# Patient Record
Sex: FEMALE | Race: WHITE | NOT HISPANIC OR LATINO | Employment: OTHER | URBAN - NONMETROPOLITAN AREA
[De-identification: names, ages, dates, MRNs, and addresses within clinical notes are randomized per-mention and may not be internally consistent; named-entity substitution may affect disease eponyms.]

---

## 2018-05-31 ENCOUNTER — APPOINTMENT (OUTPATIENT)
Dept: GENERAL RADIOLOGY | Facility: HOSPITAL | Age: 38
End: 2018-05-31

## 2018-05-31 ENCOUNTER — APPOINTMENT (OUTPATIENT)
Dept: CT IMAGING | Facility: HOSPITAL | Age: 38
End: 2018-05-31

## 2018-05-31 ENCOUNTER — HOSPITAL ENCOUNTER (OUTPATIENT)
Facility: HOSPITAL | Age: 38
Setting detail: OBSERVATION
Discharge: HOME | End: 2018-06-02
Attending: EMERGENCY MEDICINE | Admitting: FAMILY MEDICINE

## 2018-05-31 DIAGNOSIS — R00.1 BRADYCARDIA: ICD-10-CM

## 2018-05-31 DIAGNOSIS — R07.9 CHEST PAIN, UNSPECIFIED TYPE: ICD-10-CM

## 2018-05-31 DIAGNOSIS — J20.9 ACUTE BRONCHITIS, UNSPECIFIED ORGANISM: Primary | ICD-10-CM

## 2018-05-31 PROBLEM — K21.9 GERD WITHOUT ESOPHAGITIS: Chronic | Status: ACTIVE | Noted: 2018-05-31

## 2018-05-31 PROBLEM — I10 ESSENTIAL HYPERTENSION: Chronic | Status: ACTIVE | Noted: 2018-05-31

## 2018-05-31 PROBLEM — J42 CHRONIC BRONCHITIS WITH ACUTE EXACERBATION (HCC): Status: ACTIVE | Noted: 2018-05-31

## 2018-05-31 PROBLEM — M79.7 FIBROMYALGIA SYNDROME: Chronic | Status: ACTIVE | Noted: 2018-05-31

## 2018-05-31 LAB
ALBUMIN SERPL-MCNC: 4 G/DL (ref 3.5–5)
ALBUMIN/GLOB SERPL: 1.4 G/DL (ref 1–2)
ALP SERPL-CCNC: 121 U/L (ref 38–126)
ALT SERPL W P-5'-P-CCNC: 84 U/L (ref 13–69)
ANION GAP SERPL CALCULATED.3IONS-SCNC: 15.1 MMOL/L (ref 10–20)
ARTERIAL PATENCY WRIST A: POSITIVE
AST SERPL-CCNC: 171 U/L (ref 15–46)
B-HCG UR QL: NEGATIVE
BASE EXCESS BLDA CALC-SCNC: -1.9 MMOL/L
BASOPHILS # BLD AUTO: 0.02 10*3/MM3 (ref 0–0.2)
BASOPHILS NFR BLD AUTO: 0.3 % (ref 0–2.5)
BDY SITE: ABNORMAL
BILIRUB SERPL-MCNC: 1 MG/DL (ref 0.2–1.3)
BUN BLD-MCNC: 9 MG/DL (ref 7–20)
BUN/CREAT SERPL: 12.9 (ref 7.1–23.5)
CALCIUM SPEC-SCNC: 8.6 MG/DL (ref 8.4–10.2)
CHLORIDE SERPL-SCNC: 105 MMOL/L (ref 98–107)
CO2 SERPL-SCNC: 26 MMOL/L (ref 26–30)
COHGB MFR BLD: 1.2 %
CREAT BLD-MCNC: 0.7 MG/DL (ref 0.6–1.3)
DEPRECATED RDW RBC AUTO: 47.6 FL (ref 37–54)
EOSINOPHIL # BLD AUTO: 0.02 10*3/MM3 (ref 0–0.7)
EOSINOPHIL NFR BLD AUTO: 0.3 % (ref 0–7)
ERYTHROCYTE [DISTWIDTH] IN BLOOD BY AUTOMATED COUNT: 16.9 % (ref 11.5–14.5)
GFR SERPL CREATININE-BSD FRML MDRD: 95 ML/MIN/1.73
GLOBULIN UR ELPH-MCNC: 2.8 GM/DL
GLUCOSE BLD-MCNC: 133 MG/DL (ref 74–98)
GLUCOSE BLDC GLUCOMTR-MCNC: 154 MG/DL (ref 70–130)
HCO3 BLDA-SCNC: 21.8 MMOL/L (ref 22–28)
HCT VFR BLD AUTO: 30.9 % (ref 37–47)
HGB BLD-MCNC: 9.4 G/DL (ref 12–16)
HGB BLDA-MCNC: 9 G/DL (ref 12–18)
HOROWITZ INDEX BLD+IHG-RTO: 21 %
IMM GRANULOCYTES # BLD: 0.08 10*3/MM3 (ref 0–0.06)
IMM GRANULOCYTES NFR BLD: 1.2 % (ref 0–0.6)
LYMPHOCYTES # BLD AUTO: 1.13 10*3/MM3 (ref 0.6–3.4)
LYMPHOCYTES NFR BLD AUTO: 16.4 % (ref 10–50)
MCH RBC QN AUTO: 23.6 PG (ref 27–31)
MCHC RBC AUTO-ENTMCNC: 30.4 G/DL (ref 30–37)
MCV RBC AUTO: 77.4 FL (ref 81–99)
METHGB BLD QL: 0.9 %
MODALITY: ABNORMAL
MONOCYTES # BLD AUTO: 0.41 10*3/MM3 (ref 0–0.9)
MONOCYTES NFR BLD AUTO: 6 % (ref 0–12)
NEUTROPHILS # BLD AUTO: 5.21 10*3/MM3 (ref 2–6.9)
NEUTROPHILS NFR BLD AUTO: 75.8 % (ref 37–80)
NRBC BLD MANUAL-RTO: 0.4 /100 WBC (ref 0–0)
NT-PROBNP SERPL-MCNC: 368 PG/ML (ref 0–125)
OXYHGB MFR BLDV: 94.2 % (ref 94–99)
PCO2 BLDA: 30.1 MM HG (ref 35–45)
PCO2 TEMP ADJ BLD: ABNORMAL MM HG (ref 35–45)
PH BLDA: 7.47 PH UNITS (ref 7.3–7.5)
PH, TEMP CORRECTED: ABNORMAL PH UNITS
PLATELET # BLD AUTO: 199 10*3/MM3 (ref 130–400)
PMV BLD AUTO: 9.9 FL (ref 6–12)
PO2 BLDA: 79.5 MM HG (ref 75–100)
PO2 TEMP ADJ BLD: ABNORMAL MM HG (ref 83–108)
POTASSIUM BLD-SCNC: 4.1 MMOL/L (ref 3.5–5.1)
PROT SERPL-MCNC: 6.8 G/DL (ref 6.3–8.2)
RBC # BLD AUTO: 3.99 10*6/MM3 (ref 4.2–5.4)
SAO2 % BLDCOA: 96.2 %
SODIUM BLD-SCNC: 142 MMOL/L (ref 137–145)
T4 FREE SERPL-MCNC: 1.1 NG/DL (ref 0.78–2.19)
TROPONIN I SERPL-MCNC: <0.012 NG/ML (ref 0–0.03)
TSH SERPL DL<=0.05 MIU/L-ACNC: 3.06 MIU/ML (ref 0.47–4.68)
WBC NRBC COR # BLD: 6.87 10*3/MM3 (ref 4.8–10.8)

## 2018-05-31 PROCEDURE — 25010000002 METHYLPREDNISOLONE PER 125 MG: Performed by: FAMILY MEDICINE

## 2018-05-31 PROCEDURE — 36600 WITHDRAWAL OF ARTERIAL BLOOD: CPT

## 2018-05-31 PROCEDURE — G0378 HOSPITAL OBSERVATION PER HR: HCPCS

## 2018-05-31 PROCEDURE — 96375 TX/PRO/DX INJ NEW DRUG ADDON: CPT

## 2018-05-31 PROCEDURE — 80053 COMPREHEN METABOLIC PANEL: CPT | Performed by: NURSE PRACTITIONER

## 2018-05-31 PROCEDURE — 99220 PR INITIAL OBSERVATION CARE/DAY 70 MINUTES: CPT | Performed by: FAMILY MEDICINE

## 2018-05-31 PROCEDURE — 71275 CT ANGIOGRAPHY CHEST: CPT

## 2018-05-31 PROCEDURE — 25010000002 IOPAMIDOL 61 % SOLUTION: Performed by: EMERGENCY MEDICINE

## 2018-05-31 PROCEDURE — 94640 AIRWAY INHALATION TREATMENT: CPT

## 2018-05-31 PROCEDURE — 83050 HGB METHEMOGLOBIN QUAN: CPT

## 2018-05-31 PROCEDURE — 84443 ASSAY THYROID STIM HORMONE: CPT | Performed by: FAMILY MEDICINE

## 2018-05-31 PROCEDURE — 82375 ASSAY CARBOXYHB QUANT: CPT

## 2018-05-31 PROCEDURE — 93005 ELECTROCARDIOGRAM TRACING: CPT | Performed by: NURSE PRACTITIONER

## 2018-05-31 PROCEDURE — 84439 ASSAY OF FREE THYROXINE: CPT | Performed by: FAMILY MEDICINE

## 2018-05-31 PROCEDURE — 96374 THER/PROPH/DIAG INJ IV PUSH: CPT

## 2018-05-31 PROCEDURE — 99284 EMERGENCY DEPT VISIT MOD MDM: CPT

## 2018-05-31 PROCEDURE — 25010000002 KETOROLAC TROMETHAMINE PER 15 MG: Performed by: NURSE PRACTITIONER

## 2018-05-31 PROCEDURE — 82962 GLUCOSE BLOOD TEST: CPT

## 2018-05-31 PROCEDURE — 81025 URINE PREGNANCY TEST: CPT | Performed by: NURSE PRACTITIONER

## 2018-05-31 PROCEDURE — 84484 ASSAY OF TROPONIN QUANT: CPT | Performed by: NURSE PRACTITIONER

## 2018-05-31 PROCEDURE — 85025 COMPLETE CBC W/AUTO DIFF WBC: CPT | Performed by: NURSE PRACTITIONER

## 2018-05-31 PROCEDURE — 94799 UNLISTED PULMONARY SVC/PX: CPT

## 2018-05-31 PROCEDURE — 83880 ASSAY OF NATRIURETIC PEPTIDE: CPT | Performed by: NURSE PRACTITIONER

## 2018-05-31 PROCEDURE — 82805 BLOOD GASES W/O2 SATURATION: CPT

## 2018-05-31 PROCEDURE — 25010000002 FENTANYL CITRATE (PF) 100 MCG/2ML SOLUTION: Performed by: NURSE PRACTITIONER

## 2018-05-31 RX ORDER — FENTANYL CITRATE 50 UG/ML
50 INJECTION, SOLUTION INTRAMUSCULAR; INTRAVENOUS ONCE
Status: COMPLETED | OUTPATIENT
Start: 2018-05-31 | End: 2018-05-31

## 2018-05-31 RX ORDER — FLUOXETINE HYDROCHLORIDE 40 MG/1
40 CAPSULE ORAL DAILY
COMMUNITY

## 2018-05-31 RX ORDER — CALCIUM CARBONATE 200(500)MG
1 TABLET,CHEWABLE ORAL
Status: DISCONTINUED | OUTPATIENT
Start: 2018-06-01 | End: 2018-06-02 | Stop reason: HOSPADM

## 2018-05-31 RX ORDER — BUDESONIDE AND FORMOTEROL FUMARATE DIHYDRATE 160; 4.5 UG/1; UG/1
2 AEROSOL RESPIRATORY (INHALATION)
Status: DISCONTINUED | OUTPATIENT
Start: 2018-05-31 | End: 2018-06-02 | Stop reason: HOSPADM

## 2018-05-31 RX ORDER — LISINOPRIL 2.5 MG/1
2.5 TABLET ORAL DAILY
Status: ON HOLD | COMMUNITY
End: 2018-06-02

## 2018-05-31 RX ORDER — METHYLPREDNISOLONE SODIUM SUCCINATE 125 MG/2ML
80 INJECTION, POWDER, LYOPHILIZED, FOR SOLUTION INTRAMUSCULAR; INTRAVENOUS ONCE
Status: COMPLETED | OUTPATIENT
Start: 2018-05-31 | End: 2018-05-31

## 2018-05-31 RX ORDER — SODIUM CHLORIDE 0.9 % (FLUSH) 0.9 %
1-10 SYRINGE (ML) INJECTION AS NEEDED
Status: DISCONTINUED | OUTPATIENT
Start: 2018-05-31 | End: 2018-06-02 | Stop reason: HOSPADM

## 2018-05-31 RX ORDER — IPRATROPIUM BROMIDE AND ALBUTEROL SULFATE 2.5; .5 MG/3ML; MG/3ML
3 SOLUTION RESPIRATORY (INHALATION) ONCE
Status: COMPLETED | OUTPATIENT
Start: 2018-05-31 | End: 2018-05-31

## 2018-05-31 RX ORDER — ACETAMINOPHEN 325 MG/1
325 TABLET ORAL EVERY 4 HOURS PRN
Status: DISCONTINUED | OUTPATIENT
Start: 2018-05-31 | End: 2018-06-02 | Stop reason: HOSPADM

## 2018-05-31 RX ORDER — LANSOPRAZOLE 30 MG/1
30 CAPSULE, DELAYED RELEASE ORAL DAILY
COMMUNITY
End: 2020-06-16 | Stop reason: HOSPADM

## 2018-05-31 RX ORDER — LISINOPRIL 5 MG/1
5 TABLET ORAL DAILY
Status: DISCONTINUED | OUTPATIENT
Start: 2018-06-01 | End: 2018-06-02 | Stop reason: HOSPADM

## 2018-05-31 RX ORDER — KETOROLAC TROMETHAMINE 30 MG/ML
30 INJECTION, SOLUTION INTRAMUSCULAR; INTRAVENOUS ONCE
Status: COMPLETED | OUTPATIENT
Start: 2018-05-31 | End: 2018-05-31

## 2018-05-31 RX ORDER — GABAPENTIN 800 MG/1
800 TABLET ORAL 3 TIMES DAILY
COMMUNITY

## 2018-05-31 RX ORDER — SODIUM CHLORIDE 9 MG/ML
75 INJECTION, SOLUTION INTRAVENOUS CONTINUOUS
Status: DISCONTINUED | OUTPATIENT
Start: 2018-05-31 | End: 2018-06-01

## 2018-05-31 RX ORDER — AZITHROMYCIN 250 MG/1
500 TABLET, FILM COATED ORAL
Status: DISCONTINUED | OUTPATIENT
Start: 2018-06-01 | End: 2018-06-02 | Stop reason: HOSPADM

## 2018-05-31 RX ORDER — GABAPENTIN 400 MG/1
400 CAPSULE ORAL EVERY 8 HOURS SCHEDULED
Status: DISCONTINUED | OUTPATIENT
Start: 2018-05-31 | End: 2018-06-02 | Stop reason: HOSPADM

## 2018-05-31 RX ORDER — METHYLPREDNISOLONE SODIUM SUCCINATE 40 MG/ML
40 INJECTION, POWDER, LYOPHILIZED, FOR SOLUTION INTRAMUSCULAR; INTRAVENOUS EVERY 12 HOURS
Status: DISCONTINUED | OUTPATIENT
Start: 2020-06-01 | End: 2018-06-01

## 2018-05-31 RX ORDER — ONDANSETRON 2 MG/ML
4 INJECTION INTRAMUSCULAR; INTRAVENOUS EVERY 6 HOURS PRN
Status: DISCONTINUED | OUTPATIENT
Start: 2018-05-31 | End: 2018-06-02 | Stop reason: HOSPADM

## 2018-05-31 RX ORDER — FAMOTIDINE 20 MG/1
20 TABLET, FILM COATED ORAL 2 TIMES DAILY
Status: DISCONTINUED | OUTPATIENT
Start: 2018-05-31 | End: 2018-06-02 | Stop reason: HOSPADM

## 2018-05-31 RX ORDER — ROPINIROLE 1 MG/1
1 TABLET, FILM COATED ORAL 3 TIMES DAILY
COMMUNITY
End: 2018-06-02 | Stop reason: HOSPADM

## 2018-05-31 RX ADMIN — FENTANYL CITRATE 50 MCG: 50 INJECTION, SOLUTION INTRAMUSCULAR; INTRAVENOUS at 19:19

## 2018-05-31 RX ADMIN — METHYLPREDNISOLONE SODIUM SUCCINATE 80 MG: 125 INJECTION, POWDER, FOR SOLUTION INTRAMUSCULAR; INTRAVENOUS at 19:19

## 2018-05-31 RX ADMIN — FAMOTIDINE 20 MG: 20 TABLET ORAL at 21:20

## 2018-05-31 RX ADMIN — BUDESONIDE AND FORMOTEROL FUMARATE DIHYDRATE 2 PUFF: 160; 4.5 AEROSOL RESPIRATORY (INHALATION) at 22:53

## 2018-05-31 RX ADMIN — ACETAMINOPHEN 325 MG: 325 TABLET, FILM COATED ORAL at 23:21

## 2018-05-31 RX ADMIN — GABAPENTIN 400 MG: 400 CAPSULE ORAL at 21:20

## 2018-05-31 RX ADMIN — IOPAMIDOL 100 ML: 612 INJECTION, SOLUTION INTRAVENOUS at 17:51

## 2018-05-31 RX ADMIN — KETOROLAC TROMETHAMINE 30 MG: 30 INJECTION, SOLUTION INTRAMUSCULAR; INTRAVENOUS at 17:15

## 2018-05-31 RX ADMIN — IPRATROPIUM BROMIDE AND ALBUTEROL SULFATE 3 ML: .5; 3 SOLUTION RESPIRATORY (INHALATION) at 15:42

## 2018-05-31 RX ADMIN — SODIUM CHLORIDE 75 ML/HR: 9 INJECTION, SOLUTION INTRAVENOUS at 21:20

## 2018-06-01 ENCOUNTER — APPOINTMENT (OUTPATIENT)
Dept: ULTRASOUND IMAGING | Facility: HOSPITAL | Age: 38
End: 2018-06-01

## 2018-06-01 LAB
ALBUMIN SERPL-MCNC: 4.1 G/DL (ref 3.5–5)
ALBUMIN/GLOB SERPL: 1.3 G/DL (ref 1–2)
ALP SERPL-CCNC: 120 U/L (ref 38–126)
ALT SERPL W P-5'-P-CCNC: 123 U/L (ref 13–69)
ANION GAP SERPL CALCULATED.3IONS-SCNC: 21.7 MMOL/L (ref 10–20)
ANISOCYTOSIS BLD QL: NORMAL
AST SERPL-CCNC: 173 U/L (ref 15–46)
BACTERIA UR QL AUTO: ABNORMAL /HPF
BASOPHILS # BLD AUTO: 0.01 10*3/MM3 (ref 0–0.2)
BASOPHILS NFR BLD AUTO: 0.1 % (ref 0–2.5)
BILIRUB SERPL-MCNC: 0.9 MG/DL (ref 0.2–1.3)
BILIRUB UR QL STRIP: NEGATIVE
BUN BLD-MCNC: 8 MG/DL (ref 7–20)
BUN/CREAT SERPL: 13.3 (ref 7.1–23.5)
CALCIUM SPEC-SCNC: 8.6 MG/DL (ref 8.4–10.2)
CHLORIDE SERPL-SCNC: 105 MMOL/L (ref 98–107)
CLARITY UR: ABNORMAL
CO2 SERPL-SCNC: 21 MMOL/L (ref 26–30)
COLOR UR: YELLOW
CREAT BLD-MCNC: 0.6 MG/DL (ref 0.6–1.3)
DEPRECATED RDW RBC AUTO: 47.5 FL (ref 37–54)
EOSINOPHIL # BLD AUTO: 0 10*3/MM3 (ref 0–0.7)
EOSINOPHIL NFR BLD AUTO: 0 % (ref 0–7)
ERYTHROCYTE [DISTWIDTH] IN BLOOD BY AUTOMATED COUNT: 17 % (ref 11.5–14.5)
ERYTHROCYTE [SEDIMENTATION RATE] IN BLOOD: 19 MM/HR (ref 0–20)
GFR SERPL CREATININE-BSD FRML MDRD: 113 ML/MIN/1.73
GLOBULIN UR ELPH-MCNC: 3.2 GM/DL
GLUCOSE BLD-MCNC: 248 MG/DL (ref 74–98)
GLUCOSE BLDC GLUCOMTR-MCNC: 247 MG/DL (ref 70–130)
GLUCOSE UR STRIP-MCNC: NEGATIVE MG/DL
HBA1C MFR BLD: 6 % (ref 3–6)
HCT VFR BLD AUTO: 32.2 % (ref 37–47)
HGB BLD-MCNC: 9.5 G/DL (ref 12–16)
HGB UR QL STRIP.AUTO: ABNORMAL
HIGH SENSITIVE C-REACTIVE PROTEIN MG/L: >15 MG/L (ref 1–3)
HYALINE CASTS UR QL AUTO: ABNORMAL /LPF
IMM GRANULOCYTES # BLD: 0.17 10*3/MM3 (ref 0–0.06)
IMM GRANULOCYTES NFR BLD: 2.1 % (ref 0–0.6)
KETONES UR QL STRIP: NEGATIVE
LEUKOCYTE ESTERASE UR QL STRIP.AUTO: ABNORMAL
LIPASE SERPL-CCNC: 99 U/L (ref 23–300)
LYMPHOCYTES # BLD AUTO: 0.73 10*3/MM3 (ref 0.6–3.4)
LYMPHOCYTES NFR BLD AUTO: 8.9 % (ref 10–50)
MAGNESIUM SERPL-MCNC: 2.2 MG/DL (ref 1.6–2.3)
MCH RBC QN AUTO: 22.9 PG (ref 27–31)
MCHC RBC AUTO-ENTMCNC: 29.5 G/DL (ref 30–37)
MCV RBC AUTO: 77.6 FL (ref 81–99)
MONOCYTES # BLD AUTO: 0.15 10*3/MM3 (ref 0–0.9)
MONOCYTES NFR BLD AUTO: 1.8 % (ref 0–12)
NEUTROPHILS # BLD AUTO: 7.16 10*3/MM3 (ref 2–6.9)
NEUTROPHILS NFR BLD AUTO: 87.1 % (ref 37–80)
NITRITE UR QL STRIP: NEGATIVE
NRBC BLD MANUAL-RTO: 0 /100 WBC (ref 0–0)
PH UR STRIP.AUTO: 6 [PH] (ref 5–8)
PLAT MORPH BLD: NORMAL
PLATELET # BLD AUTO: 224 10*3/MM3 (ref 130–400)
PMV BLD AUTO: 11.8 FL (ref 6–12)
POLYCHROMASIA BLD QL SMEAR: NORMAL
POTASSIUM BLD-SCNC: 4.7 MMOL/L (ref 3.5–5.1)
PROT SERPL-MCNC: 7.3 G/DL (ref 6.3–8.2)
PROT UR QL STRIP: NEGATIVE
RBC # BLD AUTO: 4.15 10*6/MM3 (ref 4.2–5.4)
RBC # UR: ABNORMAL /HPF
REF LAB TEST METHOD: ABNORMAL
SODIUM BLD-SCNC: 143 MMOL/L (ref 137–145)
SP GR UR STRIP: 1.01 (ref 1–1.03)
SQUAMOUS #/AREA URNS HPF: ABNORMAL /HPF
UROBILINOGEN UR QL STRIP: ABNORMAL
WBC MORPH BLD: NORMAL
WBC NRBC COR # BLD: 8.22 10*3/MM3 (ref 4.8–10.8)
WBC UR QL AUTO: ABNORMAL /HPF

## 2018-06-01 PROCEDURE — 83735 ASSAY OF MAGNESIUM: CPT | Performed by: FAMILY MEDICINE

## 2018-06-01 PROCEDURE — 25010000002 ENOXAPARIN PER 10 MG: Performed by: NURSE PRACTITIONER

## 2018-06-01 PROCEDURE — 80053 COMPREHEN METABOLIC PANEL: CPT | Performed by: FAMILY MEDICINE

## 2018-06-01 PROCEDURE — 80074 ACUTE HEPATITIS PANEL: CPT | Performed by: NURSE PRACTITIONER

## 2018-06-01 PROCEDURE — 96372 THER/PROPH/DIAG INJ SC/IM: CPT

## 2018-06-01 PROCEDURE — 63710000001 PREDNISONE PER 1 MG: Performed by: NURSE PRACTITIONER

## 2018-06-01 PROCEDURE — 94799 UNLISTED PULMONARY SVC/PX: CPT

## 2018-06-01 PROCEDURE — 85651 RBC SED RATE NONAUTOMATED: CPT | Performed by: INTERNAL MEDICINE

## 2018-06-01 PROCEDURE — 85007 BL SMEAR W/DIFF WBC COUNT: CPT | Performed by: FAMILY MEDICINE

## 2018-06-01 PROCEDURE — 86141 C-REACTIVE PROTEIN HS: CPT | Performed by: INTERNAL MEDICINE

## 2018-06-01 PROCEDURE — 85025 COMPLETE CBC W/AUTO DIFF WBC: CPT | Performed by: FAMILY MEDICINE

## 2018-06-01 PROCEDURE — 82962 GLUCOSE BLOOD TEST: CPT

## 2018-06-01 PROCEDURE — 83036 HEMOGLOBIN GLYCOSYLATED A1C: CPT | Performed by: INTERNAL MEDICINE

## 2018-06-01 PROCEDURE — 25010000002 ONDANSETRON PER 1 MG: Performed by: FAMILY MEDICINE

## 2018-06-01 PROCEDURE — 83690 ASSAY OF LIPASE: CPT | Performed by: INTERNAL MEDICINE

## 2018-06-01 PROCEDURE — 86618 LYME DISEASE ANTIBODY: CPT | Performed by: INTERNAL MEDICINE

## 2018-06-01 PROCEDURE — 76705 ECHO EXAM OF ABDOMEN: CPT

## 2018-06-01 PROCEDURE — 81001 URINALYSIS AUTO W/SCOPE: CPT | Performed by: INTERNAL MEDICINE

## 2018-06-01 PROCEDURE — G0378 HOSPITAL OBSERVATION PER HR: HCPCS

## 2018-06-01 PROCEDURE — 99225 PR SBSQ OBSERVATION CARE/DAY 25 MINUTES: CPT | Performed by: NURSE PRACTITIONER

## 2018-06-01 PROCEDURE — 96375 TX/PRO/DX INJ NEW DRUG ADDON: CPT

## 2018-06-01 RX ORDER — TIZANIDINE 4 MG/1
4 TABLET ORAL EVERY 12 HOURS SCHEDULED
Status: DISCONTINUED | OUTPATIENT
Start: 2018-06-01 | End: 2018-06-02 | Stop reason: HOSPADM

## 2018-06-01 RX ORDER — PREDNISONE 20 MG/1
40 TABLET ORAL
Status: DISCONTINUED | OUTPATIENT
Start: 2018-06-01 | End: 2018-06-02 | Stop reason: HOSPADM

## 2018-06-01 RX ORDER — SODIUM CHLORIDE 450 MG/100ML
50 INJECTION, SOLUTION INTRAVENOUS CONTINUOUS
Status: DISCONTINUED | OUTPATIENT
Start: 2018-06-01 | End: 2018-06-02 | Stop reason: HOSPADM

## 2018-06-01 RX ORDER — BENZONATATE 100 MG/1
100 CAPSULE ORAL 3 TIMES DAILY PRN
Status: DISCONTINUED | OUTPATIENT
Start: 2018-06-01 | End: 2018-06-02 | Stop reason: HOSPADM

## 2018-06-01 RX ADMIN — BUDESONIDE AND FORMOTEROL FUMARATE DIHYDRATE 2 PUFF: 160; 4.5 AEROSOL RESPIRATORY (INHALATION) at 07:21

## 2018-06-01 RX ADMIN — PREDNISONE 40 MG: 20 TABLET ORAL at 14:09

## 2018-06-01 RX ADMIN — CALCIUM CARBONATE 1 TABLET: 500 TABLET, CHEWABLE ORAL at 12:45

## 2018-06-01 RX ADMIN — AZITHROMYCIN MONOHYDRATE 500 MG: 250 TABLET ORAL at 09:08

## 2018-06-01 RX ADMIN — ENOXAPARIN SODIUM 40 MG: 40 INJECTION SUBCUTANEOUS at 14:09

## 2018-06-01 RX ADMIN — BUDESONIDE AND FORMOTEROL FUMARATE DIHYDRATE 2 PUFF: 160; 4.5 AEROSOL RESPIRATORY (INHALATION) at 19:25

## 2018-06-01 RX ADMIN — ONDANSETRON 4 MG: 2 INJECTION INTRAMUSCULAR; INTRAVENOUS at 15:11

## 2018-06-01 RX ADMIN — LISINOPRIL 5 MG: 5 TABLET ORAL at 09:07

## 2018-06-01 RX ADMIN — ACETAMINOPHEN 325 MG: 325 TABLET, FILM COATED ORAL at 22:16

## 2018-06-01 RX ADMIN — GABAPENTIN 400 MG: 400 CAPSULE ORAL at 22:12

## 2018-06-01 RX ADMIN — SODIUM CHLORIDE 50 ML/HR: 4.5 INJECTION, SOLUTION INTRAVENOUS at 14:12

## 2018-06-01 RX ADMIN — CALCIUM CARBONATE 1 TABLET: 500 TABLET, CHEWABLE ORAL at 06:43

## 2018-06-01 RX ADMIN — TIZANIDINE 4 MG: 4 TABLET ORAL at 02:31

## 2018-06-01 RX ADMIN — CALCIUM CARBONATE 1 TABLET: 500 TABLET, CHEWABLE ORAL at 17:29

## 2018-06-01 RX ADMIN — BENZONATATE 100 MG: 100 CAPSULE, LIQUID FILLED ORAL at 12:45

## 2018-06-01 RX ADMIN — FAMOTIDINE 20 MG: 20 TABLET ORAL at 09:08

## 2018-06-01 RX ADMIN — GABAPENTIN 400 MG: 400 CAPSULE ORAL at 14:09

## 2018-06-01 RX ADMIN — FAMOTIDINE 20 MG: 20 TABLET ORAL at 22:12

## 2018-06-01 RX ADMIN — GABAPENTIN 400 MG: 400 CAPSULE ORAL at 06:43

## 2018-06-01 RX ADMIN — TIZANIDINE 4 MG: 4 TABLET ORAL at 22:12

## 2018-06-01 NOTE — DISCHARGE INSTR - OTHER ORDERS
If you have any questions about your recovery, please call the [facility] Nurse Call Center at [phone]. A registered nurse is available 24 hours a day 7 days a week to assist you.

## 2018-06-01 NOTE — CONSULTS
Referring Provider: Grover Zhou  Reason for Consultation: Bradycardia     Patient Care Team:  Ollie Flores MD as PCP - General (Internal Medicine)        History of present illness:    Middle-aged lady with multiple medical problems is been feeling bad for the last 3 days time.  Feels like she is can pass out on and off.  These comes in waves and 50 has to hold onto something to feel any better.  She also has lost her appetite has been having aches and pains all over the body and feels generalized fatigue.  Has been admitted for possible intermittent bradycardia/exacerbation of her reactive airway disease.    Patient presently feels weak in general.  On questioning feels that she had a swelling of the left lower extremity that came up about 3 days ago and then gradually went away.  Has been feeling weak and tired and has no appetite for the last few days now.    Review of Systems   Pertinent items are noted in HPI  Review of Systems      History  #1 Baseline EKG-sinus rhythm AR interval of 162 ms rate of 42 beats a minute no acute ST segment changes.    #2 LV function assessment-EF 65% bedside echocardiogram for 6/18.    #3 CAD workup none.    #4 hypertension.    #5 obesity with recent weight gain.    #6 sleep apnea syndrome-high probability significant snoring through the night.    #7 fibromyalgia.    #8 celiac disease.    #9  degenerative disc disease of the low back.    #10 PCOS    #11. Pre Diabetic status.    #12 abnormal menstrual cycles heavy bleeding.    Personal history:    Nonsmoker does not drink alcohol.  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    Family history:    A sister and a brother have had myocardial infarction in the mid 50s.    Review of symptoms   has had generalized fatigue and malaise has not had any Actual Syncope Swelling of the Left Leg 3 Days Ago.  There Is No Stroke Weakness Joint Swelling No History of Tick Bite.    Past Surgical History:   Procedure Laterality  "Date   • CHOLECYSTECTOMY     , History reviewed. No pertinent family history., Social History   Substance Use Topics   • Smoking status: Former Smoker     Quit date: 2014   • Smokeless tobacco: Never Used   • Alcohol use No   , Prescriptions Prior to Admission   Medication Sig Dispense Refill Last Dose   • FLUoxetine (PROzac) 20 MG capsule Take 40 mg by mouth Daily.   5/31/2018 at Unknown time   • gabapentin (NEURONTIN) 800 MG tablet Take 800 mg by mouth 3 (Three) Times a Day.   5/30/2018 at Unknown time   • lansoprazole (PREVACID) 30 MG capsule Take 30 mg by mouth Daily.   5/30/2018 at Unknown time   • lisinopril (PRINIVIL,ZESTRIL) 2.5 MG tablet Take 2.5 mg by mouth Daily.   Past Week at Unknown time   • metFORMIN (GLUCOPHAGE) 500 MG tablet Take 500 mg by mouth 2 (Two) Times a Day With Meals.      • rOPINIRole (REQUIP) 1 MG tablet Take 1 mg by mouth 3 (Three) Times a Day. Take 1 hour before bedtime.      • TiZANidine HCl (ZANAFLEX PO) Take 4 mg by mouth Every Night. 1-2 tabs   5/30/2018 at Unknown time   , Scheduled Meds:    azithromycin 500 mg Oral Q24H   budesonide-formoterol 2 puff Inhalation BID - RT   calcium carbonate 1 tablet Oral TID AC   enoxaparin 40 mg Subcutaneous Q24H   famotidine 20 mg Oral BID   gabapentin 400 mg Oral Q8H   lisinopril 5 mg Oral Daily   predniSONE 40 mg Oral Daily With Breakfast   tiZANidine 4 mg Oral Q12H   , Continuous Infusions:    sodium chloride 50 mL/hr   , PRN Meds:  •  acetaminophen  •  benzonatate  •  ondansetron  •  sodium chloride, Allergies:  Penicillins and Phenobarbital     Objective     Vital Sign Min/Max for last 24 hours  Temp  Min: 98.1 °F (36.7 °C)  Max: 99.2 °F (37.3 °C)   BP  Min: 136/86  Max: 175/75   Pulse  Min: 41  Max: 63   Resp  Min: 16  Max: 18   SpO2  Min: 94 %  Max: 99 %   No Data Recorded   Weight  Min: 127 kg (280 lb 9.6 oz)  Max: 129 kg (283 lb 14.4 oz)     Flowsheet Rows      First Filed Value   Admission Height  165.1 cm (65\") Documented at 05/31/2018 " 1509   Admission Weight  128 kg (283 lb) Documented at 05/31/2018 1509               Physical Exam:     General Appearance:    Alert, cooperative, in no acute distress   Head:    Normocephalic, without obvious abnormality, atraumatic   Eyes:            Lids and lashes normal, conjunctivae and sclerae normal, no   icterus, no pallor, corneas clear, PERRLA   Ears:    Ears appear intact with no abnormalities noted   Throat:   No oral lesions, no thrush, oral mucosa moist   Neck:   No adenopathy, supple, trachea midline, no thyromegaly, no     carotid bruit, no JVD   Back:     No kyphosis present, no scoliosis present, no skin lesions,       erythema or scars, no tenderness to percussion or                   palpation,   range of motion normal   Lungs:     Clear to auscultation,respirations regular, even and                   unlabored    Heart:    Regular rhythm and normal rate, normal S1 and S2, no            murmur, no gallop, no rub, no click   Breast Exam:    Deferred   Abdomen:     Normal bowel sounds, no masses, no organomegaly, soft        non-tender, non-distended, no guarding, no rebound                 tenderness   Genitalia:    Deferred   Extremities:   Moves all extremities well, no edema, no cyanosis, no              redness   Pulses:   Pulses palpable and equal bilaterally   Skin:   No bleeding, bruising or rash   Lymph nodes:   No palpable adenopathy   Neurologic:   Cranial nerves 2 - 12 grossly intact, sensation intact, DTR        present and equal bilaterally       Results Review:   I reviewed the patient's new clinical results.    Telemetry-average heart rate 50 range 36-90.  No prolonged pauses noted.  Marketed sinus bradycardia noted.    LAB DATA :           WBC   Date Value Ref Range Status   06/01/2018 8.22 4.80 - 10.80 10*3/mm3 Final     RBC   Date Value Ref Range Status   06/01/2018 4.15 (L) 4.20 - 5.40 10*6/mm3 Final     Hemoglobin   Date Value Ref Range Status   06/01/2018 9.5 (L) 12.0 - 16.0  g/dL Final     Hematocrit   Date Value Ref Range Status   06/01/2018 32.2 (L) 37.0 - 47.0 % Final     MCV   Date Value Ref Range Status   06/01/2018 77.6 (L) 81.0 - 99.0 fL Final     MCH   Date Value Ref Range Status   06/01/2018 22.9 (L) 27.0 - 31.0 pg Final     MCHC   Date Value Ref Range Status   06/01/2018 29.5 (L) 30.0 - 37.0 g/dL Final     RDW   Date Value Ref Range Status   06/01/2018 17.0 (H) 11.5 - 14.5 % Final     RDW-SD   Date Value Ref Range Status   06/01/2018 47.5 37.0 - 54.0 fl Final     MPV   Date Value Ref Range Status   06/01/2018 11.8 6.0 - 12.0 fL Final     Platelets   Date Value Ref Range Status   06/01/2018 224 130 - 400 10*3/mm3 Final     Neutrophil %   Date Value Ref Range Status   06/01/2018 87.1 (H) 37.0 - 80.0 % Final     Lymphocyte %   Date Value Ref Range Status   06/01/2018 8.9 (L) 10.0 - 50.0 % Final     Monocyte %   Date Value Ref Range Status   06/01/2018 1.8 0.0 - 12.0 % Final     Eosinophil %   Date Value Ref Range Status   06/01/2018 0.0 0.0 - 7.0 % Final     Basophil %   Date Value Ref Range Status   06/01/2018 0.1 0.0 - 2.5 % Final     Immature Grans %   Date Value Ref Range Status   06/01/2018 2.1 (H) 0.0 - 0.6 % Final     Neutrophils, Absolute   Date Value Ref Range Status   06/01/2018 7.16 (H) 2.00 - 6.90 10*3/mm3 Final     Lymphocytes, Absolute   Date Value Ref Range Status   06/01/2018 0.73 0.60 - 3.40 10*3/mm3 Final     Monocytes, Absolute   Date Value Ref Range Status   06/01/2018 0.15 0.00 - 0.90 10*3/mm3 Final     Eosinophils, Absolute   Date Value Ref Range Status   06/01/2018 0.00 0.00 - 0.70 10*3/mm3 Final     Basophils, Absolute   Date Value Ref Range Status   06/01/2018 0.01 0.00 - 0.20 10*3/mm3 Final     Immature Grans, Absolute   Date Value Ref Range Status   06/01/2018 0.17 (H) 0.00 - 0.06 10*3/mm3 Final     nRBC   Date Value Ref Range Status   06/01/2018 0.0 0.0 - 0.0 /100 WBC Final       Lab Results   Component Value Date    GLUCOSE 248 (H) 06/01/2018    BUN 8  06/01/2018    CREATININE 0.60 06/01/2018    EGFRIFNONA 113 06/01/2018    BCR 13.3 06/01/2018    CO2 21.0 (L) 06/01/2018    CALCIUM 8.6 06/01/2018    ALBUMIN 4.10 06/01/2018    LABIL2 1.3 06/01/2018     (H) 06/01/2018     (H) 06/01/2018       Lab Results   Component Value Date    CKTOTAL 59 11/10/2016    CKMB 0.4 11/10/2016    CKMBINDEX 1 11/10/2016    TROPONINI <0.012 05/31/2018       No results found for: DDIMER    Site   Date Value Ref Range Status   05/31/2018 Arterial: right brachial  Final     Carlos's Test   Date Value Ref Range Status   05/31/2018 Positive  Final     pH, Arterial   Date Value Ref Range Status   05/31/2018 7.469 7.300 - 7.500 pH units Final     pCO2, Arterial   Date Value Ref Range Status   05/31/2018 30.1 (L) 35.0 - 45.0 mm Hg Final     pO2, Arterial   Date Value Ref Range Status   05/31/2018 79.5 75.0 - 100.0 mm Hg Final     HCO3, Arterial   Date Value Ref Range Status   05/31/2018 21.8 (L) 22.0 - 28.0 mmol/L Final     Base Excess, Arterial   Date Value Ref Range Status   05/31/2018 -1.9 mmol/L Final     O2 Saturation, Arterial   Date Value Ref Range Status   05/31/2018 96.2 % Final     Hemoglobin, Blood Gas   Date Value Ref Range Status   05/31/2018 9.0 (L) 12 - 18 g/dL Final     Oxyhemoglobin   Date Value Ref Range Status   05/31/2018 94.2 94 - 99 % Final     Methemoglobin   Date Value Ref Range Status   05/31/2018 0.90 % Final     Carboxyhemoglobin   Date Value Ref Range Status   05/31/2018 1.2 % Final     Modality   Date Value Ref Range Status   05/31/2018 Room Air  Final     FIO2   Date Value Ref Range Status   05/31/2018 21 % Final     Lab Results   Component Value Date    HGBA1C 5.8 11/11/2016       Results from last 7 days  Lab Units 05/31/18  1534   TSH mIU/mL 3.060   FREE T4 ng/dL 1.10     No results found for: LIPASE    IMAGING DATA:     Ct Chest Pulmonary Embolism With Contrast    Result Date: 5/31/2018  Narrative: FINAL REPORT TECHNIQUE: Thin section axial images  were obtained through the chest and during the arterial phase of IV contrast administration. Coronal 3-D MIP reconstructed images were also provided. CLINICAL HISTORY: shortness of breath. PE PROTOCOL FINDINGS: The pulmonary arteries are well-opacified and there is no filling defect to indicate pulmonary embolism. The thoracic aorta is normal.  There are small right greater than left pleural effusions.  There is subtle groundglass opacity and interlobular septal thickening in the lower lung zones that may be due to pulmonary edema.  The lungs are otherwise clear. There is no adenopathy or significant osseous abnormality.     Impression: Unremarkable. Authenticated by Mino Reina M.D. on 05/31/2018 06:15:10 PM        DIAGNOSIS   #1 bradycardia: Patient has had abrupt onset bradycardia.  Will need Lyme's titer checked.  We'll continue to monitor on telemetry.  Her thyroid function appears to be normal.  There are no medications on board at this point in time that should cause the bradycardia.  Her recurrent dizzy spells waves of nausea and feeling like she is on a pass out appear to be related to her bradycardia.  Will have to watch her closely until the heart rate stabilizes.  Patient is in agreement with this plan.    #2 autoimmune disorders: Patient has multiple symptoms associated with the same.  Was checked for pericardial effusion by bedside echo which was negative.  The LV function appears to be normal.  We will check her CRP and ESR Level at This Time to Quantify the Intermittent Markers.    #3 Hypertension: On Manual Blood Pressure Checked Which Is 1 40 x 80.  Would Discontinue the Saline to and Switch It to Half-Normal Saline at This Point in Time.    Thank you again for me take part in the care of Mrs. Flaherty.  Continue to follow her during this hospital stay.        Principal Problem:    Chronic bronchitis with acute exacerbation  Active Problems:    Bradycardia with 41-50 beats per minute    Essential  hypertension    Fibromyalgia syndrome    GERD without esophagitis    Acute bronchitis          I discussed the patients findings and my recommendations with patient    Huber Pierson MD  06/01/18  1:52 PM      Please note that portions of this note may have been completed with a voice recognition program. Efforts were made to edit the dictations, but occasionally words are mistranscribed.

## 2018-06-01 NOTE — H&P
[facility] Medicine Services  HISTORY AND PHYSICAL    Primary Care Physician: Cecilio Newell DO    Subjective     Chief Complaint:    Chief Complaint   Patient presents with   • Chest Pain       History of Present Illness:     I have reviewed labs/imaging/records from this hospitalization, including ER staff and admitting/attending physicians H/P's and progress notes to establish a comprehensive understanding of this patient's clinical hospital course, as well as to establish a transition of care appropriately.    Patient is a 37-year-old female who presents to the emergency room today with complaints of progressive worsening shortness of breath with cough.  Patient states she has had worsening of malaise/fatigue additionally, all symptoms seemingly to have again around 5-6 days ago.  She has not been seen by her primary care provider for this however.  Patient denies any known sick contacts.  She has a significant history of chronic bronchitis however.  She also has known hypertension and fibromyalgia syndrome.  She denies any recent changes to her home medications.  Patient gives a history of frequently awakening at night with a cough.  She denies any active or focal wheezing.  She has no known history of reactive airway disease or asthma as a child.  She denies any particular or known allergens history.  Patient does have known GERD, currently listed as taking daily proton pump inhibitor.  She denies any dysphagia or worsened reflux symptoms.    In the emergency room patient was found to have respiratory symptoms, and complaints of chest discomfort or shortness of breath which prompted laboratory evaluation demonstrating normal white blood cell count, as well as chronic anemia of iron deficiency.  Thyroid levels were normal including TSH and free T4.  Initial troponin was negative.  Arterial blood gas demonstrates a normal pH of 7.46, PCO2 of 30.1.  Bicarbonate of 21.  Renal  function was normal.  Mild elevation to her ALT and AST of 84 and 171.  Patient was given an IV dose of Solu-Medrol, with mild improvement to her respiratory symptoms and generalized malaise and fatigue.  Patient has had relative bradycardia throughout the majority of her emergency room visit, with a heart rate in the 40s.  She has no known history of sick sinus syndrome or symptomatic bradycardia in the past.  EKG demonstrates sinus bradycardia, with prolonged QT interval, likely reflective of her bradycardia.    Review of Systems   1. Constitutional: Negative for fever. Negative for chills, diaphoresis.  Progressively worsening malaise/fatigue and without unexpected weight change.  Denies night sweats.  2. HENT: No dysphagia; no changes to vision/hearing/smell/taste; no epistaxis  3. Eyes: Negative for redness and visual disturbance.   4. Respiratory: Positive for shortness of breath, not persistent. Negative for chest pain .  Paroxysmal coughing without defined wheezing, associated with chest tightness at times.  No hemoptysis.    5. Cardiovascular: Negative for chest pain and palpitations.   6. Gastrointestinal: Negative for abdominal distention, abdominal pain and blood in stool.   7. Endocrine: Negative for cold intolerance and heat intolerance.   8. Genitourinary: Decreased frequency and volume of urination.   9. Musculoskeletal: Chronic arthralgias, back pain and myalgias.   10. Skin: Negative for color change, rash and wound.   11. Neurological: Negative for syncope and headaches.  Generalized weakness.  12. Hematological: Negative for adenopathy. Does not bruise/bleed easily.   13. Psychiatric/Behavioral: Negative for confusion. The patient is not nervous/anxious.     Past Medical History:   Past Medical History:   Diagnosis Date   • Chronic bronchitis    • Fibromyalgia    • Hypertension    • PCOS (polycystic ovarian syndrome)    • Pneumonia    • Seizures        Past Surgical History:  Past Surgical  "History:   Procedure Laterality Date   • CHOLECYSTECTOMY         Family History: family history is not on file.    Social History:  reports that she quit smoking about 4 years ago. She has never used smokeless tobacco. She reports that she does not drink alcohol or use drugs.    Medications:  Prescriptions Prior to Admission   Medication Sig Dispense Refill Last Dose   • FLUoxetine (PROzac) 20 MG capsule Take 40 mg by mouth Daily.   5/31/2018 at Unknown time   • gabapentin (NEURONTIN) 800 MG tablet Take 800 mg by mouth 3 (Three) Times a Day.   5/30/2018 at Unknown time   • lansoprazole (PREVACID) 30 MG capsule Take 30 mg by mouth Daily.   5/30/2018 at Unknown time   • lisinopril (PRINIVIL,ZESTRIL) 2.5 MG tablet Take 2.5 mg by mouth Daily.   Past Week at Unknown time   • TiZANidine HCl (ZANAFLEX PO) Take 4 mg by mouth Every Night. 1-2 tabs   5/30/2018 at Unknown time       Allergies:  Allergies   Allergen Reactions   • Penicillins Rash   • Phenobarbital Rash         Objective     Physical Exam:  Vital Signs: /89   Pulse 54   Temp 99.2 °F (37.3 °C) (Oral)   Resp 18   Ht 165.1 cm (65\")   Wt 128 kg (283 lb)   LMP 05/24/2018   SpO2 98%   BMI 47.09 kg/m²      General Appearance: alert, oriented x 3, no acute distress.  Skin: warm and dry.   HEENT: Atraumatic.  pupils round and reactive to light and accommodation, oral mucosa pink and moist.  Nares patent without epistaxis.  External auditory canals are patent tympanic membranes intact.  Neck: supple, no JVD, trachea midline.  No thyromegaly  Lungs: Audible air exchange noted all lung fields, mild/trace end expiratory wheezes bilaterally.  Rhonchus referred upper airway congestion is cleared mostly with light cough.  Heart: Bradycardia with a heart rate of 56 bpm, normal S1 and S2, no S3, no rub.  Abdomen: soft, non-tender, no palpable bladder, present bowel sounds to auscultation ×4.  No guarding or rigidity.  Truncal obesity.  Extremities: no clubbing, " cyanosis or edema.  Good range of motion actively and passively.  Symmetric muscle strength and development  Neuro: normal speech and mental status.  Cranial nerves II through XII intact.  No anosmia. DTR 2+; proprioception intact.  No focal motor/sensory deficits.          Results Reviewed:    Lab Results (last 72 hours)     Procedure Component Value Units Date/Time    TSH [[id number]]  (Normal) Collected:  05/31/18 1534    Specimen:  Blood Updated:  05/31/18 2022     TSH 3.060 mIU/mL     T4, Free [[id number]]  (Normal) Collected:  05/31/18 1534    Specimen:  Blood Updated:  05/31/18 2022     Free T4 1.10 ng/dL     Blood Gas, Arterial With Co-Ox [[id number]]  (Abnormal) Collected:  05/31/18 1842    Specimen:  Arterial Blood Updated:  05/31/18 1839     Site Arterial: right brachial     Carlos's Test Positive     pH, Arterial 7.469 pH units      pCO2, Arterial 30.1 (L) mm Hg      pO2, Arterial 79.5 mm Hg      HCO3, Arterial 21.8 (L) mmol/L      Base Excess, Arterial -1.9 mmol/L      O2 Saturation, Arterial 96.2 %      Hemoglobin, Blood Gas 9.0 (L) g/dL      Oxyhemoglobin 94.2 %      Methemoglobin 0.90 %      Carboxyhemoglobin 1.2 %      Modality Room Air     FIO2 21 %      pH, Temp Corrected -- pH Units      pCO2, Temperature Corrected -- mm Hg      pO2, Temperature Corrected -- mm Hg     Pregnancy, Urine - Urine, Clean Catch [[id number]]  (Normal) Collected:  05/31/18 1710    Specimen:  Urine from Urine, Clean Catch Updated:  05/31/18 1733     HCG, Urine QL Negative    BNP [[id number]]  (Abnormal) Collected:  05/31/18 1534    Specimen:  Blood Updated:  05/31/18 1616     proBNP 368.0 (C) pg/mL     Comprehensive Metabolic Panel [[id number]]  (Abnormal) Collected:  05/31/18 1534    Specimen:  Blood Updated:  05/31/18 1614     Glucose 133 (H) mg/dL      BUN 9 mg/dL      Creatinine 0.70 mg/dL      Sodium 142 mmol/L      Potassium 4.1 mmol/L      Chloride 105 mmol/L      CO2 26.0 mmol/L      Calcium 8.6 mg/dL      Total Protein 6.8  g/dL      Albumin 4.00 g/dL      ALT (SGPT) 84 (H) U/L      AST (SGOT) 171 (H) U/L      Alkaline Phosphatase 121 U/L      Total Bilirubin 1.0 mg/dL      eGFR Non African Amer 95 mL/min/1.73      Globulin 2.8 gm/dL      A/G Ratio 1.4 g/dL      BUN/Creatinine Ratio 12.9     Anion Gap 15.1 mmol/L     Narrative:       GFR Normal >60  Chronic Kidney Disease <60  Kidney Failure <15    Troponin [[id number]]  (Normal) Collected:  05/31/18 1534    Specimen:  Blood Updated:  05/31/18 1614     Troponin I <0.012 ng/mL     Narrative:       Normal Patient Upper Reference Limit (URL) (99th Percentile)=0.03 ng/mL   Non-AMI Illness Reference Limit=0.03-0.11 ng/mL   AMI Confirmation=0.12 ng/mL and above    CBC & Differential [[id number]] Collected:  05/31/18 1534    Specimen:  Blood Updated:  05/31/18 1543    Narrative:       The following orders were created for panel order CBC & Differential.  Procedure                               Abnormality         Status                     ---------                               -----------         ------                     CBC Auto Differential[[id number]]        Abnormal            Final result                 Please view results for these tests on the individual orders.    CBC Auto Differential [[id number]]  (Abnormal) Collected:  05/31/18 1534    Specimen:  Blood Updated:  05/31/18 1543     WBC 6.87 10*3/mm3      RBC 3.99 (L) 10*6/mm3      Hemoglobin 9.4 (L) g/dL      Hematocrit 30.9 (L) %      MCV 77.4 (L) fL      MCH 23.6 (L) pg      MCHC 30.4 g/dL      RDW 16.9 (H) %      RDW-SD 47.6 fl      MPV 9.9 fL      Platelets 199 10*3/mm3      Neutrophil % 75.8 %      Lymphocyte % 16.4 %      Monocyte % 6.0 %      Eosinophil % 0.3 %      Basophil % 0.3 %      Immature Grans % 1.2 (H) %      Neutrophils, Absolute 5.21 10*3/mm3      Lymphocytes, Absolute 1.13 10*3/mm3      Monocytes, Absolute 0.41 10*3/mm3      Eosinophils, Absolute 0.02 10*3/mm3      Basophils, Absolute 0.02 10*3/mm3      Immature  Grans, Absolute 0.08 (H) 10*3/mm3      nRBC 0.4 (H) /100 WBC           Imaging Results (last 72 hours)     Procedure Component Value Units Date/Time    CT Chest Pulmonary Embolism With Contrast [[id number]] Collected:  05/31/18 1815     Updated:  05/31/18 1816    Narrative:       FINAL REPORT    TECHNIQUE:  Thin section axial images were obtained through the chest and  during the arterial phase of IV contrast administration. Coronal  3-D MIP reconstructed images were also provided.    CLINICAL HISTORY:  shortness of breath. PE PROTOCOL    FINDINGS:  The pulmonary arteries are well-opacified and there is no  filling defect to indicate pulmonary embolism. The thoracic  aorta is normal.  There are small right greater than left  pleural effusions.  There is subtle groundglass opacity and  interlobular septal thickening in the lower lung zones that may  be due to pulmonary edema.  The lungs are otherwise clear.  There is no adenopathy or significant osseous abnormality.      Impression:       Unremarkable.    Authenticated by Mino Reina M.D. on 05/31/2018 06:15:10 PM          ECG/EMG Results (most recent)     None          I have personally reviewed and interpreted available lab data, radiology studies and ECG obtained at time of admission.     Assessment / Plan     Assessment/Problem List:   Principal Problem:    Chronic bronchitis with acute exacerbation  Active Problems:    Bradycardia with 41-50 beats per minute    Essential hypertension    Fibromyalgia syndrome    GERD without esophagitis          Plan:  Patient will be admitted to telemetry.  I do suspect ropinirole is contributing to patient's bradycardia.  Plan to hold today's dosing, resume lower dosing and decreased frequency beginning tomorrow at 0.5 mg dosing twice daily.  Avoid abrupt discontinuation due to potential withdrawal effects.  I have recommended twice daily dosing of Pepcid.  One Tums 30 minutes prior to each meal 3 times daily.  Hold use of  metformin at this time.  I've also recommended a decrease dosing of gabapentin to 400 mg 3 times daily.  Patient has an acute exacerbation of clinical chronic bronchitis, I recommended initiation of Symbicort 2 puffs twice daily 160/4.5.  Patient will be continued on DuoNeb breathing treatments as needed, as well as oxygen supplementation should it also be required.  SCUDs to bilateral lower extremities.  Hopeful for a an immediate improvement to her bradycardia with adjustment to her ropinirole dosing, as well as improvement to her respiratory status with IV Solu-Medrol and initiation of long-acting bronchodilator and inhaled steroid.  Discussed the need to continue the inhaled steroid and long-acting bronchodilator for a prolonged period of time, with consideration to lowering of dosage in approximately 3 months time.  Patient relative bradycardia is without symptoms at this time, and certainly can be attributable to her ropinirole use.  I see no findings or family history of any sick sinus syndrome.  I have discussed the plan of care in detail with patient, she verbalized understanding and agrees.  I've answered all of her questions today.      I spent a total of 70 minutes in direct patient care time today.      Grover Zhou,  05/31/18 8:44 PM    Please note that portions of this note may have been completed with a voice recognition program. Efforts were made to edit the dictations, but occasionally words are mistranscribed.

## 2018-06-01 NOTE — PLAN OF CARE
Problem: Cardiac: ACS (Acute Coronary Syndrome) (Adult)  Goal: Signs and Symptoms of Listed Potential Problems Will be Absent, Minimized or Managed (Cardiac: ACS)   05/31/18 8736   Goal/Outcome Evaluation   Problems Assessed (Acute Coronary Syndrome) chest pain (angina);situational response   Problems Present (Acute Coronary Syn) dysrhythmia/arrhythmia

## 2018-06-01 NOTE — PROGRESS NOTES
Adult Nutrition  Assessment/PES    Patient Name:  Liliana Calderon  YOB: 1980  MRN: [id number]  Admit Date:  5/31/2018    Assessment Date:  6/1/2018    Comments:    Recommend:  1. Continue current diet order.  2. Encourage PO intake. PO intake average ~12% x 2 meals.  3. RD ordered Boost daily with lunch.  4. Consider MVI with minerals daily.     RD to follow pt and available PRN.        Reason for Assessment     Row Name 06/01/18 1515          Reason for Assessment    Reason For Assessment diagnosis/disease state     Diagnosis cardiac disease;gastrointestinal disease;other (see comments)   Chronic bronchitis, Bradycardia, HTN, Fibromyalgia, GERD     Identified At Risk by Screening Criteria BMI;other (see comments)   LACE                 Labs/Tests/Procedures/Meds     Row Name 06/01/18 1518          Labs/Procedures/Meds    Lab Results Reviewed reviewed, pertinent     Lab Results Comments High: Gluc, ALT               Estimated/Assessed Needs     Row Name 06/01/18 1520          Calculation Measurements    Weight Used For Calculations 57.3 kg (126 lb 4.8 oz)   IBW        Estimated/Assessed Needs    Additional Documentation Fluid Requirements (Group);[place]-St. Jeor Equation (Group);Protein Requirements (Group);Calorie Requirements (Group)        Calorie Requirements    Estimated Calorie Requirement (kcal/day) --   AF 1.3     Estimated Calorie Requirement Comment 1642 - 1842        KCAL/KG    14 Kcal/Kg (kcal) 802.06     15 Kcal/Kg (kcal) 859.35     18 Kcal/Kg (kcal) 1031.22     20 Kcal/Kg (kcal) 1145.8     25 Kcal/Kg (kcal) 1432.25     30 Kcal/Kg (kcal) 1718.7     35 Kcal/Kg (kcal) 2005.15     40 Kcal/Kg (kcal) 2291.6     45 Kcal/Kg (kcal) 2578.05     50 Kcal/Kg (kcal) 2864.5        [place]-St. Jeor Equation    RMR ([place]-St. Jeor Equation) 1263.78        Protein Requirements    Est Protein Requirement Amount (gms/kg) 1.2 gm protein   58 - 68 gm     Estimated Protein Requirements (gms/day) 68.75         Fluid Requirements    Estimated Fluid Requirement Method [place]-Segar Formula     [place]-Segar Method (over 20 kg) 2645.8             Nutrition Prescription Ordered     Row Name 06/01/18 1526          Nutrition Prescription PO    Current PO Diet Regular     Common Modifiers Cardiac             Evaluation of Received Nutrient/Fluid Intake     Row Name 06/01/18 1520          Calculation Measurements    Weight Used For Calculations 57.3 kg (126 lb 4.8 oz)   IBW        PO Evaluation    Number of Days PO Intake Evaluated 1 day     Number of Meals 2     % PO Intake 12             Evaluation of Prescribed Nutrient/Fluid Intake     Row Name 06/01/18 1520          Calculation Measurements    Weight Used For Calculations 57.3 kg (126 lb 4.8 oz)   IBW           Problem/Interventions:        Problem 1     Row Name 06/01/18 1527          Nutrition Diagnoses Problem 1    Problem 1 Overweight/Obesity     Etiology (related to) Factors Affecting Nutrition     Food Habit/Preferences Large Meals     Signs/Symptoms (evidenced by) BMI     BMI Greater than 40             Problem 2     Row Name 06/01/18 1527          Nutrition Diagnoses Problem 2    Problem 2 Inadequate Intake/Infusion     Inadequate Intake Type Oral     Macronutrient Fluid;Fiber;Protein;Kcal;Carbohydrate;Fat     Micronutrient Vitamin;Mineral     Etiology (related to) Factors Affecting Nutrition     Food Habit/Preferences Small Meals   While inpatient     Signs/Symptoms (evidenced by) PO Intake     Percent (%) intake recorded 12 %     Over number of meals 2                   Intervention Goal     Row Name 06/01/18 1528          Intervention Goal    General Meet nutritional needs for age/condition     PO Meet estimated needs;Increase intake;PO intake (%)     PO Intake % 50 %     Weight No significant weight loss             Nutrition Intervention     Row Name 06/01/18 1528          Nutrition Intervention    RD/Tech Action Follow Tx progress;Encourage  intake;Recommend/ordered     Recommended/Ordered Supplement       [unfilled]        Education/Evaluation     Row Name 06/01/18 1529          Education    Education Will Instruct as appropriate        Monitor/Evaluation    Monitor Per protocol;I&O;PO intake;Supplement intake;Pertinent labs;Weight         Electronically signed by:  Shari Buchanan RD  06/01/18 3:30 PM

## 2018-06-01 NOTE — PROGRESS NOTES
Discharge Planning Assessment   Andino     Patient Name: Liliana Calderon  MRN: [id number]  Today's Date: 6/1/2018    Admit Date: 5/31/2018          Discharge Needs Assessment     Row Name 06/01/18 1135       Living Environment    Lives With child(royce), dependent    Name(s) of Who Lives With Patient 14 and 10 y/o sons    Current Living Arrangements home/apartment/condo    Primary Care Provided by self    Provides Primary Care For child(royce)    Family Caregiver if Needed none    Quality of Family Relationships unable to assess    Able to Return to Prior Arrangements yes       Resource/Environmental Concerns    Resource/Environmental Concerns none       Transition Planning    Patient/Family Anticipates Transition to home    Patient/Family Anticipated Services at Transition none    Transportation Anticipated family or friend will provide       Discharge Needs Assessment    Readmission Within the Last 30 Days no previous admission in last 30 days    Concerns to be Addressed denies needs/concerns at this time    Equipment Currently Used at Home none    Anticipated Changes Related to Illness none    Equipment Needed After Discharge none            Discharge Plan     Row Name 06/01/18 1132       Plan    Plan Home    Patient/Family in Agreement with Plan yes    Plan Comments Spoke with pt in room re [facility]; she is alone.  Pt reports that she lives in a duplex with her two dep sons.   She is independent and plans to return home.  Her sister or friends provide transportation.  Pt has a nebulizer and glucometer, but does not use.  Address, phone & PCP verified.  CM will continue to follow and assist with discharge as needed.        Destination     No service coordination in this encounter.      Durable Medical Equipment     No service coordination in this encounter.      Dialysis/Infusion     No service coordination in this encounter.      Home Medical Care     No service coordination in this encounter.      Social Care      No service coordination in this encounter.        Expected Discharge Date and Time     Expected Discharge Date Expected Discharge Time    Jun 2, 2018               Demographic Summary     Row Name 06/01/18 1134       General Information    Admission Type observation    Arrived From emergency department    Required Notices Provided Observation Status Notice    Referral Source admission list    Reason for Consult discharge planning    Preferred Language English     Used During This Interaction no            Functional Status     Row Name 06/01/18 1134       Functional Status    Usual Activity Tolerance good       Functional Status, IADL    Medications independent    Meal Preparation independent    Housekeeping independent    Laundry independent    Shopping independent       Mental Status    General Appearance WDL WDL       Mental Status Summary    Recent Changes in Mental Status/Cognitive Functioning no changes       Employment/    Employment Status disabled    Current or Previous Occupation desk job            Psychosocial    No documentation.           Abuse/Neglect    No documentation.           Legal    No documentation.           Substance Abuse    No documentation.           Patient Forms    No documentation.         Susan Hernadez

## 2018-06-01 NOTE — PROGRESS NOTES
PROGRESS NOTE        Date of Admission: 5/31/2018  Length of Stay: 0  Primary Care Physician: Ollie Flores MD    Subjective   Chief Complaint: Chest pain and cough  HPI: This is a 37-year-old female who is admitted last night after she presented to the emergency room with complaints of chest pain and worsening shortness of breath.  Patient also has a known history of chronic bronchitis.  Upon evaluation in the emergency room she was complaining of some chest discomfort and shortness of breath.  Labs were done for which her TSH and free T4 were normal.  Arterial blood gas showed a pH of 7.46, PCO2 of 30.1, bicarbonate of 21.  Her renal function was normal.  She does have some mild elevation of her liver enzymes for which I have ordered a hepatitis panel and an ultrasound of her liver.  She does complain of some postnasal drainage with a tickle in the back of her throat for which I have ordered Tessalon Perles.  She has noted some bradycardia on telemetry with heart rate as low as 30s.  There was question of a 2.2 second pause on telemetry however I have reviewed the strip and it does not appear that this was a true pause.  Cardiology has been consulted for patient's bradycardia.    Review Of Systems:   Review of Systems   General ROS: Patient denies any fevers, chills or loss of consciousness.    Psychological ROS: Denies any hallucinations and delusions.  Ophthalmic ROS: Denies any diplopia or transient loss of vision.  ENT ROS: Denies sore throat, nasal congestion or ear pain.   Allergy and Immunology ROS: Denies rash or itching.  Hematological and Lymphatic ROS: Denies neck swelling or easy bleeding.  Endocrine ROS: Denies any recent unintentional weight gain or loss.  Breast ROS: Denies any pain or swelling.  Respiratory ROS: Complains of cough and shortness of breath.   Cardiovascular ROS: Denies chest pain or palpitations. No history of exertional chest pain.   Gastrointestinal ROS: Denies nausea and  vomiting. Denies any abdominal pain. No diarrhea.  Genito-Urinary ROS: Denies dysuria or hematuria.  Musculoskeletal ROS: Denies back pain. No muscle pain. No calf pain.   Neurological ROS: Denies any focal weakness. No loss of consciousness. Denies any numbness. Denies neck pain.   Dermatological ROS: Denies any redness or pruritis.    Objective      Temp:  [98.1 °F (36.7 °C)-99.2 °F (37.3 °C)] 98.3 °F (36.8 °C)  Heart Rate:  [41-63] 45  Resp:  [16-18] 18  BP: (136-175)/() 144/86  Physical Exam    General Appearance:  Alert and cooperative, not in any acute distress.   Head:  Atraumatic and normocephalic, without obvious abnormality.   Eyes:          PERRLA, conjunctivae and sclerae normal, no Icterus. No pallor. Extra-occular movements are within normal limits.   Ears:  Ears appear intact with no abnormalities noted.   Throat: No oral lesions, no thrush, oral mucosa moist.   Neck: Supple, trachea midline, no thyromegaly, no carotid bruit.   Back:   No kyphoscoliosis present. No tenderness to palpation,   range of motion normal.   Lungs:   Chest shape is normal. Breath sounds heard bilaterally equally.  No crackles or wheezing. No Pleural rub or bronchial breathing.   Heart:  Normal S1 and S2, no murmur, no gallop, no rub. No JVD   Abdomen:   Normal bowel sounds, no masses, no organomegaly. Soft     non-tender, non-distended, no guarding, no rebound                tenderness   Extremities: Moves all extremities well, no edema, no cyanosis, no clubbing.   Pulses: Pulses palpable and equal bilaterally   Skin: No bleeding, bruising or rash   Lymph nodes: No palpable adenopathy   Neurologic:    Psychiatric/Behavior:     Cranial nerves 2 - 12 grossly intact, sensation intact, Motor power is normal and equal bilaterally.  Mood normal, behavior normal       Results Review:    I have reviewed the labs, radiology results and diagnostic studies.      Results from last 7 days  Lab Units 06/01/18  0611   WBC 10*3/mm3  8.22   HEMOGLOBIN g/dL 9.5*   PLATELETS 10*3/mm3 224       Results from last 7 days  Lab Units 06/01/18  0611   SODIUM mmol/L 143   POTASSIUM mmol/L 4.7   CO2 mmol/L 21.0*   CREATININE mg/dL 0.60   GLUCOSE mg/dL 248*       Culture Data:    Radiology Data:   Cardiology Data:    I have reviewed the medications.    Assessment/Plan     Assessment and Plan:  1.  Acute bronchitis-patient is on empiric antibiotic therapy in the form of azithromycin, breathing treatments.  Patient has been started on Symbicort.    2.  Symptomatic bradycardia-according to the patient she has been having episodes where she feels dizzy like she is going to pass out.  She is on telemetry for which she has had some episodes of bradycardia with heart rate in the 30s to 40s.  Cardiology has been consulted.    3.  Morbid obesity-counseling    4.  Fibromyalgia    5.  Chronic essential hypertension-blood pressure stable continue to monitor.    6.  Elevated liver enzymes-  Hepatitis panel and US liver        DVT prophylaxis:  Lovenox  Discharge Planning:   SHAYNE Sol 06/01/18 1:10 PM

## 2018-06-02 VITALS
RESPIRATION RATE: 18 BRPM | DIASTOLIC BLOOD PRESSURE: 95 MMHG | HEART RATE: 50 BPM | HEIGHT: 65 IN | WEIGHT: 281.6 LBS | TEMPERATURE: 98.2 F | BODY MASS INDEX: 46.92 KG/M2 | OXYGEN SATURATION: 100 % | SYSTOLIC BLOOD PRESSURE: 143 MMHG

## 2018-06-02 LAB
B BURGDOR IGM SER-ACNC: <0.8 INDEX (ref 0–0.79)
HAV IGM SERPL QL IA: NEGATIVE
HBV CORE IGM SERPL QL IA: NEGATIVE
HBV SURFACE AG SERPL QL IA: NEGATIVE
HCV AB S/CO SERPL IA: <0.1 S/CO RATIO (ref 0–0.9)

## 2018-06-02 PROCEDURE — 94799 UNLISTED PULMONARY SVC/PX: CPT

## 2018-06-02 PROCEDURE — 63710000001 PREDNISONE PER 1 MG: Performed by: NURSE PRACTITIONER

## 2018-06-02 PROCEDURE — 99217 PR OBSERVATION CARE DISCHARGE MANAGEMENT: CPT | Performed by: INTERNAL MEDICINE

## 2018-06-02 PROCEDURE — G0378 HOSPITAL OBSERVATION PER HR: HCPCS

## 2018-06-02 RX ORDER — BUDESONIDE AND FORMOTEROL FUMARATE DIHYDRATE 160; 4.5 UG/1; UG/1
2 AEROSOL RESPIRATORY (INHALATION)
Qty: 1 INHALER | Refills: 0 | Status: SHIPPED | OUTPATIENT
Start: 2018-06-02

## 2018-06-02 RX ORDER — ONDANSETRON 4 MG/1
4 TABLET, FILM COATED ORAL EVERY 8 HOURS PRN
Qty: 6 TABLET | Refills: 0 | Status: ON HOLD | OUTPATIENT
Start: 2018-06-02 | End: 2018-07-27

## 2018-06-02 RX ORDER — LISINOPRIL 5 MG/1
5 TABLET ORAL DAILY
Qty: 30 TABLET | Refills: 0 | Status: ON HOLD | OUTPATIENT
Start: 2018-06-02 | End: 2018-07-26

## 2018-06-02 RX ORDER — PREDNISONE 20 MG/1
20 TABLET ORAL
Qty: 10 TABLET | Refills: 0 | Status: ON HOLD | OUTPATIENT
Start: 2018-06-02 | End: 2018-07-27

## 2018-06-02 RX ORDER — AZITHROMYCIN 250 MG/1
TABLET, FILM COATED ORAL
Qty: 1 TABLET | Refills: 0 | Status: ON HOLD | OUTPATIENT
Start: 2018-06-02 | End: 2018-07-26

## 2018-06-02 RX ADMIN — PREDNISONE 40 MG: 20 TABLET ORAL at 09:35

## 2018-06-02 RX ADMIN — BUDESONIDE AND FORMOTEROL FUMARATE DIHYDRATE 2 PUFF: 160; 4.5 AEROSOL RESPIRATORY (INHALATION) at 07:08

## 2018-06-02 RX ADMIN — AZITHROMYCIN MONOHYDRATE 500 MG: 250 TABLET ORAL at 09:35

## 2018-06-02 RX ADMIN — GABAPENTIN 400 MG: 400 CAPSULE ORAL at 06:50

## 2018-06-02 RX ADMIN — BENZONATATE 100 MG: 100 CAPSULE, LIQUID FILLED ORAL at 00:04

## 2018-06-02 RX ADMIN — FAMOTIDINE 20 MG: 20 TABLET ORAL at 09:35

## 2018-06-02 RX ADMIN — TIZANIDINE 4 MG: 4 TABLET ORAL at 09:35

## 2018-06-02 RX ADMIN — CALCIUM CARBONATE 1 TABLET: 500 TABLET, CHEWABLE ORAL at 06:50

## 2018-06-02 RX ADMIN — LISINOPRIL 5 MG: 5 TABLET ORAL at 09:35

## 2018-06-02 NOTE — PROGRESS NOTES
"   LOS: 0 days   Patient Care Team:  Ollie Flores MD as PCP - General (Internal Medicine)      Interval History: Patient feels some better.  He is not as fatigued and tired.  Has a little more energy and she is eating this morning.        Review of Systems:   Pertinent items are noted in HPI.      Objective     Vital Sign Min/Max for last 24 hours  Temp  Min: 97.6 °F (36.4 °C)  Max: 98.6 °F (37 °C)   BP  Min: 139/72  Max: 146/87   Pulse  Min: 45  Max: 66   Resp  Min: 17  Max: 20   SpO2  Min: 94 %  Max: 100 %   No Data Recorded   Weight  Min: 128 kg (281 lb 9.6 oz)  Max: 128 kg (281 lb 9.6 oz)     Flowsheet Rows      First Filed Value   Admission Height  165.1 cm (65\") Documented at 05/31/2018 1509   Admission Weight  128 kg (283 lb) Documented at 05/31/2018 1509          Physical Exam:     General Appearance:    Alert, cooperative, in no acute distress   Head:    Normocephalic, without obvious abnormality, atraumatic   Eyes:            Lids and lashes normal, conjunctivae and sclerae normal, no   icterus, no pallor, corneas clear, PERRLA   Ears:    Ears appear intact with no abnormalities noted   Throat:   No oral lesions, no thrush, oral mucosa moist   Neck:   No adenopathy, supple, trachea midline, no thyromegaly, no     carotid bruit, no JVD   Back:     No kyphosis present, no scoliosis present, no skin lesions,       erythema or scars, no tenderness to percussion or                   palpation,   range of motion normal   Lungs:     Clear to auscultation,respirations regular, even and                   unlabored    Heart:    Regular rhythm and normal rate, normal S1 and S2, no            murmur, no gallop, no rub, no click   Breast Exam:    Deferred   Abdomen:     Normal bowel sounds, no masses, no organomegaly, soft        non-tender, non-distended, no guarding, no rebound                 tenderness   Genitalia:    Deferred   Extremities:   Moves all extremities well, no edema, no cyanosis, no            "   redness   Pulses:   Pulses palpable and equal bilaterally   Skin:   No bleeding, bruising or rash   Lymph nodes:   No palpable adenopathy   Neurologic:   Cranial nerves 2 - 12 grossly intact, sensation intact, DTR        present and equal bilaterally        Results Review:     I reviewed the patient's new clinical results.    Results Review:   I reviewed the patient's new clinical results.    Average heart rate of 52.  No pauses noted.    LAB DATA :           Laboratory results:      Results from last 7 days  Lab Units 06/01/18  0611 05/31/18  1534   SODIUM mmol/L 143 142   POTASSIUM mmol/L 4.7 4.1   CHLORIDE mmol/L 105 105   CO2 mmol/L 21.0* 26.0   BUN mg/dL 8 9   CREATININE mg/dL 0.60 0.70   CALCIUM mg/dL 8.6 8.6   BILIRUBIN mg/dL 0.9 1.0   ALK PHOS U/L 120 121   ALT (SGPT) U/L 123* 84*   AST (SGOT) U/L 173* 171*   GLUCOSE mg/dL 248* 133*       Results from last 7 days  Lab Units 06/01/18  0611 05/31/18  1534   WBC 10*3/mm3 8.22 6.87   HEMOGLOBIN g/dL 9.5* 9.4*   HEMATOCRIT % 32.2* 30.9*   PLATELETS 10*3/mm3 224 199                   Results from last 7 days  Lab Units 05/31/18  1534   TROPONIN I ng/mL <0.012               Results from last 7 days  Lab Units 05/31/18  1842   PH, ARTERIAL pH units 7.469   PCO2, ARTERIAL mm Hg 30.1*   PO2 ART mm Hg 79.5   HCO3 ART mmol/L 21.8*   BASE EXCESS ART mmol/L -1.9   O2 SATURATION ART % 96.2   HEMOGLOBIN, ARTERIAL g/dL 9.0*   OXYHEMOGLOBIN % 94.2   METHEMOGLOBIN % 0.90   CARBOXYHEMOGLOBIN % 1.2   MODALITY  Room Air   FIO2 % 21     Lab Results   Component Value Date    HGBA1C 6.0 06/01/2018       Results from last 7 days  Lab Units 05/31/18  1534   TSH mIU/mL 3.060   FREE T4 ng/dL 1.10       Results from last 7 days  Lab Units 06/01/18  1406   LIPASE U/L 99       IMAGING DATA:     Us Liver    Result Date: 6/1/2018  Narrative: US LIVER  HISTORY: Elevated liver enzymes; J20.9-Acute bronchitis, unspecified; R07.9-Chest pain, unspecified; R00.1-Bradycardia, unspecified.      FINDINGS:  Multiple transverse and longitudinal scans were performed of the right upper quadrant of the abdomen with attention to the liver.  Liver is enlarged. There is diffuse increased echogenicity which may be seen with fatty change. No focal hepatic lesion is seen. No intrahepatic duct dilatation is identified. No evidence of common bile duct dilatation is identified.  The upper common duct measures 5 mm.  Color and spectral Doppler evaluation shows patent portal and hepatic veins. There is normal directional flow.  No appreciable ascites is seen.  Postcholecystectomy changes are present.  No fluid collection is seen in the gallbladder fossa.   Pancreas is obscured. The right kidney shows no evidence of obstruction.           Impression: Abnormally increased echogenicity throughout the liver probably fatty change or less likely cirrhosis with mild hepatomegaly.            Ct Chest Pulmonary Embolism With Contrast    Result Date: 5/31/2018  Narrative: FINAL REPORT TECHNIQUE: Thin section axial images were obtained through the chest and during the arterial phase of IV contrast administration. Coronal 3-D MIP reconstructed images were also provided. CLINICAL HISTORY: shortness of breath. PE PROTOCOL FINDINGS: The pulmonary arteries are well-opacified and there is no filling defect to indicate pulmonary embolism. The thoracic aorta is normal.  There are small right greater than left pleural effusions.  There is subtle groundglass opacity and interlobular septal thickening in the lower lung zones that may be due to pulmonary edema.  The lungs are otherwise clear. There is no adenopathy or significant osseous abnormality.     Impression: Unremarkable. Authenticated by Mino Reina M.D. on 05/31/2018 06:15:10 PM            Assessment/Plan    #1 bradycardia: Overnight the patient has done well to average heart rate is around 50-60.  No long pauses noted.  I do not have an explanation for a heart rate of 3740s that  occurred day before yesterday.  Await the Lyme titers still.  Patient has been asked to call if any recurrent symptoms do occur or if she has any nausea and excessive fatigue and tiredness.  No further workup being planned at this time.    #2 hypertension: The blood pressures appear to be borderline high still.  This may partly be related to her steroid intake at this time.  Will need to be followed up on outpatient basis. we will arrange for an echocardiogram and follow-up in about a month.    Principal Problem:    Chronic bronchitis with acute exacerbation  Active Problems:    Bradycardia with 41-50 beats per minute    Essential hypertension    Fibromyalgia syndrome    GERD without esophagitis    Acute bronchitis            Huber Pierson MD  06/02/18  8:04 AM

## 2018-06-02 NOTE — PLAN OF CARE
Problem: Patient Care Overview  Goal: Plan of Care Review  Outcome: Ongoing (interventions implemented as appropriate)   06/02/18 0615   Coping/Psychosocial   Plan of Care Reviewed With patient   Plan of Care Review   Progress improving   OTHER   Outcome Summary ultrasound

## 2018-06-02 NOTE — DISCHARGE SUMMARY
[facility]      Name:  Liliana Calderon   Age:  37 y.o.  Sex:  female  :  1980  MRN:  [id number]   Visit Number:  [id number]    Admission Date:  2018  Date of Discharge:  2018  Primary Care Physician:  Ollie Flores MD    Discharge Diagnoses:   Principal Problem:    Chronic bronchitis with acute exacerbation  Active Problems:    Bradycardia with 41-50 beats per minute    Essential hypertension    Fibromyalgia syndrome    GERD without esophagitis    Acute bronchitis      Presenting Problem:    Acute bronchitis, unspecified organism [J20.9]     Consults:     Consults     Date and Time Order Name Status Description    2018 0723 Inpatient Cardiology Consult          Consulting Physician(s)     Provider Relationship Specialty    Huber Pierson MD Consulting Physician Interventional Cardiology          Procedures Performed:           History of presenting illness/Hospital Course:  Patient is a 37-year-old female with medical history of PCOS, hypertension, GERD, celiac disease, recurrent episodes of bronchitis and fibromyalgia who presented to the ER on  with complaints of worsening shortness of breath, cough and fatigue.  Apparently all of her symptoms have been going on for about a week or so.  Patient was found to have mildly elevated AST and ALT and low hemoglobin at 9.5 on admission with otherwise normal lab findings.  CT chest pulmonary embolism was performed which was negative.  However, she was noted to be bradycardic with EKG showing sinus bradycardia into the 40s.     Patient does report history of episodes of near syncope in the recent past.  Cardiology consultation was obtained with Dr. Pierson who recommended checking lyme titers along with inflammatory markers and to monitor overnight for any further episodes of bradycardia. Patient did not have any notable pauses on telemetry and has had HR in 55-60 range since. Dr. Pierson  did evaluate patient for pericardial effusion with bedside echo and there was no finding of this. Her LV function on the bedside echo also appeared to be normal. Nonetheless, he did recommend a full echocardiogram which could not be done due to logistical issues during this hospitalization. Dr. Pierson feels that the patient is stable for discharge home and that he will arrange for outpatient echocardiogram and further evaluation.Thyroid functions were checked and are within normal limits.     Patient's blood pressures have been notably high. She was placed back on Lisinopril 5mg daily with appropriate response in BP.     Patient was found to have acute bronchitis on admission and was placed on Azithromycin and oral steroids with improvement in her symptoms. Dr. Pierson recommends continuing patient on the steroids as there could possibly be a inflammatory process contributing to patient's symptoms. She will be discharged home on one more day of azithromycin and 20mg oral prednisone daily.     Ms. Calderon was found to have slightly elevated LFTs on admission. US of abdomen showed enlarged liver with suspected fatty infiltration. Hepatitis panel negative. She was advised to try and lose weight and control her diet.     Today, patient states that she's still quite fatigued and tired. She does not have a good appetite and feeling slightly nauseous and has brought up bile at times. This is apparently a chronic issue for her. Denies any abdominal pain, fever or chills. She has chronic diarrhea due to Celiac and there has not been any changes in the consistently or amount of stool.     Patient has a history of anemia due to PCOS causing heavy menstrual cycles. She is supposed to be on iron supplementation but has not taken it in. I advised her to start taking iron pills as anemia can be contributing to some of her fatigue and tiredness. She verbalized understanding of this.     Patient was advised to follow up with her  primary care physician in 3-5 days and Dr. Pierson in 1 week.   She was advised to refrain from driving until cleared by Dr. Pierson.    Vital Signs:    Temp:  [97.6 °F (36.4 °C)-98.6 °F (37 °C)] 98.2 °F (36.8 °C)  Heart Rate:  [45-66] 50  Resp:  [17-20] 18  BP: (139-146)/(72-95) 143/95    Physical Exam:    General Appearance:  Alert and cooperative, not in any acute distress.   Head:  Atraumatic and normocephalic, without obvious abnormality.   Eyes:          PERRLA, conjunctivae and sclerae normal, no Icterus. No pallor. Extra-occular movements are within normal limits.   Ears:  Ears appear intact with no abnormalities noted.   Throat: No oral lesions, no thrush, oral mucosa moist.   Neck: Supple, trachea midline, no thyromegaly, no carotid bruit.   Back:   No kyphoscoliosis present. No tenderness to palpation,   range of motion normal.   Lungs:   Chest shape is normal. Breath sounds heard bilaterally equally.  No crackles or wheezing. No Pleural rub or bronchial breathing.   Heart:  Normal S1 and S2, no murmur, no gallop, no rub. No JVD.   Abdomen:   Normal bowel sounds, no masses, no organomegaly. Soft, non-tender, non-distended, no guarding, no rebound tenderness.   Extremities: Moves all extremities well, no edema, no cyanosis, no clubbing.   Pulses: Pulses palpable and equal bilaterally.   Skin: No bleeding, bruising or rash.   Lymph nodes: No palpable adenopathy.   Neurologic: Alert and oriented x 3. Moves all four limbs equally. No tremors. No facial asymetry.     Pertinent Lab Results:       Results from last 7 days  Lab Units 06/01/18  0611 05/31/18  1534   SODIUM mmol/L 143 142   POTASSIUM mmol/L 4.7 4.1   CHLORIDE mmol/L 105 105   CO2 mmol/L 21.0* 26.0   BUN mg/dL 8 9   CREATININE mg/dL 0.60 0.70   CALCIUM mg/dL 8.6 8.6   BILIRUBIN mg/dL 0.9 1.0   ALK PHOS U/L 120 121   ALT (SGPT) U/L 123* 84*   AST (SGOT) U/L 173* 171*   GLUCOSE mg/dL 248* 133*       Results from last 7 days  Lab Units 06/01/18  0611  05/31/18  1534   WBC 10*3/mm3 8.22 6.87   HEMOGLOBIN g/dL 9.5* 9.4*   HEMATOCRIT % 32.2* 30.9*   PLATELETS 10*3/mm3 224 199           Results from last 7 days  Lab Units 05/31/18  1534   TROPONIN I ng/mL <0.012       Results from last 7 days  Lab Units 05/31/18  1534   PROBNP pg/mL 368.0*           Results from last 7 days  Lab Units 06/01/18  1406   LIPASE U/L 99       Results from last 7 days  Lab Units 05/31/18  1842   PH, ARTERIAL pH units 7.469   PO2 ART mm Hg 79.5   PCO2, ARTERIAL mm Hg 30.1*   HCO3 ART mmol/L 21.8*       Results from last 7 days  Lab Units 06/01/18  1836   COLOR UA  Yellow   GLUCOSE UA  Negative   KETONES UA  Negative   LEUKOCYTES UA  Small (1+)*   PH, URINE  6.0   BILIRUBIN UA  Negative   UROBILINOGEN UA  0.2 E.U./dL     Pain Management Panel     There is no flowsheet data to display.              Pertinent Radiology Results:    Imaging Results (all)     Procedure Component Value Units Date/Time    US Liver [[id number]] Collected:  06/01/18 1733     Updated:  06/01/18 1733    Narrative:       US LIVER     HISTORY: Elevated liver enzymes; J20.9-Acute bronchitis, unspecified;  R07.9-Chest pain, unspecified; R00.1-Bradycardia, unspecified.         FINDINGS:  Multiple transverse and longitudinal scans were performed of  the right upper quadrant of the abdomen with attention to the liver.     Liver is enlarged. There is diffuse increased echogenicity which may be  seen with fatty change. No focal hepatic lesion is seen. No intrahepatic  duct dilatation is identified. No evidence of common bile duct  dilatation is identified.  The upper common duct measures 5 mm.     Color and spectral Doppler evaluation shows patent portal and hepatic  veins. There is normal directional flow.     No appreciable ascites is seen.     Postcholecystectomy changes are present.  No fluid collection is seen in  the gallbladder fossa.       Pancreas is obscured. The right kidney shows no evidence of obstruction.               Impression:       Abnormally increased echogenicity throughout the liver  probably fatty change or less likely cirrhosis with mild hepatomegaly.                CT Chest Pulmonary Embolism With Contrast [[id number]] Collected:  05/31/18 1815     Updated:  05/31/18 1816    Narrative:       FINAL REPORT    TECHNIQUE:  Thin section axial images were obtained through the chest and  during the arterial phase of IV contrast administration. Coronal  3-D MIP reconstructed images were also provided.    CLINICAL HISTORY:  shortness of breath. PE PROTOCOL    FINDINGS:  The pulmonary arteries are well-opacified and there is no  filling defect to indicate pulmonary embolism. The thoracic  aorta is normal.  There are small right greater than left  pleural effusions.  There is subtle groundglass opacity and  interlobular septal thickening in the lower lung zones that may  be due to pulmonary edema.  The lungs are otherwise clear.  There is no adenopathy or significant osseous abnormality.      Impression:       Unremarkable.    Authenticated by Mino Reina M.D. on 05/31/2018 06:15:10 PM          Condition on Discharge:      Stable.    Code status during the hospital stay:    Full Code    Discharge Disposition:    Home or Self Care    Discharge Medications:     Liliana Calderon   Home Medication Instructions HATTIE:[id number]    Printed on:06/02/18 0942   Medication Information                      azithromycin (ZITHROMAX) 250 MG tablet  Take 2 tablets the first day, then 1 tablet daily for 4 days.             budesonide-formoterol (SYMBICORT) 160-4.5 MCG/ACT inhaler  Inhale 2 puffs 2 (Two) Times a Day.             FLUoxetine (PROzac) 20 MG capsule  Take 40 mg by mouth Daily.             gabapentin (NEURONTIN) 800 MG tablet  Take 800 mg by mouth 3 (Three) Times a Day.             lansoprazole (PREVACID) 30 MG capsule  Take 30 mg by mouth Daily.             lisinopril (PRINIVIL,ZESTRIL) 5 MG tablet  Take 1 tablet by mouth Daily.              metFORMIN (GLUCOPHAGE) 500 MG tablet  Take 500 mg by mouth 2 (Two) Times a Day With Meals.             ondansetron (ZOFRAN) 4 MG tablet  Take 1 tablet by mouth Every 8 (Eight) Hours As Needed for Nausea or Vomiting.             predniSONE (DELTASONE) 20 MG tablet  Take 1 tablet by mouth Daily With Breakfast.             TiZANidine HCl (ZANAFLEX PO)  Take 4 mg by mouth Every Night. 1-2 tabs                 Discharge Diet:     Diet Instructions     Diet: Cardiac       Discharge Diet:  Cardiac          Activity at Discharge:     Activity Instructions     Discharge Activity Restrictions       1) No driving until follow up with Dr. Pierson          Follow-up Appointments:    Additional Instructions for the Follow-ups that You Need to Schedule     Discharge Follow-up with PCP    As directed      Follow Up Details:  3-5 days         Discharge Follow-up with Specified Provider: Dr. Pierson; 1 Week    As directed      To:  Dr. Pierson    Follow Up:  1 Week           Follow-up Information     Ollie Flores MD .    Specialty:  Internal Medicine  Why:  3-5 days  Contact information:  [address] [place] [address] [phone]                   No future appointments.    Additional Instructions for the Follow-ups that You Need to Schedule     Discharge Follow-up with PCP    As directed      Follow Up Details:  3-5 days         Discharge Follow-up with Specified Provider: Dr. Pierson; 1 Week    As directed      To:  Dr. Pierson    Follow Up:  1 Week               Test Results Pending at Discharge:     Order Current Status    Lyme, IgM, Early Test / Reflex In process             Fior Sanchez MD  06/02/18  9:42 AM    Time spent: 32 minutes     Dictated utilizing Dragon dictation.

## 2018-07-26 ENCOUNTER — APPOINTMENT (OUTPATIENT)
Dept: CT IMAGING | Facility: HOSPITAL | Age: 38
End: 2018-07-26

## 2018-07-26 ENCOUNTER — APPOINTMENT (OUTPATIENT)
Dept: GENERAL RADIOLOGY | Facility: HOSPITAL | Age: 38
End: 2018-07-26

## 2018-07-26 ENCOUNTER — HOSPITAL ENCOUNTER (INPATIENT)
Facility: HOSPITAL | Age: 38
LOS: 4 days | Discharge: HOME | End: 2018-07-30
Attending: EMERGENCY MEDICINE | Admitting: INTERNAL MEDICINE

## 2018-07-26 DIAGNOSIS — R41.82 ALTERED MENTAL STATUS, UNSPECIFIED ALTERED MENTAL STATUS TYPE: Primary | ICD-10-CM

## 2018-07-26 DIAGNOSIS — J96.01 ACUTE RESPIRATORY FAILURE WITH HYPOXIA (HCC): ICD-10-CM

## 2018-07-26 DIAGNOSIS — E87.20 LACTIC ACIDOSIS: ICD-10-CM

## 2018-07-26 DIAGNOSIS — J69.0 ASPIRATION PNEUMONIA OF BOTH LOWER LOBES DUE TO VOMIT (HCC): ICD-10-CM

## 2018-07-26 LAB
ALBUMIN SERPL-MCNC: 4.5 G/DL (ref 3.5–5)
ALBUMIN/GLOB SERPL: 1.5 G/DL (ref 1–2)
ALP SERPL-CCNC: 168 U/L (ref 38–126)
ALT SERPL W P-5'-P-CCNC: 93 U/L (ref 13–69)
AMORPH URATE CRY URNS QL MICRO: ABNORMAL /HPF
AMPHET+METHAMPHET UR QL: NEGATIVE
AMPHETAMINES UR QL: NEGATIVE
ANION GAP SERPL CALCULATED.3IONS-SCNC: 24.5 MMOL/L (ref 10–20)
ANISOCYTOSIS BLD QL: NORMAL
APAP SERPL-MCNC: <10 MCG/ML
ARTERIAL PATENCY WRIST A: ABNORMAL
ARTERIAL PATENCY WRIST A: POSITIVE
AST SERPL-CCNC: 176 U/L (ref 15–46)
ATMOSPHERIC PRESS: 734 MMHG
ATMOSPHERIC PRESS: 734 MMHG
B-HCG UR QL: NEGATIVE
BACTERIA UR QL AUTO: ABNORMAL /HPF
BARBITURATES UR QL SCN: NEGATIVE
BASE EXCESS BLDA CALC-SCNC: -1.2 MMOL/L (ref 0–2)
BASE EXCESS BLDA CALC-SCNC: -10.2 MMOL/L (ref 0–2)
BASOPHILS # BLD AUTO: 0.03 10*3/MM3 (ref 0–0.2)
BASOPHILS NFR BLD AUTO: 0.2 % (ref 0–2.5)
BDY SITE: ABNORMAL
BDY SITE: ABNORMAL
BENZODIAZ UR QL SCN: NEGATIVE
BILIRUB SERPL-MCNC: 0.9 MG/DL (ref 0.2–1.3)
BILIRUB UR QL STRIP: NEGATIVE
BUN BLD-MCNC: 12 MG/DL (ref 7–20)
BUN/CREAT SERPL: 13.3 (ref 7.1–23.5)
BUPRENORPHINE SERPL-MCNC: NEGATIVE NG/ML
CALCIUM SPEC-SCNC: 9.3 MG/DL (ref 8.4–10.2)
CANNABINOIDS SERPL QL: NEGATIVE
CHLORIDE SERPL-SCNC: 101 MMOL/L (ref 98–107)
CLARITY UR: CLEAR
CO2 SERPL-SCNC: 17 MMOL/L (ref 26–30)
COCAINE UR QL: NEGATIVE
COHGB MFR BLD: 0.9 % (ref 0–2)
COHGB MFR BLD: 1 % (ref 0–2)
COLOR UR: YELLOW
CREAT BLD-MCNC: 0.9 MG/DL (ref 0.6–1.3)
D-LACTATE SERPL-SCNC: 3.3 MMOL/L (ref 0.5–2)
D-LACTATE SERPL-SCNC: 8.7 MMOL/L (ref 0.5–2)
DEPRECATED RDW RBC AUTO: 51 FL (ref 37–54)
EOSINOPHIL # BLD AUTO: 0.04 10*3/MM3 (ref 0–0.7)
EOSINOPHIL NFR BLD AUTO: 0.3 % (ref 0–7)
ERYTHROCYTE [DISTWIDTH] IN BLOOD BY AUTOMATED COUNT: 17.4 % (ref 11.5–14.5)
GFR SERPL CREATININE-BSD FRML MDRD: 70 ML/MIN/1.73
GLOBULIN UR ELPH-MCNC: 3.1 GM/DL
GLUCOSE BLD-MCNC: 303 MG/DL (ref 74–98)
GLUCOSE BLDC GLUCOMTR-MCNC: 339 MG/DL (ref 70–130)
GLUCOSE UR STRIP-MCNC: ABNORMAL MG/DL
HCO3 BLDA-SCNC: 17.7 MMOL/L (ref 22–28)
HCO3 BLDA-SCNC: 24.3 MMOL/L (ref 22–28)
HCT VFR BLD AUTO: 33.2 % (ref 37–47)
HCT VFR BLD CALC: 29.4 %
HCT VFR BLD CALC: 35.9 %
HGB BLD-MCNC: 9.8 G/DL (ref 12–16)
HGB BLDA-MCNC: 11.7 G/DL (ref 12–18)
HGB BLDA-MCNC: 9.6 G/DL (ref 12–18)
HGB UR QL STRIP.AUTO: ABNORMAL
HOLD SPECIMEN: NORMAL
HOROWITZ INDEX BLD+IHG-RTO: 60 %
HOROWITZ INDEX BLD+IHG-RTO: 60 %
HYALINE CASTS UR QL AUTO: ABNORMAL /LPF
IMM GRANULOCYTES # BLD: 0.22 10*3/MM3 (ref 0–0.06)
IMM GRANULOCYTES NFR BLD: 1.7 % (ref 0–0.6)
KETONES UR QL STRIP: NEGATIVE
LEUKOCYTE ESTERASE UR QL STRIP.AUTO: NEGATIVE
LYMPHOCYTES # BLD AUTO: 0.73 10*3/MM3 (ref 0.6–3.4)
LYMPHOCYTES NFR BLD AUTO: 5.6 % (ref 10–50)
Lab: ABNORMAL
Lab: ABNORMAL
MCH RBC QN AUTO: 23.8 PG (ref 27–31)
MCHC RBC AUTO-ENTMCNC: 29.5 G/DL (ref 30–37)
MCV RBC AUTO: 80.8 FL (ref 81–99)
METHADONE UR QL SCN: NEGATIVE
METHGB BLD QL: 1.1 % (ref 0–1.5)
METHGB BLD QL: 1.1 % (ref 0–1.5)
MICROCYTES BLD QL: NORMAL
MODALITY: ABNORMAL
MODALITY: ABNORMAL
MONOCYTES # BLD AUTO: 0.51 10*3/MM3 (ref 0–0.9)
MONOCYTES NFR BLD AUTO: 3.9 % (ref 0–12)
MUCOUS THREADS URNS QL MICRO: ABNORMAL /HPF
NEUTROPHILS # BLD AUTO: 11.4 10*3/MM3 (ref 2–6.9)
NEUTROPHILS NFR BLD AUTO: 88.3 % (ref 37–80)
NITRITE UR QL STRIP: NEGATIVE
NRBC BLD MANUAL-RTO: 0 /100 WBC (ref 0–0)
OPIATES UR QL: POSITIVE
OXYCODONE UR QL SCN: NEGATIVE
OXYHGB MFR BLDV: 93.6 % (ref 94–99)
OXYHGB MFR BLDV: 98 % (ref 94–99)
PCO2 BLDA: 43.4 MM HG (ref 35–45)
PCO2 BLDA: 46.3 MM HG (ref 35–45)
PCO2 TEMP ADJ BLD: ABNORMAL MM HG (ref 35–45)
PCO2 TEMP ADJ BLD: ABNORMAL MM HG (ref 35–45)
PCP UR QL SCN: NEGATIVE
PEEP RESPIRATORY: 10 CM[H2O]
PEEP RESPIRATORY: 5 CM[H2O]
PH BLDA: 7.19 PH UNITS (ref 7.3–7.5)
PH BLDA: 7.36 PH UNITS (ref 7.3–7.5)
PH UR STRIP.AUTO: 6 [PH] (ref 5–8)
PH, TEMP CORRECTED: ABNORMAL PH UNITS
PH, TEMP CORRECTED: ABNORMAL PH UNITS
PLATELET # BLD AUTO: 185 10*3/MM3 (ref 130–400)
PMV BLD AUTO: 9.6 FL (ref 6–12)
PO2 BLDA: 220 MM HG (ref 75–100)
PO2 BLDA: 93.8 MM HG (ref 75–100)
PO2 TEMP ADJ BLD: ABNORMAL MM HG (ref 83–108)
PO2 TEMP ADJ BLD: ABNORMAL MM HG (ref 83–108)
POTASSIUM BLD-SCNC: 4.5 MMOL/L (ref 3.5–5.1)
PROPOXYPH UR QL: NEGATIVE
PROT SERPL-MCNC: 7.6 G/DL (ref 6.3–8.2)
PROT UR QL STRIP: ABNORMAL
RBC # BLD AUTO: 4.11 10*6/MM3 (ref 4.2–5.4)
RBC # UR: ABNORMAL /HPF
REF LAB TEST METHOD: ABNORMAL
SALICYLATES SERPL-MCNC: <1 MG/DL (ref 2.8–20)
SAO2 % BLDCOA: 95.5 % (ref 94–100)
SAO2 % BLDCOA: >99.6 % (ref 94–100)
SMALL PLATELETS BLD QL SMEAR: ADEQUATE
SODIUM BLD-SCNC: 138 MMOL/L (ref 137–145)
SP GR UR STRIP: >=1.03 (ref 1–1.03)
SQUAMOUS #/AREA URNS HPF: ABNORMAL /HPF
TRICYCLICS UR QL SCN: NEGATIVE
UROBILINOGEN UR QL STRIP: ABNORMAL
VENTILATOR MODE: AC
VENTILATOR MODE: AC
VT ON VENT VENT: 550 ML
WBC MORPH BLD: NORMAL
WBC NRBC COR # BLD: 12.93 10*3/MM3 (ref 4.8–10.8)
WBC UR QL AUTO: ABNORMAL /HPF

## 2018-07-26 PROCEDURE — 83605 ASSAY OF LACTIC ACID: CPT | Performed by: EMERGENCY MEDICINE

## 2018-07-26 PROCEDURE — 87205 SMEAR GRAM STAIN: CPT | Performed by: EMERGENCY MEDICINE

## 2018-07-26 PROCEDURE — 71260 CT THORAX DX C+: CPT

## 2018-07-26 PROCEDURE — 82962 GLUCOSE BLOOD TEST: CPT

## 2018-07-26 PROCEDURE — 94799 UNLISTED PULMONARY SVC/PX: CPT

## 2018-07-26 PROCEDURE — 83050 HGB METHEMOGLOBIN QUAN: CPT

## 2018-07-26 PROCEDURE — 93005 ELECTROCARDIOGRAM TRACING: CPT | Performed by: EMERGENCY MEDICINE

## 2018-07-26 PROCEDURE — 25010000002 VANCOMYCIN PER 500 MG: Performed by: EMERGENCY MEDICINE

## 2018-07-26 PROCEDURE — 94002 VENT MGMT INPAT INIT DAY: CPT

## 2018-07-26 PROCEDURE — 25010000002 PROPOFOL 1000 MG/ML EMULSION: Performed by: EMERGENCY MEDICINE

## 2018-07-26 PROCEDURE — 82805 BLOOD GASES W/O2 SATURATION: CPT

## 2018-07-26 PROCEDURE — 85007 BL SMEAR W/DIFF WBC COUNT: CPT | Performed by: EMERGENCY MEDICINE

## 2018-07-26 PROCEDURE — 74176 CT ABD & PELVIS W/O CONTRAST: CPT

## 2018-07-26 PROCEDURE — 80307 DRUG TEST PRSMV CHEM ANLYZR: CPT | Performed by: EMERGENCY MEDICINE

## 2018-07-26 PROCEDURE — 99223 1ST HOSP IP/OBS HIGH 75: CPT | Performed by: INTERNAL MEDICINE

## 2018-07-26 PROCEDURE — 70450 CT HEAD/BRAIN W/O DYE: CPT

## 2018-07-26 PROCEDURE — 81025 URINE PREGNANCY TEST: CPT | Performed by: EMERGENCY MEDICINE

## 2018-07-26 PROCEDURE — 25010000002 IOPAMIDOL 61 % SOLUTION: Performed by: EMERGENCY MEDICINE

## 2018-07-26 PROCEDURE — 36600 WITHDRAWAL OF ARTERIAL BLOOD: CPT

## 2018-07-26 PROCEDURE — 82375 ASSAY CARBOXYHB QUANT: CPT

## 2018-07-26 PROCEDURE — 81001 URINALYSIS AUTO W/SCOPE: CPT | Performed by: EMERGENCY MEDICINE

## 2018-07-26 PROCEDURE — 71045 X-RAY EXAM CHEST 1 VIEW: CPT

## 2018-07-26 PROCEDURE — 31500 INSERT EMERGENCY AIRWAY: CPT

## 2018-07-26 PROCEDURE — 51702 INSERT TEMP BLADDER CATH: CPT

## 2018-07-26 PROCEDURE — 25010000002 LEVOFLOXACIN PER 250 MG: Performed by: EMERGENCY MEDICINE

## 2018-07-26 PROCEDURE — 94770: CPT

## 2018-07-26 PROCEDURE — 99291 CRITICAL CARE FIRST HOUR: CPT

## 2018-07-26 PROCEDURE — 5A1935Z RESPIRATORY VENTILATION, LESS THAN 24 CONSECUTIVE HOURS: ICD-10-PCS | Performed by: INTERNAL MEDICINE

## 2018-07-26 PROCEDURE — 80053 COMPREHEN METABOLIC PANEL: CPT | Performed by: EMERGENCY MEDICINE

## 2018-07-26 PROCEDURE — 25010000002 CEFEPIME PER 500 MG: Performed by: EMERGENCY MEDICINE

## 2018-07-26 PROCEDURE — 85025 COMPLETE CBC W/AUTO DIFF WBC: CPT | Performed by: EMERGENCY MEDICINE

## 2018-07-26 PROCEDURE — 87040 BLOOD CULTURE FOR BACTERIA: CPT | Performed by: EMERGENCY MEDICINE

## 2018-07-26 PROCEDURE — 80306 DRUG TEST PRSMV INSTRMNT: CPT | Performed by: EMERGENCY MEDICINE

## 2018-07-26 PROCEDURE — 87070 CULTURE OTHR SPECIMN AEROBIC: CPT | Performed by: EMERGENCY MEDICINE

## 2018-07-26 RX ORDER — LEVOFLOXACIN 5 MG/ML
750 INJECTION, SOLUTION INTRAVENOUS ONCE
Status: COMPLETED | OUTPATIENT
Start: 2018-07-26 | End: 2018-07-26

## 2018-07-26 RX ORDER — ROCURONIUM BROMIDE 10 MG/ML
120 INJECTION, SOLUTION INTRAVENOUS ONCE
Status: COMPLETED | OUTPATIENT
Start: 2018-07-26 | End: 2018-07-26

## 2018-07-26 RX ORDER — ONDANSETRON 4 MG/1
4 TABLET, FILM COATED ORAL EVERY 6 HOURS PRN
Status: DISCONTINUED | OUTPATIENT
Start: 2018-07-26 | End: 2018-07-30 | Stop reason: HOSPADM

## 2018-07-26 RX ORDER — ONDANSETRON 4 MG/1
4 TABLET, ORALLY DISINTEGRATING ORAL EVERY 6 HOURS PRN
Status: DISCONTINUED | OUTPATIENT
Start: 2018-07-26 | End: 2018-07-30 | Stop reason: HOSPADM

## 2018-07-26 RX ORDER — SODIUM CHLORIDE 0.9 % (FLUSH) 0.9 %
1-10 SYRINGE (ML) INJECTION AS NEEDED
Status: DISCONTINUED | OUTPATIENT
Start: 2018-07-26 | End: 2018-07-30 | Stop reason: HOSPADM

## 2018-07-26 RX ORDER — DEXTROSE MONOHYDRATE 25 G/50ML
25 INJECTION, SOLUTION INTRAVENOUS
Status: DISCONTINUED | OUTPATIENT
Start: 2018-07-26 | End: 2018-07-27

## 2018-07-26 RX ORDER — NICOTINE POLACRILEX 4 MG
1 LOZENGE BUCCAL
Status: DISCONTINUED | OUTPATIENT
Start: 2018-07-26 | End: 2018-07-27

## 2018-07-26 RX ORDER — KETAMINE HYDROCHLORIDE 50 MG/ML
120 INJECTION, SOLUTION, CONCENTRATE INTRAMUSCULAR; INTRAVENOUS ONCE
Status: COMPLETED | OUTPATIENT
Start: 2018-07-26 | End: 2018-07-26

## 2018-07-26 RX ORDER — LEVOFLOXACIN 5 MG/ML
500 INJECTION, SOLUTION INTRAVENOUS DAILY
Status: DISCONTINUED | OUTPATIENT
Start: 2018-07-27 | End: 2018-07-28

## 2018-07-26 RX ORDER — FERROUS SULFATE 325(65) MG
325 TABLET ORAL
COMMUNITY
End: 2018-07-30 | Stop reason: HOSPADM

## 2018-07-26 RX ORDER — BISOPROLOL FUMARATE 5 MG/1
2.5 TABLET, FILM COATED ORAL DAILY
COMMUNITY
End: 2018-07-30 | Stop reason: HOSPADM

## 2018-07-26 RX ORDER — SODIUM CHLORIDE 9 MG/ML
100 INJECTION, SOLUTION INTRAVENOUS CONTINUOUS
Status: DISCONTINUED | OUTPATIENT
Start: 2018-07-26 | End: 2018-07-28

## 2018-07-26 RX ORDER — SODIUM CHLORIDE 0.9 % (FLUSH) 0.9 %
10 SYRINGE (ML) INJECTION AS NEEDED
Status: DISCONTINUED | OUTPATIENT
Start: 2018-07-26 | End: 2018-07-30 | Stop reason: HOSPADM

## 2018-07-26 RX ORDER — FAMOTIDINE 20 MG/1
40 TABLET, FILM COATED ORAL DAILY
Status: DISCONTINUED | OUTPATIENT
Start: 2018-07-27 | End: 2018-07-30 | Stop reason: HOSPADM

## 2018-07-26 RX ORDER — LISINOPRIL AND HYDROCHLOROTHIAZIDE 20; 12.5 MG/1; MG/1
1 TABLET ORAL DAILY
COMMUNITY
End: 2020-06-16 | Stop reason: HOSPADM

## 2018-07-26 RX ORDER — ACETAMINOPHEN 325 MG/1
650 TABLET ORAL EVERY 4 HOURS PRN
Status: DISCONTINUED | OUTPATIENT
Start: 2018-07-26 | End: 2018-07-30 | Stop reason: HOSPADM

## 2018-07-26 RX ORDER — ONDANSETRON 2 MG/ML
4 INJECTION INTRAMUSCULAR; INTRAVENOUS EVERY 6 HOURS PRN
Status: DISCONTINUED | OUTPATIENT
Start: 2018-07-26 | End: 2018-07-30 | Stop reason: HOSPADM

## 2018-07-26 RX ADMIN — SODIUM CHLORIDE, POTASSIUM CHLORIDE, SODIUM LACTATE AND CALCIUM CHLORIDE 1000 ML: 600; 310; 30; 20 INJECTION, SOLUTION INTRAVENOUS at 19:38

## 2018-07-26 RX ADMIN — CEFEPIME HYDROCHLORIDE 2 G: 2 INJECTION, SOLUTION INTRAVENOUS at 19:38

## 2018-07-26 RX ADMIN — KETAMINE HYDROCHLORIDE 120 MG: 50 INJECTION, SOLUTION INTRAMUSCULAR; INTRAVENOUS at 19:57

## 2018-07-26 RX ADMIN — PROPOFOL 70 MCG/KG/MIN: 10 INJECTION, EMULSION INTRAVENOUS at 21:55

## 2018-07-26 RX ADMIN — VANCOMYCIN HYDROCHLORIDE 2500 MG: 500 INJECTION, POWDER, LYOPHILIZED, FOR SOLUTION INTRAVENOUS at 20:12

## 2018-07-26 RX ADMIN — SODIUM CHLORIDE 1000 ML: 9 INJECTION, SOLUTION INTRAVENOUS at 22:47

## 2018-07-26 RX ADMIN — IOPAMIDOL 100 ML: 612 INJECTION, SOLUTION INTRAVENOUS at 19:04

## 2018-07-26 RX ADMIN — ROCURONIUM BROMIDE 120 MG: 10 INJECTION INTRAVENOUS at 19:57

## 2018-07-26 RX ADMIN — PROPOFOL 70 MCG/KG/MIN: 10 INJECTION, EMULSION INTRAVENOUS at 19:37

## 2018-07-26 RX ADMIN — SODIUM CHLORIDE 100 ML/HR: 9 INJECTION, SOLUTION INTRAVENOUS at 23:44

## 2018-07-26 RX ADMIN — PROPOFOL 60 MCG/KG/MIN: 10 INJECTION, EMULSION INTRAVENOUS at 18:10

## 2018-07-26 RX ADMIN — LEVOFLOXACIN 750 MG: 5 INJECTION, SOLUTION INTRAVENOUS at 20:12

## 2018-07-27 ENCOUNTER — APPOINTMENT (OUTPATIENT)
Dept: GENERAL RADIOLOGY | Facility: HOSPITAL | Age: 38
End: 2018-07-27

## 2018-07-27 LAB
ANION GAP SERPL CALCULATED.3IONS-SCNC: 13.2 MMOL/L (ref 10–20)
ARTERIAL PATENCY WRIST A: POSITIVE
ATMOSPHERIC PRESS: 734 MMHG
BASE EXCESS BLDA CALC-SCNC: 0.7 MMOL/L (ref 0–2)
BASOPHILS # BLD AUTO: 0.02 10*3/MM3 (ref 0–0.2)
BASOPHILS NFR BLD AUTO: 0.2 % (ref 0–2.5)
BDY SITE: ABNORMAL
BUN BLD-MCNC: 9 MG/DL (ref 7–20)
BUN/CREAT SERPL: 15 (ref 7.1–23.5)
CALCIUM SPEC-SCNC: 8.3 MG/DL (ref 8.4–10.2)
CHLORIDE SERPL-SCNC: 106 MMOL/L (ref 98–107)
CO2 SERPL-SCNC: 24 MMOL/L (ref 26–30)
COHGB MFR BLD: 1.2 % (ref 0–2)
CREAT BLD-MCNC: 0.6 MG/DL (ref 0.6–1.3)
D-LACTATE SERPL-SCNC: 3.4 MMOL/L (ref 0.5–2)
DEPRECATED RDW RBC AUTO: 52 FL (ref 37–54)
EOSINOPHIL # BLD AUTO: 0.02 10*3/MM3 (ref 0–0.7)
EOSINOPHIL NFR BLD AUTO: 0.2 % (ref 0–7)
ERYTHROCYTE [DISTWIDTH] IN BLOOD BY AUTOMATED COUNT: 17.9 % (ref 11.5–14.5)
GFR SERPL CREATININE-BSD FRML MDRD: 112 ML/MIN/1.73
GLUCOSE BLD-MCNC: 105 MG/DL (ref 74–98)
GLUCOSE BLDC GLUCOMTR-MCNC: 118 MG/DL (ref 70–130)
GLUCOSE BLDC GLUCOMTR-MCNC: 131 MG/DL (ref 70–130)
HCO3 BLDA-SCNC: 25.7 MMOL/L (ref 22–28)
HCT VFR BLD AUTO: 33.3 % (ref 37–47)
HCT VFR BLD CALC: 28.9 %
HGB BLD-MCNC: 10 G/DL (ref 12–16)
HGB BLDA-MCNC: 9.4 G/DL (ref 12–18)
HOROWITZ INDEX BLD+IHG-RTO: 50 %
IMM GRANULOCYTES # BLD: 0.12 10*3/MM3 (ref 0–0.06)
IMM GRANULOCYTES NFR BLD: 1.1 % (ref 0–0.6)
LYMPHOCYTES # BLD AUTO: 1.05 10*3/MM3 (ref 0.6–3.4)
LYMPHOCYTES NFR BLD AUTO: 9.7 % (ref 10–50)
Lab: ABNORMAL
MCH RBC QN AUTO: 24.1 PG (ref 27–31)
MCHC RBC AUTO-ENTMCNC: 30 G/DL (ref 30–37)
MCV RBC AUTO: 80.2 FL (ref 81–99)
METHGB BLD QL: 1.2 % (ref 0–1.5)
MODALITY: ABNORMAL
MONOCYTES # BLD AUTO: 0.52 10*3/MM3 (ref 0–0.9)
MONOCYTES NFR BLD AUTO: 4.8 % (ref 0–12)
NEUTROPHILS # BLD AUTO: 9.12 10*3/MM3 (ref 2–6.9)
NEUTROPHILS NFR BLD AUTO: 84 % (ref 37–80)
NRBC BLD MANUAL-RTO: 0 /100 WBC (ref 0–0)
NT-PROBNP SERPL-MCNC: 326 PG/ML (ref 0–125)
OXYHGB MFR BLDV: 97.4 % (ref 94–99)
PCO2 BLDA: 41.7 MM HG (ref 35–45)
PCO2 TEMP ADJ BLD: ABNORMAL MM HG (ref 35–45)
PEEP RESPIRATORY: 5 CM[H2O]
PH BLDA: 7.4 PH UNITS (ref 7.3–7.5)
PH, TEMP CORRECTED: ABNORMAL PH UNITS
PLATELET # BLD AUTO: 191 10*3/MM3 (ref 130–400)
PMV BLD AUTO: 11.1 FL (ref 6–12)
PO2 BLDA: 149 MM HG (ref 75–100)
PO2 TEMP ADJ BLD: ABNORMAL MM HG (ref 83–108)
POTASSIUM BLD-SCNC: 4.2 MMOL/L (ref 3.5–5.1)
RBC # BLD AUTO: 4.15 10*6/MM3 (ref 4.2–5.4)
SAO2 % BLDCOA: >99.6 % (ref 94–100)
SET MECH RESP RATE: 16
SODIUM BLD-SCNC: 139 MMOL/L (ref 137–145)
TROPONIN I SERPL-MCNC: 0.04 NG/ML (ref 0–0.03)
VENTILATOR MODE: AC
VT ON VENT VENT: 550 ML
WBC NRBC COR # BLD: 10.85 10*3/MM3 (ref 4.8–10.8)

## 2018-07-27 PROCEDURE — 80048 BASIC METABOLIC PNL TOTAL CA: CPT | Performed by: INTERNAL MEDICINE

## 2018-07-27 PROCEDURE — 94640 AIRWAY INHALATION TREATMENT: CPT

## 2018-07-27 PROCEDURE — 84484 ASSAY OF TROPONIN QUANT: CPT | Performed by: INTERNAL MEDICINE

## 2018-07-27 PROCEDURE — 82962 GLUCOSE BLOOD TEST: CPT

## 2018-07-27 PROCEDURE — 83605 ASSAY OF LACTIC ACID: CPT | Performed by: INTERNAL MEDICINE

## 2018-07-27 PROCEDURE — 25010000002 LEVOFLOXACIN PER 250 MG: Performed by: INTERNAL MEDICINE

## 2018-07-27 PROCEDURE — 94003 VENT MGMT INPAT SUBQ DAY: CPT

## 2018-07-27 PROCEDURE — 82375 ASSAY CARBOXYHB QUANT: CPT

## 2018-07-27 PROCEDURE — 94799 UNLISTED PULMONARY SVC/PX: CPT

## 2018-07-27 PROCEDURE — 99233 SBSQ HOSP IP/OBS HIGH 50: CPT | Performed by: INTERNAL MEDICINE

## 2018-07-27 PROCEDURE — 36600 WITHDRAWAL OF ARTERIAL BLOOD: CPT

## 2018-07-27 PROCEDURE — 25010000002 VANCOMYCIN PER 500 MG: Performed by: INTERNAL MEDICINE

## 2018-07-27 PROCEDURE — 71045 X-RAY EXAM CHEST 1 VIEW: CPT

## 2018-07-27 PROCEDURE — 25010000002 PROPOFOL 1000 MG/ML EMULSION: Performed by: EMERGENCY MEDICINE

## 2018-07-27 PROCEDURE — 99223 1ST HOSP IP/OBS HIGH 75: CPT | Performed by: INTERNAL MEDICINE

## 2018-07-27 PROCEDURE — 85025 COMPLETE CBC W/AUTO DIFF WBC: CPT | Performed by: INTERNAL MEDICINE

## 2018-07-27 PROCEDURE — 94770: CPT

## 2018-07-27 PROCEDURE — 82805 BLOOD GASES W/O2 SATURATION: CPT

## 2018-07-27 PROCEDURE — 83050 HGB METHEMOGLOBIN QUAN: CPT

## 2018-07-27 PROCEDURE — 83880 ASSAY OF NATRIURETIC PEPTIDE: CPT | Performed by: INTERNAL MEDICINE

## 2018-07-27 PROCEDURE — 25010000002 PROPOFOL 1000 MG/ML EMULSION: Performed by: INTERNAL MEDICINE

## 2018-07-27 PROCEDURE — 25010000002 ENOXAPARIN PER 10 MG: Performed by: INTERNAL MEDICINE

## 2018-07-27 RX ORDER — IPRATROPIUM BROMIDE AND ALBUTEROL SULFATE 2.5; .5 MG/3ML; MG/3ML
3 SOLUTION RESPIRATORY (INHALATION)
Status: DISCONTINUED | OUTPATIENT
Start: 2018-07-27 | End: 2018-07-30 | Stop reason: HOSPADM

## 2018-07-27 RX ORDER — BISOPROLOL FUMARATE 5 MG/1
5 TABLET, FILM COATED ORAL
Status: DISCONTINUED | OUTPATIENT
Start: 2018-07-28 | End: 2018-07-29

## 2018-07-27 RX ORDER — IBUPROFEN 800 MG/1
800 TABLET ORAL ONCE
Status: COMPLETED | OUTPATIENT
Start: 2018-07-27 | End: 2018-07-27

## 2018-07-27 RX ADMIN — PROPOFOL 70 MCG/KG/MIN: 10 INJECTION, EMULSION INTRAVENOUS at 00:00

## 2018-07-27 RX ADMIN — ENOXAPARIN SODIUM 40 MG: 40 INJECTION SUBCUTANEOUS at 00:53

## 2018-07-27 RX ADMIN — VANCOMYCIN HYDROCHLORIDE 2000 MG: 500 INJECTION, POWDER, LYOPHILIZED, FOR SOLUTION INTRAVENOUS at 08:48

## 2018-07-27 RX ADMIN — LEVOFLOXACIN 500 MG: 5 INJECTION, SOLUTION INTRAVENOUS at 23:00

## 2018-07-27 RX ADMIN — AZTREONAM 1 G: 1 INJECTION, POWDER, LYOPHILIZED, FOR SOLUTION INTRAMUSCULAR; INTRAVENOUS at 01:04

## 2018-07-27 RX ADMIN — SODIUM CHLORIDE 100 ML/HR: 9 INJECTION, SOLUTION INTRAVENOUS at 14:30

## 2018-07-27 RX ADMIN — ACETAMINOPHEN 650 MG: 325 TABLET, FILM COATED ORAL at 23:10

## 2018-07-27 RX ADMIN — IBUPROFEN 800 MG: 800 TABLET ORAL at 19:36

## 2018-07-27 RX ADMIN — PROPOFOL 70 MCG/KG/MIN: 10 INJECTION, EMULSION INTRAVENOUS at 06:23

## 2018-07-27 RX ADMIN — PROPOFOL 70 MCG/KG/MIN: 10 INJECTION, EMULSION INTRAVENOUS at 01:57

## 2018-07-27 RX ADMIN — PROPOFOL 70 MCG/KG/MIN: 10 INJECTION, EMULSION INTRAVENOUS at 04:11

## 2018-07-27 RX ADMIN — PROPOFOL 70 MCG/KG/MIN: 10 INJECTION, EMULSION INTRAVENOUS at 08:47

## 2018-07-27 RX ADMIN — ENOXAPARIN SODIUM 40 MG: 40 INJECTION SUBCUTANEOUS at 23:10

## 2018-07-27 RX ADMIN — FAMOTIDINE 40 MG: 20 TABLET ORAL at 08:48

## 2018-07-27 RX ADMIN — ACETAMINOPHEN 650 MG: 325 TABLET, FILM COATED ORAL at 16:36

## 2018-07-27 RX ADMIN — AZTREONAM 2 G: 1 INJECTION, POWDER, LYOPHILIZED, FOR SOLUTION INTRAMUSCULAR; INTRAVENOUS at 08:48

## 2018-07-27 RX ADMIN — IPRATROPIUM BROMIDE AND ALBUTEROL SULFATE 3 ML: .5; 3 SOLUTION RESPIRATORY (INHALATION) at 20:38

## 2018-07-27 RX ADMIN — ACETAMINOPHEN 650 MG: 325 TABLET, FILM COATED ORAL at 12:48

## 2018-07-28 ENCOUNTER — APPOINTMENT (OUTPATIENT)
Dept: GENERAL RADIOLOGY | Facility: HOSPITAL | Age: 38
End: 2018-07-28

## 2018-07-28 LAB
ALBUMIN SERPL-MCNC: 3.3 G/DL (ref 3.5–5)
ANION GAP SERPL CALCULATED.3IONS-SCNC: 10.1 MMOL/L (ref 10–20)
BASOPHILS # BLD AUTO: 0.01 10*3/MM3 (ref 0–0.2)
BASOPHILS NFR BLD AUTO: 0.2 % (ref 0–2.5)
BUN BLD-MCNC: 6 MG/DL (ref 7–20)
BUN/CREAT SERPL: 12 (ref 7.1–23.5)
CALCIUM SPEC-SCNC: 8.4 MG/DL (ref 8.4–10.2)
CHLORIDE SERPL-SCNC: 107 MMOL/L (ref 98–107)
CO2 SERPL-SCNC: 26 MMOL/L (ref 26–30)
CREAT BLD-MCNC: 0.5 MG/DL (ref 0.6–1.3)
CRP SERPL-MCNC: 8.7 MG/DL (ref 0–1)
DEPRECATED RDW RBC AUTO: 54.6 FL (ref 37–54)
EOSINOPHIL # BLD AUTO: 0.09 10*3/MM3 (ref 0–0.7)
EOSINOPHIL NFR BLD AUTO: 1.8 % (ref 0–7)
ERYTHROCYTE [DISTWIDTH] IN BLOOD BY AUTOMATED COUNT: 18.3 % (ref 11.5–14.5)
GFR SERPL CREATININE-BSD FRML MDRD: 139 ML/MIN/1.73
GLUCOSE BLD-MCNC: 186 MG/DL (ref 74–98)
HCT VFR BLD AUTO: 29.4 % (ref 37–47)
HGB BLD-MCNC: 8.6 G/DL (ref 12–16)
IMM GRANULOCYTES # BLD: 0.04 10*3/MM3 (ref 0–0.06)
IMM GRANULOCYTES NFR BLD: 0.8 % (ref 0–0.6)
IRON 24H UR-MRATE: 27 MCG/DL (ref 37–181)
IRON SATN MFR SERPL: 8 % (ref 11–46)
LYMPHOCYTES # BLD AUTO: 0.85 10*3/MM3 (ref 0.6–3.4)
LYMPHOCYTES NFR BLD AUTO: 17 % (ref 10–50)
MAGNESIUM SERPL-MCNC: 1.7 MG/DL (ref 1.6–2.3)
MCH RBC QN AUTO: 24 PG (ref 27–31)
MCHC RBC AUTO-ENTMCNC: 29.3 G/DL (ref 30–37)
MCV RBC AUTO: 82.1 FL (ref 81–99)
MONOCYTES # BLD AUTO: 0.33 10*3/MM3 (ref 0–0.9)
MONOCYTES NFR BLD AUTO: 6.6 % (ref 0–12)
NEUTROPHILS # BLD AUTO: 3.69 10*3/MM3 (ref 2–6.9)
NEUTROPHILS NFR BLD AUTO: 73.6 % (ref 37–80)
NRBC BLD MANUAL-RTO: 0 /100 WBC (ref 0–0)
PHOSPHATE SERPL-MCNC: 2.9 MG/DL (ref 2.5–4.5)
PLATELET # BLD AUTO: 121 10*3/MM3 (ref 130–400)
PMV BLD AUTO: 9.7 FL (ref 6–12)
POTASSIUM BLD-SCNC: 4.1 MMOL/L (ref 3.5–5.1)
RBC # BLD AUTO: 3.58 10*6/MM3 (ref 4.2–5.4)
SODIUM BLD-SCNC: 139 MMOL/L (ref 137–145)
TIBC SERPL-MCNC: 356 MCG/DL (ref 261–497)
VANCOMYCIN TROUGH SERPL-MCNC: <5 MCG/ML (ref 5–15)
WBC NRBC COR # BLD: 5.01 10*3/MM3 (ref 4.8–10.8)

## 2018-07-28 PROCEDURE — 94640 AIRWAY INHALATION TREATMENT: CPT

## 2018-07-28 PROCEDURE — 99233 SBSQ HOSP IP/OBS HIGH 50: CPT | Performed by: INTERNAL MEDICINE

## 2018-07-28 PROCEDURE — 85025 COMPLETE CBC W/AUTO DIFF WBC: CPT | Performed by: INTERNAL MEDICINE

## 2018-07-28 PROCEDURE — 80069 RENAL FUNCTION PANEL: CPT | Performed by: INTERNAL MEDICINE

## 2018-07-28 PROCEDURE — 86140 C-REACTIVE PROTEIN: CPT | Performed by: INTERNAL MEDICINE

## 2018-07-28 PROCEDURE — 25010000002 CEFEPIME PER 500 MG: Performed by: INTERNAL MEDICINE

## 2018-07-28 PROCEDURE — 80202 ASSAY OF VANCOMYCIN: CPT | Performed by: INTERNAL MEDICINE

## 2018-07-28 PROCEDURE — 99232 SBSQ HOSP IP/OBS MODERATE 35: CPT | Performed by: INTERNAL MEDICINE

## 2018-07-28 PROCEDURE — 83540 ASSAY OF IRON: CPT | Performed by: INTERNAL MEDICINE

## 2018-07-28 PROCEDURE — 25010000002 MORPHINE SULFATE (PF) 2 MG/ML SOLUTION: Performed by: INTERNAL MEDICINE

## 2018-07-28 PROCEDURE — 94799 UNLISTED PULMONARY SVC/PX: CPT

## 2018-07-28 PROCEDURE — 71045 X-RAY EXAM CHEST 1 VIEW: CPT

## 2018-07-28 PROCEDURE — 83550 IRON BINDING TEST: CPT | Performed by: INTERNAL MEDICINE

## 2018-07-28 PROCEDURE — 25010000002 ENOXAPARIN PER 10 MG: Performed by: INTERNAL MEDICINE

## 2018-07-28 PROCEDURE — 83735 ASSAY OF MAGNESIUM: CPT | Performed by: INTERNAL MEDICINE

## 2018-07-28 RX ORDER — PANTOPRAZOLE SODIUM 40 MG/1
40 TABLET, DELAYED RELEASE ORAL EVERY MORNING
Status: DISCONTINUED | OUTPATIENT
Start: 2018-07-29 | End: 2018-07-30 | Stop reason: HOSPADM

## 2018-07-28 RX ORDER — LEVETIRACETAM 500 MG/1
500 TABLET ORAL EVERY 12 HOURS SCHEDULED
Status: DISCONTINUED | OUTPATIENT
Start: 2018-07-28 | End: 2018-07-30 | Stop reason: HOSPADM

## 2018-07-28 RX ORDER — FLUOXETINE HYDROCHLORIDE 20 MG/1
40 CAPSULE ORAL DAILY
Status: DISCONTINUED | OUTPATIENT
Start: 2018-07-28 | End: 2018-07-30 | Stop reason: HOSPADM

## 2018-07-28 RX ORDER — TIZANIDINE 4 MG/1
4 TABLET ORAL EVERY 8 HOURS PRN
Status: DISCONTINUED | OUTPATIENT
Start: 2018-07-28 | End: 2018-07-30 | Stop reason: HOSPADM

## 2018-07-28 RX ORDER — BUDESONIDE AND FORMOTEROL FUMARATE DIHYDRATE 160; 4.5 UG/1; UG/1
2 AEROSOL RESPIRATORY (INHALATION)
Status: DISCONTINUED | OUTPATIENT
Start: 2018-07-28 | End: 2018-07-30 | Stop reason: HOSPADM

## 2018-07-28 RX ORDER — LEVOFLOXACIN 500 MG/1
500 TABLET, FILM COATED ORAL EVERY 24 HOURS
Status: COMPLETED | OUTPATIENT
Start: 2018-07-28 | End: 2018-07-30

## 2018-07-28 RX ORDER — GABAPENTIN 400 MG/1
400 CAPSULE ORAL 3 TIMES DAILY
Status: DISCONTINUED | OUTPATIENT
Start: 2018-07-28 | End: 2018-07-30 | Stop reason: HOSPADM

## 2018-07-28 RX ORDER — MORPHINE SULFATE 2 MG/ML
2 INJECTION, SOLUTION INTRAMUSCULAR; INTRAVENOUS EVERY 6 HOURS PRN
Status: DISCONTINUED | OUTPATIENT
Start: 2018-07-28 | End: 2018-07-30 | Stop reason: HOSPADM

## 2018-07-28 RX ORDER — FERROUS SULFATE TAB EC 324 MG (65 MG FE EQUIVALENT) 324 (65 FE) MG
324 TABLET DELAYED RESPONSE ORAL 2 TIMES DAILY WITH MEALS
Status: DISCONTINUED | OUTPATIENT
Start: 2018-07-28 | End: 2018-07-30 | Stop reason: HOSPADM

## 2018-07-28 RX ADMIN — IPRATROPIUM BROMIDE AND ALBUTEROL SULFATE 3 ML: .5; 3 SOLUTION RESPIRATORY (INHALATION) at 20:42

## 2018-07-28 RX ADMIN — SODIUM CHLORIDE 100 ML/HR: 9 INJECTION, SOLUTION INTRAVENOUS at 04:30

## 2018-07-28 RX ADMIN — Medication 10 ML: at 15:49

## 2018-07-28 RX ADMIN — ACETAMINOPHEN 650 MG: 325 TABLET, FILM COATED ORAL at 20:09

## 2018-07-28 RX ADMIN — LEVETIRACETAM 500 MG: 500 TABLET, FILM COATED ORAL at 20:09

## 2018-07-28 RX ADMIN — BISOPROLOL FUMARATE 5 MG: 5 TABLET ORAL at 08:33

## 2018-07-28 RX ADMIN — GABAPENTIN 400 MG: 400 CAPSULE ORAL at 20:09

## 2018-07-28 RX ADMIN — ACETAMINOPHEN 650 MG: 325 TABLET, FILM COATED ORAL at 08:39

## 2018-07-28 RX ADMIN — IPRATROPIUM BROMIDE AND ALBUTEROL SULFATE 3 ML: .5; 3 SOLUTION RESPIRATORY (INHALATION) at 13:08

## 2018-07-28 RX ADMIN — HYDROCHLOROTHIAZIDE: 25 TABLET ORAL at 15:48

## 2018-07-28 RX ADMIN — FERROUS SULFATE TAB EC 324 MG (65 MG FE EQUIVALENT) 324 MG: 324 (65 FE) TABLET DELAYED RESPONSE at 17:18

## 2018-07-28 RX ADMIN — MORPHINE SULFATE 2 MG: 2 INJECTION, SOLUTION INTRAMUSCULAR; INTRAVENOUS at 15:48

## 2018-07-28 RX ADMIN — CEFEPIME HYDROCHLORIDE 2 G: 2 INJECTION, SOLUTION INTRAVENOUS at 01:00

## 2018-07-28 RX ADMIN — GABAPENTIN 400 MG: 400 CAPSULE ORAL at 15:48

## 2018-07-28 RX ADMIN — Medication 10 ML: at 01:00

## 2018-07-28 RX ADMIN — GABAPENTIN 400 MG: 400 CAPSULE ORAL at 11:56

## 2018-07-28 RX ADMIN — CEFEPIME HYDROCHLORIDE 2 G: 2 INJECTION, SOLUTION INTRAVENOUS at 08:33

## 2018-07-28 RX ADMIN — FAMOTIDINE 40 MG: 20 TABLET ORAL at 08:33

## 2018-07-28 RX ADMIN — CEFEPIME HYDROCHLORIDE 2 G: 2 INJECTION, SOLUTION INTRAVENOUS at 17:18

## 2018-07-28 RX ADMIN — LEVOFLOXACIN 500 MG: 500 TABLET, FILM COATED ORAL at 11:56

## 2018-07-28 RX ADMIN — FLUOXETINE 40 MG: 20 CAPSULE ORAL at 15:48

## 2018-07-28 RX ADMIN — BUDESONIDE AND FORMOTEROL FUMARATE DIHYDRATE 2 PUFF: 160; 4.5 AEROSOL RESPIRATORY (INHALATION) at 21:02

## 2018-07-28 RX ADMIN — ENOXAPARIN SODIUM 40 MG: 100 INJECTION SUBCUTANEOUS at 20:09

## 2018-07-29 ENCOUNTER — APPOINTMENT (OUTPATIENT)
Dept: ULTRASOUND IMAGING | Facility: HOSPITAL | Age: 38
End: 2018-07-29

## 2018-07-29 LAB
ALBUMIN SERPL-MCNC: 3.4 G/DL (ref 3.5–5)
ANION GAP SERPL CALCULATED.3IONS-SCNC: 10 MMOL/L (ref 10–20)
BASOPHILS # BLD AUTO: 0.02 10*3/MM3 (ref 0–0.2)
BASOPHILS NFR BLD AUTO: 0.3 % (ref 0–2.5)
BUN BLD-MCNC: 4 MG/DL (ref 7–20)
BUN/CREAT SERPL: 6.7 (ref 7.1–23.5)
CALCIUM SPEC-SCNC: 8.9 MG/DL (ref 8.4–10.2)
CHLORIDE SERPL-SCNC: 104 MMOL/L (ref 98–107)
CO2 SERPL-SCNC: 30 MMOL/L (ref 26–30)
CREAT BLD-MCNC: 0.6 MG/DL (ref 0.6–1.3)
DEPRECATED RDW RBC AUTO: 54.5 FL (ref 37–54)
EOSINOPHIL # BLD AUTO: 0.12 10*3/MM3 (ref 0–0.7)
EOSINOPHIL NFR BLD AUTO: 2.1 % (ref 0–7)
ERYTHROCYTE [DISTWIDTH] IN BLOOD BY AUTOMATED COUNT: 18.6 % (ref 11.5–14.5)
GFR SERPL CREATININE-BSD FRML MDRD: 112 ML/MIN/1.73
GLUCOSE BLD-MCNC: 121 MG/DL (ref 74–98)
HCT VFR BLD AUTO: 28.5 % (ref 37–47)
HEMOCCULT STL QL: NEGATIVE
HGB BLD-MCNC: 8.3 G/DL (ref 12–16)
IMM GRANULOCYTES # BLD: 0.06 10*3/MM3 (ref 0–0.06)
IMM GRANULOCYTES NFR BLD: 1 % (ref 0–0.6)
LYMPHOCYTES # BLD AUTO: 1.33 10*3/MM3 (ref 0.6–3.4)
LYMPHOCYTES NFR BLD AUTO: 23 % (ref 10–50)
MAGNESIUM SERPL-MCNC: 1.8 MG/DL (ref 1.6–2.3)
MCH RBC QN AUTO: 23.4 PG (ref 27–31)
MCHC RBC AUTO-ENTMCNC: 29.1 G/DL (ref 30–37)
MCV RBC AUTO: 80.3 FL (ref 81–99)
MONOCYTES # BLD AUTO: 0.36 10*3/MM3 (ref 0–0.9)
MONOCYTES NFR BLD AUTO: 6.2 % (ref 0–12)
NEUTROPHILS # BLD AUTO: 3.9 10*3/MM3 (ref 2–6.9)
NEUTROPHILS NFR BLD AUTO: 67.4 % (ref 37–80)
NRBC BLD MANUAL-RTO: 0 /100 WBC (ref 0–0)
PHOSPHATE SERPL-MCNC: 4.1 MG/DL (ref 2.5–4.5)
PLATELET # BLD AUTO: 142 10*3/MM3 (ref 130–400)
PMV BLD AUTO: 9.7 FL (ref 6–12)
POTASSIUM BLD-SCNC: 4 MMOL/L (ref 3.5–5.1)
RBC # BLD AUTO: 3.55 10*6/MM3 (ref 4.2–5.4)
SODIUM BLD-SCNC: 140 MMOL/L (ref 137–145)
WBC NRBC COR # BLD: 5.79 10*3/MM3 (ref 4.8–10.8)

## 2018-07-29 PROCEDURE — 94799 UNLISTED PULMONARY SVC/PX: CPT

## 2018-07-29 PROCEDURE — 85007 BL SMEAR W/DIFF WBC COUNT: CPT | Performed by: INTERNAL MEDICINE

## 2018-07-29 PROCEDURE — 85025 COMPLETE CBC W/AUTO DIFF WBC: CPT | Performed by: INTERNAL MEDICINE

## 2018-07-29 PROCEDURE — 82272 OCCULT BLD FECES 1-3 TESTS: CPT | Performed by: INTERNAL MEDICINE

## 2018-07-29 PROCEDURE — 99232 SBSQ HOSP IP/OBS MODERATE 35: CPT | Performed by: INTERNAL MEDICINE

## 2018-07-29 PROCEDURE — 83735 ASSAY OF MAGNESIUM: CPT | Performed by: INTERNAL MEDICINE

## 2018-07-29 PROCEDURE — 80069 RENAL FUNCTION PANEL: CPT | Performed by: INTERNAL MEDICINE

## 2018-07-29 PROCEDURE — 25010000002 CEFEPIME PER 500 MG: Performed by: INTERNAL MEDICINE

## 2018-07-29 PROCEDURE — 93971 EXTREMITY STUDY: CPT

## 2018-07-29 PROCEDURE — 25010000002 ENOXAPARIN PER 10 MG: Performed by: INTERNAL MEDICINE

## 2018-07-29 RX ORDER — DIPHENHYDRAMINE HYDROCHLORIDE 50 MG/ML
25 INJECTION INTRAMUSCULAR; INTRAVENOUS ONCE
Status: DISCONTINUED | OUTPATIENT
Start: 2018-07-29 | End: 2018-07-29

## 2018-07-29 RX ORDER — METOCLOPRAMIDE HYDROCHLORIDE 5 MG/ML
10 INJECTION INTRAMUSCULAR; INTRAVENOUS ONCE
Status: DISCONTINUED | OUTPATIENT
Start: 2018-07-29 | End: 2018-07-29

## 2018-07-29 RX ORDER — AMLODIPINE BESYLATE 5 MG/1
5 TABLET ORAL ONCE
Status: COMPLETED | OUTPATIENT
Start: 2018-07-29 | End: 2018-07-29

## 2018-07-29 RX ORDER — KETOROLAC TROMETHAMINE 30 MG/ML
30 INJECTION, SOLUTION INTRAMUSCULAR; INTRAVENOUS ONCE
Status: DISCONTINUED | OUTPATIENT
Start: 2018-07-29 | End: 2018-07-29

## 2018-07-29 RX ORDER — SUMATRIPTAN 50 MG/1
50 TABLET, FILM COATED ORAL
Status: DISCONTINUED | OUTPATIENT
Start: 2018-07-29 | End: 2018-07-30 | Stop reason: HOSPADM

## 2018-07-29 RX ORDER — AMLODIPINE BESYLATE 5 MG/1
5 TABLET ORAL
Status: DISCONTINUED | OUTPATIENT
Start: 2018-07-29 | End: 2018-07-29

## 2018-07-29 RX ORDER — HYDRALAZINE HYDROCHLORIDE 25 MG/1
25 TABLET, FILM COATED ORAL EVERY 6 HOURS PRN
Status: DISCONTINUED | OUTPATIENT
Start: 2018-07-29 | End: 2018-07-30 | Stop reason: HOSPADM

## 2018-07-29 RX ORDER — CEFDINIR 300 MG/1
300 CAPSULE ORAL EVERY 12 HOURS SCHEDULED
Status: DISCONTINUED | OUTPATIENT
Start: 2018-07-29 | End: 2018-07-30 | Stop reason: HOSPADM

## 2018-07-29 RX ORDER — AMLODIPINE BESYLATE 5 MG/1
10 TABLET ORAL
Status: DISCONTINUED | OUTPATIENT
Start: 2018-07-30 | End: 2018-07-30

## 2018-07-29 RX ORDER — BISOPROLOL FUMARATE 5 MG/1
10 TABLET, FILM COATED ORAL
Status: DISCONTINUED | OUTPATIENT
Start: 2018-07-29 | End: 2018-07-30

## 2018-07-29 RX ADMIN — LEVETIRACETAM 500 MG: 500 TABLET, FILM COATED ORAL at 20:21

## 2018-07-29 RX ADMIN — LEVOFLOXACIN 500 MG: 500 TABLET, FILM COATED ORAL at 11:07

## 2018-07-29 RX ADMIN — TIZANIDINE 4 MG: 4 TABLET ORAL at 23:32

## 2018-07-29 RX ADMIN — PANTOPRAZOLE SODIUM 40 MG: 40 TABLET, DELAYED RELEASE ORAL at 06:22

## 2018-07-29 RX ADMIN — SUMATRIPTAN SUCCINATE 50 MG: 50 TABLET ORAL at 23:31

## 2018-07-29 RX ADMIN — ACETAMINOPHEN 650 MG: 325 TABLET, FILM COATED ORAL at 18:56

## 2018-07-29 RX ADMIN — GABAPENTIN 400 MG: 400 CAPSULE ORAL at 08:29

## 2018-07-29 RX ADMIN — IPRATROPIUM BROMIDE AND ALBUTEROL SULFATE 3 ML: .5; 3 SOLUTION RESPIRATORY (INHALATION) at 19:44

## 2018-07-29 RX ADMIN — BISOPROLOL FUMARATE 10 MG: 5 TABLET ORAL at 08:25

## 2018-07-29 RX ADMIN — GABAPENTIN 400 MG: 400 CAPSULE ORAL at 15:46

## 2018-07-29 RX ADMIN — ACETAMINOPHEN 650 MG: 325 TABLET, FILM COATED ORAL at 11:13

## 2018-07-29 RX ADMIN — AMLODIPINE BESYLATE 5 MG: 5 TABLET ORAL at 11:07

## 2018-07-29 RX ADMIN — FAMOTIDINE 40 MG: 20 TABLET ORAL at 08:27

## 2018-07-29 RX ADMIN — SUMATRIPTAN SUCCINATE 50 MG: 50 TABLET ORAL at 16:11

## 2018-07-29 RX ADMIN — GABAPENTIN 400 MG: 400 CAPSULE ORAL at 20:21

## 2018-07-29 RX ADMIN — FLUOXETINE 40 MG: 20 CAPSULE ORAL at 08:25

## 2018-07-29 RX ADMIN — CEFEPIME HYDROCHLORIDE 2 G: 2 INJECTION, SOLUTION INTRAVENOUS at 00:17

## 2018-07-29 RX ADMIN — HYDROCHLOROTHIAZIDE: 25 TABLET ORAL at 08:38

## 2018-07-29 RX ADMIN — ENOXAPARIN SODIUM 40 MG: 100 INJECTION SUBCUTANEOUS at 08:25

## 2018-07-29 RX ADMIN — LEVETIRACETAM 500 MG: 500 TABLET, FILM COATED ORAL at 08:27

## 2018-07-29 RX ADMIN — CEFDINIR 300 MG: 300 CAPSULE ORAL at 11:07

## 2018-07-29 RX ADMIN — HYDRALAZINE HYDROCHLORIDE 25 MG: 25 TABLET ORAL at 16:26

## 2018-07-29 RX ADMIN — ACETAMINOPHEN 650 MG: 325 TABLET, FILM COATED ORAL at 06:22

## 2018-07-29 RX ADMIN — IPRATROPIUM BROMIDE AND ALBUTEROL SULFATE 3 ML: .5; 3 SOLUTION RESPIRATORY (INHALATION) at 15:29

## 2018-07-29 RX ADMIN — AMLODIPINE BESYLATE 5 MG: 5 TABLET ORAL at 19:32

## 2018-07-29 RX ADMIN — CEFDINIR 300 MG: 300 CAPSULE ORAL at 20:21

## 2018-07-29 RX ADMIN — FERROUS SULFATE TAB EC 324 MG (65 MG FE EQUIVALENT) 324 MG: 324 (65 FE) TABLET DELAYED RESPONSE at 18:38

## 2018-07-29 RX ADMIN — FERROUS SULFATE TAB EC 324 MG (65 MG FE EQUIVALENT) 324 MG: 324 (65 FE) TABLET DELAYED RESPONSE at 08:25

## 2018-07-29 RX ADMIN — BUDESONIDE AND FORMOTEROL FUMARATE DIHYDRATE 2 PUFF: 160; 4.5 AEROSOL RESPIRATORY (INHALATION) at 19:44

## 2018-07-29 RX ADMIN — CEFEPIME HYDROCHLORIDE 2 G: 2 INJECTION, SOLUTION INTRAVENOUS at 08:25

## 2018-07-29 RX ADMIN — ACETAMINOPHEN 650 MG: 325 TABLET, FILM COATED ORAL at 00:13

## 2018-07-30 ENCOUNTER — APPOINTMENT (OUTPATIENT)
Dept: MRI IMAGING | Facility: HOSPITAL | Age: 38
End: 2018-07-30
Attending: PSYCHIATRY & NEUROLOGY

## 2018-07-30 ENCOUNTER — APPOINTMENT (OUTPATIENT)
Dept: SLEEP MEDICINE | Facility: HOSPITAL | Age: 38
End: 2018-07-30
Attending: PSYCHIATRY & NEUROLOGY

## 2018-07-30 VITALS
SYSTOLIC BLOOD PRESSURE: 111 MMHG | TEMPERATURE: 98.4 F | HEART RATE: 79 BPM | WEIGHT: 276.9 LBS | BODY MASS INDEX: 44.5 KG/M2 | HEIGHT: 66 IN | DIASTOLIC BLOOD PRESSURE: 72 MMHG | RESPIRATION RATE: 17 BRPM | OXYGEN SATURATION: 95 %

## 2018-07-30 PROBLEM — J20.9 ACUTE BRONCHITIS: Status: RESOLVED | Noted: 2018-05-31 | Resolved: 2018-07-30

## 2018-07-30 PROBLEM — J42 CHRONIC BRONCHITIS WITH ACUTE EXACERBATION (HCC): Status: RESOLVED | Noted: 2018-05-31 | Resolved: 2018-07-30

## 2018-07-30 PROBLEM — R41.82 ALTERED MENTAL STATUS: Status: RESOLVED | Noted: 2018-07-26 | Resolved: 2018-07-30

## 2018-07-30 PROBLEM — J20.9 CHRONIC BRONCHITIS WITH ACUTE EXACERBATION (HCC): Status: RESOLVED | Noted: 2018-05-31 | Resolved: 2018-07-30

## 2018-07-30 LAB
BACTERIA SPEC RESP CULT: NORMAL
GRAM STN SPEC: NORMAL

## 2018-07-30 PROCEDURE — 94799 UNLISTED PULMONARY SVC/PX: CPT

## 2018-07-30 PROCEDURE — 99222 1ST HOSP IP/OBS MODERATE 55: CPT | Performed by: PSYCHIATRY & NEUROLOGY

## 2018-07-30 PROCEDURE — 94760 N-INVAS EAR/PLS OXIMETRY 1: CPT

## 2018-07-30 PROCEDURE — 95819 EEG AWAKE AND ASLEEP: CPT | Performed by: PSYCHIATRY & NEUROLOGY

## 2018-07-30 PROCEDURE — 93005 ELECTROCARDIOGRAM TRACING: CPT | Performed by: INTERNAL MEDICINE

## 2018-07-30 PROCEDURE — 94618 PULMONARY STRESS TESTING: CPT

## 2018-07-30 PROCEDURE — 70551 MRI BRAIN STEM W/O DYE: CPT

## 2018-07-30 PROCEDURE — 99238 HOSP IP/OBS DSCHRG MGMT 30/<: CPT | Performed by: INTERNAL MEDICINE

## 2018-07-30 PROCEDURE — 95819 EEG AWAKE AND ASLEEP: CPT

## 2018-07-30 PROCEDURE — 25010000002 MORPHINE SULFATE (PF) 2 MG/ML SOLUTION: Performed by: INTERNAL MEDICINE

## 2018-07-30 RX ORDER — FERROUS SULFATE TAB EC 324 MG (65 MG FE EQUIVALENT) 324 (65 FE) MG
324 TABLET DELAYED RESPONSE ORAL 2 TIMES DAILY WITH MEALS
Qty: 60 TABLET | Refills: 0 | Status: SHIPPED | OUTPATIENT
Start: 2018-07-30 | End: 2020-06-16 | Stop reason: HOSPADM

## 2018-07-30 RX ORDER — LEVOFLOXACIN 500 MG/1
500 TABLET, FILM COATED ORAL EVERY 24 HOURS
Qty: 3 TABLET | Refills: 0 | Status: SHIPPED | OUTPATIENT
Start: 2018-07-30 | End: 2018-08-02

## 2018-07-30 RX ORDER — LEVETIRACETAM 500 MG/1
500 TABLET ORAL EVERY 12 HOURS SCHEDULED
Qty: 60 TABLET | Refills: 0 | Status: SHIPPED | OUTPATIENT
Start: 2018-07-30 | End: 2018-09-11 | Stop reason: SDUPTHER

## 2018-07-30 RX ORDER — SUMATRIPTAN 50 MG/1
TABLET, FILM COATED ORAL
Qty: 9 TABLET | Refills: 0 | Status: SHIPPED | OUTPATIENT
Start: 2018-07-30 | End: 2018-09-11 | Stop reason: SDUPTHER

## 2018-07-30 RX ORDER — BISOPROLOL FUMARATE 5 MG/1
5 TABLET, FILM COATED ORAL
Status: DISCONTINUED | OUTPATIENT
Start: 2018-07-30 | End: 2018-07-30 | Stop reason: HOSPADM

## 2018-07-30 RX ORDER — CEFDINIR 300 MG/1
300 CAPSULE ORAL EVERY 12 HOURS SCHEDULED
Qty: 11 CAPSULE | Refills: 0 | Status: SHIPPED | OUTPATIENT
Start: 2018-07-30 | End: 2018-08-05

## 2018-07-30 RX ORDER — BISOPROLOL FUMARATE 5 MG/1
5 TABLET, FILM COATED ORAL
Qty: 30 TABLET | Refills: 0 | Status: SHIPPED | OUTPATIENT
Start: 2018-07-31 | End: 2020-06-16 | Stop reason: HOSPADM

## 2018-07-30 RX ADMIN — FLUOXETINE 40 MG: 20 CAPSULE ORAL at 08:47

## 2018-07-30 RX ADMIN — GABAPENTIN 400 MG: 400 CAPSULE ORAL at 08:48

## 2018-07-30 RX ADMIN — ACETAMINOPHEN 650 MG: 325 TABLET, FILM COATED ORAL at 08:51

## 2018-07-30 RX ADMIN — LEVETIRACETAM 500 MG: 500 TABLET, FILM COATED ORAL at 08:47

## 2018-07-30 RX ADMIN — GABAPENTIN 400 MG: 400 CAPSULE ORAL at 15:16

## 2018-07-30 RX ADMIN — FAMOTIDINE 40 MG: 20 TABLET ORAL at 08:47

## 2018-07-30 RX ADMIN — HYDROCHLOROTHIAZIDE: 25 TABLET ORAL at 08:46

## 2018-07-30 RX ADMIN — PANTOPRAZOLE SODIUM 40 MG: 40 TABLET, DELAYED RELEASE ORAL at 06:09

## 2018-07-30 RX ADMIN — ACETAMINOPHEN 650 MG: 325 TABLET, FILM COATED ORAL at 15:18

## 2018-07-30 RX ADMIN — BUDESONIDE AND FORMOTEROL FUMARATE DIHYDRATE 2 PUFF: 160; 4.5 AEROSOL RESPIRATORY (INHALATION) at 06:51

## 2018-07-30 RX ADMIN — BISOPROLOL FUMARATE 5 MG: 5 TABLET ORAL at 08:46

## 2018-07-30 RX ADMIN — IPRATROPIUM BROMIDE AND ALBUTEROL SULFATE 3 ML: .5; 3 SOLUTION RESPIRATORY (INHALATION) at 12:33

## 2018-07-30 RX ADMIN — IPRATROPIUM BROMIDE AND ALBUTEROL SULFATE 3 ML: .5; 3 SOLUTION RESPIRATORY (INHALATION) at 06:51

## 2018-07-30 RX ADMIN — LEVOFLOXACIN 500 MG: 500 TABLET, FILM COATED ORAL at 11:16

## 2018-07-30 RX ADMIN — CEFDINIR 300 MG: 300 CAPSULE ORAL at 08:46

## 2018-07-30 RX ADMIN — MORPHINE SULFATE 2 MG: 2 INJECTION, SOLUTION INTRAMUSCULAR; INTRAVENOUS at 08:22

## 2018-07-30 RX ADMIN — FERROUS SULFATE TAB EC 324 MG (65 MG FE EQUIVALENT) 324 MG: 324 (65 FE) TABLET DELAYED RESPONSE at 08:47

## 2018-07-31 LAB
BACTERIA SPEC AEROBE CULT: NORMAL
BACTERIA SPEC AEROBE CULT: NORMAL

## 2018-08-10 ENCOUNTER — OFFICE VISIT (OUTPATIENT)
Dept: NEUROLOGY | Facility: CLINIC | Age: 38
End: 2018-08-10

## 2018-08-10 VITALS
HEART RATE: 94 BPM | HEIGHT: 66 IN | SYSTOLIC BLOOD PRESSURE: 112 MMHG | DIASTOLIC BLOOD PRESSURE: 72 MMHG | OXYGEN SATURATION: 100 %

## 2018-08-10 DIAGNOSIS — G47.33 OBSTRUCTIVE SLEEP APNEA: Primary | ICD-10-CM

## 2018-08-10 DIAGNOSIS — G47.19 EXCESSIVE DAYTIME SLEEPINESS: ICD-10-CM

## 2018-08-10 DIAGNOSIS — G47.34 NOCTURNAL OXYGEN DESATURATION: ICD-10-CM

## 2018-08-10 DIAGNOSIS — G43.711 INTRACTABLE CHRONIC MIGRAINE WITHOUT AURA AND WITH STATUS MIGRAINOSUS: ICD-10-CM

## 2018-08-10 PROCEDURE — 96372 THER/PROPH/DIAG INJ SC/IM: CPT | Performed by: PSYCHIATRY & NEUROLOGY

## 2018-08-10 PROCEDURE — 99214 OFFICE O/P EST MOD 30 MIN: CPT | Performed by: PSYCHIATRY & NEUROLOGY

## 2018-08-10 RX ORDER — TIZANIDINE 4 MG/1
1 TABLET ORAL EVERY 8 HOURS
Refills: 0 | COMMUNITY
Start: 2018-08-06 | End: 2020-06-16 | Stop reason: HOSPADM

## 2018-08-10 RX ORDER — IPRATROPIUM BROMIDE AND ALBUTEROL SULFATE 2.5; .5 MG/3ML; MG/3ML
SOLUTION RESPIRATORY (INHALATION)
Refills: 3 | COMMUNITY
Start: 2018-08-07

## 2018-08-10 RX ORDER — KETOROLAC TROMETHAMINE 30 MG/ML
60 INJECTION, SOLUTION INTRAMUSCULAR; INTRAVENOUS ONCE
Status: COMPLETED | OUTPATIENT
Start: 2018-08-10 | End: 2018-08-10

## 2018-08-10 RX ADMIN — KETOROLAC TROMETHAMINE 60 MG: 30 INJECTION, SOLUTION INTRAMUSCULAR; INTRAVENOUS at 10:01

## 2018-08-10 NOTE — PROGRESS NOTES
[facility] NEUROLOGY [place] PROGRESS NOTE  History of Present Illness     Date: 8/10/2018    Patient Identification  Liliana Calderon is a 38 y.o. female.    Patient information was obtained from patient.  History/Exam limitations: none.    Original consultation requested by: Ollie Flores MD      Chief Complaint   Seizures (NP, in office today for consult. Pt was taken to [facility] ED 7/26/18 for possible sz. Pt was found unconscious in her car, EMS was called and transported pt to hospital. Pt states at that time she had not had sz in approx 5 years  Pt has undergone MRI and EEG)      History of Present Illness   Patient is a pleasant 38-year-old referred to [facility] neurology [place] for evaluation of seizure and migraine headache.  Patient apparently admitted to hospital on July 26, 2018 after she was found unresponsive in her car with agonal breathing.  Her urine drug screen is positive for opiates.  Patient was found to be acidotic with a pH of 7.19.  Patient also had history of sleep apnea.  Patient witnessed having apneic episodes, loud snoring and excessive daytime sleepiness.      Patient also had history of seizure.  Patient has been seizure-free over the last 5 years.  With this episode of syncope patient suffered underwent EEG and MRI.  EEG and MRI are both reportedly normal.      Patient also reports having daily headache associated with nausea but no vomiting.  Patient reported to have photophobia and phonophobia.  Patient reports to have headache when she wake up in the morning.    PMH:   Past Medical History:   Diagnosis Date   • Anxiety and depression    • Chronic bronchitis (CMS/HCC)    • Fibromyalgia    • Heart valve problem    • Hypertension    • Migraine    • PCOS (polycystic ovarian syndrome)    • Pneumonia    • Seizures (CMS/HCC)        Past Surgical History:   Past Surgical History:   Procedure Laterality Date   • CHOLECYSTECTOMY         Family Hisotry:   Family History   Problem  Relation Age of Onset   • Breast cancer Mother    • Bone cancer Mother    • Skin cancer Mother    • Hypertension Mother    • Dementia Father    • Diabetes Father    • Heart disease Father    • Hypertension Father    • Stroke Father    • Pulmonary embolism Father    • Alcohol abuse Sister    • Hypertension Sister    • Mental illness Sister    • Alcohol abuse Brother    • Hypertension Brother    • Mental illness Brother    • Alzheimer's disease Maternal Grandmother    • Parkinsonism Maternal Grandmother    • Heart disease Paternal Grandfather        Social History:   Social History     Social History   • Marital status: Single     Spouse name: N/A   • Number of children: N/A   • Years of education: N/A     Occupational History   • Not on file.     Social History Main Topics   • Smoking status: Former Smoker     Quit date: 2014   • Smokeless tobacco: Never Used   • Alcohol use No   • Drug use: No   • Sexual activity: Not on file     Other Topics Concern   • Not on file     Social History Narrative   • No narrative on file       Medications:   Current Outpatient Prescriptions   Medication Sig Dispense Refill   • bisoprolol (ZEBeta) 5 MG tablet Take 1 tablet by mouth Daily. 30 tablet 0   • budesonide-formoterol (SYMBICORT) 160-4.5 MCG/ACT inhaler Inhale 2 puffs 2 (Two) Times a Day. 1 inhaler 0   • ferrous sulfate 324 (65 Fe) MG tablet delayed-release EC tablet Take 1 tablet by mouth 2 (Two) Times a Day With Meals. 60 tablet 0   • FLUoxetine (PROzac) 40 MG capsule Take 40 mg by mouth Daily.     • gabapentin (NEURONTIN) 800 MG tablet Take 800 mg by mouth 3 (Three) Times a Day.     • ipratropium-albuterol (DUO-NEB) 0.5-2.5 mg/3 ml nebulizer INHALE 1 VIAL (3 MLS)  BY NEBULIZATION TWO TIMES DAILY  3   • lansoprazole (PREVACID) 30 MG capsule Take 30 mg by mouth Daily.     • levETIRAcetam (KEPPRA) 500 MG tablet Take 1 tablet by mouth Every 12 (Twelve) Hours. 60 tablet 0   • lisinopril-hydrochlorothiazide (PRINZIDE,ZESTORETIC)  "20-12.5 MG per tablet Take 1 tablet by mouth Daily.     • SUMAtriptan (IMITREX) 50 MG tablet Take one tablet at onset of headache. May repeat dose one time in 2 hours if headache not relieved. 9 tablet 0   • tiZANidine (ZANAFLEX) 4 MG tablet 1 tablet Every 8 (Eight) Hours.  0     No current facility-administered medications for this visit.        Allergy:   Allergies   Allergen Reactions   • Penicillins Rash   • Phenobarbital Rash       Review of Systems:  Review of Systems   Constitutional: Positive for fatigue. Negative for chills and fever.   HENT: Negative for congestion, ear pain, hearing loss, rhinorrhea and sore throat.    Eyes: Negative for pain, discharge and redness.   Respiratory: Positive for apnea. Negative for cough, shortness of breath, wheezing and stridor.    Cardiovascular: Negative for chest pain, palpitations and leg swelling.   Gastrointestinal: Negative for abdominal pain, constipation, nausea and vomiting.   Endocrine: Negative for cold intolerance, heat intolerance and polyphagia.   Genitourinary: Negative for dysuria, flank pain, frequency and urgency.   Musculoskeletal: Negative for joint swelling, myalgias, neck pain and neck stiffness.   Skin: Negative for pallor, rash and wound.   Allergic/Immunologic: Negative for environmental allergies.   Neurological: Negative for dizziness, tremors, seizures, syncope, facial asymmetry, speech difficulty, weakness, light-headedness, numbness and headaches.   Hematological: Negative for adenopathy.   Psychiatric/Behavioral: Positive for sleep disturbance. Negative for confusion and hallucinations. The patient is not nervous/anxious.        Physical Exam     Vitals:    08/10/18 0845   BP: 112/72   Pulse: 94   SpO2: 100%  Comment: 2L O2   Height: 167.6 cm (66\")     GENERAL: Patient is pleasant, cooperative, appears to be stated age.  Body habitus is endomorphic.  SKIN AND EXTREMITIES:  No skin rashes or lesions are noted.  No cyanosis, clubbing or edema " of the extremities.    HEAD:  Head is normocephalic and atraumatic.    NECK: Neck are non-tender without thyromegaly or adenopathy.  Carotic upstrokes are 1+/4.  No cranial or cervical bruits.  The neck is supple with a full range of motion.   Neck Circumference is 17 inches  ENT: palate elevate symmetrically, no evidence of high arch palate, tongue midline erythema in posterior pharynx, Mallampati Classification Class III   CARDIOVASCULAR:  Regular rate and rhythm with normal S1 and S2 without rub or gallop.  RESPIRATORY:  Clear to auscultation without wheezes or crackle   ABDOMEN:  Soft and non-tender, positive bowel sound without hepatosplenomegaly  BACK:  Back is straight without midline defect.    PSYCH:  Higher cortical function/mental status:  The patient is alert.  She is oriented x3 to time, place and person.  Recent and the remote memory appear normal.  The patient has a good fund of knowledge.  There is no visual or auditory hallucination or suicidal or homicidal ideation.  SPEECH:There is no gross evidence of aphasia, dysarthria or agnosia.      CRANIAL NERVES:  Pupils are 4mm, equal round reactive to light, reacting briskly to 2mm without afferent pupillary defect.  Visual fields are intact to confrontation testing.  Fundoscopic examination reveals sharp disk margins with normal vasculature.  No papilledema, hemorrhages or exudates.  Extraocular movements are full and smooth with normal pursuits and saccades.  No nystagmus noted.  The face is symmetric. palate elevate symmetrically, Tongue midline, positive gag reflex. The remainder of the cranial nerves are intact and symmetrical.    MOTOR: Strength is 5/5 throughout with normal tone and bulk with the following exceptions, 4/5 intrinsic muscles of the hands and feet.  No involuntary movements noted.    Deep Tendon Reflexes: are 2/4 and symmetrical in the upper extremities, 2/4 and symmetrical at the knees and 1/4 and symmetrical at the Achilles tendon.   Plantar responses were down-going bilaterally.    SENSATION:  Intact to pinprick, light touch, vibration and proprioception.  Coordination:  The patient normally performs finger-nose-finger, heel-to-knee-to-shin and rapid alternating movements in symmetrical fashion.    COORDINATION AND GAIT:  The patient walks with a narrow-based gait.  Patient is able to heel-toe and tandem walk forward and backwards without difficulty.  Romberg and monopedal  Romberg are negative.    MUSCULOSKELETAL: Range of motion normal, no clubbing, cyanosis, or edema.  No joint swelling.            Studies: I have personally reviewed the following and discussed with the patient.  Results for orders placed or performed during the hospital encounter of 07/26/18   Respiratory Culture - Sputum, Naris, Right   Result Value Ref Range    Respiratory Culture Moderate growth (3+) Normal Respiratory Irina     Gram Stain Result Greater than 20 WBCs per low power field     Gram Stain Result Less than 10 Epithelial cells per low power field     Gram Stain Result       Rare (1+) Gram positive cocci, intracellular and extracellular, in pairs and chains   Blood Culture With MEMO - Blood,   Result Value Ref Range    Blood Culture No growth at 5 days    Blood Culture With MEMO - Blood,   Result Value Ref Range    Blood Culture No growth at 5 days    CBC Auto Differential   Result Value Ref Range    WBC 12.93 (H) 4.80 - 10.80 10*3/mm3    RBC 4.11 (L) 4.20 - 5.40 10*6/mm3    Hemoglobin 9.8 (L) 12.0 - 16.0 g/dL    Hematocrit 33.2 (L) 37.0 - 47.0 %    MCV 80.8 (L) 81.0 - 99.0 fL    MCH 23.8 (L) 27.0 - 31.0 pg    MCHC 29.5 (L) 30.0 - 37.0 g/dL    RDW 17.4 (H) 11.5 - 14.5 %    RDW-SD 51.0 37.0 - 54.0 fl    MPV 9.6 6.0 - 12.0 fL    Platelets 185 130 - 400 10*3/mm3    Neutrophil % 88.3 (H) 37.0 - 80.0 %    Lymphocyte % 5.6 (L) 10.0 - 50.0 %    Monocyte % 3.9 0.0 - 12.0 %    Eosinophil % 0.3 0.0 - 7.0 %    Basophil % 0.2 0.0 - 2.5 %    Immature Grans % 1.7 (H) 0.0 -  0.6 %    Neutrophils, Absolute 11.40 (H) 2.00 - 6.90 10*3/mm3    Lymphocytes, Absolute 0.73 0.60 - 3.40 10*3/mm3    Monocytes, Absolute 0.51 0.00 - 0.90 10*3/mm3    Eosinophils, Absolute 0.04 0.00 - 0.70 10*3/mm3    Basophils, Absolute 0.03 0.00 - 0.20 10*3/mm3    Immature Grans, Absolute 0.22 (H) 0.00 - 0.06 10*3/mm3    nRBC 0.0 0.0 - 0.0 /100 WBC   Comprehensive Metabolic Panel   Result Value Ref Range    Glucose 303 (H) 74 - 98 mg/dL    BUN 12 7 - 20 mg/dL    Creatinine 0.90 0.60 - 1.30 mg/dL    Sodium 138 137 - 145 mmol/L    Potassium 4.5 3.5 - 5.1 mmol/L    Chloride 101 98 - 107 mmol/L    CO2 17.0 (L) 26.0 - 30.0 mmol/L    Calcium 9.3 8.4 - 10.2 mg/dL    Total Protein 7.6 6.3 - 8.2 g/dL    Albumin 4.50 3.50 - 5.00 g/dL    ALT (SGPT) 93 (H) 13 - 69 U/L    AST (SGOT) 176 (H) 15 - 46 U/L    Alkaline Phosphatase 168 (H) 38 - 126 U/L    Total Bilirubin 0.9 0.2 - 1.3 mg/dL    eGFR Non African Amer 70 >60 mL/min/1.73    Globulin 3.1 gm/dL    A/G Ratio 1.5 1.0 - 2.0 g/dL    BUN/Creatinine Ratio 13.3 7.1 - 23.5    Anion Gap 24.5 (H) 10.0 - 20.0 mmol/L   Urine Drug Screen - Urine, Clean Catch   Result Value Ref Range    THC, Screen, Urine Negative Negative    Phencyclidine (PCP), Urine Negative Negative    Cocaine Screen, Urine Negative Negative    Methamphetamine, Urine Negative Negative    Opiate Screen Positive (A) Negative    Amphetamine Screen, Urine Negative Negative    Benzodiazepine Screen, Urine Negative Negative    Tricyclic Antidepressants Screen Negative Negative    Methadone Screen, Urine Negative Negative    Barbiturates Screen, Urine Negative Negative    Oxycodone Screen, Urine Negative Negative    Propoxyphene Screen Negative Negative    Buprenorphine, Screen, Urine Negative Negative   Urinalysis With Culture If Indicated - Urine, Clean Catch   Result Value Ref Range    Color, UA Yellow Yellow, Straw    Appearance, UA Clear Clear    pH, UA 6.0 5.0 - 8.0    Specific Gravity, UA >=1.030 1.005 - 1.030     Glucose,  mg/dL (2+) (A) Negative    Ketones, UA Negative Negative    Bilirubin, UA Negative Negative    Blood, UA Trace (A) Negative    Protein, UA >=300 mg/dL (3+) (A) Negative    Leuk Esterase, UA Negative Negative    Nitrite, UA Negative Negative    Urobilinogen, UA 0.2 E.U./dL 0.2 - 1.0 E.U./dL   Salicylate Level   Result Value Ref Range    Salicylate <1.0 (L) 2.8 - 20.0 mg/dL   Acetaminophen Level   Result Value Ref Range    Acetaminophen <10.0   mcg/mL   Pregnancy, Urine - Urine, Clean Catch   Result Value Ref Range    HCG, Urine QL Negative Negative   Blood Gas, Arterial With Co-Ox   Result Value Ref Range    Site Right Brachial     Carlos's Test N/A     pH, Arterial 7.191 (C) 7.300 - 7.500 pH units    pCO2, Arterial 46.3 (H) 35.0 - 45.0 mm Hg    pO2, Arterial 93.8 75.0 - 100.0 mm Hg    HCO3, Arterial 17.7 (L) 22.0 - 28.0 mmol/L    Base Excess, Arterial -10.2 (L) 0.0 - 2.0 mmol/L    O2 Saturation, Arterial 95.5 94.0 - 100.0 %    Hemoglobin, Blood Gas 11.7 (L) 12 - 18 g/dL    Hematocrit, Blood Gas 35.9 %    Oxyhemoglobin 93.6 (L) 94 - 99 %    Methemoglobin 1.10 0.00 - 1.50 %    Carboxyhemoglobin 0.9 0 - 2 %    Barometric Pressure for Blood Gas 734 mmHg    Modality Ventilator     FIO2 60 %    Ventilator Mode AC     Set Tidal Volume 550     PEEP 5.0     Collected by josey     pH, Temp Corrected  pH Units    pCO2, Temperature Corrected  35 - 45 mm Hg    pO2, Temperature Corrected  83 - 108 mm Hg   Lactic Acid, Plasma   Result Value Ref Range    Lactate 8.7 (C) 0.5 - 2.0 mmol/L   Urinalysis, Microscopic Only - Urine, Clean Catch   Result Value Ref Range    RBC, UA 3-5 (A) None Seen /HPF    WBC, UA 3-5 (A) None Seen /HPF    Bacteria, UA Trace (A) None Seen /HPF    Squamous Epithelial Cells, UA 3-6 (A) None Seen, 0-2 /HPF    Hyaline Casts, UA 0-2 None Seen /LPF    Amorphous Crystals, UA Large/3+ None Seen /HPF    Mucus, UA Trace None Seen, Trace /HPF    Methodology Manual Light Microscopy    Lactic Acid, Reflex  Timer (This will reflex a repeat order 3-3:15 hours after ordered.)   Result Value Ref Range    Extra Tube Hold for add-ons.    Scan Slide   Result Value Ref Range    Anisocytosis Slight/1+ None Seen    Microcytes Slight/1+ None Seen    WBC Morphology Normal Normal    Platelet Estimate Adequate Normal   Lactic Acid, Reflex   Result Value Ref Range    Lactate 3.3 (C) 0.5 - 2.0 mmol/L   Blood Gas, Arterial With Co-Ox   Result Value Ref Range    Site Right Radial     Carlos's Test Positive     pH, Arterial 7.357 7.300 - 7.500 pH units    pCO2, Arterial 43.4 35.0 - 45.0 mm Hg    pO2, Arterial 220.0 (H) 75.0 - 100.0 mm Hg    HCO3, Arterial 24.3 22.0 - 28.0 mmol/L    Base Excess, Arterial -1.2 (L) 0.0 - 2.0 mmol/L    O2 Saturation, Arterial >99.6 94.0 - 100.0 %    Hemoglobin, Blood Gas 9.6 (L) 12 - 18 g/dL    Hematocrit, Blood Gas 29.4 %    Oxyhemoglobin 98.0 94 - 99 %    Methemoglobin 1.10 0.00 - 1.50 %    Carboxyhemoglobin 1.0 0 - 2 %    Barometric Pressure for Blood Gas 734 mmHg    Modality Ventilator     FIO2 60 %    Ventilator Mode AC     PEEP 10.0     Collected by josey     pH, Temp Corrected  pH Units    pCO2, Temperature Corrected  35 - 45 mm Hg    pO2, Temperature Corrected  83 - 108 mm Hg   BNP   Result Value Ref Range    proBNP 326.0 (C) 0.0 - 125.0 pg/mL   Basic Metabolic Panel   Result Value Ref Range    Glucose 105 (H) 74 - 98 mg/dL    BUN 9 7 - 20 mg/dL    Creatinine 0.60 0.60 - 1.30 mg/dL    Sodium 139 137 - 145 mmol/L    Potassium 4.2 3.5 - 5.1 mmol/L    Chloride 106 98 - 107 mmol/L    CO2 24.0 (L) 26.0 - 30.0 mmol/L    Calcium 8.3 (L) 8.4 - 10.2 mg/dL    eGFR Non African Amer 112 >60 mL/min/1.73    BUN/Creatinine Ratio 15.0 7.1 - 23.5    Anion Gap 13.2 10.0 - 20.0 mmol/L   CBC Auto Differential   Result Value Ref Range    WBC 10.85 (H) 4.80 - 10.80 10*3/mm3    RBC 4.15 (L) 4.20 - 5.40 10*6/mm3    Hemoglobin 10.0 (L) 12.0 - 16.0 g/dL    Hematocrit 33.3 (L) 37.0 - 47.0 %    MCV 80.2 (L) 81.0 - 99.0 fL    MCH  24.1 (L) 27.0 - 31.0 pg    MCHC 30.0 30.0 - 37.0 g/dL    RDW 17.9 (H) 11.5 - 14.5 %    RDW-SD 52.0 37.0 - 54.0 fl    MPV 11.1 6.0 - 12.0 fL    Platelets 191 130 - 400 10*3/mm3    Neutrophil % 84.0 (H) 37.0 - 80.0 %    Lymphocyte % 9.7 (L) 10.0 - 50.0 %    Monocyte % 4.8 0.0 - 12.0 %    Eosinophil % 0.2 0.0 - 7.0 %    Basophil % 0.2 0.0 - 2.5 %    Immature Grans % 1.1 (H) 0.0 - 0.6 %    Neutrophils, Absolute 9.12 (H) 2.00 - 6.90 10*3/mm3    Lymphocytes, Absolute 1.05 0.60 - 3.40 10*3/mm3    Monocytes, Absolute 0.52 0.00 - 0.90 10*3/mm3    Eosinophils, Absolute 0.02 0.00 - 0.70 10*3/mm3    Basophils, Absolute 0.02 0.00 - 0.20 10*3/mm3    Immature Grans, Absolute 0.12 (H) 0.00 - 0.06 10*3/mm3    nRBC 0.0 0.0 - 0.0 /100 WBC   Blood Gas, Arterial With Co-Ox   Result Value Ref Range    Site Left Radial     Carlos's Test Positive     pH, Arterial 7.398 7.300 - 7.500 pH units    pCO2, Arterial 41.7 35.0 - 45.0 mm Hg    pO2, Arterial 149.0 (H) 75.0 - 100.0 mm Hg    HCO3, Arterial 25.7 22.0 - 28.0 mmol/L    Base Excess, Arterial 0.7 0.0 - 2.0 mmol/L    O2 Saturation, Arterial >99.6 94.0 - 100.0 %    Hemoglobin, Blood Gas 9.4 (L) 12 - 18 g/dL    Hematocrit, Blood Gas 28.9 %    Oxyhemoglobin 97.4 94 - 99 %    Methemoglobin 1.20 0.00 - 1.50 %    Carboxyhemoglobin 1.2 0 - 2 %    Barometric Pressure for Blood Gas 734 mmHg    Modality Ventilator     FIO2 50 %    Ventilator Mode AC     Set Tidal Volume 550     Set Mech Resp Rate 16.0     PEEP 5.0     Collected by bc     pH, Temp Corrected  pH Units    pCO2, Temperature Corrected  35 - 45 mm Hg    pO2, Temperature Corrected  83 - 108 mm Hg   Lactic Acid, Plasma   Result Value Ref Range    Lactate 3.4 (C) 0.5 - 2.0 mmol/L   Troponin   Result Value Ref Range    Troponin I 0.036 (H) 0.000 - 0.034 ng/mL   Renal Function Panel   Result Value Ref Range    Glucose 186 (H) 74 - 98 mg/dL    BUN 6 (L) 7 - 20 mg/dL    Creatinine 0.50 (L) 0.60 - 1.30 mg/dL    Sodium 139 137 - 145 mmol/L     Potassium 4.1 3.5 - 5.1 mmol/L    Chloride 107 98 - 107 mmol/L    CO2 26.0 26.0 - 30.0 mmol/L    Calcium 8.4 8.4 - 10.2 mg/dL    Albumin 3.30 (L) 3.50 - 5.00 g/dL    Phosphorus 2.9 2.5 - 4.5 mg/dL    Anion Gap 10.1 10.0 - 20.0 mmol/L    BUN/Creatinine Ratio 12.0 7.1 - 23.5    eGFR Non African Amer 139 >60 mL/min/1.73   Magnesium   Result Value Ref Range    Magnesium 1.7 1.6 - 2.3 mg/dL   C-reactive Protein   Result Value Ref Range    C-Reactive Protein 8.70 (H) 0.00 - 1.00 mg/dL   CBC Auto Differential   Result Value Ref Range    WBC 5.01 4.80 - 10.80 10*3/mm3    RBC 3.58 (L) 4.20 - 5.40 10*6/mm3    Hemoglobin 8.6 (L) 12.0 - 16.0 g/dL    Hematocrit 29.4 (L) 37.0 - 47.0 %    MCV 82.1 81.0 - 99.0 fL    MCH 24.0 (L) 27.0 - 31.0 pg    MCHC 29.3 (L) 30.0 - 37.0 g/dL    RDW 18.3 (H) 11.5 - 14.5 %    RDW-SD 54.6 (H) 37.0 - 54.0 fl    MPV 9.7 6.0 - 12.0 fL    Platelets 121 (L) 130 - 400 10*3/mm3    Neutrophil % 73.6 37.0 - 80.0 %    Lymphocyte % 17.0 10.0 - 50.0 %    Monocyte % 6.6 0.0 - 12.0 %    Eosinophil % 1.8 0.0 - 7.0 %    Basophil % 0.2 0.0 - 2.5 %    Immature Grans % 0.8 (H) 0.0 - 0.6 %    Neutrophils, Absolute 3.69 2.00 - 6.90 10*3/mm3    Lymphocytes, Absolute 0.85 0.60 - 3.40 10*3/mm3    Monocytes, Absolute 0.33 0.00 - 0.90 10*3/mm3    Eosinophils, Absolute 0.09 0.00 - 0.70 10*3/mm3    Basophils, Absolute 0.01 0.00 - 0.20 10*3/mm3    Immature Grans, Absolute 0.04 0.00 - 0.06 10*3/mm3    nRBC 0.0 0.0 - 0.0 /100 WBC   Vancomycin, Trough   Result Value Ref Range    Vancomycin Trough <5.00 (L) 5.00 - 15.00 mcg/mL   Iron Profile   Result Value Ref Range    Iron 27 (L) 37 - 181 mcg/dL    TIBC 356 261 - 497 mcg/dL    Iron Saturation 8 (L) 11 - 46 %   Occult Blood X 1, Stool - Stool,   Result Value Ref Range    Fecal Occult Blood Negative Negative   Renal Function Panel   Result Value Ref Range    Glucose 121 (H) 74 - 98 mg/dL    BUN 4 (L) 7 - 20 mg/dL    Creatinine 0.60 0.60 - 1.30 mg/dL    Sodium 140 137 - 145 mmol/L     Potassium 4.0 3.5 - 5.1 mmol/L    Chloride 104 98 - 107 mmol/L    CO2 30.0 26.0 - 30.0 mmol/L    Calcium 8.9 8.4 - 10.2 mg/dL    Albumin 3.40 (L) 3.50 - 5.00 g/dL    Phosphorus 4.1 2.5 - 4.5 mg/dL    Anion Gap 10.0 10.0 - 20.0 mmol/L    BUN/Creatinine Ratio 6.7 (L) 7.1 - 23.5    eGFR Non African Amer 112 >60 mL/min/1.73   Magnesium   Result Value Ref Range    Magnesium 1.8 1.6 - 2.3 mg/dL   CBC Auto Differential   Result Value Ref Range    WBC 5.79 4.80 - 10.80 10*3/mm3    RBC 3.55 (L) 4.20 - 5.40 10*6/mm3    Hemoglobin 8.3 (L) 12.0 - 16.0 g/dL    Hematocrit 28.5 (L) 37.0 - 47.0 %    MCV 80.3 (L) 81.0 - 99.0 fL    MCH 23.4 (L) 27.0 - 31.0 pg    MCHC 29.1 (L) 30.0 - 37.0 g/dL    RDW 18.6 (H) 11.5 - 14.5 %    RDW-SD 54.5 (H) 37.0 - 54.0 fl    MPV 9.7 6.0 - 12.0 fL    Platelets 142 130 - 400 10*3/mm3    Neutrophil % 67.4 37.0 - 80.0 %    Lymphocyte % 23.0 10.0 - 50.0 %    Monocyte % 6.2 0.0 - 12.0 %    Eosinophil % 2.1 0.0 - 7.0 %    Basophil % 0.3 0.0 - 2.5 %    Immature Grans % 1.0 (H) 0.0 - 0.6 %    Neutrophils, Absolute 3.90 2.00 - 6.90 10*3/mm3    Lymphocytes, Absolute 1.33 0.60 - 3.40 10*3/mm3    Monocytes, Absolute 0.36 0.00 - 0.90 10*3/mm3    Eosinophils, Absolute 0.12 0.00 - 0.70 10*3/mm3    Basophils, Absolute 0.02 0.00 - 0.20 10*3/mm3    Immature Grans, Absolute 0.06 0.00 - 0.06 10*3/mm3    nRBC 0.0 0.0 - 0.0 /100 WBC   POC Glucose Once   Result Value Ref Range    Glucose 339 (H) 70 - 130 mg/dL   POC Glucose Once   Result Value Ref Range    Glucose 131 (H) 70 - 130 mg/dL   POC Glucose Once   Result Value Ref Range    Glucose 118 70 - 130 mg/dL       Review of Imaging: I have personally reviewed the following images and discussed with the patient.  MR I of the brain is negative  EEG is unremarkable      Records Reviewed: I have personally reviewed her previous medical record.    Liliana was seen today for seizures.    Diagnoses and all orders for this visit:    Obstructive sleep apnea  -     Home Sleep Study;  "Future    Excessive daytime sleepiness  -     Home Sleep Study; Future    Nocturnal oxygen desaturation  -     Home Sleep Study; Future        Treatments:  1.  Encourage patient should avoid further use of opiate medications   2.  Encourage regular sleep wake schedule  3.  Avoid sleep deprivation  4.  Encourage good sleep hygiene  5.  Will schedule patient to have home sleep study  6.  Counseled patient extensively on migraine headache  Discussion:  Migraine Headache  Migraine headaches are a major public health problem affecting more than 28 million persons in this country. Nearly 25 percent of women and 9 percent of men experience disabling migraines.    Migraine treatment depends on the duration and severity of pain, associated symptoms, degree of disability, and initial response to therapy.  A widely prescribed and effective class of medications for migraines is the 5-HT1 receptor-specific agonists (\"triptans\").  Contraindications to their use include ischemic vascular conditions, vasospastic coronary disease, uncontrolled hypertension, or other significant cardiovascular disease.  Following appropriate management of acute migraine, patients should be evaluated for initiation of preventive therapy. Factors that should prompt consideration of preventive therapy include the occurrence of two or more migraines per month with disability lasting three or more days per month; failure of, contraindication for, or adverse events from acute treatments; use of abortive medication more than twice per week; and uncommon migraine conditions (e.g., hemiplegic migraine, migraine with prolonged aura, migrainous infarction). Patient preference and cost also should be considered.  Evidence consistently supports the use of the beta blocker propranolol (Inderal) in migraine prophylaxis. Amitriptyline is a first-line agent for migraine prophylaxis and is the only antidepressant with consistent evidence supporting its effectiveness " for this use. Divalproex (Depakote) and sodium valproate are well supported by evidence for use in migraine prevention.  Topamax has also been studies for migraine prophylaxis  in open label studies and double blind studies. Evidence does not support the use of diltiazem (Cardizem) in migraine prevention, and the evidence for several other calcium channel blockers, such as nifedipine (Procardia), is poor and suggests only modest effect  Menstrual Migraine can present a challenge to clinician.  Estrogen withdrawal has been shown to precipitate migraine headaches, and a sustained elevated level of estrogen will postpone the migraine. Transdermal estrogen started just before menstruation can provide a sustained low level of estrogen, decreasing the degree of estrogen decline, and thus may prevent induction of migraines    This Document is signed by Kumar Ornelas MD, FAAN, FAASM   August 10, 37889:46 AM

## 2018-09-10 ENCOUNTER — HOSPITAL ENCOUNTER (OUTPATIENT)
Dept: SLEEP MEDICINE | Facility: HOSPITAL | Age: 38
Setting detail: THERAPIES SERIES
End: 2018-09-10
Attending: PSYCHIATRY & NEUROLOGY

## 2018-09-10 DIAGNOSIS — G47.33 OBSTRUCTIVE SLEEP APNEA: ICD-10-CM

## 2018-09-10 DIAGNOSIS — G47.19 EXCESSIVE DAYTIME SLEEPINESS: ICD-10-CM

## 2018-09-10 DIAGNOSIS — G47.34 NOCTURNAL OXYGEN DESATURATION: ICD-10-CM

## 2018-09-10 PROCEDURE — 95806 SLEEP STUDY UNATT&RESP EFFT: CPT

## 2018-09-10 PROCEDURE — 95806 SLEEP STUDY UNATT&RESP EFFT: CPT | Performed by: PSYCHIATRY & NEUROLOGY

## 2018-09-11 RX ORDER — LEVETIRACETAM 500 MG/1
500 TABLET ORAL EVERY 12 HOURS SCHEDULED
Qty: 60 TABLET | Refills: 3 | Status: SHIPPED | OUTPATIENT
Start: 2018-09-11 | End: 2020-06-16 | Stop reason: HOSPADM

## 2018-09-11 RX ORDER — SUMATRIPTAN 50 MG/1
TABLET, FILM COATED ORAL
Qty: 9 TABLET | Refills: 3 | Status: SHIPPED | OUTPATIENT
Start: 2018-09-11 | End: 2020-06-16 | Stop reason: HOSPADM

## 2018-09-11 NOTE — TELEPHONE ENCOUNTER
Pt was put on Keppra in the hospital and is a pt of Dr Ornelas's she is out of medicine and needs a refill [facility]. Pt says she is also out of imitrex and needs a refill on it.

## 2018-09-27 ENCOUNTER — APPOINTMENT (OUTPATIENT)
Dept: GENERAL RADIOLOGY | Facility: HOSPITAL | Age: 38
End: 2018-09-27

## 2018-09-27 ENCOUNTER — HOSPITAL ENCOUNTER (EMERGENCY)
Facility: HOSPITAL | Age: 38
Discharge: HOME | End: 2018-09-27
Attending: STUDENT IN AN ORGANIZED HEALTH CARE EDUCATION/TRAINING PROGRAM | Admitting: STUDENT IN AN ORGANIZED HEALTH CARE EDUCATION/TRAINING PROGRAM

## 2018-09-27 VITALS
RESPIRATION RATE: 18 BRPM | TEMPERATURE: 98.7 F | SYSTOLIC BLOOD PRESSURE: 127 MMHG | OXYGEN SATURATION: 97 % | HEIGHT: 65 IN | HEART RATE: 103 BPM | DIASTOLIC BLOOD PRESSURE: 88 MMHG | WEIGHT: 265 LBS | BODY MASS INDEX: 44.15 KG/M2

## 2018-09-27 DIAGNOSIS — R05.9 COUGH: Primary | ICD-10-CM

## 2018-09-27 DIAGNOSIS — R53.1 WEAKNESS GENERALIZED: ICD-10-CM

## 2018-09-27 LAB
ALBUMIN SERPL-MCNC: 4.3 G/DL (ref 3.5–5)
ALBUMIN/GLOB SERPL: 1.5 G/DL (ref 1–2)
ALP SERPL-CCNC: 107 U/L (ref 38–126)
ALT SERPL W P-5'-P-CCNC: 44 U/L (ref 13–69)
ANION GAP SERPL CALCULATED.3IONS-SCNC: 12.7 MMOL/L (ref 10–20)
AST SERPL-CCNC: 35 U/L (ref 15–46)
B-HCG UR QL: NEGATIVE
BASOPHILS # BLD AUTO: 0.02 10*3/MM3 (ref 0–0.2)
BASOPHILS NFR BLD AUTO: 0.2 % (ref 0–2.5)
BILIRUB SERPL-MCNC: 1 MG/DL (ref 0.2–1.3)
BILIRUB UR QL STRIP: NEGATIVE
BUN BLD-MCNC: 18 MG/DL (ref 7–20)
BUN/CREAT SERPL: 20 (ref 7.1–23.5)
CALCIUM SPEC-SCNC: 9.6 MG/DL (ref 8.4–10.2)
CHLORIDE SERPL-SCNC: 98 MMOL/L (ref 98–107)
CLARITY UR: CLEAR
CO2 SERPL-SCNC: 29 MMOL/L (ref 26–30)
COLOR UR: YELLOW
CREAT BLD-MCNC: 0.9 MG/DL (ref 0.6–1.3)
DEPRECATED RDW RBC AUTO: 46.5 FL (ref 37–54)
EOSINOPHIL # BLD AUTO: 0.08 10*3/MM3 (ref 0–0.7)
EOSINOPHIL NFR BLD AUTO: 0.9 % (ref 0–7)
ERYTHROCYTE [DISTWIDTH] IN BLOOD BY AUTOMATED COUNT: 15.5 % (ref 11.5–14.5)
GFR SERPL CREATININE-BSD FRML MDRD: 70 ML/MIN/1.73
GLOBULIN UR ELPH-MCNC: 2.9 GM/DL
GLUCOSE BLD-MCNC: 134 MG/DL (ref 74–98)
GLUCOSE UR STRIP-MCNC: NEGATIVE MG/DL
HCT VFR BLD AUTO: 35.2 % (ref 37–47)
HGB BLD-MCNC: 10.6 G/DL (ref 12–16)
HGB UR QL STRIP.AUTO: NEGATIVE
IMM GRANULOCYTES # BLD: 0.06 10*3/MM3 (ref 0–0.06)
IMM GRANULOCYTES NFR BLD: 0.7 % (ref 0–0.6)
KETONES UR QL STRIP: NEGATIVE
LEUKOCYTE ESTERASE UR QL STRIP.AUTO: NEGATIVE
LYMPHOCYTES # BLD AUTO: 1.74 10*3/MM3 (ref 0.6–3.4)
LYMPHOCYTES NFR BLD AUTO: 19.6 % (ref 10–50)
MCH RBC QN AUTO: 25.1 PG (ref 27–31)
MCHC RBC AUTO-ENTMCNC: 30.1 G/DL (ref 30–37)
MCV RBC AUTO: 83.4 FL (ref 81–99)
MONOCYTES # BLD AUTO: 0.6 10*3/MM3 (ref 0–0.9)
MONOCYTES NFR BLD AUTO: 6.8 % (ref 0–12)
NEUTROPHILS # BLD AUTO: 6.36 10*3/MM3 (ref 2–6.9)
NEUTROPHILS NFR BLD AUTO: 71.8 % (ref 37–80)
NITRITE UR QL STRIP: NEGATIVE
NRBC BLD MANUAL-RTO: 0 /100 WBC (ref 0–0)
PH UR STRIP.AUTO: 5.5 [PH] (ref 5–8)
PLATELET # BLD AUTO: 221 10*3/MM3 (ref 130–400)
PMV BLD AUTO: 9.8 FL (ref 6–12)
POTASSIUM BLD-SCNC: 3.7 MMOL/L (ref 3.5–5.1)
PROT SERPL-MCNC: 7.2 G/DL (ref 6.3–8.2)
PROT UR QL STRIP: NEGATIVE
RBC # BLD AUTO: 4.22 10*6/MM3 (ref 4.2–5.4)
SODIUM BLD-SCNC: 136 MMOL/L (ref 137–145)
SP GR UR STRIP: >=1.03 (ref 1–1.03)
UROBILINOGEN UR QL STRIP: NORMAL
WBC NRBC COR # BLD: 8.86 10*3/MM3 (ref 4.8–10.8)

## 2018-09-27 PROCEDURE — 81003 URINALYSIS AUTO W/O SCOPE: CPT | Performed by: STUDENT IN AN ORGANIZED HEALTH CARE EDUCATION/TRAINING PROGRAM

## 2018-09-27 PROCEDURE — 85025 COMPLETE CBC W/AUTO DIFF WBC: CPT | Performed by: STUDENT IN AN ORGANIZED HEALTH CARE EDUCATION/TRAINING PROGRAM

## 2018-09-27 PROCEDURE — 81025 URINE PREGNANCY TEST: CPT | Performed by: STUDENT IN AN ORGANIZED HEALTH CARE EDUCATION/TRAINING PROGRAM

## 2018-09-27 PROCEDURE — 71046 X-RAY EXAM CHEST 2 VIEWS: CPT

## 2018-09-27 PROCEDURE — 99283 EMERGENCY DEPT VISIT LOW MDM: CPT

## 2018-09-27 PROCEDURE — 80053 COMPREHEN METABOLIC PANEL: CPT | Performed by: STUDENT IN AN ORGANIZED HEALTH CARE EDUCATION/TRAINING PROGRAM

## 2018-09-27 PROCEDURE — 93005 ELECTROCARDIOGRAM TRACING: CPT | Performed by: STUDENT IN AN ORGANIZED HEALTH CARE EDUCATION/TRAINING PROGRAM

## 2018-09-27 RX ORDER — AZITHROMYCIN 250 MG/1
250 TABLET, FILM COATED ORAL DAILY
Qty: 6 TABLET | Refills: 0 | Status: SHIPPED | OUTPATIENT
Start: 2018-09-27 | End: 2020-06-16 | Stop reason: HOSPADM

## 2018-10-03 ENCOUNTER — TRANSCRIBE ORDERS (OUTPATIENT)
Dept: ADMINISTRATIVE | Facility: HOSPITAL | Age: 38
End: 2018-10-03

## 2018-10-03 DIAGNOSIS — Z12.39 SCREENING BREAST EXAMINATION: Primary | ICD-10-CM

## 2018-11-19 ENCOUNTER — TRANSCRIBE ORDERS (OUTPATIENT)
Dept: ADMINISTRATIVE | Facility: HOSPITAL | Age: 38
End: 2018-11-19

## 2018-11-19 ENCOUNTER — HOSPITAL ENCOUNTER (OUTPATIENT)
Dept: GENERAL RADIOLOGY | Facility: HOSPITAL | Age: 38
Discharge: HOME | End: 2018-11-19
Admitting: NURSE PRACTITIONER

## 2018-11-19 DIAGNOSIS — J06.9 ACUTE URI: Primary | ICD-10-CM

## 2018-11-19 DIAGNOSIS — J06.9 ACUTE URI: ICD-10-CM

## 2018-11-19 PROCEDURE — 71046 X-RAY EXAM CHEST 2 VIEWS: CPT

## 2018-12-19 ENCOUNTER — OFFICE VISIT (OUTPATIENT)
Dept: PULMONOLOGY | Facility: CLINIC | Age: 38
End: 2018-12-19

## 2018-12-19 ENCOUNTER — APPOINTMENT (OUTPATIENT)
Dept: GENERAL RADIOLOGY | Facility: HOSPITAL | Age: 38
End: 2018-12-19
Attending: EMERGENCY MEDICINE

## 2018-12-19 ENCOUNTER — HOSPITAL ENCOUNTER (EMERGENCY)
Facility: HOSPITAL | Age: 38
Discharge: HOME | End: 2018-12-19
Attending: EMERGENCY MEDICINE | Admitting: EMERGENCY MEDICINE

## 2018-12-19 VITALS
OXYGEN SATURATION: 98 % | HEIGHT: 65 IN | DIASTOLIC BLOOD PRESSURE: 80 MMHG | SYSTOLIC BLOOD PRESSURE: 110 MMHG | HEART RATE: 82 BPM | WEIGHT: 248 LBS | BODY MASS INDEX: 41.32 KG/M2 | RESPIRATION RATE: 19 BRPM

## 2018-12-19 VITALS
HEIGHT: 65 IN | TEMPERATURE: 99.1 F | HEART RATE: 76 BPM | WEIGHT: 248 LBS | RESPIRATION RATE: 16 BRPM | OXYGEN SATURATION: 98 % | DIASTOLIC BLOOD PRESSURE: 88 MMHG | SYSTOLIC BLOOD PRESSURE: 151 MMHG | BODY MASS INDEX: 41.32 KG/M2

## 2018-12-19 DIAGNOSIS — R06.83 SNORING: ICD-10-CM

## 2018-12-19 DIAGNOSIS — F51.5 NIGHTMARE: ICD-10-CM

## 2018-12-19 DIAGNOSIS — R07.9 CHEST PAIN, UNSPECIFIED TYPE: Primary | ICD-10-CM

## 2018-12-19 DIAGNOSIS — R06.02 SHORTNESS OF BREATH: Primary | ICD-10-CM

## 2018-12-19 DIAGNOSIS — G47.19 EXCESSIVE DAYTIME SLEEPINESS: ICD-10-CM

## 2018-12-19 DIAGNOSIS — G47.33 OBSTRUCTIVE SLEEP APNEA: ICD-10-CM

## 2018-12-19 DIAGNOSIS — G47.8 SLEEP TALKING: ICD-10-CM

## 2018-12-19 DIAGNOSIS — J30.89 OTHER ALLERGIC RHINITIS: ICD-10-CM

## 2018-12-19 LAB
ALBUMIN SERPL-MCNC: 5 G/DL (ref 3.5–5)
ALBUMIN/GLOB SERPL: 1.6 G/DL (ref 1–2)
ALP SERPL-CCNC: 137 U/L (ref 38–126)
ALT SERPL W P-5'-P-CCNC: 26 U/L (ref 13–69)
ANION GAP SERPL CALCULATED.3IONS-SCNC: 15 MMOL/L (ref 10–20)
AST SERPL-CCNC: 31 U/L (ref 15–46)
BASOPHILS # BLD AUTO: 0.02 10*3/MM3 (ref 0–0.2)
BASOPHILS NFR BLD AUTO: 0.2 % (ref 0–2.5)
BILIRUB SERPL-MCNC: 0.8 MG/DL (ref 0.2–1.3)
BUN BLD-MCNC: 10 MG/DL (ref 7–20)
BUN/CREAT SERPL: 12.5 (ref 7.1–23.5)
CALCIUM SPEC-SCNC: 9.7 MG/DL (ref 8.4–10.2)
CHLORIDE SERPL-SCNC: 102 MMOL/L (ref 98–107)
CO2 SERPL-SCNC: 27 MMOL/L (ref 26–30)
CREAT BLD-MCNC: 0.8 MG/DL (ref 0.6–1.3)
DEPRECATED RDW RBC AUTO: 41.1 FL (ref 37–54)
EOSINOPHIL # BLD AUTO: 0.02 10*3/MM3 (ref 0–0.7)
EOSINOPHIL NFR BLD AUTO: 0.2 % (ref 0–7)
ERYTHROCYTE [DISTWIDTH] IN BLOOD BY AUTOMATED COUNT: 14.3 % (ref 11.5–14.5)
GFR SERPL CREATININE-BSD FRML MDRD: 81 ML/MIN/1.73
GLOBULIN UR ELPH-MCNC: 3.1 GM/DL
GLUCOSE BLD-MCNC: 122 MG/DL (ref 74–98)
HCG SERPL QL: NEGATIVE
HCT VFR BLD AUTO: 41.8 % (ref 37–47)
HGB BLD-MCNC: 12.6 G/DL (ref 12–16)
HOLD SPECIMEN: NORMAL
HOLD SPECIMEN: NORMAL
IMM GRANULOCYTES # BLD: 0.05 10*3/MM3 (ref 0–0.06)
IMM GRANULOCYTES NFR BLD: 0.6 % (ref 0–0.6)
LYMPHOCYTES # BLD AUTO: 1.15 10*3/MM3 (ref 0.6–3.4)
LYMPHOCYTES NFR BLD AUTO: 13.5 % (ref 10–50)
MCH RBC QN AUTO: 23.8 PG (ref 27–31)
MCHC RBC AUTO-ENTMCNC: 30.1 G/DL (ref 30–37)
MCV RBC AUTO: 79 FL (ref 81–99)
MONOCYTES # BLD AUTO: 0.29 10*3/MM3 (ref 0–0.9)
MONOCYTES NFR BLD AUTO: 3.4 % (ref 0–12)
NEUTROPHILS # BLD AUTO: 7 10*3/MM3 (ref 2–6.9)
NEUTROPHILS NFR BLD AUTO: 82.1 % (ref 37–80)
NRBC BLD MANUAL-RTO: 0 /100 WBC (ref 0–0)
PLATELET # BLD AUTO: 253 10*3/MM3 (ref 130–400)
PMV BLD AUTO: 9.9 FL (ref 6–12)
POTASSIUM BLD-SCNC: 4 MMOL/L (ref 3.5–5.1)
PROT SERPL-MCNC: 8.1 G/DL (ref 6.3–8.2)
RBC # BLD AUTO: 5.29 10*6/MM3 (ref 4.2–5.4)
SODIUM BLD-SCNC: 140 MMOL/L (ref 137–145)
TROPONIN I SERPL-MCNC: 0 NG/ML (ref 0–0.05)
TROPONIN I SERPL-MCNC: <0.012 NG/ML (ref 0–0.03)
TROPONIN I SERPL-MCNC: <0.012 NG/ML (ref 0–0.03)
WBC NRBC COR # BLD: 8.53 10*3/MM3 (ref 4.8–10.8)
WHOLE BLOOD HOLD SPECIMEN: NORMAL
WHOLE BLOOD HOLD SPECIMEN: NORMAL

## 2018-12-19 PROCEDURE — 80053 COMPREHEN METABOLIC PANEL: CPT | Performed by: EMERGENCY MEDICINE

## 2018-12-19 PROCEDURE — 84703 CHORIONIC GONADOTROPIN ASSAY: CPT | Performed by: EMERGENCY MEDICINE

## 2018-12-19 PROCEDURE — 71045 X-RAY EXAM CHEST 1 VIEW: CPT

## 2018-12-19 PROCEDURE — 99284 EMERGENCY DEPT VISIT MOD MDM: CPT

## 2018-12-19 PROCEDURE — 93005 ELECTROCARDIOGRAM TRACING: CPT | Performed by: EMERGENCY MEDICINE

## 2018-12-19 PROCEDURE — 84484 ASSAY OF TROPONIN QUANT: CPT | Performed by: EMERGENCY MEDICINE

## 2018-12-19 PROCEDURE — 85025 COMPLETE CBC W/AUTO DIFF WBC: CPT | Performed by: EMERGENCY MEDICINE

## 2018-12-19 PROCEDURE — 95012 NITRIC OXIDE EXP GAS DETER: CPT | Performed by: INTERNAL MEDICINE

## 2018-12-19 PROCEDURE — 99214 OFFICE O/P EST MOD 30 MIN: CPT | Performed by: INTERNAL MEDICINE

## 2018-12-19 RX ORDER — SODIUM CHLORIDE 0.9 % (FLUSH) 0.9 %
10 SYRINGE (ML) INJECTION AS NEEDED
Status: DISCONTINUED | OUTPATIENT
Start: 2018-12-19 | End: 2018-12-19 | Stop reason: HOSPADM

## 2018-12-19 RX ORDER — POTASSIUM CHLORIDE 750 MG/1
TABLET, FILM COATED, EXTENDED RELEASE ORAL
Refills: 0 | COMMUNITY
Start: 2018-10-31 | End: 2020-06-16 | Stop reason: HOSPADM

## 2018-12-19 RX ORDER — ASPIRIN 325 MG
325 TABLET ORAL ONCE
Status: COMPLETED | OUTPATIENT
Start: 2018-12-19 | End: 2018-12-19

## 2018-12-19 RX ORDER — HYDROXYZINE PAMOATE 25 MG/1
25 CAPSULE ORAL 3 TIMES DAILY
Refills: 0 | COMMUNITY
Start: 2018-12-03 | End: 2020-06-16 | Stop reason: HOSPADM

## 2018-12-19 RX ORDER — LEVOFLOXACIN 500 MG/1
TABLET, FILM COATED ORAL
Refills: 0 | COMMUNITY
Start: 2018-12-07 | End: 2020-06-16 | Stop reason: HOSPADM

## 2018-12-19 RX ORDER — LEVOTHYROXINE SODIUM 0.05 MG/1
50 TABLET ORAL DAILY
Refills: 3 | COMMUNITY
Start: 2018-12-02 | End: 2020-06-16 | Stop reason: HOSPADM

## 2018-12-19 RX ORDER — LISINOPRIL 2.5 MG/1
2.5 TABLET ORAL DAILY
Refills: 2 | COMMUNITY
Start: 2018-12-03 | End: 2020-06-16 | Stop reason: HOSPADM

## 2018-12-19 RX ORDER — FLUNISOLIDE 0.25 MG/ML
1 SOLUTION NASAL EVERY 12 HOURS
Qty: 1 BOTTLE | Refills: 5 | Status: SHIPPED | OUTPATIENT
Start: 2018-12-19 | End: 2020-06-16 | Stop reason: HOSPADM

## 2018-12-19 RX ORDER — METRONIDAZOLE 500 MG/1
TABLET ORAL
Refills: 0 | COMMUNITY
Start: 2018-10-02 | End: 2020-06-16 | Stop reason: HOSPADM

## 2018-12-19 RX ADMIN — LIDOCAINE HYDROCHLORIDE: 20 SOLUTION ORAL; TOPICAL at 15:03

## 2018-12-19 RX ADMIN — ASPIRIN 325 MG ORAL TABLET 325 MG: 325 PILL ORAL at 12:42

## 2018-12-19 NOTE — PROGRESS NOTES
FOLLOW UP NOTE    Requested by:   Ollie Flores MD Karkorian, Paul Nork, MD      Chief Complaint   Patient presents with   • Follow-up   • Shortness of Breath       Subjective   Liliana Calderon is a 38 y.o. female.     History of Present Illness   Patient was sent in today for evaluation of sleep disturbance.  She was actually seen in the hospital where she was admitted with acute respiratory failure and atelectasis.      Patient says that for the past few years, she has had trouble with snoring.     Patient says that she feels tired in the morning after waking up. She is also complaining of usually falling asleep while watching TV and sometimes while reading a book.    She is not complaining of occasional headaches. Patient's sleep schedule was reviewed. Patient's Epsworth Sleepiness score was reviewed.    She also mentions nightmares and has a tendency to talk in her sleep.    Patient also complains of runny nose and dribbling in the back of the throat for the past few weeks. This has been sometimes associated with seasonal variation.    The following portions of the patient's history were reviewed and updated as appropriate: allergies, current medications, past family history, past medical history, past social history and past surgical history.    Review of Systems   Constitutional: Negative for chills, fatigue, fever and unexpected weight change.   HENT: Positive for rhinorrhea. Negative for sinus pressure, sneezing and sore throat.    Respiratory: Positive for shortness of breath and wheezing. Negative for cough and chest tightness.    Cardiovascular: Positive for chest pain. Negative for palpitations and leg swelling.   Psychiatric/Behavioral: Positive for sleep disturbance.       Past Medical History:   Diagnosis Date   • Anxiety and depression    • Chronic bronchitis (CMS/HCC)    • Fibromyalgia    • Heart valve problem    • Hypertension    • Migraine    • PCOS (polycystic ovarian syndrome)    •  "Pneumonia    • Seizures (CMS/[facility])        Social History     Tobacco Use   • Smoking status: Former Smoker     Packs/day: 0.25     Years: 5.00     Pack years: 1.25     Types: Cigarettes     Last attempt to quit: 2014     Years since quittin.9   • Smokeless tobacco: Never Used   Substance Use Topics   • Alcohol use: No         Objective   Visit Vitals  /80   Pulse 82   Resp 19   Ht 165.1 cm (65\")   Wt 112 kg (248 lb)   SpO2 98%   BMI 41.27 kg/m²       Physical Exam   Constitutional: She is oriented to person, place, and time. She appears well-developed and well-nourished.   HENT:   Head: Atraumatic.   Crowded Oropharynx.    Eyes: EOM are normal. Pupils are equal, round, and reactive to light.   Neck: No JVD present. No tracheal deviation present. No thyromegaly present.   Increased adipose tissue.    Cardiovascular: Normal rate and regular rhythm.   Pulmonary/Chest: Effort normal and breath sounds normal. No respiratory distress. She has no wheezes.   Musculoskeletal: Normal range of motion. She exhibits no edema.   Gait was normal.   Neurological: She is alert and oriented to person, place, and time.   Skin: Skin is warm and dry.   Psychiatric: She has a normal mood and affect. Her behavior is normal.   Vitals reviewed.      Assessment/Plan   Liliana was seen today for follow-up and shortness of breath.    Diagnoses and all orders for this visit:    Shortness of breath  -     Nitric Oxide  -     Peak Flow  -     Pulmonary Function Test; Future  -     XR Chest PA & Lateral; Future    Other allergic rhinitis  -     IgE; Future  -     Allergens, Zone 8; Future    Snoring  -     Polysomnography 4 or More Parameters; Future    Excessive daytime sleepiness  -     Polysomnography 4 or More Parameters; Future    Obstructive sleep apnea  -     Polysomnography 4 or More Parameters; Future    Nightmare  -     Polysomnography 4 or More Parameters; Future    Sleep talking  -     Polysomnography 4 or More Parameters; " Future    Other orders  -     flunisolide (NASALIDE) 25 MCG/ACT (0.025%) solution nasal spray; Inhale 1 spray Every 12 (Twelve) Hours.           Return in about 3 months (around 3/19/2019) for Recheck.    DISCUSSION(if any):  She was unable to perform FeNO despite multiple attempts.    Peak flow was 340 LPM today.    I have reviewed the patient's imaging studies and shared them with her.  Her last chest x-ray from 19 November 2018 showed no acute pulmonary disease.    I also reviewed her CT scan from 26th of July 2018 that showed significant consolidation.  Her last carbon dioxide level was 29.    I have ordered a repeat chest x-ray and based on the results, further recommendations will be made.    Patient will be started on nasal spray for symptoms which are definitely consistent with allergic rhinitis.     I have ordered IgE/RAST panel.    We'll try to obtain echocardiogram from Dr. Pierson's office.    Sleep questionnaire was provided to the patient    The pathophysiology of sleep apnea was discussed, with the patient.     We will encourage the patient to schedule the sleep study soon.     The patient was made aware of the limitation of the home sleep study, whereby it may underestimate the true AHI and also carries a low sensitivity.  The patient was also told that even if the home sleep study is negative, we may suggest an in lab sleep study to completely and definitively rule out/in sleep apnea.  The patient has understood.  This was communicated to the patient, in case home study is to be requested.    The patient is agreeable to try CPAP/BiPAP, if needed.     Patient was educated on good sleep hygiene measures and voiced understanding of the same.     Patient was given reading material regarding sleep apnea    Patient was counseled regarding weight loss.     Dictated utilizing Dragon dictation.    This document was electronically signed by Kymberly Turcios MD December 19, 2018  11:48 AM

## 2018-12-19 NOTE — ED PROVIDER NOTES
Subjective   38-year-old female presents to the ED for chief complaint of chest pain.  The patient was at her pulmonologist soft and performing a PFT when she did multiple deep inspirations.  The patient then developed left-sided chest pain.  The pain was severe and stabbing in nature.  The pain did not radiate.  Pain has eased since initial onset.  The patient states that she became diaphoretic and felt like she might have a seizure.  She does have a history of seizures.  The patient denies lightheadedness or dizziness at this time.  No chest pain at this time.  No shortness of breath.  No nausea vomiting diarrhea or abdominal pain.  No other complaints at this time.             Review of Systems   Cardiovascular: Positive for chest pain.   All other systems reviewed and are negative.      Past Medical History:   Diagnosis Date   • Anxiety and depression    • Chronic bronchitis (CMS/HCC)    • Fibromyalgia    • Heart valve problem    • Hypertension    • Migraine    • PCOS (polycystic ovarian syndrome)    • Pneumonia    • Seizures (CMS/HCC)        Allergies   Allergen Reactions   • Penicillins Rash   • Phenobarbital Rash       Past Surgical History:   Procedure Laterality Date   • CHOLECYSTECTOMY         Family History   Problem Relation Age of Onset   • Breast cancer Mother    • Bone cancer Mother    • Skin cancer Mother    • Hypertension Mother    • Dementia Father    • Diabetes Father    • Heart disease Father    • Hypertension Father    • Stroke Father    • Pulmonary embolism Father    • Alcohol abuse Sister    • Hypertension Sister    • Mental illness Sister    • Alcohol abuse Brother    • Hypertension Brother    • Mental illness Brother    • Alzheimer's disease Maternal Grandmother    • Parkinsonism Maternal Grandmother    • Heart disease Paternal Grandfather        Social History     Socioeconomic History   • Marital status: Single     Spouse name: Not on file   • Number of children: Not on file   • Years of  education: Not on file   • Highest education level: Not on file   Tobacco Use   • Smoking status: Former Smoker     Packs/day: 0.25     Years: 5.00     Pack years: 1.25     Types: Cigarettes     Last attempt to quit: 2014     Years since quittin.9   • Smokeless tobacco: Never Used   Substance and Sexual Activity   • Alcohol use: No   • Drug use: No           Objective   Physical Exam   Constitutional: She is oriented to person, place, and time. She appears well-developed and well-nourished. No distress.   HENT:   Head: Normocephalic and atraumatic.   Nose: Nose normal.   Eyes: Conjunctivae and EOM are normal.   Cardiovascular: Normal rate and regular rhythm.   Pulmonary/Chest: Effort normal and breath sounds normal. No respiratory distress. She exhibits tenderness.   Left anterior chest wall tenderness to palpation.   Abdominal: Soft. She exhibits no distension. There is no tenderness. There is no guarding.   Musculoskeletal: She exhibits no edema or deformity.   Neurological: She is alert and oriented to person, place, and time. No cranial nerve deficit.   Skin: She is not diaphoretic.   Nursing note and vitals reviewed.      Procedures           ED Course  ED Course as of Dec 19 1724   Wed Dec 19, 2018   1320 EKG interpreted by me.  Sinus rhythm.  Rate 68.  .  Nonspecific ST segment changes.  Abnormal EKG.  [CG]   1615 Pain completely resolved with GI cocktail.  [CG]   1616 EKG #3 interpreted by me.  Sinus rhythm.  Low voltage in the chest leads.  Rate of 68.  QTC of 486.  Prolonged QT interval.  No ST segment depressions or elevations.  No ischemic changes.  Abnormal EKG.  [CG]   1616 EKG #2 interpreted by me.  Sinus rhythm.  Rate is 62.  .  .  Low voltage in the chest leads.  Prolonged QT interval.  No ST segment depressions or elevations.  Nonischemic EKG.  Abnormal EKG.  [CG]      ED Course User Index  [CG] Galileo Mcnamara, DO        EKG nonspecific ×3.  Troponin negative ×2.  Heart  score of 1.  Chest x-ray unremarkable.  Further laboratory results unremarkable.  Patient will be discharged follow-up as needed she is agreeable to this plan.          [facility]      Final diagnoses:   Chest pain, unspecified type            Galileo cMnamara, DO  12/19/18 1729

## 2019-01-10 ENCOUNTER — HOSPITAL ENCOUNTER (OUTPATIENT)
Dept: SLEEP MEDICINE | Facility: HOSPITAL | Age: 39
Setting detail: THERAPIES SERIES
End: 2019-01-10
Attending: INTERNAL MEDICINE

## 2019-01-10 DIAGNOSIS — F51.5 NIGHTMARE: ICD-10-CM

## 2019-01-10 DIAGNOSIS — G47.8 SLEEP TALKING: ICD-10-CM

## 2019-01-10 DIAGNOSIS — G47.33 OBSTRUCTIVE SLEEP APNEA: ICD-10-CM

## 2019-01-10 DIAGNOSIS — R06.83 SNORING: ICD-10-CM

## 2019-01-10 DIAGNOSIS — G47.19 EXCESSIVE DAYTIME SLEEPINESS: ICD-10-CM

## 2019-01-10 PROCEDURE — 95810 POLYSOM 6/> YRS 4/> PARAM: CPT

## 2019-01-10 PROCEDURE — 95810 POLYSOM 6/> YRS 4/> PARAM: CPT | Performed by: INTERNAL MEDICINE

## 2019-09-03 ENCOUNTER — HOSPITAL ENCOUNTER (EMERGENCY)
Facility: HOSPITAL | Age: 39
Discharge: HOME | End: 2019-09-03
Attending: EMERGENCY MEDICINE | Admitting: EMERGENCY MEDICINE

## 2019-09-03 VITALS
OXYGEN SATURATION: 99 % | SYSTOLIC BLOOD PRESSURE: 145 MMHG | HEART RATE: 100 BPM | RESPIRATION RATE: 16 BRPM | TEMPERATURE: 98.3 F | DIASTOLIC BLOOD PRESSURE: 93 MMHG | BODY MASS INDEX: 36.82 KG/M2 | HEIGHT: 65 IN | WEIGHT: 221 LBS

## 2019-09-03 DIAGNOSIS — T40.1X1A ACCIDENTAL OVERDOSE OF HEROIN, INITIAL ENCOUNTER (HCC): Primary | ICD-10-CM

## 2019-09-03 PROCEDURE — 96374 THER/PROPH/DIAG INJ IV PUSH: CPT

## 2019-09-03 PROCEDURE — 99283 EMERGENCY DEPT VISIT LOW MDM: CPT

## 2019-09-03 PROCEDURE — 25010000002 ONDANSETRON PER 1 MG: Performed by: PHYSICIAN ASSISTANT

## 2019-09-03 RX ORDER — ONDANSETRON 4 MG/1
4 TABLET, ORALLY DISINTEGRATING ORAL EVERY 6 HOURS PRN
Qty: 20 TABLET | Refills: 0 | Status: SHIPPED | OUTPATIENT
Start: 2019-09-03 | End: 2020-06-16 | Stop reason: HOSPADM

## 2019-09-03 RX ORDER — ACETAMINOPHEN 500 MG
1000 TABLET ORAL ONCE
Status: COMPLETED | OUTPATIENT
Start: 2019-09-03 | End: 2019-09-03

## 2019-09-03 RX ORDER — ONDANSETRON 2 MG/ML
4 INJECTION INTRAMUSCULAR; INTRAVENOUS ONCE
Status: COMPLETED | OUTPATIENT
Start: 2019-09-03 | End: 2019-09-03

## 2019-09-03 RX ADMIN — ONDANSETRON 4 MG: 2 INJECTION INTRAMUSCULAR; INTRAVENOUS at 13:18

## 2019-09-03 RX ADMIN — ACETAMINOPHEN 1000 MG: 500 TABLET, FILM COATED ORAL at 13:18

## 2019-09-03 NOTE — ED PROVIDER NOTES
Subjective   This patient states that just prior to arrival she used heroin intravenously.  She states this is on the second time.  She was reportedly sonorous and cyanotic and respirations were diminished upon EMS arrival.  She was given 1 mg of Narcan intravenously with resolution of symptoms.  The patient did vomit prior to arrival.  Her only complaint now is a headache and mild nausea.  She denies any other drug use.  She states she is recently stopped smoking cigarettes.  She agrees to observation at this time.  No chest pain or shortness of breath.            Review of Systems   Constitutional:        Heroin overdose prior to arrival   All other systems reviewed and are negative.      Past Medical History:   Diagnosis Date   • Anxiety and depression    • Chronic bronchitis (CMS/HCC)    • Fibromyalgia    • Heart valve problem    • Hypertension    • Migraine    • PCOS (polycystic ovarian syndrome)    • Pneumonia    • Seizures (CMS/HCC)        Allergies   Allergen Reactions   • Penicillins Rash   • Phenobarbital Rash       Past Surgical History:   Procedure Laterality Date   • CHOLECYSTECTOMY         Family History   Problem Relation Age of Onset   • Breast cancer Mother    • Bone cancer Mother    • Skin cancer Mother    • Hypertension Mother    • Dementia Father    • Diabetes Father    • Heart disease Father    • Hypertension Father    • Stroke Father    • Pulmonary embolism Father    • Alcohol abuse Sister    • Hypertension Sister    • Mental illness Sister    • Alcohol abuse Brother    • Hypertension Brother    • Mental illness Brother    • Alzheimer's disease Maternal Grandmother    • Parkinsonism Maternal Grandmother    • Heart disease Paternal Grandfather        Social History     Socioeconomic History   • Marital status: Single     Spouse name: Not on file   • Number of children: Not on file   • Years of education: Not on file   • Highest education level: Not on file   Tobacco Use   • Smoking status:  Former Smoker     Packs/day: 0.25     Years: 5.00     Pack years: 1.25     Types: Cigarettes     Last attempt to quit: 2014     Years since quittin.6   • Smokeless tobacco: Never Used   Substance and Sexual Activity   • Alcohol use: No   • Drug use: Yes     Types: IV     Comment: HEROIN           Objective   Physical Exam   Constitutional: She is oriented to person, place, and time. She appears well-developed and well-nourished. No distress.   HENT:   Head: Normocephalic and atraumatic.   Neck: Normal range of motion.   Cardiovascular: Normal rate and regular rhythm.   Pulmonary/Chest: Effort normal and breath sounds normal.   Abdominal: Soft. There is no tenderness.   Musculoskeletal: Normal range of motion.   Neurological: She is alert and oriented to person, place, and time.   Skin: Skin is warm and dry. She is not diaphoretic.   Psychiatric: She has a normal mood and affect. Her behavior is normal. Thought content normal.       Procedures           ED Course  Final Diagnosis: as of Sep 03 1355   Accidental overdose of heroin, initial encounter (CMS/[facility])      1:55 PM  VSS HA and nausea resolved.             Jonathan Henderson PA-C  [phone]

## 2020-05-31 ENCOUNTER — HOSPITAL ENCOUNTER (EMERGENCY)
Facility: HOSPITAL | Age: 40
Discharge: ANOTHER HOSPITAL | End: 2020-06-01
Attending: EMERGENCY MEDICINE | Admitting: EMERGENCY MEDICINE

## 2020-05-31 ENCOUNTER — APPOINTMENT (OUTPATIENT)
Dept: CT IMAGING | Facility: HOSPITAL | Age: 40
End: 2020-05-31

## 2020-05-31 DIAGNOSIS — D72.829 LEUKOCYTOSIS, UNSPECIFIED TYPE: ICD-10-CM

## 2020-05-31 DIAGNOSIS — J18.9 PNEUMONIA OF BOTH LUNGS DUE TO INFECTIOUS ORGANISM, UNSPECIFIED PART OF LUNG: ICD-10-CM

## 2020-05-31 DIAGNOSIS — I33.0 ACUTE BACTERIAL ENDOCARDITIS: Primary | ICD-10-CM

## 2020-05-31 LAB
ALBUMIN SERPL-MCNC: 2.7 G/DL (ref 3.5–5.2)
ALBUMIN/GLOB SERPL: 0.6 G/DL
ALP SERPL-CCNC: 306 U/L (ref 39–117)
ALT SERPL W P-5'-P-CCNC: 16 U/L (ref 1–33)
AMORPH URATE CRY URNS QL MICRO: ABNORMAL /HPF
AMPHET+METHAMPHET UR QL: NEGATIVE
AMPHETAMINES UR QL: NEGATIVE
ANION GAP SERPL CALCULATED.3IONS-SCNC: 19.4 MMOL/L (ref 5–15)
ANISOCYTOSIS BLD QL: ABNORMAL
AST SERPL-CCNC: 26 U/L (ref 1–32)
B-HCG UR QL: NEGATIVE
BACTERIA UR QL AUTO: ABNORMAL /HPF
BARBITURATES UR QL SCN: NEGATIVE
BENZODIAZ UR QL SCN: NEGATIVE
BILIRUB SERPL-MCNC: 1.5 MG/DL (ref 0.2–1.2)
BILIRUB UR QL STRIP: NEGATIVE
BUN BLD-MCNC: 20 MG/DL (ref 6–20)
BUN/CREAT SERPL: 25.3 (ref 7–25)
BUPRENORPHINE SERPL-MCNC: NEGATIVE NG/ML
CALCIUM SPEC-SCNC: 8.8 MG/DL (ref 8.6–10.5)
CANNABINOIDS SERPL QL: NEGATIVE
CHLORIDE SERPL-SCNC: 94 MMOL/L (ref 98–107)
CLARITY UR: ABNORMAL
CO2 SERPL-SCNC: 19.6 MMOL/L (ref 22–29)
COCAINE UR QL: NEGATIVE
COLOR UR: YELLOW
CREAT BLD-MCNC: 0.79 MG/DL (ref 0.57–1)
CRP SERPL-MCNC: 32.12 MG/DL (ref 0–0.5)
D-LACTATE SERPL-SCNC: 1.9 MMOL/L (ref 0.5–2)
DEPRECATED RDW RBC AUTO: 42 FL (ref 37–54)
ERYTHROCYTE [DISTWIDTH] IN BLOOD BY AUTOMATED COUNT: 16.6 % (ref 12.3–15.4)
ERYTHROCYTE [SEDIMENTATION RATE] IN BLOOD: 80 MM/HR (ref 0–20)
GFR SERPL CREATININE-BSD FRML MDRD: 81 ML/MIN/1.73
GLOBULIN UR ELPH-MCNC: 4.3 GM/DL
GLUCOSE BLD-MCNC: 145 MG/DL (ref 65–99)
GLUCOSE UR STRIP-MCNC: NEGATIVE MG/DL
HCT VFR BLD AUTO: 28.5 % (ref 34–46.6)
HGB BLD-MCNC: 8.8 G/DL (ref 12–15.9)
HGB UR QL STRIP.AUTO: ABNORMAL
HOLD SPECIMEN: NORMAL
HOLD SPECIMEN: NORMAL
HYALINE CASTS UR QL AUTO: ABNORMAL /LPF
KETONES UR QL STRIP: ABNORMAL
LEUKOCYTE ESTERASE UR QL STRIP.AUTO: ABNORMAL
LIPASE SERPL-CCNC: 22 U/L (ref 13–60)
LYMPHOCYTES # BLD MANUAL: 0.43 10*3/MM3 (ref 0.7–3.1)
LYMPHOCYTES NFR BLD MANUAL: 3 % (ref 19.6–45.3)
LYMPHOCYTES NFR BLD MANUAL: 7 % (ref 5–12)
MCH RBC QN AUTO: 21.9 PG (ref 26.6–33)
MCHC RBC AUTO-ENTMCNC: 30.9 G/DL (ref 31.5–35.7)
MCV RBC AUTO: 71.1 FL (ref 79–97)
METAMYELOCYTES NFR BLD MANUAL: 4 % (ref 0–0)
METHADONE UR QL SCN: NEGATIVE
MICROCYTES BLD QL: ABNORMAL
MONOCYTES # BLD AUTO: 0.99 10*3/MM3 (ref 0.1–0.9)
MYELOCYTES NFR BLD MANUAL: 2 % (ref 0–0)
NEUTROPHILS # BLD AUTO: 11.93 10*3/MM3 (ref 1.7–7)
NEUTROPHILS NFR BLD MANUAL: 72 % (ref 42.7–76)
NEUTS BAND NFR BLD MANUAL: 12 % (ref 0–5)
NITRITE UR QL STRIP: NEGATIVE
NT-PROBNP SERPL-MCNC: 1230 PG/ML (ref 5–450)
OPIATES UR QL: NEGATIVE
OXYCODONE UR QL SCN: NEGATIVE
PCP UR QL SCN: NEGATIVE
PH UR STRIP.AUTO: 5.5 [PH] (ref 5–8)
PLATELET # BLD AUTO: 198 10*3/MM3 (ref 140–450)
PMV BLD AUTO: 11 FL (ref 6–12)
POTASSIUM BLD-SCNC: 3.6 MMOL/L (ref 3.5–5.2)
PROCALCITONIN SERPL-MCNC: 0.59 NG/ML (ref 0.1–0.25)
PROPOXYPH UR QL: NEGATIVE
PROT SERPL-MCNC: 7 G/DL (ref 6–8.5)
PROT UR QL STRIP: ABNORMAL
RBC # BLD AUTO: 4.01 10*6/MM3 (ref 3.77–5.28)
RBC # UR: ABNORMAL /HPF
REF LAB TEST METHOD: ABNORMAL
SCAN SLIDE: NORMAL
SMALL PLATELETS BLD QL SMEAR: ADEQUATE
SODIUM BLD-SCNC: 133 MMOL/L (ref 136–145)
SP GR UR STRIP: 1.02 (ref 1–1.03)
SQUAMOUS #/AREA URNS HPF: ABNORMAL /HPF
TRICYCLICS UR QL SCN: NEGATIVE
TROPONIN T SERPL-MCNC: <0.01 NG/ML (ref 0–0.03)
UROBILINOGEN UR QL STRIP: ABNORMAL
WBC MORPH BLD: NORMAL
WBC NRBC COR # BLD: 14.2 10*3/MM3 (ref 3.4–10.8)
WBC UR QL AUTO: ABNORMAL /HPF
WHOLE BLOOD HOLD SPECIMEN: NORMAL
WHOLE BLOOD HOLD SPECIMEN: NORMAL

## 2020-05-31 PROCEDURE — 84145 PROCALCITONIN (PCT): CPT | Performed by: EMERGENCY MEDICINE

## 2020-05-31 PROCEDURE — 85651 RBC SED RATE NONAUTOMATED: CPT | Performed by: EMERGENCY MEDICINE

## 2020-05-31 PROCEDURE — 93005 ELECTROCARDIOGRAM TRACING: CPT | Performed by: EMERGENCY MEDICINE

## 2020-05-31 PROCEDURE — 87186 SC STD MICRODIL/AGAR DIL: CPT | Performed by: EMERGENCY MEDICINE

## 2020-05-31 PROCEDURE — 25010000002 VANCOMYCIN 5 G RECONSTITUTED SOLUTION 5,000 MG VIAL: Performed by: STUDENT IN AN ORGANIZED HEALTH CARE EDUCATION/TRAINING PROGRAM

## 2020-05-31 PROCEDURE — 81001 URINALYSIS AUTO W/SCOPE: CPT | Performed by: EMERGENCY MEDICINE

## 2020-05-31 PROCEDURE — 83605 ASSAY OF LACTIC ACID: CPT | Performed by: EMERGENCY MEDICINE

## 2020-05-31 PROCEDURE — 85025 COMPLETE CBC W/AUTO DIFF WBC: CPT | Performed by: EMERGENCY MEDICINE

## 2020-05-31 PROCEDURE — 25010000002 KETOROLAC TROMETHAMINE PER 15 MG: Performed by: EMERGENCY MEDICINE

## 2020-05-31 PROCEDURE — 87086 URINE CULTURE/COLONY COUNT: CPT | Performed by: EMERGENCY MEDICINE

## 2020-05-31 PROCEDURE — 87040 BLOOD CULTURE FOR BACTERIA: CPT | Performed by: EMERGENCY MEDICINE

## 2020-05-31 PROCEDURE — 96361 HYDRATE IV INFUSION ADD-ON: CPT

## 2020-05-31 PROCEDURE — 80306 DRUG TEST PRSMV INSTRMNT: CPT | Performed by: EMERGENCY MEDICINE

## 2020-05-31 PROCEDURE — 86140 C-REACTIVE PROTEIN: CPT | Performed by: EMERGENCY MEDICINE

## 2020-05-31 PROCEDURE — 87804 INFLUENZA ASSAY W/OPTIC: CPT | Performed by: EMERGENCY MEDICINE

## 2020-05-31 PROCEDURE — 81025 URINE PREGNANCY TEST: CPT | Performed by: EMERGENCY MEDICINE

## 2020-05-31 PROCEDURE — 25010000002 MEROPENEM IN SODIUM CHLORIDE 0.9% 50 ML 1 GM/50ML RECONSTITUTED SOLUTION: Performed by: STUDENT IN AN ORGANIZED HEALTH CARE EDUCATION/TRAINING PROGRAM

## 2020-05-31 PROCEDURE — 87147 CULTURE TYPE IMMUNOLOGIC: CPT | Performed by: EMERGENCY MEDICINE

## 2020-05-31 PROCEDURE — 84484 ASSAY OF TROPONIN QUANT: CPT | Performed by: EMERGENCY MEDICINE

## 2020-05-31 PROCEDURE — 74177 CT ABD & PELVIS W/CONTRAST: CPT

## 2020-05-31 PROCEDURE — 25010000002 ONDANSETRON PER 1 MG: Performed by: EMERGENCY MEDICINE

## 2020-05-31 PROCEDURE — 99285 EMERGENCY DEPT VISIT HI MDM: CPT

## 2020-05-31 PROCEDURE — 96365 THER/PROPH/DIAG IV INF INIT: CPT

## 2020-05-31 PROCEDURE — 83690 ASSAY OF LIPASE: CPT | Performed by: EMERGENCY MEDICINE

## 2020-05-31 PROCEDURE — 87635 SARS-COV-2 COVID-19 AMP PRB: CPT | Performed by: EMERGENCY MEDICINE

## 2020-05-31 PROCEDURE — 87150 DNA/RNA AMPLIFIED PROBE: CPT | Performed by: EMERGENCY MEDICINE

## 2020-05-31 PROCEDURE — 85007 BL SMEAR W/DIFF WBC COUNT: CPT | Performed by: EMERGENCY MEDICINE

## 2020-05-31 PROCEDURE — 83880 ASSAY OF NATRIURETIC PEPTIDE: CPT | Performed by: EMERGENCY MEDICINE

## 2020-05-31 PROCEDURE — 87088 URINE BACTERIA CULTURE: CPT | Performed by: EMERGENCY MEDICINE

## 2020-05-31 PROCEDURE — 96375 TX/PRO/DX INJ NEW DRUG ADDON: CPT

## 2020-05-31 PROCEDURE — 71260 CT THORAX DX C+: CPT

## 2020-05-31 PROCEDURE — 80074 ACUTE HEPATITIS PANEL: CPT | Performed by: HOSPITALIST

## 2020-05-31 PROCEDURE — 80053 COMPREHEN METABOLIC PANEL: CPT | Performed by: EMERGENCY MEDICINE

## 2020-05-31 PROCEDURE — 25010000002 IOPAMIDOL 61 % SOLUTION: Performed by: EMERGENCY MEDICINE

## 2020-05-31 RX ORDER — MEROPENEM AND SODIUM CHLORIDE 1 G/50ML
1 INJECTION, SOLUTION INTRAVENOUS ONCE
Status: COMPLETED | OUTPATIENT
Start: 2020-05-31 | End: 2020-05-31

## 2020-05-31 RX ORDER — DROPERIDOL 2.5 MG/ML
1.25 INJECTION, SOLUTION INTRAMUSCULAR; INTRAVENOUS ONCE
Status: COMPLETED | OUTPATIENT
Start: 2020-05-31 | End: 2020-06-01

## 2020-05-31 RX ORDER — ONDANSETRON 2 MG/ML
4 INJECTION INTRAMUSCULAR; INTRAVENOUS ONCE
Status: COMPLETED | OUTPATIENT
Start: 2020-05-31 | End: 2020-05-31

## 2020-05-31 RX ORDER — KETOROLAC TROMETHAMINE 30 MG/ML
30 INJECTION, SOLUTION INTRAMUSCULAR; INTRAVENOUS ONCE
Status: COMPLETED | OUTPATIENT
Start: 2020-05-31 | End: 2020-05-31

## 2020-05-31 RX ORDER — ACETAMINOPHEN 500 MG
1000 TABLET ORAL ONCE
Status: COMPLETED | OUTPATIENT
Start: 2020-05-31 | End: 2020-05-31

## 2020-05-31 RX ORDER — ACETAMINOPHEN 500 MG
1000 TABLET ORAL ONCE
Status: COMPLETED | OUTPATIENT
Start: 2020-05-31 | End: 2020-06-01

## 2020-05-31 RX ADMIN — IOPAMIDOL 100 ML: 612 INJECTION, SOLUTION INTRAVENOUS at 19:51

## 2020-05-31 RX ADMIN — SODIUM CHLORIDE 1000 ML: 9 INJECTION, SOLUTION INTRAVENOUS at 17:55

## 2020-05-31 RX ADMIN — ACETAMINOPHEN 1000 MG: 500 TABLET, FILM COATED ORAL at 17:53

## 2020-05-31 RX ADMIN — MEROPENEM AND SODIUM CHLORIDE 1 G: 1 INJECTION, SOLUTION INTRAVENOUS at 22:03

## 2020-05-31 RX ADMIN — KETOROLAC TROMETHAMINE 30 MG: 30 INJECTION, SOLUTION INTRAMUSCULAR at 17:54

## 2020-05-31 RX ADMIN — VANCOMYCIN HYDROCHLORIDE 2000 MG: 500 INJECTION, POWDER, LYOPHILIZED, FOR SOLUTION INTRAVENOUS at 22:39

## 2020-05-31 RX ADMIN — ONDANSETRON 4 MG: 2 INJECTION INTRAMUSCULAR; INTRAVENOUS at 17:53

## 2020-06-01 ENCOUNTER — HOSPITAL ENCOUNTER (INPATIENT)
Facility: HOSPITAL | Age: 40
LOS: 14 days | Discharge: ANOTHER HOSPITAL | End: 2020-06-15
Attending: INTERNAL MEDICINE | Admitting: HOSPITALIST

## 2020-06-01 VITALS
DIASTOLIC BLOOD PRESSURE: 77 MMHG | RESPIRATION RATE: 18 BRPM | BODY MASS INDEX: 39.99 KG/M2 | WEIGHT: 240 LBS | SYSTOLIC BLOOD PRESSURE: 126 MMHG | HEIGHT: 65 IN | OXYGEN SATURATION: 95 % | TEMPERATURE: 99.2 F | HEART RATE: 93 BPM

## 2020-06-01 DIAGNOSIS — G06.2 EPIDURAL ABSCESS: ICD-10-CM

## 2020-06-01 DIAGNOSIS — Z78.9 IMPAIRED MOBILITY AND ADLS: Primary | ICD-10-CM

## 2020-06-01 DIAGNOSIS — Z74.09 IMPAIRED MOBILITY AND ADLS: Primary | ICD-10-CM

## 2020-06-01 PROBLEM — F32.A DEPRESSION: Status: ACTIVE | Noted: 2020-06-01

## 2020-06-01 PROBLEM — A41.9 SEPSIS (HCC): Status: ACTIVE | Noted: 2020-06-01

## 2020-06-01 PROBLEM — R56.9 SEIZURE (HCC): Status: ACTIVE | Noted: 2020-06-01

## 2020-06-01 PROBLEM — G43.909 MIGRAINE: Status: ACTIVE | Noted: 2020-06-01

## 2020-06-01 PROBLEM — F41.9 ANXIETY: Status: ACTIVE | Noted: 2020-06-01

## 2020-06-01 PROBLEM — M50.30 DDD (DEGENERATIVE DISC DISEASE), CERVICAL: Status: ACTIVE | Noted: 2020-06-01

## 2020-06-01 LAB
B-HCG UR QL: NEGATIVE
BACTERIA BLD CULT: ABNORMAL
D-LACTATE SERPL-SCNC: 1.4 MMOL/L (ref 0.5–2)
GLUCOSE BLDC GLUCOMTR-MCNC: 144 MG/DL (ref 70–130)
HAV IGM SERPL QL IA: NORMAL
HBV CORE IGM SERPL QL IA: NORMAL
HBV SURFACE AG SERPL QL IA: NORMAL
HCV AB SER DONR QL: NORMAL
HIV1+2 AB SER QL: NORMAL
HOLD SPECIMEN: NORMAL
SARS-COV-2 RNA RESP QL NAA+PROBE: NOT DETECTED

## 2020-06-01 PROCEDURE — 83605 ASSAY OF LACTIC ACID: CPT | Performed by: HOSPITALIST

## 2020-06-01 PROCEDURE — 87150 DNA/RNA AMPLIFIED PROBE: CPT | Performed by: HOSPITALIST

## 2020-06-01 PROCEDURE — 81025 URINE PREGNANCY TEST: CPT | Performed by: HOSPITALIST

## 2020-06-01 PROCEDURE — 25010000002 HEPARIN (PORCINE) PER 1000 UNITS: Performed by: HOSPITALIST

## 2020-06-01 PROCEDURE — 25010000002 VANCOMYCIN 10 G RECONSTITUTED SOLUTION: Performed by: HOSPITALIST

## 2020-06-01 PROCEDURE — 25010000002 PROMETHAZINE PER 50 MG: Performed by: EMERGENCY MEDICINE

## 2020-06-01 PROCEDURE — 87040 BLOOD CULTURE FOR BACTERIA: CPT | Performed by: HOSPITALIST

## 2020-06-01 PROCEDURE — 94640 AIRWAY INHALATION TREATMENT: CPT

## 2020-06-01 PROCEDURE — G0432 EIA HIV-1/HIV-2 SCREEN: HCPCS | Performed by: HOSPITALIST

## 2020-06-01 PROCEDURE — 96366 THER/PROPH/DIAG IV INF ADDON: CPT

## 2020-06-01 PROCEDURE — 87147 CULTURE TYPE IMMUNOLOGIC: CPT | Performed by: HOSPITALIST

## 2020-06-01 PROCEDURE — 94799 UNLISTED PULMONARY SVC/PX: CPT

## 2020-06-01 PROCEDURE — 99223 1ST HOSP IP/OBS HIGH 75: CPT | Performed by: HOSPITALIST

## 2020-06-01 PROCEDURE — 25010000002 MEROPENEM IN SODIUM CHLORIDE 0.9% 50 ML 1 GM/50ML RECONSTITUTED SOLUTION: Performed by: STUDENT IN AN ORGANIZED HEALTH CARE EDUCATION/TRAINING PROGRAM

## 2020-06-01 PROCEDURE — 96376 TX/PRO/DX INJ SAME DRUG ADON: CPT

## 2020-06-01 PROCEDURE — 25010000002 ONDANSETRON PER 1 MG: Performed by: HOSPITALIST

## 2020-06-01 PROCEDURE — 96375 TX/PRO/DX INJ NEW DRUG ADDON: CPT

## 2020-06-01 PROCEDURE — 82962 GLUCOSE BLOOD TEST: CPT

## 2020-06-01 PROCEDURE — 87186 SC STD MICRODIL/AGAR DIL: CPT | Performed by: HOSPITALIST

## 2020-06-01 PROCEDURE — 25010000002 DROPERIDOL PER 5 MG: Performed by: STUDENT IN AN ORGANIZED HEALTH CARE EDUCATION/TRAINING PROGRAM

## 2020-06-01 RX ORDER — BUDESONIDE AND FORMOTEROL FUMARATE DIHYDRATE 160; 4.5 UG/1; UG/1
2 AEROSOL RESPIRATORY (INHALATION)
Status: DISCONTINUED | OUTPATIENT
Start: 2020-06-01 | End: 2020-06-16 | Stop reason: HOSPADM

## 2020-06-01 RX ORDER — IPRATROPIUM BROMIDE AND ALBUTEROL SULFATE 2.5; .5 MG/3ML; MG/3ML
3 SOLUTION RESPIRATORY (INHALATION) EVERY 6 HOURS PRN
Status: DISCONTINUED | OUTPATIENT
Start: 2020-06-01 | End: 2020-06-16 | Stop reason: HOSPADM

## 2020-06-01 RX ORDER — ACETAMINOPHEN 325 MG/1
650 TABLET ORAL EVERY 6 HOURS PRN
Status: DISCONTINUED | OUTPATIENT
Start: 2020-06-01 | End: 2020-06-16 | Stop reason: HOSPADM

## 2020-06-01 RX ORDER — TIZANIDINE 4 MG/1
4 TABLET ORAL EVERY 8 HOURS PRN
Status: DISCONTINUED | OUTPATIENT
Start: 2020-06-01 | End: 2020-06-10

## 2020-06-01 RX ORDER — PANTOPRAZOLE SODIUM 40 MG/1
40 TABLET, DELAYED RELEASE ORAL EVERY MORNING
Status: DISCONTINUED | OUTPATIENT
Start: 2020-06-02 | End: 2020-06-15

## 2020-06-01 RX ORDER — FLUOXETINE HYDROCHLORIDE 20 MG/1
40 CAPSULE ORAL DAILY
Status: DISCONTINUED | OUTPATIENT
Start: 2020-06-01 | End: 2020-06-16 | Stop reason: HOSPADM

## 2020-06-01 RX ORDER — SODIUM CHLORIDE 9 MG/ML
125 INJECTION, SOLUTION INTRAVENOUS CONTINUOUS
Status: DISCONTINUED | OUTPATIENT
Start: 2020-06-01 | End: 2020-06-15

## 2020-06-01 RX ORDER — IBUPROFEN 600 MG/1
600 TABLET ORAL ONCE
Status: COMPLETED | OUTPATIENT
Start: 2020-06-01 | End: 2020-06-01

## 2020-06-01 RX ORDER — MEROPENEM AND SODIUM CHLORIDE 1 G/50ML
1 INJECTION, SOLUTION INTRAVENOUS ONCE
Status: COMPLETED | OUTPATIENT
Start: 2020-06-01 | End: 2020-06-01

## 2020-06-01 RX ORDER — ONDANSETRON 2 MG/ML
4 INJECTION INTRAMUSCULAR; INTRAVENOUS EVERY 6 HOURS PRN
Status: DISCONTINUED | OUTPATIENT
Start: 2020-06-01 | End: 2020-06-07

## 2020-06-01 RX ORDER — HYDROCODONE BITARTRATE AND ACETAMINOPHEN 5; 325 MG/1; MG/1
1 TABLET ORAL EVERY 6 HOURS PRN
Status: DISCONTINUED | OUTPATIENT
Start: 2020-06-01 | End: 2020-06-11 | Stop reason: SDUPTHER

## 2020-06-01 RX ORDER — GABAPENTIN 400 MG/1
800 CAPSULE ORAL EVERY 8 HOURS SCHEDULED
Status: DISCONTINUED | OUTPATIENT
Start: 2020-06-01 | End: 2020-06-16 | Stop reason: HOSPADM

## 2020-06-01 RX ORDER — SODIUM CHLORIDE 9 MG/ML
125 INJECTION, SOLUTION INTRAVENOUS CONTINUOUS
Status: DISCONTINUED | OUTPATIENT
Start: 2020-06-01 | End: 2020-06-01 | Stop reason: HOSPADM

## 2020-06-01 RX ORDER — PROMETHAZINE HYDROCHLORIDE 25 MG/ML
25 INJECTION, SOLUTION INTRAMUSCULAR; INTRAVENOUS ONCE
Status: COMPLETED | OUTPATIENT
Start: 2020-06-01 | End: 2020-06-01

## 2020-06-01 RX ORDER — LEVOTHYROXINE SODIUM 0.05 MG/1
50 TABLET ORAL DAILY
Status: DISCONTINUED | OUTPATIENT
Start: 2020-06-02 | End: 2020-06-15

## 2020-06-01 RX ORDER — HEPARIN SODIUM 5000 [USP'U]/ML
5000 INJECTION, SOLUTION INTRAVENOUS; SUBCUTANEOUS EVERY 8 HOURS SCHEDULED
Status: DISCONTINUED | OUTPATIENT
Start: 2020-06-01 | End: 2020-06-06

## 2020-06-01 RX ADMIN — IBUPROFEN 600 MG: 600 TABLET ORAL at 02:14

## 2020-06-01 RX ADMIN — ACETAMINOPHEN 1000 MG: 500 TABLET, FILM COATED ORAL at 00:05

## 2020-06-01 RX ADMIN — BUDESONIDE AND FORMOTEROL FUMARATE DIHYDRATE 2 PUFF: 160; 4.5 AEROSOL RESPIRATORY (INHALATION) at 20:19

## 2020-06-01 RX ADMIN — VANCOMYCIN HYDROCHLORIDE 2250 MG: 10 INJECTION, POWDER, LYOPHILIZED, FOR SOLUTION INTRAVENOUS at 17:56

## 2020-06-01 RX ADMIN — SODIUM CHLORIDE 125 ML/HR: 9 INJECTION, SOLUTION INTRAVENOUS at 16:24

## 2020-06-01 RX ADMIN — SODIUM CHLORIDE 125 ML/HR: 9 INJECTION, SOLUTION INTRAVENOUS at 07:11

## 2020-06-01 RX ADMIN — HYDROCODONE BITARTRATE AND ACETAMINOPHEN 1 TABLET: 5; 325 TABLET ORAL at 22:10

## 2020-06-01 RX ADMIN — HYDROCODONE BITARTRATE AND ACETAMINOPHEN 1 TABLET: 5; 325 TABLET ORAL at 16:07

## 2020-06-01 RX ADMIN — ONDANSETRON 4 MG: 2 INJECTION INTRAMUSCULAR; INTRAVENOUS at 17:51

## 2020-06-01 RX ADMIN — SODIUM CHLORIDE 125 ML/HR: 9 INJECTION, SOLUTION INTRAVENOUS at 17:45

## 2020-06-01 RX ADMIN — GABAPENTIN 800 MG: 400 CAPSULE ORAL at 22:10

## 2020-06-01 RX ADMIN — HEPARIN SODIUM 5000 UNITS: 5000 INJECTION, SOLUTION INTRAVENOUS; SUBCUTANEOUS at 22:11

## 2020-06-01 RX ADMIN — MEROPENEM AND SODIUM CHLORIDE 1 G: 1 INJECTION, SOLUTION INTRAVENOUS at 07:55

## 2020-06-01 RX ADMIN — DROPERIDOL 1.25 MG: 2.5 INJECTION, SOLUTION INTRAMUSCULAR; INTRAVENOUS at 00:06

## 2020-06-01 RX ADMIN — AZTREONAM 2 G: 2 INJECTION, POWDER, LYOPHILIZED, FOR SOLUTION INTRAMUSCULAR; INTRAVENOUS at 22:10

## 2020-06-01 RX ADMIN — PROMETHAZINE HYDROCHLORIDE 25 MG: 25 INJECTION INTRAMUSCULAR; INTRAVENOUS at 07:52

## 2020-06-01 RX ADMIN — ACETAMINOPHEN 650 MG: 325 TABLET, FILM COATED ORAL at 22:39

## 2020-06-01 RX ADMIN — HEPARIN SODIUM 5000 UNITS: 5000 INJECTION, SOLUTION INTRAVENOUS; SUBCUTANEOUS at 16:07

## 2020-06-01 RX ADMIN — FLUOXETINE HYDROCHLORIDE 40 MG: 20 CAPSULE ORAL at 16:06

## 2020-06-01 RX ADMIN — GABAPENTIN 800 MG: 400 CAPSULE ORAL at 16:06

## 2020-06-01 NOTE — ED NOTES
At this time, [facility] was contacted and [facility] states that the patient is number 2 on the waiting list. She states she is not sure when the bed for this patient will be available. She states she will continue to give updates every 4 hours.      Amanda Hernandez  06/01/20 0879

## 2020-06-01 NOTE — PROGRESS NOTES
"Pharmacy Consult-Vancomycin Dosing  Liliana Calderon is a  39 y.o. female receiving vancomycin therapy.     Indication: sepsis/MRSA bacteremia   Consulting Provider: hospitalist  ID Consult: not currently  Goal Trough: 15-20mg/dL    Current Antimicrobial Therapy  Aztreonam 2g IV q8h, start 6/1  Vancomycin PTD, start 5/31 @ OSH    Allergies  Allergies as of 06/01/2020 - Reviewed 06/01/2020   Allergen Reaction Noted    Penicillins Rash 05/31/2018    Phenobarbital Rash 05/31/2018     Labs  Results from last 7 days   Lab Units 05/31/20  1751   BUN mg/dL 20   CREATININE mg/dL 0.79     Results from last 7 days   Lab Units 05/31/20  1751   WBC 10*3/mm3 14.20*     Evaluation of Dosing  Last Dose Received in the ED/Outside Facility: vanc 2000mg x1 5/31 @ 2239 @ OSH   Is Patient on Dialysis or Renal Replacement: no    Ht - 165.1 cm (65\")  Wt - 106 kg (233 lb)    Estimated Creatinine Clearance: 116.8 mL/min (by C-G formula based on SCr of 0.79 mg/dL).    Intake & Output (last 3 days)       None          Microbiology and Radiology  Microbiology Results (last 10 days)       Procedure Component Value - Date/Time    Urine Culture - Urine, Urine, Clean Catch [[id number]]  (Abnormal) Collected:  05/31/20 1914    Lab Status:  Preliminary result Specimen:  Urine, Clean Catch Updated:  06/01/20 1306     Urine Culture 50,000 CFU/mL Staphylococcus aureus, MRSA     Comment:   Methicillin resistant Staph aureus, patient may be an isolation risk.  Staphylococcus aureus is not typically regarded as a uropathogen, except in cases with genitourinary instrumentation present.  It's presence suggests an alternate source (e.g. bloodstream, abscess, SSTI, etc.).  Please evaluate accordingly.       Blood Culture - Blood, Arm, Left [[id number]]  (Abnormal) Collected:  05/31/20 1805    Lab Status:  Preliminary result Specimen:  Blood from Arm, Left Updated:  06/01/20 0545     Blood Culture Abnormal Stain     Gram Stain Anaerobic Bottle Gram positive " cocci in pairs and clusters      Aerobic Bottle Gram positive cocci in pairs and clusters    Blood Culture ID, PCR - Blood, Arm, Left [[id number]]  (Abnormal) Collected:  05/31/20 1805    Lab Status:  Final result Specimen:  Blood from Arm, Left Updated:  06/01/20 0628     BCID, PCR Staphylococcus aureus. mecA (methicillin resistance gene) detected. Identification by BCID PCR.    Blood Culture - Blood, Arm, Right [[id number]]  (Abnormal) Collected:  05/31/20 1757    Lab Status:  Preliminary result Specimen:  Blood from Arm, Right Updated:  06/01/20 0545     Blood Culture Abnormal Stain     Gram Stain Aerobic Bottle Gram positive cocci in pairs and clusters      Anaerobic Bottle Gram positive cocci in pairs and clusters    Influenza Antigen, Rapid - Swab, Nasopharynx [[id number]]  (Normal) Collected:  05/31/20 1752    Lab Status:  Final result Specimen:  Swab from Nasopharynx Updated:  05/31/20 1825     Influenza A Ag, EIA Negative     Influenza B Ag, EIA Negative    COVID-19, URI IN-HOUSE, NP SWAB IN TRANSPORT MEDIA 8-12 HR TAT - Swab, Nasopharynx [[id number]]  (Normal) Collected:  05/31/20 1743    Lab Status:  Final result Specimen:  Swab from Nasopharynx Updated:  06/01/20 1420     COVID19 Not Detected          Evaluation of Level         Assessment/Plan:  Pharmacy to dose vancomycin for sepsis and MRSA bacteremia in this 39yoF. She received a one-time dose of vancomycin 2000mg (18.9mg/kg) at the OSH last evening. Given patient's age, renal function, and the severity of her illness, will re-load with vancomycin 2250mg (21mg/kg) IV now, then start a maintenance regimen of vancomycin 1500mg (14mg/kg) IV q8h.   Assess a trough level prior to the 5th dose (6th overall) tomorrow evening @ 2330.  Pharmacy will continue to monitor and adjust vancomycin dose as necessary based on renal function, cultures, labs, and clinical status.     Thank you,  Cassandra Pollard, [facility]  6/1/2020  15:04

## 2020-06-01 NOTE — ED NOTES
Updated pt on POC, possible transfer to [facility]. PT requesting something for pain and nausea. MD notified see orders     Haydee Hutchinson RN  05/31/20 9677

## 2020-06-01 NOTE — ED NOTES
Phone call from [facility] bed coordinator, pt is still on a wait list for a bed. Pt is aware that she is awaiting a bed.        Haydee Hutchinson RN  06/01/20 0681

## 2020-06-01 NOTE — ED NOTES
Hospitalist at [facility] re-paged for Dr. Mota.     Judy Chen  05/31/20 6060

## 2020-06-01 NOTE — ED NOTES
Pt urinated and defecated in bed. Pt's bed was changed, placed brief on pt. Gave a sheet and new ice pack. Pt has no other needs at this time     Haydee Hutchinson RN  06/01/20 6746

## 2020-06-01 NOTE — H&P
[facility] Medicine Services  HISTORY AND PHYSICAL    Patient Name: Liliana Calderon  : 1980  MRN: [id number]  Primary Care Physician: Ollie Flores MD  Date of admission: 2020      Subjective   Subjective     Chief Complaint:  Shortness of air and severe lower back pain    HPI:  Liliana Calderon is a 39 y.o. female with past medical history significant for depression/anxiety, fibromyalgia, COPD, hypertension, seizure disorder, migraine headache, and remote tobacco/IV drug abuse.  Patient reported that she stopped smoking in .  She also reported that she stopped using IV drugs, mainly heroin, about a month ago.  She stated that she had been relatively well up until about 3 days ago when she first noticed leg and lower back pain, as well as shortness of air/blood-tinged productive cough, as well as fever, T-max was 103.9.  She stated that she had been taking some Tylenol at home, which help with the fever.  She denies any recent contact with people who are sick or have COVID.  She denies any recent travel.  She denies any changes in her taste or smell.  She stated that she had been compliant with social distancing recommendations.    She presented to [facility] ED on  and was found to have fever, T-max one 1.4 there.  She also had significant leukocytosis, as well as abnormal UA and CT scan of the chest, concerning for pneumonia and possible septic emboli.  Her blood culture also came back positive for MRSA.  She was transferred to our facility for higher level care.  Basic labs were remarkable for anemia, hemoglobin of 8.8, elevated total bilirubin of 1.5 (normal in 2018), mildly elevated procalcitonin, and mild hyponatremia, sodium 133.  Her influenza test were negative.  COVID testing is also negative at this time.  Her BNP was mildly elevated at 1002 and 30.  Her urine drug screening was negative.      Review of Systems   All other systems  reviewed and are negative, except for those mentioned in the above HPI.     Personal History     Past Medical History:   Diagnosis Date   • Anxiety and depression    • Chronic bronchitis (CMS/HCC)    • Fibromyalgia    • Heart valve problem    • Hypertension    • Migraine    • PCOS (polycystic ovarian syndrome)    • Pneumonia    • Seizures (CMS/HCC)        Past Surgical History:   Procedure Laterality Date   • CHOLECYSTECTOMY         Family History: family history includes Alcohol abuse in her brother and sister; Alzheimer's disease in her maternal grandmother; Bone cancer in her mother; Breast cancer in her mother; Dementia in her father; Diabetes in her father; Heart disease in her father and paternal grandfather; Hypertension in her brother, father, mother, and sister; Mental illness in her brother and sister; Parkinsonism in her maternal grandmother; Pulmonary embolism in her father; Skin cancer in her mother; Stroke in her father. Otherwise pertinent FHx was reviewed and unremarkable.     Social History:  reports that she quit smoking about 6 years ago. Her smoking use included cigarettes. She has a 1.25 pack-year smoking history. She has never used smokeless tobacco. She reports that she has current or past drug history. Drug: IV. She reports that she does not drink alcohol.  Social History     Social History Narrative   • Not on file       Medications:  Available home medication information reviewed.  Medications Prior to Admission   Medication Sig Dispense Refill Last Dose   • azithromycin (ZITHROMAX) 250 MG tablet Take 1 tablet by mouth Daily. Take 2 tablets the first day, then 1 tablet daily for 4 days. 6 tablet 0 Not Taking   • bisoprolol (ZEBeta) 5 MG tablet Take 1 tablet by mouth Daily. 30 tablet 0 Taking   • budesonide-formoterol (SYMBICORT) 160-4.5 MCG/ACT inhaler Inhale 2 puffs 2 (Two) Times a Day. 1 inhaler 0 Taking   • ferrous sulfate 324 (65 Fe) MG tablet delayed-release EC tablet Take 1 tablet by  mouth 2 (Two) Times a Day With Meals. 60 tablet 0 Taking   • flunisolide (NASALIDE) 25 MCG/ACT (0.025%) solution nasal spray Inhale 1 spray Every 12 (Twelve) Hours. 1 bottle 5    • FLUoxetine (PROzac) 40 MG capsule Take 40 mg by mouth Daily.   Taking   • gabapentin (NEURONTIN) 800 MG tablet Take 800 mg by mouth 3 (Three) Times a Day.   Taking   • hydrOXYzine (VISTARIL) 25 MG capsule Take 25 mg by mouth 3 (Three) Times a Day.  0 Taking   • ipratropium-albuterol (DUO-NEB) 0.5-2.5 mg/3 ml nebulizer INHALE 1 VIAL (3 MLS)  BY NEBULIZATION TWO TIMES DAILY  3 Taking   • lansoprazole (PREVACID) 30 MG capsule Take 30 mg by mouth Daily.   Taking   • levETIRAcetam (KEPPRA) 500 MG tablet Take 1 tablet by mouth Every 12 (Twelve) Hours. 60 tablet 3 Taking   • levoFLOXacin (LEVAQUIN) 500 MG tablet TAKE 1 TABLET BY MOUTH EVERY 24 HOURS UNTIL GONE  0 Not Taking   • levothyroxine (SYNTHROID, LEVOTHROID) 50 MCG tablet Take 50 mcg by mouth Daily. 200001  3 Taking   • lisinopril (PRINIVIL,ZESTRIL) 2.5 MG tablet Take 2.5 mg by mouth Daily. 200001  2 Not Taking   • lisinopril-hydrochlorothiazide (PRINZIDE,ZESTORETIC) 20-12.5 MG per tablet Take 1 tablet by mouth Daily.   Taking   • metroNIDAZOLE (FLAGYL) 500 MG tablet TAKE 1 TABLET BY MOUTH EVERY TWELVE HOURS UNTIL GONE  0 Not Taking   • ondansetron ODT (ZOFRAN-ODT) 4 MG disintegrating tablet Take 1 tablet by mouth Every 6 (Six) Hours As Needed for Nausea or Vomiting. 20 tablet 0    • potassium chloride (K-DUR) 10 MEQ CR tablet TAKE 2 TABLETS BY MOUTH TWO TIMES A DAY WITH FOOD  0 Taking   • SUMAtriptan (IMITREX) 50 MG tablet Take one tablet at onset of headache. May repeat dose one time in 2 hours if headache not relieved. 9 tablet 3 Taking   • tiZANidine (ZANAFLEX) 4 MG tablet 1 tablet Every 8 (Eight) Hours.  0 Taking       Allergies   Allergen Reactions   • Penicillins Rash   • Phenobarbital Rash       Objective   Objective     Vital Signs:   Temp:  [98.3 °F (36.8 °C)-102.2 °F (39 °C)]  98.3 °F (36.8 °C)  Heart Rate:  [] 97  Resp:  [18-20] 18  BP: (117-205)/() 121/62        Physical Exam   General Assessment: No acute cardiopulmonary distress. Well developed and well nourished.  Nontoxic appearing.    HEENT: NCAT, PERRL, MM dry.    Neck: Supple    CVS: RRR, S1S2 normal, no murmurs    Resp: CTAB, no adventitious sound    Abd: soft, NT, ND, normal BS, no guarding or peritoneal signs    Ext: No edema, both calves are symmetric and NTTP, + ankle monitor on rt ankle.    Neuro: Nonfocal    Skin: W/D/I. No rash.    Psych: Affect is appropriate      Results Reviewed:  I have personally reviewed current lab and radiology data.    Results from last 7 days   Lab Units 05/31/20  1751   WBC 10*3/mm3 14.20*   HEMOGLOBIN g/dL 8.8*   HEMATOCRIT % 28.5*   PLATELETS 10*3/mm3 198     Results from last 7 days   Lab Units 05/31/20  1751 05/31/20  1740   SODIUM mmol/L 133*  --    POTASSIUM mmol/L 3.6  --    CHLORIDE mmol/L 94*  --    CO2 mmol/L 19.6*  --    BUN mg/dL 20  --    CREATININE mg/dL 0.79  --    GLUCOSE mg/dL 145*  --    CALCIUM mg/dL 8.8  --    ALT (SGPT) U/L 16  --    AST (SGOT) U/L 26  --    TROPONIN T ng/mL <0.010  --    PROBNP pg/mL 1,230.0*  --    LACTATE mmol/L  --  1.9   PROCALCITONIN ng/mL 0.59*  --      Estimated Creatinine Clearance: 116.8 mL/min (by C-G formula based on SCr of 0.79 mg/dL).  Brief Urine Lab Results  (Last result in the past 365 days)      Color   Clarity   Blood   Leuk Est   Nitrite   Protein   CREAT   Urine HCG        05/31/20 1914 Yellow Cloudy Trace Small (1+) Negative 30 mg/dL (1+)         05/31/20 1914               Negative         Imaging Results (Last 24 Hours)     ** No results found for the last 24 hours. **             Assessment/Plan   Assessment & Plan     Active Hospital Problems    Diagnosis POA   • **Sepsis (CMS/[facility]) [A41.9] Yes   • DDD (degenerative disc disease), cervical [M50.30] Unknown   • Anxiety [F41.9] Unknown   • Depression [F32.9] Unknown   •  Seizure (CMS/HCC) [R56.9] Unknown   • Migraine [G43.909] Unknown   • Essential hypertension [I10] Yes   • Fibromyalgia syndrome [M79.7] Yes   • GERD without esophagitis [K21.9] Yes     Liliana Calderon is a 39 y.o. female with past medical history significant for depression/anxiety, fibromyalgia, COPD, hypertension, seizure disorder, migraine headache, and remote tobacco/IV drug abuse.  Patient reported that she stopped smoking in 2014.  She also reported that she stopped using IV drugs, mainly heroin, about a month ago.  Patient was reportedly well up until 3 days prior to presentation.  She reported fever, shortness of air, lower back/leg pain, as well as generalized malaise.  She presented to the ED at outside hospital and was found to have MRSA bacteremia with associated left-sided pneumonia, as well as cavitary lesion in the right upper lobe concerning for possible septic emboli.    Sepsis with MRSA bacteremia in patient with history of IV drug abuse, last used heroin about 1 month ago.  Multiple right-sided pulmonary nodules, one is cavitary located in the posterior right upper lobe measuring about 20 mm, worrisome for septic emboli  Left lung opacity, worrisome for pneumonia  MRSA UTI  -Not hypoxic  -UDS negative  -Repeat blood cultures x2  -Start vancomycin and aztreonam (patient is allergic to penicillin-causes hives).  Unclear if patient was started on antibiotics at outside facility, I try to review chart but was unable to determined.  -Obtain MRI of the lumbar spine to rule out epidural abscess and echocardiogram to rule out endocarditis  -ID consultation in the morning  -IV fluids and supportive care    Acute on chronic lower back pain, has history of DDD  -Etiology unclear, no recent acute injuries or trauma  -Given history of IV drug use and current bacteremia, concern for possible epidural abscess.  Get MRI of the lumbar spine with and without contrast to rule out abscess.  -PT to evaluate and treat  as indicated    Mildly elevated total bilirubin  -Monitor    History of IV drug use  -Check acute hepatitis panel and HIV    Seizure disorder (data deficient)  -Resume home Keppra  -Seizure precaution    Migraine  -Stable, holding sumatriptan-consider it PRN    COPD   -Remote tobacco abuse, quit in 2014  -Resume home nebs    Hypertension  -Resume home antihypertensive meds  -BP well controlled at this time    Fibromyalgia  -Resume home gabapentin    Depression/anxiety  -Resume home meds    GERD  -PPI    Rt ankle monitor present  -unclear why, pt not very forth coming with details, stated that it was related to a warrant for her child's well being  -SW consulted      DVT prophylaxis: Heparin subcutaneously      Admission Communication  Due to current limited visitation policies, an attempt will be made to update patient's identified best point-of-contact(s) at admission to discuss plan of care.   Contact: Chante Calderon   Relation: Sister   Time of communication: 14:55   Notes (if applicable):          CODE STATUS:    Code Status and Medical Interventions:   Ordered at: 06/01/20 1441     Code Status:    CPR     Medical Interventions (Level of Support Prior to Arrest):    Full       Admission Status:  I believe this patient meets INPATIENT status due to sepsis/GPC bacteremia in patient with history of IVDA presenting with LBP and pneumonia.  I feel patient’s risk for adverse outcomes and need for care warrant INPATIENT evaluation and I predict the patient’s care encounter to likely last beyond 2 midnights.      Electronically signed by Vanessa Lopez MD, 06/01/20, 2:49 PM.

## 2020-06-01 NOTE — ED NOTES
Pt repositioned and given ice pack for head. No other needs at this time     Haydee Hutchinson RN  06/01/20 0224

## 2020-06-01 NOTE — NURSING NOTE
"  ACC REVIEW REPORT: [facility]        PATIENT NAME: Liliana Calderon    PATIENT ID: [id number]      COVID-19 ACC SCREENING       DOES THE PATIENT HAVE A FEVER GREATER THAN OR EQUAL .4: YES, PATIENT IS SEPTIC    IS THE PATIENT EXPERIENCING SHORTNESS OF BREATH: NO    DOES THE PATIENT HAVE A COUGH: NO    DOES THE PATIENT HAVE ANY OF THE FOLLOWING RISK FACTORS:    EXPOSURE TO SUSPECTED OR KNOWN COVID-19: NO    RECENT TRAVEL HISTORY TO ENDEMIC AREA (DOMESTIC/LOCAL): NO    IS THE PATIENT A HEALTHCARE WORKER: NO    HAS THE PATIENT BEEN TESTED FOR COVID-19: YES    DATE TESTED: 05/31/2020     LAB TESTING SENT TO: [place], RESULTS ARE PENDING.       BED: S504    BED TYPE: TELE    BED GIVEN TO: IVAN SOLOMON RN     TIME BED GIVEN: 155    YOB: 1980    AGE: 39 YRS    GENDER: FEMALE    PREVIOUS ADMIT TO [facility]:     PREVIOUS ADMISSION DATE:     PATIENT CLASS:     TODAY'S DATE: 6/1/2020    TRANSFER DATE: 06/01/20    ETA:     TRANSFERRING FACILITY: [facility]    TRANSFERRING FACILITY PHONE # : [phone]    TRANSFERRING MD: DR COLVIN    ACCEPTING PROVIDER: MD: DR HYATT      DATE/TIME REQUEST RECEIVED: \06/01/2020    [facility] RN: SANDRA CAI    REPORT FROM: IVAN SOLOMON RN     TIME REPORT TAKEN: 0955    DIAGNOSIS: ENDOCARDITIS    REASON FOR TRANSFER TO [facility]: HIGHER LEVEL CARE    TRANSPORTATION: GROUND    CLINICAL REASON FOR TRANSFER TO [facility]: PRESENTED TO THE ER WITH COMPLAINTS OF HAVING  FEVER FOR THE LAST 10-13 DAYS, ACCOMPANIED BY BACK PAIN, BODY ACHES, ABDOMINAL PAIN AND DIARRHEA.  SHE ALSO REPORTED THAT SHE WAS JAUNDICED FOR A FEW DAYS.  SHE HAS A HISTORY OF IV DRUG ABUSE BUT MAINTAINS THAT SHE'S BEEN \"CLEAN\" FOR A MONTH.  HER DRUG SCREEN WAS NEGATIVE.        CLINICAL INFORMATION    HEIGHT:     WEIGHT: 109 KG    ALLERGIES: PCN AND PHENOBARBITAL    MICHAELS: NO BUT WEARING A BRIEF, WILL NOT GO TO THE BR.    URINE SHOWED 1+ PROTEIN                                     INFECTIOUS DISEASE:     ISOLATION: "     1ST VITAL SIGNS:   TIME:   TEMP:   PULSE:   B/P:   RESP:     LAST VITAL SIGNS:  TIME: 1008  TEMP: 99.2  (101.4 LAST NIGHT)   PULSE: 84  B/P: 126/77  RESP:18  100% ON RMA    LAB INFORMATION: SODIUM-133, C02-19.6, GLUCOSE-145, WBC-14.20, H&H-8.8/28.5, CHLORIDE-94, SED RATE-80, CRP-32.12, PROCALCITONIN-0.59, BNP-1230, ALK PHOS-306, LACTATE-1.9, FLU WAS NEGATIVE,        CULTURE INFORMATION: ABNORMAL STAINS    MEDS/IV FLUIDS: # 18 G  RAC WITH NS @ 125/ML/HR  RECEIVED MERREM,       CARDIAC SYSTEM:    CHEST PAIN:     RATE:     SCALE:     RHYTHM: NSR    Is patient taking or has patient been given any drugs that could increase bleeding? NO  (Plavix, Brilinta, Effient, Eliquis, Xarelto, Warfarin, Integrilin, Angiomax)    DRUG:      DOSE/FREQUENCY:     CARDIAC ENZYMES:    DATE:   TIME:   CK:   CKMB:   STEVEN:   TROP:     DATE:   TIME:   CK:   CKMB:   STEVEN:   TROP:     CARDIAC NOTES:       RESPIRATORY SYSTEM: MAY HAVE PNEUMONIA    LUNG SOUNDS:    CLEAR:   CRACKLES:   WHEEZES: EXPIRATORY  RHONCHI:   DIMINISHED: YES    ABG DATE:         ABG TIME:     ABG RESULTS:    PH:   PO2:   PCO2:   HCO3:   O2 SAT:     OXYGEN:     O2 SAT:     ADMINISTRATION ROUTE:     IMAGING FINDINGS:     PNEUMO LOCATION:     PNEUMO SIZE:     PNEUMO CHEST TUBE SEAL TYPE:     RADIOLOGY RESULTS:     RESPIRATORY STATUS:   FINDINGS:  Axial CT images of the chest were obtained with contrast.  Coronal reformatted images were also obtained. This study was  performed with techniques to keep radiation doses as low as  reasonably achievable, (ALARA). Individualized dose reduction  techniques using automated exposure control or adjustment of mA  and/or KV according to the patient's size were employed.  There  is no evidence of mediastinal or hilar mass or adenopathy. No  axillary mass or adenopathy is identified.  On the lung window  images there is a 9 mm nodule in the right upper lobe.  There is  a cavitary nodule in the posterior right upper lobe measuring 20  mm.   Just lateral to this is a 6 mm nodule.  There are mild  opacities in the left lung worrisome for pneumonia.     IMPRESSION:  Multiple right lung nodules.  Since 1 of these nodules is  cavitary this is worrisome for septic emboli.  Left lung  opacities worrisome for pneumonia.      CNS/MUSCULOSKELETAL        NIHSS    CNS/MUSCULOSKELETAL NOTES:       GI//GY      ABDOMINAL PAIN:     VOMITING:     DIARRHEA:     NAUSEA:     BOWEL SOUNDS:     OCCULT STOOL:     VAGINAL BLEEDING:     TESTICULAR PAIN:     HEMATURIA:     NG TUBE:    SIZE:   DATE INSERTED:       ULTRASOUND:     ULTRASOUND RESULTS:       ACUTE ABDOMEN:     CT ABDOMEN/PELVIS RESULTS  FINDINGS:  CT OF THE ABDOMEN AND PELVIS WITH CONTRAST  Axial CT images of  the abdomen and pelvis were obtained after the administration of  intravenous contrast. Coronal reformatted images were also  obtained and reviewed.This study was performed with techniques  to keep radiation doses as low as reasonably achievable (ALARA).  Individualized dose reduction techniques using automated  exposure control or adjustment of mA and/or kV according to the  patient's size were employed.  Abdomen:  The heart is normal in  size. The liver has an unremarkable appearance, without evidence  of mass or biliary ductal dilatation.  There is evidence of  prior cholecystectomy.  The spleen is enlarged up to 18.5 cm.  No adrenal mass is present. The pancreas has an unremarkable  appearance. The kidneys are normal, without evidence of mass or  hydronephrosis. The aorta is normal in caliber. There is no free  fluid or adenopathy.  There is mild anasarca.  There is a small  umbilical hernia containing fat.    Pelvis: The appendix is  normal.  The urinary bladder is unremarkable. No inflammatory  process is seen. There is no evidence of mass or adenopathy.  There is no evidence of bowel obstruction.     IMPRESSION:  Splenomegaly.  Normal appendix.  Mild anasarca.      CT SCAN:     CT SCAN RESULTS:        GI//GY NOTES:     PAST MEDICAL HISTORY:     OTHER SYMPTOM NOTES:     ADDITIONAL NOTES:           Pamela Arredondo, RN  6/1/2020  09:38

## 2020-06-01 NOTE — PROGRESS NOTES
Discharge Planning Assessment  [facility]     Patient Name: Liliana Calderon  MRN: [id number]  Today's Date: 6/1/2020    Admit Date: 6/1/2020    Discharge Needs Assessment     Row Name 06/01/20 1515       Living Environment    Lives With  alone    Current Living Arrangements  home/apartment/condo    Primary Care Provided by  self    Provides Primary Care For  no one    Family Caregiver if Needed  sibling(s)    Quality of Family Relationships  helpful;supportive    Able to Return to Prior Arrangements  yes       Resource/Environmental Concerns    Resource/Environmental Concerns  none       Transition Planning    Patient/Family Anticipates Transition to  other (see comments) TBD    Patient/Family Anticipated Services at Transition  other (see comments) TBD    Transportation Anticipated  family or friend will provide;health plan transportation       Discharge Needs Assessment    Readmission Within the Last 30 Days  no previous admission in last 30 days    Concerns to be Addressed  discharge planning    Equipment Currently Used at Home  oxygen homeO O2 prn, unsure of name of DME company    Current Discharge Risk  lives alone        Discharge Plan     Row Name 06/01/20 1516       Plan    Plan  TBD    Patient/Family in Agreement with Plan  yes    Plan Comments  Met with patient in the room to initiate discharge planning. Patient lives alone in a second-story apartment in [place] [facility]. She is independent with ADLs and mobility. She wears 3L home oxygen as needed and has portable tanks at home. Patient's POC is evolving. ID consult and PT eval pending. CM will continue to follow.         Destination      Coordination has not been started for this encounter.      Durable Medical Equipment      Coordination has not been started for this encounter.      Dialysis/Infusion      Coordination has not been started for this encounter.      Home Medical Care      Coordination has not been started for this encounter.      Therapy       Coordination has not been started for this encounter.      Community Resources      Coordination has not been started for this encounter.          Demographic Summary     Row Name 06/01/20 1514       General Information    Admission Type  inpatient    Reason for Consult  discharge planning    General Information Comments  Confirmed that patient's PCP is Dr. Ollie Pierce. Confirmed with patient that she has medical coverage through Medicare A&B and has applied for Medicaid. She believes Medicaid will cover her rxs.         Functional Status     Row Name 06/01/20 1515       Functional Status    Usual Activity Tolerance  moderate       Functional Status, IADL    Medications  independent    Meal Preparation  independent    Housekeeping  independent    Laundry  independent    Shopping  independent        Psychosocial    No documentation.       Abuse/Neglect    No documentation.       Legal    No documentation.       Substance Abuse    No documentation.       Patient Forms    No documentation.           Dayanna Mukherjee RN

## 2020-06-01 NOTE — ED NOTES
TWO ATTEMPTS AT NEW IV ACCESS MADE; UNSUCCESSFUL. PTS LINE IS PATENT BUT VERY SLOW TO INFUSE. MEDS CAN BE PUSHED WITH A SLIGHT AMOUNT OF RESISTANCE BUT NO S/S OF INFILTRATION PRESENT. NEW PLACEMENT WOULD BE IDEAL.      Jenny Ewing RN  06/01/20 1046

## 2020-06-01 NOTE — ED NOTES
[facility] p[facility] via [place] in the ACC for Dr. Mota.      Judy Chen  05/31/20 4446

## 2020-06-01 NOTE — ED NOTES
PT TOOK ONLY A FEW BITES OF BREAKFAST  AND VOMITED. PT HAD SOME HIGH BP READINGS; READJUSTED CUFF AND PTS POSITION AND READING IS NORMOTENSIVE.      Jenny Ewing RN  06/01/20 1011

## 2020-06-02 ENCOUNTER — APPOINTMENT (OUTPATIENT)
Dept: CARDIOLOGY | Facility: HOSPITAL | Age: 40
End: 2020-06-02

## 2020-06-02 ENCOUNTER — APPOINTMENT (OUTPATIENT)
Dept: MRI IMAGING | Facility: HOSPITAL | Age: 40
End: 2020-06-02

## 2020-06-02 ENCOUNTER — TELEPHONE (OUTPATIENT)
Dept: INFECTIOUS DISEASES | Facility: HOSPITAL | Age: 40
End: 2020-06-02

## 2020-06-02 LAB
ALBUMIN SERPL-MCNC: 2 G/DL (ref 3.5–5.2)
ALBUMIN/GLOB SERPL: 0.6 G/DL
ALP SERPL-CCNC: 211 U/L (ref 39–117)
ALT SERPL W P-5'-P-CCNC: 11 U/L (ref 1–33)
ANION GAP SERPL CALCULATED.3IONS-SCNC: 13 MMOL/L (ref 5–15)
AST SERPL-CCNC: 14 U/L (ref 1–32)
BACTERIA BLD CULT: ABNORMAL
BACTERIA SPEC AEROBE CULT: ABNORMAL
BASOPHILS # BLD MANUAL: 0 10*3/MM3 (ref 0–0.2)
BASOPHILS NFR BLD AUTO: 0 % (ref 0–1.5)
BH CV ECHO MEAS - AO MAX PG (FULL): 6.2 MMHG
BH CV ECHO MEAS - AO MAX PG: 9.2 MMHG
BH CV ECHO MEAS - AO MEAN PG (FULL): 3.4 MMHG
BH CV ECHO MEAS - AO MEAN PG: 5.1 MMHG
BH CV ECHO MEAS - AO ROOT AREA (BSA CORRECTED): 1.6
BH CV ECHO MEAS - AO ROOT AREA: 8.7 CM^2
BH CV ECHO MEAS - AO ROOT DIAM: 3.3 CM
BH CV ECHO MEAS - AO V2 MAX: 152 CM/SEC
BH CV ECHO MEAS - AO V2 MEAN: 102.8 CM/SEC
BH CV ECHO MEAS - AO V2 VTI: 26.4 CM
BH CV ECHO MEAS - AVA(I,A): 2.1 CM^2
BH CV ECHO MEAS - AVA(I,D): 2.1 CM^2
BH CV ECHO MEAS - AVA(V,A): 1.9 CM^2
BH CV ECHO MEAS - AVA(V,D): 1.9 CM^2
BH CV ECHO MEAS - BSA(HAYCOCK): 2.3 M^2
BH CV ECHO MEAS - BSA: 2.1 M^2
BH CV ECHO MEAS - BZI_BMI: 38.8 KILOGRAMS/M^2
BH CV ECHO MEAS - BZI_METRIC_HEIGHT: 165.1 CM
BH CV ECHO MEAS - BZI_METRIC_WEIGHT: 105.7 KG
BH CV ECHO MEAS - EDV(CUBED): 136.5 ML
BH CV ECHO MEAS - EDV(MOD-SP2): 115 ML
BH CV ECHO MEAS - EDV(MOD-SP4): 85 ML
BH CV ECHO MEAS - EDV(TEICH): 126.6 ML
BH CV ECHO MEAS - EF(CUBED): 76.9 %
BH CV ECHO MEAS - EF(MOD-BP): 69 %
BH CV ECHO MEAS - EF(MOD-SP2): 75.7 %
BH CV ECHO MEAS - EF(MOD-SP4): 61.2 %
BH CV ECHO MEAS - EF(TEICH): 68.7 %
BH CV ECHO MEAS - ESV(CUBED): 31.5 ML
BH CV ECHO MEAS - ESV(MOD-SP2): 28 ML
BH CV ECHO MEAS - ESV(MOD-SP4): 33 ML
BH CV ECHO MEAS - ESV(TEICH): 39.7 ML
BH CV ECHO MEAS - FS: 38.7 %
BH CV ECHO MEAS - IVS/LVPW: 0.95
BH CV ECHO MEAS - IVSD: 0.99 CM
BH CV ECHO MEAS - LA DIMENSION: 4.1 CM
BH CV ECHO MEAS - LA/AO: 1.2
BH CV ECHO MEAS - LAD MAJOR: 5.1 CM
BH CV ECHO MEAS - LAT PEAK E' VEL: 14.9 CM/SEC
BH CV ECHO MEAS - LATERAL E/E' RATIO: 5.7
BH CV ECHO MEAS - LV DIASTOLIC VOL/BSA (35-75): 40.3 ML/M^2
BH CV ECHO MEAS - LV MASS(C)D: 196.8 GRAMS
BH CV ECHO MEAS - LV MASS(C)DI: 93.2 GRAMS/M^2
BH CV ECHO MEAS - LV MAX PG: 3.1 MMHG
BH CV ECHO MEAS - LV MEAN PG: 1.7 MMHG
BH CV ECHO MEAS - LV SYSTOLIC VOL/BSA (12-30): 15.6 ML/M^2
BH CV ECHO MEAS - LV V1 MAX: 87.9 CM/SEC
BH CV ECHO MEAS - LV V1 MEAN: 58.7 CM/SEC
BH CV ECHO MEAS - LV V1 VTI: 16.5 CM
BH CV ECHO MEAS - LVIDD: 5.1 CM
BH CV ECHO MEAS - LVIDS: 3.2 CM
BH CV ECHO MEAS - LVLD AP2: 7.8 CM
BH CV ECHO MEAS - LVLD AP4: 7.9 CM
BH CV ECHO MEAS - LVLS AP2: 5.5 CM
BH CV ECHO MEAS - LVLS AP4: 5.7 CM
BH CV ECHO MEAS - LVOT AREA (M): 3.5 CM^2
BH CV ECHO MEAS - LVOT AREA: 3.3 CM^2
BH CV ECHO MEAS - LVOT DIAM: 2.1 CM
BH CV ECHO MEAS - LVPWD: 1 CM
BH CV ECHO MEAS - MED PEAK E' VEL: 10.3 CM/SEC
BH CV ECHO MEAS - MEDIAL E/E' RATIO: 8.2
BH CV ECHO MEAS - MV A MAX VEL: 74.9 CM/SEC
BH CV ECHO MEAS - MV DEC TIME: 0.15 SEC
BH CV ECHO MEAS - MV E MAX VEL: 85.7 CM/SEC
BH CV ECHO MEAS - MV E/A: 1.1
BH CV ECHO MEAS - MV MAX PG: 2.9 MMHG
BH CV ECHO MEAS - MV MEAN PG: 1.2 MMHG
BH CV ECHO MEAS - MV V2 MAX: 84.9 CM/SEC
BH CV ECHO MEAS - MV V2 MEAN: 49.3 CM/SEC
BH CV ECHO MEAS - MV V2 VTI: 21 CM
BH CV ECHO MEAS - MVA(VTI): 2.6 CM^2
BH CV ECHO MEAS - PA ACC SLOPE: 580.3 CM/SEC^2
BH CV ECHO MEAS - PA ACC TIME: 0.15 SEC
BH CV ECHO MEAS - PA MAX PG: 4.6 MMHG
BH CV ECHO MEAS - PA PR(ACCEL): 10.9 MMHG
BH CV ECHO MEAS - PA V2 MAX: 107.4 CM/SEC
BH CV ECHO MEAS - RVSP: 31 MMHG
BH CV ECHO MEAS - SI(AO): 108.4 ML/M^2
BH CV ECHO MEAS - SI(CUBED): 49.8 ML/M^2
BH CV ECHO MEAS - SI(LVOT): 26 ML/M^2
BH CV ECHO MEAS - SI(MOD-SP2): 41.2 ML/M^2
BH CV ECHO MEAS - SI(MOD-SP4): 24.6 ML/M^2
BH CV ECHO MEAS - SI(TEICH): 41.2 ML/M^2
BH CV ECHO MEAS - SV(AO): 228.9 ML
BH CV ECHO MEAS - SV(CUBED): 105 ML
BH CV ECHO MEAS - SV(LVOT): 54.8 ML
BH CV ECHO MEAS - SV(MOD-SP2): 87 ML
BH CV ECHO MEAS - SV(MOD-SP4): 52 ML
BH CV ECHO MEAS - SV(TEICH): 86.9 ML
BH CV ECHO MEAS - TAPSE (>1.6): 2.4 CM2
BH CV ECHO MEAS - TR MAX PG: 28 MMHG
BH CV ECHO MEAS - TR MAX VEL: 256.3 CM/SEC
BH CV ECHO MEASUREMENTS AVERAGE E/E' RATIO: 6.8
BH CV VAS BP RIGHT ARM: NORMAL MMHG
BH CV XLRA - RV BASE: 4.1 CM
BH CV XLRA - RV LENGTH: 5.5 CM
BH CV XLRA - RV MID: 3 CM
BH CV XLRA - TDI S': 17.2 CM/SEC
BILIRUB SERPL-MCNC: 0.7 MG/DL (ref 0.2–1.2)
BOTTLE TYPE: ABNORMAL
BUN BLD-MCNC: 12 MG/DL (ref 6–20)
BUN/CREAT SERPL: 19 (ref 7–25)
CALCIUM SPEC-SCNC: 7.8 MG/DL (ref 8.6–10.5)
CHLORIDE SERPL-SCNC: 99 MMOL/L (ref 98–107)
CK SERPL-CCNC: 48 U/L (ref 20–180)
CO2 SERPL-SCNC: 23 MMOL/L (ref 22–29)
CREAT BLD-MCNC: 0.63 MG/DL (ref 0.57–1)
DEPRECATED RDW RBC AUTO: 43.2 FL (ref 37–54)
EOSINOPHIL # BLD MANUAL: 0 10*3/MM3 (ref 0–0.4)
EOSINOPHIL NFR BLD MANUAL: 0 % (ref 0.3–6.2)
ERYTHROCYTE [DISTWIDTH] IN BLOOD BY AUTOMATED COUNT: 16 % (ref 12.3–15.4)
FERRITIN SERPL-MCNC: 264.4 NG/ML (ref 13–150)
GFR SERPL CREATININE-BSD FRML MDRD: 106 ML/MIN/1.73
GLOBULIN UR ELPH-MCNC: 3.3 GM/DL
GLUCOSE BLD-MCNC: 143 MG/DL (ref 65–99)
HCT VFR BLD AUTO: 22.9 % (ref 34–46.6)
HCT VFR BLD AUTO: 24.9 % (ref 34–46.6)
HGB BLD-MCNC: 6.8 G/DL (ref 12–15.9)
HGB BLD-MCNC: 7.3 G/DL (ref 12–15.9)
INR PPP: 1.23 (ref 0.85–1.16)
IRON 24H UR-MRATE: 15 MCG/DL (ref 37–145)
IRON SATN MFR SERPL: 7 % (ref 20–50)
LEFT ATRIUM VOLUME INDEX: 31.7 ML/M^2
LEFT ATRIUM VOLUME: 67 ML
LYMPHOCYTES # BLD MANUAL: 1.51 10*3/MM3 (ref 0.7–3.1)
LYMPHOCYTES NFR BLD MANUAL: 14 % (ref 5–12)
LYMPHOCYTES NFR BLD MANUAL: 15 % (ref 19.6–45.3)
MAXIMAL PREDICTED HEART RATE: 182 BPM
MCH RBC QN AUTO: 22.1 PG (ref 26.6–33)
MCHC RBC AUTO-ENTMCNC: 29.7 G/DL (ref 31.5–35.7)
MCV RBC AUTO: 74.6 FL (ref 79–97)
MONOCYTES # BLD AUTO: 1.41 10*3/MM3 (ref 0.1–0.9)
NEUTROPHILS # BLD AUTO: 7.16 10*3/MM3 (ref 1.7–7)
NEUTROPHILS NFR BLD MANUAL: 71 % (ref 42.7–76)
PLAT MORPH BLD: NORMAL
PLATELET # BLD AUTO: 130 10*3/MM3 (ref 140–450)
PMV BLD AUTO: 10.5 FL (ref 6–12)
POTASSIUM BLD-SCNC: 3.8 MMOL/L (ref 3.5–5.2)
PROT SERPL-MCNC: 5.3 G/DL (ref 6–8.5)
PROTHROMBIN TIME: 15.2 SECONDS (ref 11.5–14)
RBC # BLD AUTO: 3.07 10*6/MM3 (ref 3.77–5.28)
RBC MORPH BLD: NORMAL
SCAN SLIDE: NORMAL
SODIUM BLD-SCNC: 135 MMOL/L (ref 136–145)
STRESS TARGET HR: 155 BPM
TIBC SERPL-MCNC: 224 MCG/DL (ref 298–536)
TRANSFERRIN SERPL-MCNC: 150 MG/DL (ref 200–360)
WBC MORPH BLD: NORMAL
WBC NRBC COR # BLD: 10.08 10*3/MM3 (ref 3.4–10.8)

## 2020-06-02 PROCEDURE — 25010000002 ONDANSETRON PER 1 MG: Performed by: HOSPITALIST

## 2020-06-02 PROCEDURE — 25010000002 DAPTOMYCIN PER 1 MG: Performed by: INTERNAL MEDICINE

## 2020-06-02 PROCEDURE — 94799 UNLISTED PULMONARY SVC/PX: CPT

## 2020-06-02 PROCEDURE — 99233 SBSQ HOSP IP/OBS HIGH 50: CPT | Performed by: INTERNAL MEDICINE

## 2020-06-02 PROCEDURE — 86901 BLOOD TYPING SEROLOGIC RH(D): CPT

## 2020-06-02 PROCEDURE — 80053 COMPREHEN METABOLIC PANEL: CPT | Performed by: HOSPITALIST

## 2020-06-02 PROCEDURE — 85014 HEMATOCRIT: CPT | Performed by: INTERNAL MEDICINE

## 2020-06-02 PROCEDURE — 82728 ASSAY OF FERRITIN: CPT | Performed by: INTERNAL MEDICINE

## 2020-06-02 PROCEDURE — 84466 ASSAY OF TRANSFERRIN: CPT | Performed by: INTERNAL MEDICINE

## 2020-06-02 PROCEDURE — 25010000002 HEPARIN (PORCINE) PER 1000 UNITS: Performed by: HOSPITALIST

## 2020-06-02 PROCEDURE — 93306 TTE W/DOPPLER COMPLETE: CPT

## 2020-06-02 PROCEDURE — 25010000002 CEFTAROLINE FOSAMIL PER 10 MG: Performed by: INTERNAL MEDICINE

## 2020-06-02 PROCEDURE — 85610 PROTHROMBIN TIME: CPT | Performed by: HOSPITALIST

## 2020-06-02 PROCEDURE — 85007 BL SMEAR W/DIFF WBC COUNT: CPT | Performed by: HOSPITALIST

## 2020-06-02 PROCEDURE — 0 GADOBENATE DIMEGLUMINE 529 MG/ML SOLUTION: Performed by: INTERNAL MEDICINE

## 2020-06-02 PROCEDURE — 25010000002 VANCOMYCIN 10 G RECONSTITUTED SOLUTION: Performed by: HOSPITALIST

## 2020-06-02 PROCEDURE — 93306 TTE W/DOPPLER COMPLETE: CPT | Performed by: INTERNAL MEDICINE

## 2020-06-02 PROCEDURE — 85018 HEMOGLOBIN: CPT | Performed by: INTERNAL MEDICINE

## 2020-06-02 PROCEDURE — 85025 COMPLETE CBC W/AUTO DIFF WBC: CPT | Performed by: HOSPITALIST

## 2020-06-02 PROCEDURE — A9577 INJ MULTIHANCE: HCPCS | Performed by: INTERNAL MEDICINE

## 2020-06-02 PROCEDURE — 72158 MRI LUMBAR SPINE W/O & W/DYE: CPT

## 2020-06-02 PROCEDURE — 82550 ASSAY OF CK (CPK): CPT | Performed by: INTERNAL MEDICINE

## 2020-06-02 PROCEDURE — 83540 ASSAY OF IRON: CPT | Performed by: INTERNAL MEDICINE

## 2020-06-02 PROCEDURE — 86900 BLOOD TYPING SEROLOGIC ABO: CPT

## 2020-06-02 RX ADMIN — CEFTAROLINE FOSAMIL 600 MG: 600 POWDER, FOR SOLUTION INTRAVENOUS at 21:48

## 2020-06-02 RX ADMIN — HYDROCODONE BITARTRATE AND ACETAMINOPHEN 1 TABLET: 5; 325 TABLET ORAL at 15:37

## 2020-06-02 RX ADMIN — HEPARIN SODIUM 5000 UNITS: 5000 INJECTION, SOLUTION INTRAVENOUS; SUBCUTANEOUS at 15:37

## 2020-06-02 RX ADMIN — SODIUM CHLORIDE 125 ML/HR: 9 INJECTION, SOLUTION INTRAVENOUS at 03:05

## 2020-06-02 RX ADMIN — DAPTOMYCIN 600 MG: 500 INJECTION, POWDER, LYOPHILIZED, FOR SOLUTION INTRAVENOUS at 12:46

## 2020-06-02 RX ADMIN — TIZANIDINE 4 MG: 4 TABLET ORAL at 09:31

## 2020-06-02 RX ADMIN — HYDROCODONE BITARTRATE AND ACETAMINOPHEN 1 TABLET: 5; 325 TABLET ORAL at 10:17

## 2020-06-02 RX ADMIN — VANCOMYCIN HYDROCHLORIDE 1500 MG: 10 INJECTION, POWDER, LYOPHILIZED, FOR SOLUTION INTRAVENOUS at 00:01

## 2020-06-02 RX ADMIN — HYDROCODONE BITARTRATE AND ACETAMINOPHEN 1 TABLET: 5; 325 TABLET ORAL at 04:21

## 2020-06-02 RX ADMIN — SODIUM CHLORIDE 125 ML/HR: 9 INJECTION, SOLUTION INTRAVENOUS at 18:43

## 2020-06-02 RX ADMIN — BUDESONIDE AND FORMOTEROL FUMARATE DIHYDRATE 2 PUFF: 160; 4.5 AEROSOL RESPIRATORY (INHALATION) at 19:30

## 2020-06-02 RX ADMIN — LEVOTHYROXINE SODIUM 50 MCG: 50 TABLET ORAL at 09:20

## 2020-06-02 RX ADMIN — GADOBENATE DIMEGLUMINE 20 ML: 529 INJECTION, SOLUTION INTRAVENOUS at 22:45

## 2020-06-02 RX ADMIN — GABAPENTIN 800 MG: 400 CAPSULE ORAL at 21:48

## 2020-06-02 RX ADMIN — SODIUM CHLORIDE 125 ML/HR: 9 INJECTION, SOLUTION INTRAVENOUS at 10:20

## 2020-06-02 RX ADMIN — AZTREONAM 2 G: 2 INJECTION, POWDER, LYOPHILIZED, FOR SOLUTION INTRAMUSCULAR; INTRAVENOUS at 05:49

## 2020-06-02 RX ADMIN — TIZANIDINE 4 MG: 4 TABLET ORAL at 16:59

## 2020-06-02 RX ADMIN — ACETAMINOPHEN 650 MG: 325 TABLET, FILM COATED ORAL at 16:57

## 2020-06-02 RX ADMIN — HEPARIN SODIUM 5000 UNITS: 5000 INJECTION, SOLUTION INTRAVENOUS; SUBCUTANEOUS at 21:48

## 2020-06-02 RX ADMIN — PANTOPRAZOLE SODIUM 40 MG: 40 TABLET, DELAYED RELEASE ORAL at 05:50

## 2020-06-02 RX ADMIN — HEPARIN SODIUM 5000 UNITS: 5000 INJECTION, SOLUTION INTRAVENOUS; SUBCUTANEOUS at 05:49

## 2020-06-02 RX ADMIN — HYDROCODONE BITARTRATE AND ACETAMINOPHEN 1 TABLET: 5; 325 TABLET ORAL at 21:48

## 2020-06-02 RX ADMIN — GABAPENTIN 800 MG: 400 CAPSULE ORAL at 05:49

## 2020-06-02 RX ADMIN — TIZANIDINE 4 MG: 4 TABLET ORAL at 00:01

## 2020-06-02 RX ADMIN — GABAPENTIN 800 MG: 400 CAPSULE ORAL at 15:37

## 2020-06-02 RX ADMIN — CEFTAROLINE FOSAMIL 600 MG: 600 POWDER, FOR SOLUTION INTRAVENOUS at 09:26

## 2020-06-02 RX ADMIN — ONDANSETRON 4 MG: 2 INJECTION INTRAMUSCULAR; INTRAVENOUS at 09:26

## 2020-06-02 RX ADMIN — BUDESONIDE AND FORMOTEROL FUMARATE DIHYDRATE 2 PUFF: 160; 4.5 AEROSOL RESPIRATORY (INHALATION) at 08:42

## 2020-06-02 RX ADMIN — FLUOXETINE HYDROCHLORIDE 40 MG: 20 CAPSULE ORAL at 09:20

## 2020-06-02 NOTE — PROGRESS NOTES
Continued Stay Note  [facility]     Patient Name: Liliana Calderon  MRN: [id number]  Today's Date: 6/2/2020    Admit Date: 6/1/2020    Discharge Plan     Row Name 06/02/20 1407       Plan    Plan Comments  CM spoke with Cpt May from the [facility] who gave CM permission to remove pts ankle monitor so she can have the MRI. This was witnessed by the RN. Pt is to keep the ankle monitor with her and keep it charged. Upon discharge she needs to report to his office on day of discharge to have it placed back on. Cm has discussed this with pt who reported understanding and Cpt May reported he would also call her to remind her.         Discharge Codes    No documentation.             Rukhsana Levin RN

## 2020-06-02 NOTE — PLAN OF CARE
VSS on RA overnight.  Pt placed on 1L O2 via NC during sleep (O2 sats dropped to 83-84%).  PRN meds given for pt c/o low back pain.  IVF and IV abx continued.  Pt with oral temp of 102.7, provider notified.  PO tylenol given, cold cloth/ice placed on pt.  Pt afebrile for remainder of shift.  Pt with incontinence episodes this shift.  MRI ordered but incomplete (pt has ankle monitor placed by law enforcement).  No complaints or acute distress to report at this time.  WCTM.

## 2020-06-02 NOTE — PROGRESS NOTES
[facility] Medicine Services  PROGRESS NOTE    Patient Name: Liliana Calderon  : 1980  MRN: [id number]    Date of Admission: 2020  Primary Care Physician: Ollie Flores MD    Subjective   Subjective     CC: Follow-up sepsis    HPI: Patient febrile overnight with T-max of 102.7, she does state she had a rough night continues to endorse back pain bilateral leg pain abdominal pain, does endorse some nausea but no vomiting.    Review of Systems  Gen- No fevers, chills  CV- No chest pain, palpitations  Resp- No cough, dyspnea  GI- No V/D, +abd pain,+ nausea    All systems reviewed and are currently negative except as mentioned in HPI above  Objective   Objective     Vital Signs:   Temp:  [98.3 °F (36.8 °C)-102.7 °F (39.3 °C)] 99.3 °F (37.4 °C)  Heart Rate:  [] 84  Resp:  [16-19] 19  BP: (100-137)/(57-77) 116/68        Physical Exam:  Constitutional: No acute distress, awake, alert and comfortable  HENT: NCAT, mucous membranes moist  Respiratory: Moderately labored respirations, diffuse coarse breath sounds exam difficult due to body habitus currently on 2 L NC   Cardiovascular: RRR, no murmurs, rubs, or gallops, palpable pedal pulses bilaterally  Gastrointestinal: Obese positive bowel sounds, soft, nontender, nondistended  Musculoskeletal: No bilateral ankle edema, right ankle monitor in place  Psychiatric: Appropriate affect, cooperative  Neurologic: Oriented x 3, no focal deficits  Skin: No rashes    Results Reviewed:  Results from last 7 days   Lab Units 20  0751 20  03520  1751   WBC 10*3/mm3  --  10.08 14.20*   HEMOGLOBIN g/dL 7.3* 6.8* 8.8*   HEMATOCRIT % 24.9* 22.9* 28.5*   PLATELETS 10*3/mm3  --  130* 198   INR   --  1.23*  --    PROCALCITONIN ng/mL  --   --  0.59*     Results from last 7 days   Lab Units 20  0352 20  1751   SODIUM mmol/L 135* 133*   POTASSIUM mmol/L 3.8 3.6   CHLORIDE mmol/L 99 94*   CO2 mmol/L 23.0 19.6*   BUN  mg/dL 12 20   CREATININE mg/dL 0.63 0.79   GLUCOSE mg/dL 143* 145*   CALCIUM mg/dL 7.8* 8.8   ALT (SGPT) U/L 11 16   AST (SGOT) U/L 14 26   TROPONIN T ng/mL  --  <0.010   PROBNP pg/mL  --  1,230.0*     Estimated Creatinine Clearance: 146.4 mL/min (by C-G formula based on SCr of 0.63 mg/dL).    Microbiology Results Abnormal     Procedure Component Value - Date/Time    Blood Culture - Blood, Wrist, Left [[id number]]  (Abnormal) Collected:  06/01/20 1601    Lab Status:  Preliminary result Specimen:  Blood from Wrist, Left Updated:  06/02/20 0721     Blood Culture Abnormal Stain     Gram Stain Aerobic Bottle Gram positive cocci in clusters    Blood Culture ID, PCR - Blood, Arm, Left [[id number]]  (Abnormal) Collected:  06/01/20 1558    Lab Status:  Final result Specimen:  Blood from Arm, Left Updated:  06/02/20 0721     BCID, PCR Staphylococcus aureus. mecA (methicillin resistance gene) detected. Identification by BCID PCR.     BOTTLE TYPE Aerobic Bottle    Blood Culture - Blood, Arm, Left [[id number]]  (Abnormal) Collected:  06/01/20 1558    Lab Status:  Preliminary result Specimen:  Blood from Arm, Left Updated:  06/02/20 0551     Blood Culture Abnormal Stain     Gram Stain Aerobic Bottle Gram positive cocci in clusters      Anaerobic Bottle Gram positive cocci in clusters          Imaging Results (Last 24 Hours)     ** No results found for the last 24 hours. **               I have reviewed the medications:  Scheduled Meds:    budesonide-formoterol 2 puff Inhalation BID - RT   ceftaroline 600 mg Intravenous BID   DAPTOmycin 8 mg/kg (Adjusted) Intravenous Q24H   FLUoxetine 40 mg Oral Daily   gabapentin 800 mg Oral Q8H   heparin (porcine) 5,000 Units Subcutaneous Q8H   levothyroxine 50 mcg Oral Daily   pantoprazole 40 mg Oral QAM     Continuous Infusions:    sodium chloride 125 mL/hr Last Rate: 125 mL/hr (06/02/20 0305)     PRN Meds:.•  acetaminophen  •  HYDROcodone-acetaminophen  •  ipratropium-albuterol  •   ondansetron  •  tiZANidine    Assessment/Plan   Assessment & Plan     Active Hospital Problems    Diagnosis  POA   • **Sepsis (CMS/HCC) [A41.9]  Yes   • DDD (degenerative disc disease), cervical [M50.30]  Unknown   • Anxiety [F41.9]  Unknown   • Depression [F32.9]  Unknown   • Seizure (CMS/HCC) [R56.9]  Unknown   • Migraine [G43.909]  Unknown   • Essential hypertension [I10]  Yes   • Fibromyalgia syndrome [M79.7]  Yes   • GERD without esophagitis [K21.9]  Yes      Resolved Hospital Problems   No resolved problems to display.        Brief Hospital Course to date:  Liliana Calderon is a 39 y.o. female  with past medical history significant for depression/anxiety, fibromyalgia, COPD, hypertension, seizure disorder, migraine headache, and remote tobacco/IV drug abuse.  Patient reported that she stopped smoking in 2014.  She also reported that she stopped using IV drugs, mainly heroin, about a month ago.  Patient was reportedly well up until 3 days prior to presentation.  She reported fever, shortness of air, lower back/leg pain, as well as generalized malaise.  She presented to the ED at outside hospital and was found to have MRSA bacteremia with associated left-sided pneumonia, as well as cavitary lesion in the right upper lobe concerning for possible septic emboli.    Plan  Sepsis with MRSA bacteremia, MRSA UTI and pneumonia, also with multiple right-sided pulmonary nodules with cavitation suspicious for septic emboli  -Patient has underlying history of IV drug use  -Repeat blood cultures with MRSA, ID has been consulted to assist we will get repeat blood cultures tomorrow  -Antibiotics changed to daptomycin and Teflaro  -Follow-up MRI lumbar spine to r/o epidural abscess and echo to rule out endocarditis, she will likely need REDD  -COVID-19 test is negative    Microcytic anemia  -H&H has dropped overnight, from 8.8-6.8,  -We will type and screen, repeat stat H&H shows Hg 7.3, send for iron studies  -Trend H&H, hold  on transfusion at this time    Acute on chronic low back pain  -Patient has history of DDD, however currently etiology remains unclear  -Follow-up MRI lumbar spine as above  -PT/OT to evaluate    Elevated bilirubin and alk phos  -Etiology currently unknown, continue to monitor  -Follow-up acute hepatitis panel and HIV    Hypothyroidism-continue home Synthroid    Hypertension  -BP currently low hold home antihypertensives of lisinopril and HCTZ    Fibromyalgia-stable, resume home gabapentin    Depression and anxiety-mood currently stable, continue Prozac    History of IVDU   -Last used heroin a month ago  -Chemical dependency consult in place    Seizure disorder (data deficient)  -Continue home Keppra and maintain seizure precautions    COPD--exacerbation, continue home Symbicort    Right ankle monitor  -Per sister patient was arrested and Jailed in January, where she was until April, she was released to house arrest with an ankle monitor sec to COVID. She does have am ongoing court case.    DVT Prophylaxis: [facility]    Daily Care Communication  Due to current limited visitation policies, an attempt will be made daily to update patient's identified best point-of-contact(s)   Contact: Chante Calderon   Relation: Sister   Type of communication (phone or televideo): [phone]   Time of communication: 10:26am   Notes (if applicable):update and all questions answered.     Disposition: TBD    CODE STATUS:   Code Status and Medical Interventions:   Ordered at: 06/01/20 1441     Code Status:    CPR     Medical Interventions (Level of Support Prior to Arrest):    Full     Electronically signed by Oskar Black MD, 06/02/20, 10:29 AM.

## 2020-06-03 ENCOUNTER — TELEPHONE (OUTPATIENT)
Dept: INFECTIOUS DISEASES | Facility: HOSPITAL | Age: 40
End: 2020-06-03

## 2020-06-03 LAB
ABO GROUP BLD: NORMAL
ABO GROUP BLD: NORMAL
ALBUMIN SERPL-MCNC: 1.9 G/DL (ref 3.5–5.2)
ALBUMIN/GLOB SERPL: 0.5 G/DL
ALP SERPL-CCNC: 225 U/L (ref 39–117)
ALT SERPL W P-5'-P-CCNC: 15 U/L (ref 1–33)
ANION GAP SERPL CALCULATED.3IONS-SCNC: 11 MMOL/L (ref 5–15)
AST SERPL-CCNC: 31 U/L (ref 1–32)
BACTERIA SPEC AEROBE CULT: ABNORMAL
BACTERIA SPEC AEROBE CULT: ABNORMAL
BASOPHILS # BLD AUTO: 0.01 10*3/MM3 (ref 0–0.2)
BASOPHILS NFR BLD AUTO: 0.1 % (ref 0–1.5)
BILIRUB SERPL-MCNC: 0.6 MG/DL (ref 0.2–1.2)
BLD GP AB SCN SERPL QL: NEGATIVE
BUN BLD-MCNC: 9 MG/DL (ref 6–20)
BUN/CREAT SERPL: 13.8 (ref 7–25)
CALCIUM SPEC-SCNC: 7.6 MG/DL (ref 8.6–10.5)
CHLORIDE SERPL-SCNC: 100 MMOL/L (ref 98–107)
CO2 SERPL-SCNC: 23 MMOL/L (ref 22–29)
CREAT BLD-MCNC: 0.65 MG/DL (ref 0.57–1)
DEPRECATED RDW RBC AUTO: 45.2 FL (ref 37–54)
EOSINOPHIL # BLD AUTO: 0.02 10*3/MM3 (ref 0–0.4)
EOSINOPHIL NFR BLD AUTO: 0.2 % (ref 0.3–6.2)
ERYTHROCYTE [DISTWIDTH] IN BLOOD BY AUTOMATED COUNT: 16.5 % (ref 12.3–15.4)
GFR SERPL CREATININE-BSD FRML MDRD: 102 ML/MIN/1.73
GLOBULIN UR ELPH-MCNC: 3.6 GM/DL
GLUCOSE BLD-MCNC: 167 MG/DL (ref 65–99)
GRAM STN SPEC: ABNORMAL
HCT VFR BLD AUTO: 22.3 % (ref 34–46.6)
HCT VFR BLD AUTO: 22.9 % (ref 34–46.6)
HCT VFR BLD AUTO: 24.1 % (ref 34–46.6)
HGB BLD-MCNC: 6.4 G/DL (ref 12–15.9)
HGB BLD-MCNC: 6.8 G/DL (ref 12–15.9)
HGB BLD-MCNC: 7.2 G/DL (ref 12–15.9)
IMM GRANULOCYTES # BLD AUTO: 0.32 10*3/MM3 (ref 0–0.05)
IMM GRANULOCYTES NFR BLD AUTO: 3.2 % (ref 0–0.5)
ISOLATED FROM: ABNORMAL
ISOLATED FROM: ABNORMAL
LYMPHOCYTES # BLD AUTO: 1.01 10*3/MM3 (ref 0.7–3.1)
LYMPHOCYTES NFR BLD AUTO: 10.1 % (ref 19.6–45.3)
MCH RBC QN AUTO: 21.6 PG (ref 26.6–33)
MCHC RBC AUTO-ENTMCNC: 28.7 G/DL (ref 31.5–35.7)
MCV RBC AUTO: 75.3 FL (ref 79–97)
MONOCYTES # BLD AUTO: 0.76 10*3/MM3 (ref 0.1–0.9)
MONOCYTES NFR BLD AUTO: 7.6 % (ref 5–12)
NEUTROPHILS # BLD AUTO: 7.88 10*3/MM3 (ref 1.7–7)
NEUTROPHILS NFR BLD AUTO: 78.8 % (ref 42.7–76)
NRBC BLD AUTO-RTO: 0 /100 WBC (ref 0–0.2)
PLATELET # BLD AUTO: 110 10*3/MM3 (ref 140–450)
PMV BLD AUTO: 10.2 FL (ref 6–12)
POTASSIUM BLD-SCNC: 3.8 MMOL/L (ref 3.5–5.2)
PROT SERPL-MCNC: 5.5 G/DL (ref 6–8.5)
RBC # BLD AUTO: 2.96 10*6/MM3 (ref 3.77–5.28)
RH BLD: POSITIVE
RH BLD: POSITIVE
SODIUM BLD-SCNC: 134 MMOL/L (ref 136–145)
T&S EXPIRATION DATE: NORMAL
WBC NRBC COR # BLD: 10 10*3/MM3 (ref 3.4–10.8)

## 2020-06-03 PROCEDURE — 85014 HEMATOCRIT: CPT | Performed by: INTERNAL MEDICINE

## 2020-06-03 PROCEDURE — 94799 UNLISTED PULMONARY SVC/PX: CPT

## 2020-06-03 PROCEDURE — 99221 1ST HOSP IP/OBS SF/LOW 40: CPT | Performed by: NEUROLOGICAL SURGERY

## 2020-06-03 PROCEDURE — 36430 TRANSFUSION BLD/BLD COMPNT: CPT

## 2020-06-03 PROCEDURE — 99233 SBSQ HOSP IP/OBS HIGH 50: CPT | Performed by: INTERNAL MEDICINE

## 2020-06-03 PROCEDURE — 80053 COMPREHEN METABOLIC PANEL: CPT | Performed by: INTERNAL MEDICINE

## 2020-06-03 PROCEDURE — 25010000002 HEPARIN (PORCINE) PER 1000 UNITS: Performed by: HOSPITALIST

## 2020-06-03 PROCEDURE — 25010000002 DAPTOMYCIN PER 1 MG: Performed by: INTERNAL MEDICINE

## 2020-06-03 PROCEDURE — 86901 BLOOD TYPING SEROLOGIC RH(D): CPT | Performed by: NURSE PRACTITIONER

## 2020-06-03 PROCEDURE — 86900 BLOOD TYPING SEROLOGIC ABO: CPT

## 2020-06-03 PROCEDURE — 86900 BLOOD TYPING SEROLOGIC ABO: CPT | Performed by: NURSE PRACTITIONER

## 2020-06-03 PROCEDURE — 85025 COMPLETE CBC W/AUTO DIFF WBC: CPT | Performed by: INTERNAL MEDICINE

## 2020-06-03 PROCEDURE — 25010000002 ONDANSETRON PER 1 MG: Performed by: HOSPITALIST

## 2020-06-03 PROCEDURE — 86923 COMPATIBILITY TEST ELECTRIC: CPT

## 2020-06-03 PROCEDURE — 87040 BLOOD CULTURE FOR BACTERIA: CPT | Performed by: INTERNAL MEDICINE

## 2020-06-03 PROCEDURE — 86850 RBC ANTIBODY SCREEN: CPT | Performed by: NURSE PRACTITIONER

## 2020-06-03 PROCEDURE — P9016 RBC LEUKOCYTES REDUCED: HCPCS

## 2020-06-03 PROCEDURE — 85018 HEMOGLOBIN: CPT | Performed by: INTERNAL MEDICINE

## 2020-06-03 PROCEDURE — 25010000002 CEFTAROLINE FOSAMIL PER 10 MG: Performed by: INTERNAL MEDICINE

## 2020-06-03 RX ORDER — LEVETIRACETAM 500 MG/1
500 TABLET ORAL EVERY 12 HOURS SCHEDULED
Status: DISCONTINUED | OUTPATIENT
Start: 2020-06-03 | End: 2020-06-11

## 2020-06-03 RX ADMIN — CEFTAROLINE FOSAMIL 600 MG: 600 POWDER, FOR SOLUTION INTRAVENOUS at 08:23

## 2020-06-03 RX ADMIN — ACETAMINOPHEN 650 MG: 325 TABLET, FILM COATED ORAL at 16:09

## 2020-06-03 RX ADMIN — HYDROCODONE BITARTRATE AND ACETAMINOPHEN 1 TABLET: 5; 325 TABLET ORAL at 03:10

## 2020-06-03 RX ADMIN — GABAPENTIN 800 MG: 400 CAPSULE ORAL at 06:26

## 2020-06-03 RX ADMIN — LEVOTHYROXINE SODIUM 50 MCG: 50 TABLET ORAL at 08:23

## 2020-06-03 RX ADMIN — GABAPENTIN 800 MG: 400 CAPSULE ORAL at 13:49

## 2020-06-03 RX ADMIN — SODIUM CHLORIDE 125 ML/HR: 9 INJECTION, SOLUTION INTRAVENOUS at 17:57

## 2020-06-03 RX ADMIN — CEFTAROLINE FOSAMIL 600 MG: 600 POWDER, FOR SOLUTION INTRAVENOUS at 22:57

## 2020-06-03 RX ADMIN — BUDESONIDE AND FORMOTEROL FUMARATE DIHYDRATE 2 PUFF: 160; 4.5 AEROSOL RESPIRATORY (INHALATION) at 06:44

## 2020-06-03 RX ADMIN — SODIUM CHLORIDE 1000 ML: 9 INJECTION, SOLUTION INTRAVENOUS at 08:24

## 2020-06-03 RX ADMIN — ACETAMINOPHEN 650 MG: 325 TABLET, FILM COATED ORAL at 06:54

## 2020-06-03 RX ADMIN — LEVETIRACETAM 500 MG: 500 TABLET ORAL at 23:00

## 2020-06-03 RX ADMIN — BUDESONIDE AND FORMOTEROL FUMARATE DIHYDRATE 2 PUFF: 160; 4.5 AEROSOL RESPIRATORY (INHALATION) at 20:54

## 2020-06-03 RX ADMIN — HYDROCODONE BITARTRATE AND ACETAMINOPHEN 1 TABLET: 5; 325 TABLET ORAL at 23:00

## 2020-06-03 RX ADMIN — HEPARIN SODIUM 5000 UNITS: 5000 INJECTION, SOLUTION INTRAVENOUS; SUBCUTANEOUS at 06:26

## 2020-06-03 RX ADMIN — TIZANIDINE 4 MG: 4 TABLET ORAL at 03:10

## 2020-06-03 RX ADMIN — ONDANSETRON 4 MG: 2 INJECTION INTRAMUSCULAR; INTRAVENOUS at 23:04

## 2020-06-03 RX ADMIN — DAPTOMYCIN 600 MG: 500 INJECTION, POWDER, LYOPHILIZED, FOR SOLUTION INTRAVENOUS at 08:24

## 2020-06-03 RX ADMIN — PANTOPRAZOLE SODIUM 40 MG: 40 TABLET, DELAYED RELEASE ORAL at 06:26

## 2020-06-03 RX ADMIN — HYDROCODONE BITARTRATE AND ACETAMINOPHEN 1 TABLET: 5; 325 TABLET ORAL at 16:09

## 2020-06-03 RX ADMIN — FLUOXETINE HYDROCHLORIDE 40 MG: 20 CAPSULE ORAL at 08:23

## 2020-06-03 RX ADMIN — TIZANIDINE 4 MG: 4 TABLET ORAL at 19:32

## 2020-06-03 RX ADMIN — GABAPENTIN 800 MG: 400 CAPSULE ORAL at 23:00

## 2020-06-03 RX ADMIN — HEPARIN SODIUM 5000 UNITS: 5000 INJECTION, SOLUTION INTRAVENOUS; SUBCUTANEOUS at 13:49

## 2020-06-03 RX ADMIN — IPRATROPIUM BROMIDE AND ALBUTEROL SULFATE 3 ML: 2.5; .5 SOLUTION RESPIRATORY (INHALATION) at 06:43

## 2020-06-03 RX ADMIN — HEPARIN SODIUM 5000 UNITS: 5000 INJECTION, SOLUTION INTRAVENOUS; SUBCUTANEOUS at 22:59

## 2020-06-03 RX ADMIN — LEVETIRACETAM 500 MG: 500 TABLET ORAL at 11:17

## 2020-06-03 RX ADMIN — HYDROCODONE BITARTRATE AND ACETAMINOPHEN 1 TABLET: 5; 325 TABLET ORAL at 08:37

## 2020-06-03 NOTE — PROGRESS NOTES
"Continued Stay Note  [facility]     Patient Name: Liliana Calderon  MRN: [id number]  Today's Date: 6/3/2020    Admit Date: 6/1/2020    Discharge Plan     Row Name 06/03/20 5820       Plan    Plan  Ongoing    Plan Comments  Rounded on patient today- she appears acutely ill.  She denies IVDU for the past \"several months\" however, most reports in the chart state that she has not used in approx 1 month.  Multiple potential health issues at this time.  She also states that she does not feel she needs treatment and/or resources for SHANNAN.  I feel this may change over the course of her hospitalization, as I do not believe the patient comprehends the gravity of her current situation at this time.  However, should she require long-term IV ABX, she will not be a candidate for [facility] due to the fact that she has medicare- the [facility] facility does not take Medicare.  Will follow for support.    Row Name 06/03/20 9878       Plan    Plan  Ongoing    Plan Comments  Pt        Discharge Codes    No documentation.             Bhumika Mooney RN ,BSN   Addiction Coordinator     "

## 2020-06-03 NOTE — PROGRESS NOTES
Continued Stay Note  [facility]     Patient Name: Liliana Calderon  MRN: [id number]  Today's Date: 6/3/2020    Admit Date: 6/1/2020    Discharge Plan     Row Name 06/03/20 1644       Plan    Plan Ongoing    Plan Comments Rounded on patient at bedside. She stated that she has not used any illicit substances, mainly heroin, for the past two months. She does not believe that she needs any recovery services at this time. Will continue to follow and provide recovery support and services.        Discharge Codes    No documentation.             Lynda Cardozo   PSS, Chemical Dependency Team  Ext. [phone]

## 2020-06-03 NOTE — PROGRESS NOTES
Continued Stay Note  [facility]     Patient Name: Liliana Calderon  MRN: [id number]  Today's Date: 6/3/2020    Admit Date: 6/1/2020    Discharge Plan     Row Name 06/03/20 0843       Plan    Plan  Home    Patient/Family in Agreement with Plan  yes    Plan Comments  Spoke with pt. at bedside. Pt. states she does not feel well this am. States she feels weak. Pt. to recieve unit PRBC. Will cont. to moniyot and update.     Final Discharge Disposition Code  01 - home or self-care        Discharge Codes    No documentation.             Komal Schuster RN

## 2020-06-03 NOTE — PLAN OF CARE
Problem: Patient Care Overview  Goal: Plan of Care Review  Flowsheets (Taken 6/3/2020 0446)  Progress: no change  Plan of Care Reviewed With: patient  Outcome Summary: Pt resting in bed. VSS. 2L NC. Temperature remianed 99.0-99.9. C/o frequent low back pain, chronic in nature. MRI completed. IV abx continued. Plan of care reviewed with pt. Verbalized understanding. Will continue to monitor.     Problem: Fall Risk (Adult)  Goal: Identify Related Risk Factors and Signs and Symptoms  Flowsheets (Taken 6/3/2020 0446)  Related Risk Factors (Fall Risk): culprit medication(s); gait/mobility problems; fatigue/slow reaction  Signs and Symptoms (Fall Risk): presence of risk factors  Goal: Absence of Fall  Flowsheets (Taken 6/3/2020 0446)  Absence of Fall: making progress toward outcome     Problem: Pain, Chronic (Adult)  Goal: Identify Related Risk Factors and Signs and Symptoms  Flowsheets (Taken 6/3/2020 0446)  Related Risk Factors (Chronic Pain): disease process; pain control inadequate  Signs and Symptoms (Chronic Pain): verbalization of pain descriptors; verbalization of pain/discomfort for a prolonged time period; sleep pattern change; fatigue/weakness  Goal: Acceptable Pain/Comfort Level and Functional Ability  Flowsheets (Taken 6/3/2020 0446)  Acceptable Pain/Comfort Level and Functional Ability: making progress toward outcome     Problem: Sepsis/Septic Shock (Adult)  Goal: Signs and Symptoms of Listed Potential Problems Will be Absent, Minimized or Managed (Sepsis/Septic Shock)  Flowsheets (Taken 6/3/2020 0446)  Problems Assessed (Sepsis): all  Problems Present (Sepsis): infection progression; situational response
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
## 2020-06-03 NOTE — PROGRESS NOTES
[facility] Medicine Services  PROGRESS NOTE    Patient Name: Liliana Calderon  : 1980  MRN: [id number]    Date of Admission: 2020  Primary Care Physician: Ollie Flores MD    Subjective   Subjective     CC: Follow-up sepsis    HPI: No acute events overnight, patient does not feel very well does complain of severe lethargy, right shoulder pain, nausea and fever    Review of Systems  Gen- +fevers, chills  CV- No chest pain, palpitations  Resp- No cough, dyspnea  GI- No V/D, abd pain. + Nausea    All systems reviewed and currently negative except as mentioned in HPI above    Objective   Objective     Vital Signs:   Temp:  [97.2 °F (36.2 °C)-100.8 °F (38.2 °C)] 97.2 °F (36.2 °C)  Heart Rate:  [67-90] 68  Resp:  [16-20] 20  BP: ()/(57-75) 99/58        Physical Exam:  Constitutional: Ill-appearing lady, uncomfortable  HENT: NCAT, mucous membranes dry   Respiratory: Clear to auscultation bilaterally, respiratory effort normal   Cardiovascular: RRR, no murmurs, rubs, or gallops, palpable pedal pulses bilaterally  Gastrointestinal: Obese, positive bowel sounds, soft, nontender, nondistended  Musculoskeletal: No bilateral ankle edema  Psychiatric: Appropriate affect, cooperative  Neurologic: No focal deficit  Skin: No rashes    Results Reviewed:  Results from last 7 days   Lab Units 20  04020  07520  1751   WBC 10*3/mm3 10.00  --  10.08 14.20*   HEMOGLOBIN g/dL 6.4* 7.3* 6.8* 8.8*   HEMATOCRIT % 22.3* 24.9* 22.9* 28.5*   PLATELETS 10*3/mm3 110*  --  130* 198   INR   --   --  1.23*  --    PROCALCITONIN ng/mL  --   --   --  0.59*     Results from last 7 days   Lab Units 20  04020  03520  1751   SODIUM mmol/L 134* 135* 133*   POTASSIUM mmol/L 3.8 3.8 3.6   CHLORIDE mmol/L 100 99 94*   CO2 mmol/L 23.0 23.0 19.6*   BUN mg/dL 9 12 20   CREATININE mg/dL 0.65 0.63 0.79   GLUCOSE mg/dL 167* 143* 145*   CALCIUM mg/dL 7.6*  7.8* 8.8   ALT (SGPT) U/L 15 11 16   AST (SGOT) U/L 31 14 26   TROPONIN T ng/mL  --   --  <0.010   PROBNP pg/mL  --   --  1,230.0*     Estimated Creatinine Clearance: 141.9 mL/min (by C-G formula based on SCr of 0.65 mg/dL).    Microbiology Results Abnormal     Procedure Component Value - Date/Time    Blood Culture - Blood, Wrist, Left [[id number]]  (Abnormal) Collected:  06/01/20 1601    Lab Status:  Preliminary result Specimen:  Blood from Wrist, Left Updated:  06/03/20 0618     Blood Culture Staphylococcus aureus, MRSA     Comment:   Infectious disease consultation is highly recommended to rule out distant foci of infection.  Methicillin resistant Staphylococcus aureus, Patient may be an isolation risk.        Isolated from Aerobic Bottle     Gram Stain Aerobic Bottle Gram positive cocci in clusters    Narrative:       Aerobic bottle only      Blood Culture - Blood, Arm, Left [[id number]]  (Abnormal) Collected:  06/01/20 1558    Lab Status:  Preliminary result Specimen:  Blood from Arm, Left Updated:  06/03/20 0616     Blood Culture Staphylococcus aureus, MRSA     Comment:   Infectious disease consultation is highly recommended to rule out distant foci of infection.  Methicillin resistant Staphylococcus aureus, Patient may be an isolation risk.        Isolated from Aerobic and Anaerobic Bottles     Gram Stain Aerobic Bottle Gram positive cocci in clusters      Anaerobic Bottle Gram positive cocci in clusters    Blood Culture ID, PCR - Blood, Arm, Left [[id number]]  (Abnormal) Collected:  06/01/20 1558    Lab Status:  Final result Specimen:  Blood from Arm, Left Updated:  06/02/20 0721     BCID, PCR Staphylococcus aureus. mecA (methicillin resistance gene) detected. Identification by BCID PCR.     BOTTLE TYPE Aerobic Bottle          Imaging Results (Last 24 Hours)     Procedure Component Value Units Date/Time    MRI Lumbar Spine With & Without Contrast [[id number]] Resulted:  06/02/20 2109     Updated:  06/02/20 2156            Results for orders placed during the hospital encounter of 06/01/20   Adult Transthoracic Echo Complete W/ Cont if Necessary Per Protocol    Narrative · Calculated EF = 69.0%.  · Left ventricular systolic function is normal.  · Mild mitral valve regurgitation is present  · Mild tricuspid valve regurgitation is present.  · Calculated right ventricular systolic pressure from tricuspid   regurgitation is 31 mmHg.          I have reviewed the medications:  Scheduled Meds:  budesonide-formoterol 2 puff Inhalation BID - RT   ceftaroline 600 mg Intravenous BID   DAPTOmycin 8 mg/kg (Adjusted) Intravenous Q24H   FLUoxetine 40 mg Oral Daily   gabapentin 800 mg Oral Q8H   heparin (porcine) 5,000 Units Subcutaneous Q8H   levothyroxine 50 mcg Oral Daily   pantoprazole 40 mg Oral QAM     Continuous Infusions:  sodium chloride 125 mL/hr Last Rate: 125 mL/hr (06/02/20 1843)     PRN Meds:.•  acetaminophen  •  HYDROcodone-acetaminophen  •  ipratropium-albuterol  •  ondansetron  •  tiZANidine    Assessment/Plan   Assessment & Plan     Active Hospital Problems    Diagnosis  POA   • **Sepsis (CMS/HCC) [A41.9]  Yes   • DDD (degenerative disc disease), cervical [M50.30]  Unknown   • Anxiety [F41.9]  Unknown   • Depression [F32.9]  Unknown   • Seizure (CMS/HCC) [R56.9]  Unknown   • Migraine [G43.909]  Unknown   • Essential hypertension [I10]  Yes   • Fibromyalgia syndrome [M79.7]  Yes   • GERD without esophagitis [K21.9]  Yes      Resolved Hospital Problems   No resolved problems to display.        Brief Hospital Course to date:  Liliana Calderon is a 39 y.o. female  with past medical history significant for depression/anxiety, fibromyalgia, COPD, hypertension, seizure disorder, migraine headache, and remote tobacco/IV drug abuse.  Patient reported that she stopped smoking in 2014.  She also reported that she stopped using IV drugs, mainly heroin, about a month ago.  Patient was reportedly well up until 3 days prior to  presentation.  She reported fever, shortness of air, lower back/leg pain, as well as generalized malaise.  She presented to the ED at outside hospital and was found to have MRSA bacteremia with associated left-sided pneumonia, as well as cavitary lesion in the right upper lobe concerning for possible septic emboli.     Plan  Sepsis with MRSA bacteremia, MRSA UTI and pneumonia, also with multiple right-sided pulmonary nodules with cavitation suspicious for septic emboli  -Patient has underlying history of IV drug use  -Repeat blood cultures with MRSA, ID has been consulted and are following, get repeat blood today   -Antibiotics changed to daptomycin and Teflaro, will continue for now  -Follow-up MRI lumbar spine to r/o epidural abscess, echo shows mild LV and TV regurgitation,  she will likely need REDD  -COVID-19 test is negative     Addendum: MRI L-spine concerning for multiple epidural abscesses, we will put patient n.p.o. and ask neurosurgery to evaluate.    Microcytic anemia  -H&H continues to drop, currently she has hemoglobin of 6.4   -Iron studies suggest a mixed picture ?Anemia of chronic disease  -We will transfuse 1 unit PRBC, iron replacement  -Continue to trend H&H    Acute on chronic low back pain  -Patient has history of DDD, however currently etiology remains unclear  -MRI lumbar spine done 6/2, report currently pending  -PT/OT to evaluate     Elevated bilirubin and alk phos  -Etiology currently unknown, continue to monitor  -Follow-up acute hepatitis panel and HIV     Hypothyroidism-continue home Synthroid     Hypertension  -BP currently low hold home antihypertensives of lisinopril and HCTZ     Fibromyalgia-stable, resume home gabapentin     Depression and anxiety-mood currently stable, continue Prozac     History of IVDU   -Last used heroin a month ago  -Chemical dependency consult in place     Seizure disorder (data deficient)  -Continue home Keppra and maintain seizure  precautions     COPD--exacerbation, continue home Symbicort     Right ankle monitor  -Per sister patient was arrested and Jailed in January, where she was until April, she was released to house arrest with an ankle monitor sec to COVID. She does have an ongoing court case.  - obtained approval to remove monitor for MRI, on discharge patient will have to report back to [facility] to have this placed back.     DVT Prophylaxis: [facility]    Will discuss with sister when she visits later on today.    Disposition: TBD    CODE STATUS:   Code Status and Medical Interventions:   Ordered at: 06/01/20 1441     Code Status:    CPR     Medical Interventions (Level of Support Prior to Arrest):    Full       Electronically signed by Oskar Black MD, 06/03/20, 9:56 AM.

## 2020-06-03 NOTE — PLAN OF CARE
Problem: Patient Care Overview  Goal: Plan of Care Review  Outcome: Ongoing (interventions implemented as appropriate)  Note:   Pt rested quietly with the use of the PRN med regimen. NS IV bolus given with IVFs and IV antibiotics continuing. 1 unit PRBCs completed this shift. VSS. Will continue to monitor.

## 2020-06-03 NOTE — CONSULTS
Liliana Calderon  1980  [id number]    Date of Consult:6/2/20  Requesting Provider: Johnson Lux MD  Evaluating Physician: Rancho Lang MD    Chief Complaint: fever    Reason for Consultation: MRSA bacteremia    History of present illness:   Patient is a 39 y.o.  Yr old female with history of IVDU, depression/anxiety, COPD, and seizure d/o who initially presented to [facility] yesterday with c/o shortness of air and worsening low back pain. She last used IV heroin about 1 month prior.  She was doing relatively well until about 3 days PTA when she noticed worsening low back pain and worsening shortness of air with blood-tinged sputum. She additionally started having fevers up to 103.9F. She did not have contact with anyone known to have COVID-19 infection.     On arrival to [facility] on 5/31, the patient was found to have fevers and a significant leukocytosis. CT chest was concerning for pneumonia and possible septic pulm emboli. Blood cultures resulted with MRSA and she was transferred to [facility] for a higher level of care. Tmax since arrival here has been 102.7F. WBC was elevated to 14.2 but has improved to 10 on antibiotics. She was started on vancomycin and aztreonam by primary team. Repeat blood cultures from 6/1 are again positive for MRSA. TTE and MRI lumbar spine ordered. ID consulted for recommendations in the setting of her MRSA septicemia.    Subjective:    6/3/20: The patient is still having back pain. No new weakness in her lower extremities but does have extreme back pain with movement. Fevers improved. Tmax 100F. Does have some anemia with Hgb 6.4. No nausea or vomiting. Mild diarrhea. No new rash.     Past Medical History:   Diagnosis Date   • Anxiety and depression    • Chronic bronchitis (CMS/HCC)    • Fibromyalgia    • Heart valve problem    • Hypertension    • Migraine    • PCOS (polycystic ovarian syndrome)    • Pneumonia    • Seizures (CMS/HCC)        Past Surgical History:    Procedure Laterality Date   • CHOLECYSTECTOMY         Social History:  IVDU (heroin). Former smoker. No ETOH use.    Family History:  family history includes Alcohol abuse in her brother and sister; Alzheimer's disease in her maternal grandmother; Bone cancer in her mother; Breast cancer in her mother; Dementia in her father; Diabetes in her father; Heart disease in her father and paternal grandfather; Hypertension in her brother, father, mother, and sister; Mental illness in her brother and sister; Parkinsonism in her maternal grandmother; Pulmonary embolism in her father; Skin cancer in her mother; Stroke in her father.    Allergies   Allergen Reactions   • Penicillins Rash     Received cefepime and cefdinir in 2018   • Phenobarbital Rash       Medication:  Current Facility-Administered Medications   Medication Dose Route Frequency Provider Last Rate Last Dose   • acetaminophen (TYLENOL) tablet 650 mg  650 mg Oral Q6H PRN Malathi Vasquez APRN   650 mg at 06/03/20 1609   • budesonide-formoterol (SYMBICORT) 160-4.5 MCG/ACT inhaler 2 puff  2 puff Inhalation BID - RT Vanessa Lopez MD   2 puff at 06/03/20 0644   • ceftaroline (TEFLARO) 600 mg/100 mL 0.9% NS (mbp)  600 mg Intravenous BID Rancho Lang MD   600 mg at 06/03/20 0823   • DAPTOmycin (CUBICIN) 600 mg in sodium chloride 0.9 % 50 mL IVPB  8 mg/kg (Adjusted) Intravenous Q24H Rancho Lang  mL/hr at 06/03/20 0824 600 mg at 06/03/20 0824   • FLUoxetine (PROzac) capsule 40 mg  40 mg Oral Daily Vanessa Lopez MD   40 mg at 06/03/20 0823   • gabapentin (NEURONTIN) capsule 800 mg  800 mg Oral Q8H Vanessa Lopez MD   800 mg at 06/03/20 1349   • heparin (porcine) 5000 UNIT/ML injection 5,000 Units  5,000 Units Subcutaneous Q8H Vanessa Lopez MD   5,000 Units at 06/03/20 1349   • HYDROcodone-acetaminophen (NORCO) 5-325 MG per tablet 1 tablet  1 tablet Oral Q6H PRN Vanessa Lopez MD   1 tablet at 06/03/20  1609   • ipratropium-albuterol (DUO-NEB) nebulizer solution 3 mL  3 mL Nebulization Q6H PRN Vanessa Lopez MD   3 mL at 06/03/20 0643   • levETIRAcetam (KEPPRA) tablet 500 mg  500 mg Oral Q12H Oskar Black MD   500 mg at 06/03/20 1117   • levothyroxine (SYNTHROID, LEVOTHROID) tablet 50 mcg  50 mcg Oral Daily Vanessa Lopez MD   50 mcg at 06/03/20 0823   • ondansetron (ZOFRAN) injection 4 mg  4 mg Intravenous Q6H PRN Vanessa Lopez MD   4 mg at 06/02/20 0926   • pantoprazole (PROTONIX) EC tablet 40 mg  40 mg Oral QAM Vanessa Lopez MD   40 mg at 06/03/20 0626   • sodium chloride 0.9 % infusion  125 mL/hr Intravenous Continuous Vanessa Lopez  mL/hr at 06/02/20 1843 125 mL/hr at 06/02/20 1843   • tiZANidine (ZANAFLEX) tablet 4 mg  4 mg Oral Q8H PRN Vanessa Lopez MD   4 mg at 06/03/20 0310         Infectious History:  MRSA    Antibiotics:  Anti-Infectives (From admission, onward)    Ordered     Dose/Rate Route Frequency Start Stop    06/02/20 0819  ceftaroline (TEFLARO) 600 mg/100 mL 0.9% NS (mbp)  Review   Ordering Provider:  Rancho Lang MD    600 mg Intravenous 2 Times Daily 06/02/20 0915 06/09/20 0859    06/02/20 0743  DAPTOmycin (CUBICIN) 600 mg in sodium chloride 0.9 % 50 mL IVPB  Review   Ordering Provider:  Rancho Lang MD    8 mg/kg × 76.6 kg (Adjusted)  100 mL/hr over 30 Minutes Intravenous Every 24 Hours 06/02/20 0900 06/09/20 0859    06/01/20 1501  vancomycin 2250 mg/500 mL 0.9% NS IVPB ([facility])     Ordering Provider:  Vanessa Lopez MD    2,250 mg Intravenous Once 06/01/20 1600 06/01/20 6060            Review of Systems    Constitutional-- No Fever, chills or sweats.  Appetite good, and no malaise. No fatigue.  Heent-- No new vision, hearing or throat complaints.  No epistaxis or oral sores.  Denies odynophagia or dysphagia.  No flashers, floaters or eye pain. No odynophagia or dysphagia. No headache, photophobia or neck  "stiffness.  CV-- No chest pain, palpitation or syncope  Resp-- No SOB/cough/Hemoptysis  GI- No nausea, vomiting, or diarrhea.  No hematochezia, melena, or hematemesis. Denies jaundice or chronic liver disease.  -- No dysuria, hematuria, or flank pain.  Denies hesitancy, urgency or flank pain.  Lymph- no swollen lymph nodes in neck/axilla or groin.   Heme- No active bruising or bleeding; no Hx of DVT or PE.  MS-- no swelling or pain in the bones or joints of arms/legs.  No new back pain.  Neuro-- No acute focal weakness or numbness in the arms or legs.  No seizures.  Skin-- No rashes, lesions, ulcers. No skin breakdown    12 systems were reviewed and were negative otherwise for acute complaints, except as above and per HPI    Physical Exam:   Vital Signs   /62   Pulse 66   Temp 99.8 °F (37.7 °C) (Oral)   Resp 20   Ht 165.1 cm (65\")   Wt 106 kg (233 lb)   LMP 04/01/2020 (Approximate)   SpO2 99%   BMI 38.77 kg/m²     GENERAL: Awake and alert, in no acute distress. obese  HEENT: Normocephalic, atraumatic.  PERRL. No conjunctival injection. No icterus. Oropharynx clear without evidence of thrush or exudate. No evidence of peridontal disease.    NECK: Supple without nuchal rigidity. No mass.  HEART: RRR; No murmur, rubs, gallops.   LUNGS: Clear to auscultation bilaterally without wheezing, rales, rhonchi. Normal respiratory effort. Nonlabored.   ABDOMEN: Soft, nontender, nondistended. Positive bowel sounds. No rebound or guarding. NO mass or HSM.  EXT:  No cyanosis, clubbing or edema. No cord.  :   Without Petit catheter.  MSK: FROM without joint effusions noted arms/legs.  Some tenderness to palpation over her lumbar spine.  SKIN: has slightly discolored/gray patchy/papular lesions over her bilateral lower extremity- these appeared about 2 weeks ago per the patient.  No other possible peripheral stigmata of infective endocarditis noted over her extremities or elsewhere. No signs of skin breakdown or " cellulitis.  NEURO: Oriented to PPT. No focal deficits on motor/sensory exam at arms/legs. 5 out of 5 strength throughout  PSYCHIATRIC: Normal insight and judgement. Cooperative with PE    Laboratory Data    Results from last 7 days   Lab Units 06/03/20  1331 06/03/20  0407 06/02/20  0751 06/02/20  0352 05/31/20  1751   WBC 10*3/mm3  --  10.00  --  10.08 14.20*   HEMOGLOBIN g/dL 7.2* 6.4* 7.3* 6.8* 8.8*   HEMATOCRIT % 24.1* 22.3* 24.9* 22.9* 28.5*   PLATELETS 10*3/mm3  --  110*  --  130* 198     Results from last 7 days   Lab Units 06/03/20  0407   SODIUM mmol/L 134*   POTASSIUM mmol/L 3.8   CHLORIDE mmol/L 100   CO2 mmol/L 23.0   BUN mg/dL 9   CREATININE mg/dL 0.65   GLUCOSE mg/dL 167*   CALCIUM mg/dL 7.6*     Results from last 7 days   Lab Units 06/03/20  0407   ALK PHOS U/L 225*   BILIRUBIN mg/dL 0.6   ALT (SGPT) U/L 15   AST (SGOT) U/L 31     Results from last 7 days   Lab Units 05/31/20  1752   SED RATE mm/hr 80*     Results from last 7 days   Lab Units 05/31/20  1751   CRP mg/dL 32.12*       Estimated Creatinine Clearance: 141.9 mL/min (by C-G formula based on SCr of 0.65 mg/dL).       Microbiology:    Blood Culture   Date Value Ref Range Status   06/01/2020 Abnormal Stain (C)  Preliminary   06/01/2020 Abnormal Stain (C)  Preliminary   05/31/2020 Abnormal Stain (C)  Preliminary   05/31/2020 Staphylococcus aureus, MRSA (C)  Preliminary     Comment:       Infectious disease consultation is highly recommended to rule out distant foci of infection.  Methicillin resistant Staphylococcus aureus, Patient may be an isolation risk.       Urine Culture   Date Value Ref Range Status   05/31/2020 50,000 CFU/mL Staphylococcus aureus, MRSA (A)  Preliminary     Comment:       Methicillin resistant Staph aureus, patient may be an isolation risk.  Staphylococcus aureus is not typically regarded as a uropathogen, except in cases with genitourinary instrumentation present.  It's presence suggests an alternate source (e.g.  bloodstream, abscess, SSTI, etc.).  Please evaluate accordingly.       Radiology:  Ct Chest With Contrast    Result Date: 5/31/2020  Multiple right lung nodules.  Since 1 of these nodules is cavitary this is worrisome for septic emboli.  Left lung opacities worrisome for pneumonia. Authenticated by Sukhdeep Burnette III, MD on 05/31/2020 08:39:58 PM    Ct Abdomen Pelvis With Contrast    Result Date: 5/31/2020  Splenomegaly.  Normal appendix.  Mild anasarca. Authenticated by Sukhdeep Burnette III, MD on 05/31/2020 08:39:59 PM    Mri Lumbar Spine With & Without Contrast    Result Date: 6/3/2020  1. Approximately 8 x 4 x 3 cm multilobular abscess of the right erector spinae muscles, approximately from L3 to S1. 2. Two possibly interconnected dorsal epidural abscesses at L2-L3 and L3-L4, but causing only mild canal stenosis. 3. No evidence to suggest osteomyelitis or discitis. 4. Moderate-sized L4-L5 and L5-S1 disc protrusions as discussed above.  D:  06/03/2020 E:  06/03/2020       I independently read the patient's CT Chest from 5/31/20 and I agree with the above report.    Impression:     This is a 40 yo woman with a h/o IVDU presenting with severe low back pain and shortness of air and found to have fevers/sepsis with MRSA growing from multiple blood culture sets. Also has CT chest findings that are concerning for septic pulmonary emboli and her situation is very concerning for infective endocarditis of the TV. MRI with 8x4 x3 cm multilobular abscess of the right erector spinae muscles, approximately from L3-S1. Also with two possible interconnected dorsal epidural abscesses at L2-L3 and L3-L4 but only causing mild canal stenosis. Dr. Alex with neurosurgery has evaluated and does not think that she requires any surgical intervention at this time for her epidural abscesses as they are modest but he does agree that it may be worth trying to drain the abscess in the right erector spinae muscles for source control  (even though its loculated and may not be able to adequately drain).     Problems:  MRSA septicemia  Epidural abscesses  Large loculated Paraspinal muscle abscess  Sepsis with fever, leukocytosis with neutrophilia, tachycardia  Pneumonia/lung nodules- concern for septic pulmonary emboli  Acute on chronic low back pain  Urine cx with MRSA- represents systemic infection, not primary source  Microcytic anemia  History of PCN allergy   Recent IV drug use  Obesity  H/o COPD  H/o seizure disorder  H/o depression/anxiety    PLAN: Thank you for asking us to see Liliana Calderon, I recommend the following:    -continue to follow cbc with diff, bmp, esr, and crp closely. Weekly cpk level  -will obtain surveillance blood cultures today  -primary team transfusing her for her anemia  -REDD ordered  -CT-guided drainage of her right paraspinal abscess  -Start Daptomycin 8mg/kg IV q24h. Baseline CPK level was 48  -Start Teflaro 600 mg IV BID (has tolerated cephalosporins in the recent past)  -Will consider repeat MRI L-spine in 2-3 weeks (per my discussed with Dr. Alex today)    I discussed with Dr. Black today  I discussed with Dr. Alex today    Complex set of medical issues requiring a high-level medical decision making.  The patient does have significant risk for for further morbidity and mortality in the setting of her severe MRSA infection. Has some risk for paralysis if her epidural abscesses worsen.      Rancho Lang MD  6/3/2020

## 2020-06-04 ENCOUNTER — APPOINTMENT (OUTPATIENT)
Dept: CARDIOLOGY | Facility: HOSPITAL | Age: 40
End: 2020-06-04

## 2020-06-04 ENCOUNTER — APPOINTMENT (OUTPATIENT)
Dept: CT IMAGING | Facility: HOSPITAL | Age: 40
End: 2020-06-04

## 2020-06-04 LAB
ALBUMIN SERPL-MCNC: 2.3 G/DL (ref 3.5–5.2)
ALBUMIN/GLOB SERPL: 0.6 G/DL
ALP SERPL-CCNC: 291 U/L (ref 39–117)
ALT SERPL W P-5'-P-CCNC: 30 U/L (ref 1–33)
ANION GAP SERPL CALCULATED.3IONS-SCNC: 11 MMOL/L (ref 5–15)
APPEARANCE FLD: ABNORMAL
AST SERPL-CCNC: 42 U/L (ref 1–32)
BACTERIA SPEC AEROBE CULT: ABNORMAL
BACTERIA SPEC AEROBE CULT: ABNORMAL
BASOPHILS # BLD AUTO: 0.03 10*3/MM3 (ref 0–0.2)
BASOPHILS NFR BLD AUTO: 0.2 % (ref 0–1.5)
BH BB BLOOD EXPIRATION DATE: NORMAL
BH BB BLOOD TYPE BARCODE: 5100
BH BB DISPENSE STATUS: NORMAL
BH BB PRODUCT CODE: NORMAL
BH BB UNIT NUMBER: NORMAL
BILIRUB SERPL-MCNC: 0.9 MG/DL (ref 0.2–1.2)
BUN BLD-MCNC: 8 MG/DL (ref 6–20)
BUN/CREAT SERPL: 11.3 (ref 7–25)
CALCIUM SPEC-SCNC: 7.9 MG/DL (ref 8.6–10.5)
CHLORIDE SERPL-SCNC: 102 MMOL/L (ref 98–107)
CO2 SERPL-SCNC: 24 MMOL/L (ref 22–29)
COLOR FLD: ABNORMAL
CREAT BLD-MCNC: 0.71 MG/DL (ref 0.57–1)
CROSSMATCH INTERPRETATION: NORMAL
DEPRECATED RDW RBC AUTO: 47 FL (ref 37–54)
EOSINOPHIL # BLD AUTO: 0.04 10*3/MM3 (ref 0–0.4)
EOSINOPHIL NFR BLD AUTO: 0.3 % (ref 0.3–6.2)
ERYTHROCYTE [DISTWIDTH] IN BLOOD BY AUTOMATED COUNT: 17 % (ref 12.3–15.4)
GFR SERPL CREATININE-BSD FRML MDRD: 92 ML/MIN/1.73
GLOBULIN UR ELPH-MCNC: 3.8 GM/DL
GLUCOSE BLD-MCNC: 196 MG/DL (ref 65–99)
GRAM STN SPEC: ABNORMAL
HCT VFR BLD AUTO: 26 % (ref 34–46.6)
HCT VFR BLD AUTO: 26.2 % (ref 34–46.6)
HCT VFR BLD AUTO: 26.7 % (ref 34–46.6)
HGB BLD-MCNC: 7.7 G/DL (ref 12–15.9)
HGB BLD-MCNC: 7.9 G/DL (ref 12–15.9)
HGB BLD-MCNC: 8.2 G/DL (ref 12–15.9)
IMM GRANULOCYTES # BLD AUTO: 0.38 10*3/MM3 (ref 0–0.05)
IMM GRANULOCYTES NFR BLD AUTO: 2.6 % (ref 0–0.5)
ISOLATED FROM: ABNORMAL
ISOLATED FROM: ABNORMAL
LYMPHOCYTES # BLD AUTO: 1.43 10*3/MM3 (ref 0.7–3.1)
LYMPHOCYTES NFR BLD AUTO: 9.9 % (ref 19.6–45.3)
LYMPHOCYTES NFR FLD MANUAL: 5 %
MCH RBC QN AUTO: 22.6 PG (ref 26.6–33)
MCHC RBC AUTO-ENTMCNC: 29.4 G/DL (ref 31.5–35.7)
MCV RBC AUTO: 77.1 FL (ref 79–97)
MONOCYTES # BLD AUTO: 0.67 10*3/MM3 (ref 0.1–0.9)
MONOCYTES NFR BLD AUTO: 4.6 % (ref 5–12)
MONOCYTES NFR FLD: 2 %
NEUTROPHILS # BLD AUTO: 11.9 10*3/MM3 (ref 1.7–7)
NEUTROPHILS NFR BLD AUTO: 82.4 % (ref 42.7–76)
NEUTROPHILS NFR FLD MANUAL: 93 %
NRBC BLD AUTO-RTO: 0 /100 WBC (ref 0–0.2)
PLATELET # BLD AUTO: 145 10*3/MM3 (ref 140–450)
PMV BLD AUTO: 10.9 FL (ref 6–12)
POTASSIUM BLD-SCNC: 4.3 MMOL/L (ref 3.5–5.2)
PROT SERPL-MCNC: 6.1 G/DL (ref 6–8.5)
RBC # BLD AUTO: 3.4 10*6/MM3 (ref 3.77–5.28)
RBC # FLD AUTO: ABNORMAL /MM3
SODIUM BLD-SCNC: 137 MMOL/L (ref 136–145)
UNIT  ABO: NORMAL
UNIT  RH: NORMAL
WBC # FLD AUTO: ABNORMAL /MM3
WBC NRBC COR # BLD: 14.45 10*3/MM3 (ref 3.4–10.8)

## 2020-06-04 PROCEDURE — 89051 BODY FLUID CELL COUNT: CPT | Performed by: INTERNAL MEDICINE

## 2020-06-04 PROCEDURE — 87186 SC STD MICRODIL/AGAR DIL: CPT | Performed by: INTERNAL MEDICINE

## 2020-06-04 PROCEDURE — 85025 COMPLETE CBC W/AUTO DIFF WBC: CPT | Performed by: INTERNAL MEDICINE

## 2020-06-04 PROCEDURE — 0S903ZX DRAINAGE OF LUMBAR VERTEBRAL JOINT, PERCUTANEOUS APPROACH, DIAGNOSTIC: ICD-10-PCS | Performed by: HOSPITALIST

## 2020-06-04 PROCEDURE — 87070 CULTURE OTHR SPECIMN AEROBIC: CPT | Performed by: INTERNAL MEDICINE

## 2020-06-04 PROCEDURE — 99233 SBSQ HOSP IP/OBS HIGH 50: CPT | Performed by: INTERNAL MEDICINE

## 2020-06-04 PROCEDURE — 94799 UNLISTED PULMONARY SVC/PX: CPT

## 2020-06-04 PROCEDURE — 80053 COMPREHEN METABOLIC PANEL: CPT | Performed by: INTERNAL MEDICINE

## 2020-06-04 PROCEDURE — 25010000002 CEFTAROLINE FOSAMIL PER 10 MG: Performed by: INTERNAL MEDICINE

## 2020-06-04 PROCEDURE — 85014 HEMATOCRIT: CPT | Performed by: INTERNAL MEDICINE

## 2020-06-04 PROCEDURE — 36430 TRANSFUSION BLD/BLD COMPNT: CPT

## 2020-06-04 PROCEDURE — 25010000002 DAPTOMYCIN PER 1 MG: Performed by: INTERNAL MEDICINE

## 2020-06-04 PROCEDURE — 87205 SMEAR GRAM STAIN: CPT | Performed by: INTERNAL MEDICINE

## 2020-06-04 PROCEDURE — 25010000002 HYDROMORPHONE PER 4 MG: Performed by: INTERNAL MEDICINE

## 2020-06-04 PROCEDURE — 85018 HEMOGLOBIN: CPT | Performed by: INTERNAL MEDICINE

## 2020-06-04 PROCEDURE — 87147 CULTURE TYPE IMMUNOLOGIC: CPT | Performed by: INTERNAL MEDICINE

## 2020-06-04 PROCEDURE — 25010000002 NA FERRIC GLUC CPLX PER 12.5 MG: Performed by: INTERNAL MEDICINE

## 2020-06-04 PROCEDURE — 25010000002 HEPARIN (PORCINE) PER 1000 UNITS: Performed by: HOSPITALIST

## 2020-06-04 PROCEDURE — P9016 RBC LEUKOCYTES REDUCED: HCPCS

## 2020-06-04 PROCEDURE — 25010000002 MIDAZOLAM PER 1 MG: Performed by: RADIOLOGY

## 2020-06-04 PROCEDURE — 87075 CULTR BACTERIA EXCEPT BLOOD: CPT | Performed by: INTERNAL MEDICINE

## 2020-06-04 PROCEDURE — 25010000002 FENTANYL CITRATE (PF) 100 MCG/2ML SOLUTION: Performed by: RADIOLOGY

## 2020-06-04 PROCEDURE — 86900 BLOOD TYPING SEROLOGIC ABO: CPT

## 2020-06-04 PROCEDURE — 77012 CT SCAN FOR NEEDLE BIOPSY: CPT

## 2020-06-04 RX ORDER — HYDROMORPHONE HYDROCHLORIDE 1 MG/ML
0.5 INJECTION, SOLUTION INTRAMUSCULAR; INTRAVENOUS; SUBCUTANEOUS EVERY 4 HOURS PRN
Status: DISCONTINUED | OUTPATIENT
Start: 2020-06-04 | End: 2020-06-05

## 2020-06-04 RX ORDER — L.ACID,PARA/B.BIFIDUM/S.THERM 8B CELL
1 CAPSULE ORAL DAILY
Status: DISCONTINUED | OUTPATIENT
Start: 2020-06-04 | End: 2020-06-16 | Stop reason: HOSPADM

## 2020-06-04 RX ORDER — MIDAZOLAM HYDROCHLORIDE 1 MG/ML
INJECTION INTRAMUSCULAR; INTRAVENOUS
Status: COMPLETED | OUTPATIENT
Start: 2020-06-04 | End: 2020-06-04

## 2020-06-04 RX ORDER — FENTANYL CITRATE 50 UG/ML
INJECTION, SOLUTION INTRAMUSCULAR; INTRAVENOUS
Status: DISPENSED
Start: 2020-06-04 | End: 2020-06-05

## 2020-06-04 RX ORDER — FENTANYL CITRATE 50 UG/ML
INJECTION, SOLUTION INTRAMUSCULAR; INTRAVENOUS
Status: COMPLETED | OUTPATIENT
Start: 2020-06-04 | End: 2020-06-04

## 2020-06-04 RX ORDER — HYDROMORPHONE HYDROCHLORIDE 1 MG/ML
0.5 INJECTION, SOLUTION INTRAMUSCULAR; INTRAVENOUS; SUBCUTANEOUS ONCE
Status: COMPLETED | OUTPATIENT
Start: 2020-06-04 | End: 2020-06-04

## 2020-06-04 RX ORDER — HYDROMORPHONE HYDROCHLORIDE 1 MG/ML
0.5 INJECTION, SOLUTION INTRAMUSCULAR; INTRAVENOUS; SUBCUTANEOUS ONCE
Status: DISCONTINUED | OUTPATIENT
Start: 2020-06-04 | End: 2020-06-04

## 2020-06-04 RX ORDER — MIDAZOLAM HYDROCHLORIDE 1 MG/ML
INJECTION INTRAMUSCULAR; INTRAVENOUS
Status: DISPENSED
Start: 2020-06-04 | End: 2020-06-05

## 2020-06-04 RX ORDER — LIDOCAINE HYDROCHLORIDE 10 MG/ML
10 INJECTION, SOLUTION EPIDURAL; INFILTRATION; INTRACAUDAL; PERINEURAL ONCE
Status: DISCONTINUED | OUTPATIENT
Start: 2020-06-04 | End: 2020-06-04

## 2020-06-04 RX ADMIN — HEPARIN SODIUM 5000 UNITS: 5000 INJECTION, SOLUTION INTRAVENOUS; SUBCUTANEOUS at 13:54

## 2020-06-04 RX ADMIN — HYDROMORPHONE HYDROCHLORIDE 0.5 MG: 1 INJECTION, SOLUTION INTRAMUSCULAR; INTRAVENOUS; SUBCUTANEOUS at 14:42

## 2020-06-04 RX ADMIN — LEVOTHYROXINE SODIUM 50 MCG: 50 TABLET ORAL at 10:47

## 2020-06-04 RX ADMIN — SODIUM CHLORIDE 125 MG: 9 INJECTION, SOLUTION INTRAVENOUS at 11:40

## 2020-06-04 RX ADMIN — TIZANIDINE 4 MG: 4 TABLET ORAL at 04:04

## 2020-06-04 RX ADMIN — GABAPENTIN 800 MG: 400 CAPSULE ORAL at 13:54

## 2020-06-04 RX ADMIN — LEVETIRACETAM 500 MG: 500 TABLET ORAL at 10:47

## 2020-06-04 RX ADMIN — FENTANYL CITRATE 50 MCG: 50 INJECTION, SOLUTION INTRAMUSCULAR; INTRAVENOUS at 15:24

## 2020-06-04 RX ADMIN — LEVETIRACETAM 500 MG: 500 TABLET ORAL at 19:54

## 2020-06-04 RX ADMIN — MIDAZOLAM 2 MG: 1 INJECTION INTRAMUSCULAR; INTRAVENOUS at 15:24

## 2020-06-04 RX ADMIN — CEFTAROLINE FOSAMIL 600 MG: 600 POWDER, FOR SOLUTION INTRAVENOUS at 09:03

## 2020-06-04 RX ADMIN — Medication 1 CAPSULE: at 13:54

## 2020-06-04 RX ADMIN — BUDESONIDE AND FORMOTEROL FUMARATE DIHYDRATE 2 PUFF: 160; 4.5 AEROSOL RESPIRATORY (INHALATION) at 07:43

## 2020-06-04 RX ADMIN — HEPARIN SODIUM 5000 UNITS: 5000 INJECTION, SOLUTION INTRAVENOUS; SUBCUTANEOUS at 07:22

## 2020-06-04 RX ADMIN — GABAPENTIN 800 MG: 400 CAPSULE ORAL at 05:31

## 2020-06-04 RX ADMIN — HYDROMORPHONE HYDROCHLORIDE 0.5 MG: 1 INJECTION, SOLUTION INTRAMUSCULAR; INTRAVENOUS; SUBCUTANEOUS at 12:10

## 2020-06-04 RX ADMIN — HYDROMORPHONE HYDROCHLORIDE 0.5 MG: 1 INJECTION, SOLUTION INTRAMUSCULAR; INTRAVENOUS; SUBCUTANEOUS at 19:54

## 2020-06-04 RX ADMIN — BUDESONIDE AND FORMOTEROL FUMARATE DIHYDRATE 2 PUFF: 160; 4.5 AEROSOL RESPIRATORY (INHALATION) at 19:22

## 2020-06-04 RX ADMIN — HEPARIN SODIUM 5000 UNITS: 5000 INJECTION, SOLUTION INTRAVENOUS; SUBCUTANEOUS at 19:54

## 2020-06-04 RX ADMIN — ACETAMINOPHEN 650 MG: 325 TABLET, FILM COATED ORAL at 10:22

## 2020-06-04 RX ADMIN — HYDROCODONE BITARTRATE AND ACETAMINOPHEN 1 TABLET: 5; 325 TABLET ORAL at 05:31

## 2020-06-04 RX ADMIN — GABAPENTIN 800 MG: 400 CAPSULE ORAL at 19:54

## 2020-06-04 RX ADMIN — TIZANIDINE 4 MG: 4 TABLET ORAL at 20:44

## 2020-06-04 RX ADMIN — DAPTOMYCIN 600 MG: 500 INJECTION, POWDER, LYOPHILIZED, FOR SOLUTION INTRAVENOUS at 09:03

## 2020-06-04 RX ADMIN — SODIUM CHLORIDE 125 ML/HR: 9 INJECTION, SOLUTION INTRAVENOUS at 05:38

## 2020-06-04 RX ADMIN — TIZANIDINE 4 MG: 4 TABLET ORAL at 10:47

## 2020-06-04 RX ADMIN — ACETAMINOPHEN 650 MG: 325 TABLET, FILM COATED ORAL at 02:17

## 2020-06-04 RX ADMIN — CEFTAROLINE FOSAMIL 600 MG: 600 POWDER, FOR SOLUTION INTRAVENOUS at 20:44

## 2020-06-04 RX ADMIN — PANTOPRAZOLE SODIUM 40 MG: 40 TABLET, DELAYED RELEASE ORAL at 05:31

## 2020-06-04 RX ADMIN — HYDROCODONE BITARTRATE AND ACETAMINOPHEN 1 TABLET: 5; 325 TABLET ORAL at 10:47

## 2020-06-04 RX ADMIN — FLUOXETINE HYDROCHLORIDE 40 MG: 20 CAPSULE ORAL at 10:46

## 2020-06-04 NOTE — PROGRESS NOTES
Liliana Calderon  1980  [id number]    Date of Consult:6/2/20  Requesting Provider: Johnson Lux MD  Evaluating Physician: Rancho Lang MD    Chief Complaint: fever    Reason for Consultation: MRSA bacteremia    History of present illness:   Patient is a 39 y.o.  Yr old female with history of IVDU, depression/anxiety, COPD, and seizure d/o who initially presented to [facility] yesterday with c/o shortness of air and worsening low back pain. She last used IV heroin about 1 month prior.  She was doing relatively well until about 3 days PTA when she noticed worsening low back pain and worsening shortness of air with blood-tinged sputum. She additionally started having fevers up to 103.9F. She did not have contact with anyone known to have COVID-19 infection.     On arrival to [facility] on 5/31, the patient was found to have fevers and a significant leukocytosis. CT chest was concerning for pneumonia and possible septic pulm emboli. Blood cultures resulted with MRSA and she was transferred to [facility] for a higher level of care. Tmax since arrival here has been 102.7F. WBC was elevated to 14.2 but has improved to 10 on antibiotics. She was started on vancomycin and aztreonam by primary team. Repeat blood cultures from 6/1 are again positive for MRSA. TTE and MRI lumbar spine ordered. ID consulted for recommendations in the setting of her MRSA septicemia.    Subjective:    6/3/20: The patient is still having back pain. No new weakness in her lower extremities but does have extreme back pain with movement. Fevers improved. Tmax 100F. Does have some anemia with Hgb 6.4. No nausea or vomiting. Mild diarrhea. No new rash.     6/4/20: still having some back pain and had some fevers overnight. Tmax this morning was 102.7F.  Not feeling very well this morning when I saw her. No worsening of her diarrhea. No nausea. No new rash. Still able to move her extremities well with no new weakness but does have  significant pain lifting her legs off the bed.     Past Medical History:   Diagnosis Date   • Anxiety and depression    • Chronic bronchitis (CMS/HCC)    • Fibromyalgia    • Heart valve problem    • Hypertension    • Migraine    • PCOS (polycystic ovarian syndrome)    • Pneumonia    • Seizures (CMS/HCC)        Past Surgical History:   Procedure Laterality Date   • CHOLECYSTECTOMY         Social History:  IVDU (heroin). Former smoker. No ETOH use.    Family History:  family history includes Alcohol abuse in her brother and sister; Alzheimer's disease in her maternal grandmother; Bone cancer in her mother; Breast cancer in her mother; Dementia in her father; Diabetes in her father; Heart disease in her father and paternal grandfather; Hypertension in her brother, father, mother, and sister; Mental illness in her brother and sister; Parkinsonism in her maternal grandmother; Pulmonary embolism in her father; Skin cancer in her mother; Stroke in her father.    Allergies   Allergen Reactions   • Penicillins Rash     Received cefepime and cefdinir in 2018   • Phenobarbital Rash       Medication:  Current Facility-Administered Medications   Medication Dose Route Frequency Provider Last Rate Last Dose   • acetaminophen (TYLENOL) tablet 650 mg  650 mg Oral Q6H PRN Malathi Vasquez APRN   650 mg at 06/04/20 1022   • budesonide-formoterol (SYMBICORT) 160-4.5 MCG/ACT inhaler 2 puff  2 puff Inhalation BID - RT Vanessa Lopez MD   2 puff at 06/04/20 0743   • ceftaroline (TEFLARO) 600 mg/100 mL 0.9% NS (mbp)  600 mg Intravenous BID Rancho Lang MD   600 mg at 06/04/20 0903   • DAPTOmycin (CUBICIN) 600 mg in sodium chloride 0.9 % 50 mL IVPB  8 mg/kg (Adjusted) Intravenous Q24H Rancho Lang  mL/hr at 06/04/20 0903 600 mg at 06/04/20 0903   • ferric gluconate (FERRLECIT)125 MG IVPB  125 mg Intravenous Q24H Oskar Black MD   125 mg at 06/04/20 1140   • FLUoxetine (PROzac) capsule 40 mg  40 mg Oral Daily  Vanessa Lopez MD   40 mg at 06/04/20 1046   • gabapentin (NEURONTIN) capsule 800 mg  800 mg Oral Q8H Vanessa Lopez MD   800 mg at 06/04/20 1354   • heparin (porcine) 5000 UNIT/ML injection 5,000 Units  5,000 Units Subcutaneous Q8H Vanessa Lopez MD   5,000 Units at 06/04/20 1354   • HYDROcodone-acetaminophen (NORCO) 5-325 MG per tablet 1 tablet  1 tablet Oral Q6H PRN Vanessa Lopez MD   1 tablet at 06/04/20 1047   • HYDROmorphone (DILAUDID) injection 0.5 mg  0.5 mg Intravenous Q4H PRN Oskar Black MD   0.5 mg at 06/04/20 1442   • ipratropium-albuterol (DUO-NEB) nebulizer solution 3 mL  3 mL Nebulization Q6H PRN Vanessa Lopez MD   3 mL at 06/03/20 0643   • lactobacillus acidophilus (RISAQUAD) capsule 1 capsule  1 capsule Oral Daily Rancho Lang MD   1 capsule at 06/04/20 1354   • levETIRAcetam (KEPPRA) tablet 500 mg  500 mg Oral Q12H Oskar Black MD   500 mg at 06/04/20 1047   • levothyroxine (SYNTHROID, LEVOTHROID) tablet 50 mcg  50 mcg Oral Daily Vanessa Lopez MD   50 mcg at 06/04/20 1047   • lidocaine PF 1% (XYLOCAINE) injection 10 mL  10 mL Injection Once Yimi Huggins MD       • ondansetron (ZOFRAN) injection 4 mg  4 mg Intravenous Q6H PRN Vanessa Lopez MD   4 mg at 06/03/20 2304   • pantoprazole (PROTONIX) EC tablet 40 mg  40 mg Oral QAM Vanessa Lopez MD   40 mg at 06/04/20 0531   • sodium chloride 0.9 % infusion  125 mL/hr Intravenous Continuous Vanessa Lopez  mL/hr at 06/04/20 0538 125 mL/hr at 06/04/20 0538   • tiZANidine (ZANAFLEX) tablet 4 mg  4 mg Oral Q8H PRN Vanessa Lopez MD   4 mg at 06/04/20 1047         Infectious History:  MRSA    Antibiotics:  Anti-Infectives (From admission, onward)    Ordered     Dose/Rate Route Frequency Start Stop    06/02/20 0819  ceftaroline (TEFLARO) 600 mg/100 mL 0.9% NS (mbp)     Rancho Lang MD reviewed the order on 06/04/20 0916.   Ordering  "Provider:  Rancho Lang MD    600 mg Intravenous 2 Times Daily 06/02/20 0915 06/09/20 0859    06/02/20 0743  DAPTOmycin (CUBICIN) 600 mg in sodium chloride 0.9 % 50 mL IVPB     Rancho Lang MD reviewed the order on 06/04/20 0927.   Ordering Provider:  Rancho Lang MD    8 mg/kg × 76.6 kg (Adjusted)  100 mL/hr over 30 Minutes Intravenous Every 24 Hours 06/02/20 0900 06/09/20 0859    06/01/20 1501  vancomycin 2250 mg/500 mL 0.9% NS IVPB ([facility])     Ordering Provider:  Vanessa Lopez MD    2,250 mg Intravenous Once 06/01/20 1600 06/01/20 1756            Review of Systems    Constitutional-- No Fever, chills or sweats.  Appetite good, and no malaise. No fatigue.  Heent-- No new vision, hearing or throat complaints.  No epistaxis or oral sores.  Denies odynophagia or dysphagia.  No flashers, floaters or eye pain. No odynophagia or dysphagia. No headache, photophobia or neck stiffness.  CV-- No chest pain, palpitation or syncope  Resp-- No SOB/cough/Hemoptysis  GI- No nausea, vomiting, or diarrhea.  No hematochezia, melena, or hematemesis. Denies jaundice or chronic liver disease.  -- No dysuria, hematuria, or flank pain.  Denies hesitancy, urgency or flank pain.  Lymph- no swollen lymph nodes in neck/axilla or groin.   Heme- No active bruising or bleeding; no Hx of DVT or PE.  MS-- no swelling or pain in the bones or joints of arms/legs.  No new back pain.  Neuro-- No acute focal weakness or numbness in the arms or legs.  No seizures.  Skin-- No rashes, lesions, ulcers. No skin breakdown    12 systems were reviewed and were negative otherwise for acute complaints, except as above and per HPI    Physical Exam:   Vital Signs   /77 (BP Location: Left arm, Patient Position: Lying)   Pulse 62   Temp 97.8 °F (36.6 °C) (Oral)   Resp 24   Ht 165.1 cm (65\")   Wt 106 kg (233 lb)   LMP 04/01/2020 (Approximate)   SpO2 97%   BMI 38.77 kg/m²     GENERAL: Awake and alert, in no acute " distress. obese  HEENT: Normocephalic, atraumatic.  PERRL. No conjunctival injection. No icterus. Oropharynx clear without evidence of thrush or exudate. No evidence of peridontal disease.    NECK: Supple without nuchal rigidity. No mass.  HEART: RRR; No murmur, rubs, gallops.   LUNGS: Clear to auscultation bilaterally without wheezing, rales, rhonchi. Normal respiratory effort. Nonlabored.   ABDOMEN: Soft, nontender, nondistended. Positive bowel sounds. No rebound or guarding. NO mass or HSM.  EXT:  No cyanosis, clubbing or edema. No cord.  :   Without Petit catheter.  MSK: FROM without joint effusions noted arms/legs.  Some tenderness to palpation over her lumbar spine.  SKIN: has slightly discolored/gray patchy/papular lesions over her bilateral lower extremity- these appeared about 2 weeks ago per the patient.  No other possible peripheral stigmata of infective endocarditis noted over her extremities or elsewhere. No signs of skin breakdown or cellulitis.  NEURO: Oriented to PPT. No focal deficits on motor/sensory exam at arms/legs. 5 out of 5 strength throughout  PSYCHIATRIC: Normal insight and judgement. Cooperative with PE    Laboratory Data    Results from last 7 days   Lab Units 06/04/20  1342 06/04/20  0835 06/03/20  2227  06/03/20  0407  06/02/20  0352   WBC 10*3/mm3  --  14.45*  --   --  10.00  --  10.08   HEMOGLOBIN g/dL 8.2* 7.7* 6.8*   < > 6.4*   < > 6.8*   HEMATOCRIT % 26.7* 26.2* 22.9*   < > 22.3*   < > 22.9*   PLATELETS 10*3/mm3  --  145  --   --  110*  --  130*    < > = values in this interval not displayed.     Results from last 7 days   Lab Units 06/04/20  0835   SODIUM mmol/L 137   POTASSIUM mmol/L 4.3   CHLORIDE mmol/L 102   CO2 mmol/L 24.0   BUN mg/dL 8   CREATININE mg/dL 0.71   GLUCOSE mg/dL 196*   CALCIUM mg/dL 7.9*     Results from last 7 days   Lab Units 06/04/20  0835   ALK PHOS U/L 291*   BILIRUBIN mg/dL 0.9   ALT (SGPT) U/L 30   AST (SGOT) U/L 42*     Results from last 7 days   Lab  Units 05/31/20  1752   SED RATE mm/hr 80*     Results from last 7 days   Lab Units 05/31/20  1751   CRP mg/dL 32.12*       Estimated Creatinine Clearance: 129.9 mL/min (by C-G formula based on SCr of 0.71 mg/dL).       Microbiology:    Blood Culture   Date Value Ref Range Status   06/01/2020 Abnormal Stain (C)  Preliminary   06/01/2020 Abnormal Stain (C)  Preliminary   05/31/2020 Abnormal Stain (C)  Preliminary   05/31/2020 Staphylococcus aureus, MRSA (C)  Preliminary     Comment:       Infectious disease consultation is highly recommended to rule out distant foci of infection.  Methicillin resistant Staphylococcus aureus, Patient may be an isolation risk.       Urine Culture   Date Value Ref Range Status   05/31/2020 50,000 CFU/mL Staphylococcus aureus, MRSA (A)  Preliminary     Comment:       Methicillin resistant Staph aureus, patient may be an isolation risk.  Staphylococcus aureus is not typically regarded as a uropathogen, except in cases with genitourinary instrumentation present.  It's presence suggests an alternate source (e.g. bloodstream, abscess, SSTI, etc.).  Please evaluate accordingly.       Radiology:  Ct Chest With Contrast    Result Date: 5/31/2020  Multiple right lung nodules.  Since 1 of these nodules is cavitary this is worrisome for septic emboli.  Left lung opacities worrisome for pneumonia. Authenticated by Sukhdeep Burnette III, MD on 05/31/2020 08:39:58 PM    Ct Abdomen Pelvis With Contrast    Result Date: 5/31/2020  Splenomegaly.  Normal appendix.  Mild anasarca. Authenticated by Sukhdeep Burnette III, MD on 05/31/2020 08:39:59 PM    Ct Guided Biopsy Aspiration Injection    Result Date: 6/4/2020   Percutaneous aspiration of very tiny abscess collection adjacent to vertebral bodies/within the erector spinae muscle, yielding 2 mL of purulent appearing fluid. No drainage catheter was left in place - too small for drainage catheter/contraindicated to leave drainage catheter in collection with  possible communication with epidural space  Plan:  Follow-up lab results. Continued care with primary team. ______________________________________________________________________  PROCEDURE SUMMARY: - Aspiration of tiny abscess pockets under fluoroscopic guidance - Additional procedure(s): None  PROCEDURE DETAILS:  Pre-procedure Consent: Informed consent for the procedure including risks, benefits and alternatives was obtained and time-out was performed prior to the procedure. Preparation: The site was prepared and draped using maximal sterile barrier technique including cutaneous antisepsis.  Anesthesia/sedation Level of anesthesia/sedation: Moderate sedation (conscious sedation) Anesthesia/sedation administered by: Independent trained observer under attending supervision with continuous monitoring of the patient?s level of consciousness and physiologic status Total intra-service sedation time (minutes): 12  Fluid collection aspiration The patient was positioned prone on. Initial imaging was performed. Local anesthesia was administered. The fluid collection was accessed using a 22-gauge spinal needle under CT fluoroscopic guidance. Position within the fluid collection was confirmed, and fluid aspiration was performed. All instruments were then removed. - Initial imaging findings: Fluid collections identified on comparison MRI lumbar spine 6/4/2020 not as well visualized on CT. - Aspiration needle/catheter: 22-gauge spinal needle - Post-aspiration imaging findings: No immediate complication  Contrast Contrast agent: None Contrast volume (mL): 0  Radiation Dose CT dose length product (mGy-cm): 975  Additional Details Additional description of procedure: None Equipment details: None Specimens removed: Aspirated fluid sent for analysis. Estimated blood loss (mL): Less than 10 Standardized report: DrainageAspiration  Attestation I was present and scrubbed for the entire procedure. Imaging reviewed. Agree with final  report as written.  This report was finalized on 6/4/2020 5:36 PM by Yimi Huggins.      Mri Lumbar Spine With & Without Contrast    Result Date: 6/3/2020  1. Approximately 8 x 4 x 3 cm multilobular abscess of the right erector spinae muscles, approximately from L3 to S1. 2. Two possibly interconnected dorsal epidural abscesses at L2-L3 and L3-L4, but causing only mild canal stenosis. 3. No evidence to suggest osteomyelitis or discitis. 4. Moderate-sized L4-L5 and L5-S1 disc protrusions as discussed above.  D:  06/03/2020 E:  06/03/2020           Impression:     This is a 38 yo woman with a h/o IVDU presenting with severe low back pain and shortness of air and found to have fevers/sepsis with MRSA growing from multiple blood culture sets. Also has CT chest findings that are concerning for septic pulmonary emboli and her situation is very concerning for infective endocarditis of the TV. MRI with 8x4 x3 cm multilobular abscess of the right erector spinae muscles, approximately from L3-S1. Also with two possible interconnected dorsal epidural abscesses at L2-L3 and L3-L4 but only causing mild canal stenosis. Dr. Alex with neurosurgery has evaluated and does not think that she requires any surgical intervention at this time for her epidural abscesses as they are modest. Underwent CT-guided drainage after I evaluated her this morning and had 2cc of purulent drainage aspirated from her fluid collection in erector spinae muscles.    Problems:  MRSA septicemia  Epidural abscesses  Large loculated Paraspinal muscle abscess  Sepsis with fever, leukocytosis with neutrophilia, tachycardia  Pneumonia/lung nodules- concern for septic pulmonary emboli  Acute on chronic low back pain  Urine cx with MRSA- represents systemic infection, not primary source  Microcytic anemia  History of PCN allergy   Recent IV drug use  Obesity  H/o COPD  H/o seizure disorder  H/o depression/anxiety    PLAN: Thank you for asking us to see Liliana  Yumiko, I recommend the following:    -continue to follow cbc with diff, bmp, esr, and crp closely. Weekly cpk level  -surveillance blood cultures from 6/3/20 -NGTD  -REDD pending  -s/p CT-guided drainage of her right paraspinal abscess with only 2cc fluid aspirated  -Daptomycin 8mg/kg IV q24h. Baseline CPK level was 48  -Teflaro 600 mg IV BID (has tolerated cephalosporins in the recent past)  -Will consider repeat MRI L-spine in 2-3 weeks (per my discussed with Dr. Alex today)    Complex set of medical issues requiring a high-level medical decision making.  The patient does have significant risk for for further morbidity and mortality in the setting of her severe MRSA infection. Has some risk for paralysis if her epidural abscesses worsen.      Rancho Lang MD  6/4/2020

## 2020-06-04 NOTE — PROGRESS NOTES
[facility] Medicine Services  PROGRESS NOTE    Patient Name: Liliana Calderon  : 1980  MRN: [id number]    Date of Admission: 2020  Primary Care Physician: Ollie Flores MD    Subjective   Subjective     CC: Follow-up sepsis with MRSA bacteremia    HPI: Patient febrile this morning, continues to feel miserable, does have some nausea, lethargy, back pain    Review of Systems  Gen- +fevers, +chills  CV- No chest pain, palpitations  Resp- No cough, dyspnea  GI- No N/V/D, abd pain    All systems reviewed and currently negative except as mentioned in HPI above    Objective   Objective     Vital Signs:   Temp:  [98.9 °F (37.2 °C)-101.4 °F (38.6 °C)] 99.5 °F (37.5 °C)  Heart Rate:  [66-85] 70  Resp:  [18-32] 18  BP: ()/(60-71) 115/69        Physical Exam:  Constitutional: No acute distress, but uncomfortable, awake, alert  HENT: NCAT, mucous membranes moist  Respiratory: Clear to auscultation bilaterally, respiratory effort normal   Cardiovascular: RRR, no murmurs, rubs, or gallops, palpable pedal pulses bilaterally  Gastrointestinal: Obese, positive bowel sounds, soft, nontender, nondistended  Musculoskeletal: No bilateral ankle edema  Psychiatric: Appropriate affect, cooperative  Neurologic: No focal deficits  Skin: No rashes    Results Reviewed:  Results from last 7 days   Lab Units 20  0835 20  2227 20  1331 20  0407  20  0352 20  1751   WBC 10*3/mm3 14.45*  --   --  10.00  --  10.08 14.20*   HEMOGLOBIN g/dL 7.7* 6.8* 7.2* 6.4*   < > 6.8* 8.8*   HEMATOCRIT % 26.2* 22.9* 24.1* 22.3*   < > 22.9* 28.5*   PLATELETS 10*3/mm3 145  --   --  110*  --  130* 198   INR   --   --   --   --   --  1.23*  --    PROCALCITONIN ng/mL  --   --   --   --   --   --  0.59*    < > = values in this interval not displayed.     Results from last 7 days   Lab Units 20  0835 20  0407 20  0352 20  8991   SODIUM mmol/L 137 134* 135* 133*     POTASSIUM mmol/L 4.3 3.8 3.8 3.6   CHLORIDE mmol/L 102 100 99 94*   CO2 mmol/L 24.0 23.0 23.0 19.6*   BUN mg/dL 8 9 12 20   CREATININE mg/dL 0.71 0.65 0.63 0.79   GLUCOSE mg/dL 196* 167* 143* 145*   CALCIUM mg/dL 7.9* 7.6* 7.8* 8.8   ALT (SGPT) U/L 30 15 11 16   AST (SGOT) U/L 42* 31 14 26   TROPONIN T ng/mL  --   --   --  <0.010   PROBNP pg/mL  --   --   --  1,230.0*     Estimated Creatinine Clearance: 129.9 mL/min (by C-G formula based on SCr of 0.71 mg/dL).    Microbiology Results Abnormal     Procedure Component Value - Date/Time    Blood Culture - Blood, Wrist, Left [[id number]]  (Abnormal) Collected:  06/01/20 1601    Lab Status:  Final result Specimen:  Blood from Wrist, Left Updated:  06/04/20 0612     Blood Culture Staphylococcus aureus, MRSA     Comment:   Infectious disease consultation is highly recommended to rule out distant foci of infection.  Methicillin resistant Staphylococcus aureus, Patient may be an isolation risk.        Isolated from Aerobic Bottle     Gram Stain Aerobic Bottle Gram positive cocci in clusters    Narrative:       Refer to previous blood culture collected on 6/1/2020 1558 for MICs.      Blood Culture - Blood, Arm, Left [[id number]]  (Abnormal)  (Susceptibility) Collected:  06/01/20 1558    Lab Status:  Final result Specimen:  Blood from Arm, Left Updated:  06/04/20 0610     Blood Culture Staphylococcus aureus, MRSA     Comment:   Infectious disease consultation is highly recommended to rule out distant foci of infection.  Methicillin resistant Staphylococcus aureus, Patient may be an isolation risk.        Isolated from Aerobic and Anaerobic Bottles     Gram Stain Aerobic Bottle Gram positive cocci in clusters      Anaerobic Bottle Gram positive cocci in clusters    Susceptibility      Staphylococcus aureus, MRSA     TYRONE     Gentamicin Susceptible     Oxacillin Resistant     Rifampin Susceptible     Vancomycin Susceptible                Susceptibility Comments     Staphylococcus  aureus, MRSA    This isolate does not demonstrate inducible clindamycin resistance in vitro.               Blood Culture ID, PCR - Blood, Arm, Left [[id number]]  (Abnormal) Collected:  06/01/20 1558    Lab Status:  Final result Specimen:  Blood from Arm, Left Updated:  06/02/20 0721     BCID, PCR Staphylococcus aureus. mecA (methicillin resistance gene) detected. Identification by BCID PCR.     BOTTLE TYPE Aerobic Bottle          Imaging Results (Last 24 Hours)     ** No results found for the last 24 hours. **          Results for orders placed during the hospital encounter of 06/01/20   Adult Transthoracic Echo Complete W/ Cont if Necessary Per Protocol    Narrative · Calculated EF = 69.0%.  · Left ventricular systolic function is normal.  · Mild mitral valve regurgitation is present  · Mild tricuspid valve regurgitation is present.  · Calculated right ventricular systolic pressure from tricuspid   regurgitation is 31 mmHg.          I have reviewed the medications:  Scheduled Meds:    budesonide-formoterol 2 puff Inhalation BID - RT   ceftaroline 600 mg Intravenous BID   DAPTOmycin 8 mg/kg (Adjusted) Intravenous Q24H   FLUoxetine 40 mg Oral Daily   gabapentin 800 mg Oral Q8H   heparin (porcine) 5,000 Units Subcutaneous Q8H   levETIRAcetam 500 mg Oral Q12H   levothyroxine 50 mcg Oral Daily   pantoprazole 40 mg Oral QAM     Continuous Infusions:    sodium chloride 125 mL/hr Last Rate: 125 mL/hr (06/04/20 0538)     PRN Meds:.•  acetaminophen  •  HYDROcodone-acetaminophen  •  ipratropium-albuterol  •  ondansetron  •  tiZANidine    Assessment/Plan   Assessment & Plan     Active Hospital Problems    Diagnosis  POA   • **Sepsis (CMS/HCC) [A41.9]  Yes   • DDD (degenerative disc disease), cervical [M50.30]  Unknown   • Anxiety [F41.9]  Unknown   • Depression [F32.9]  Unknown   • Seizure (CMS/HCC) [R56.9]  Unknown   • Migraine [G43.909]  Unknown   • Essential hypertension [I10]  Yes   • Fibromyalgia syndrome [M79.7]  Yes   •  GERD without esophagitis [K21.9]  Yes      Resolved Hospital Problems   No resolved problems to display.        Brief Hospital Course to date:  Liliana Calderon is a 39 y.o. female  with past medical history significant for depression/anxiety, fibromyalgia, COPD, hypertension, seizure disorder, migraine headache, and remote tobacco/IV drug abuse.  Patient reported that she stopped smoking in 2014.  She also reported that she stopped using IV drugs, mainly heroin, about a month ago.  Patient was reportedly well up until 3 days prior to presentation.  She reported fever, shortness of air, lower back/leg pain, as well as generalized malaise.  She presented to the ED at outside hospital and was found to have MRSA bacteremia with associated left-sided pneumonia, as well as cavitary lesion in the right upper lobe concerning for possible septic emboli.     Plan  Sepsis with MRSA bacteremia, MRSA UTI and pneumonia, also with multiple right-sided pulmonary nodules with cavitation suspicious for septic emboli  -Patient has underlying history of IV drug use  -Repeat blood cultures with MRSA, ID has been consulted and are following, get repeat blood today   -Antibiotics changed to daptomycin and Teflaro, will continue for now  -MRI L-spine concerning for multiple epidural abscesses, neurosurgery has evaluated and no intervention is planned, will get CT-guided drainage with IR, discussed with IR, unable to completely drain, the best they can do is aspirate sample.   - Echo shows mild LV and TV regurgitation,   plan for REDD in a.m., keep n.p.o. after midnight  -COVID-19 test is negative       Microcytic anemia  -H&H continues to drop, she is s/p 1 unit PRBC with good response hemoglobin 7.7 this morning  -Iron studies suggest a mixed picture ?Anemia of chronic disease  -start IV iron replacement, infuse for 3 days  -Continue to trend H&H     Acute on chronic low back pain  -Patient has history of DDD, however currently etiology  remains unclear  -MRI lumbar spine  shows multiple epidural abscess as above  -PT/OT to evaluate     Elevated bilirubin and alk phos  -Etiology currently unknown, suspect secondary to underlying infection continue to monitor  -Follow-up acute hepatitis panel is negative and HIV nonreactive     Hypothyroidism-continue home Synthroid     Hypertension  -BP currently low hold home antihypertensives of lisinopril and HCTZ     Fibromyalgia-stable, continue home gabapentin     Depression and anxiety-mood currently stable, continue Prozac     History of IVDU   -Last used heroin a month ago  -Chemical dependency consult in place     Seizure disorder (data deficient)  -Continue home Keppra and maintain seizure precautions     COPD--exacerbation, continue home Symbicort     Right ankle monitor  -Per sister patient was arrested and Jailed in January, where she was until April, she was released to house arrest with an ankle monitor sec to COVID. She does have an ongoing court case.  -CM obtained approval to remove monitor for MRI, on discharge patient will have to report back to [facility] longterm [place] to have this placed back.     DVT Prophylaxis: [facility]    Patient is is in touch with sister    Disposition: TBD    CODE STATUS:   Code Status and Medical Interventions:   Ordered at: 06/01/20 1441     Code Status:    CPR     Medical Interventions (Level of Support Prior to Arrest):    Full       Electronically signed by Oskar Black MD, 06/04/20, 11:19 AM.

## 2020-06-04 NOTE — PROGRESS NOTES
Continued Stay Note   [facility]     Patient Name: Liliana Calderon  MRN: [id number]  Today's Date: 6/4/2020    Admit Date: 6/1/2020    Discharge Plan     Row Name 06/04/20 0911       Plan    Plan  Ongoing    Patient/Family in Agreement with Plan  yes    Plan Comments  Spoke with pt. at bedside. She says she feels weak and tired. Is scheduled for aspiration of paraspinal abcess and possible REDD today. Will cont. to follow and update.     Final Discharge Disposition Code  30 - still a patient        Discharge Codes    No documentation.             Komal Schuster RN

## 2020-06-04 NOTE — PROGRESS NOTES
Continued Stay Note  Psychiatric     Patient Name: Liliana Calderon  MRN: [id number]  Today's Date: 6/4/2020    Admit Date: 6/1/2020    Discharge Plan     Row Name 06/04/20 1502       Plan    Plan  Ongoing    Plan Comments  Continuing to follow through hospital course.        Discharge Codes    No documentation.             Bhumika Mooney RN ,BSN   Addiction Coordinator

## 2020-06-04 NOTE — POST-PROCEDURE NOTE
Interventional Radiology Operative Note    Date: 06/04/20     Time: 17:22     Pre-op Diagnosis:  Sepsis with MRSA bacteremia, MRSA UTI and pneumonia, also with multiple right-sided pulmonary nodules with cavitation suspicious for septic emboli. IVDA.  -MRI L-spine concerning for multiple epidural abscesses, neurosurgery has evaluated and no intervention is planned  Post-op Diagnosis: Same    Procedure: CT guided aspiration of small pocket of fluid adjacent to vertebral body/in right side erector spinae muscle. Collection not large enough to leave drain/contraindicated to leave drain in collection that may communicate with epidural space.    Surgeon: HOLLY Huggins M.D.  Assistants: None    Sedation:  Moderate Sedation    Estimated Blood Loss (EBL): Trace     Urine Output (UOP): N/A (short procedure)    IVF: N/A (short procedure)    Findings: Very small pocket of fluid adjacent to vertebral body/in right side erector spinae muscle.    Specimens: 2 ml purulent appearing fluid. Portion sent for microbiology. Portion sent for cell count (likely insufficient fluid for cell count)    Complications: No immediate    Disposition: Recovery. Stable

## 2020-06-04 NOTE — NURSING NOTE
CT aspiration biopsy performed by Dr. Chaves; 2 cc pus removed. The patient tolerated well. Report called to nurse.

## 2020-06-05 ENCOUNTER — APPOINTMENT (OUTPATIENT)
Dept: CT IMAGING | Facility: HOSPITAL | Age: 40
End: 2020-06-05

## 2020-06-05 ENCOUNTER — ANESTHESIA (OUTPATIENT)
Dept: CARDIOLOGY | Facility: HOSPITAL | Age: 40
End: 2020-06-05

## 2020-06-05 ENCOUNTER — ANESTHESIA EVENT (OUTPATIENT)
Dept: CARDIOLOGY | Facility: HOSPITAL | Age: 40
End: 2020-06-05

## 2020-06-05 ENCOUNTER — APPOINTMENT (OUTPATIENT)
Dept: CARDIOLOGY | Facility: HOSPITAL | Age: 40
End: 2020-06-05

## 2020-06-05 PROBLEM — G06.2 EPIDURAL ABSCESS: Status: ACTIVE | Noted: 2020-06-05

## 2020-06-05 PROBLEM — I33.0 INFECTIVE ENDOCARDITIS: Status: ACTIVE | Noted: 2020-06-05

## 2020-06-05 PROBLEM — R78.81 MRSA BACTEREMIA: Status: ACTIVE | Noted: 2020-06-05

## 2020-06-05 PROBLEM — B95.62 MRSA BACTEREMIA: Status: ACTIVE | Noted: 2020-06-05

## 2020-06-05 LAB
BH BB BLOOD EXPIRATION DATE: NORMAL
BH BB BLOOD TYPE BARCODE: 5100
BH BB DISPENSE STATUS: NORMAL
BH BB PRODUCT CODE: NORMAL
BH BB UNIT NUMBER: NORMAL
BH CV ECHO MEAS - AO ROOT DIAM: 2 CM
BH CV ECHO MEAS - BSA(HAYCOCK): 2.3 M^2
BH CV ECHO MEAS - BSA: 2.1 M^2
BH CV ECHO MEAS - BZI_BMI: 38.8 KILOGRAMS/M^2
BH CV ECHO MEAS - BZI_METRIC_HEIGHT: 165 CM
BH CV ECHO MEAS - BZI_METRIC_WEIGHT: 105.7 KG
BH CV VAS BP LEFT ARM: NORMAL MMHG
C DIFF TOX GENS STL QL NAA+PROBE: NOT DETECTED
CROSSMATCH INTERPRETATION: NORMAL
HCT VFR BLD AUTO: 24.6 % (ref 34–46.6)
HCT VFR BLD AUTO: 29.6 % (ref 34–46.6)
HCT VFR BLD AUTO: 29.8 % (ref 34–46.6)
HGB BLD-MCNC: 7.5 G/DL (ref 12–15.9)
HGB BLD-MCNC: 8.7 G/DL (ref 12–15.9)
HGB BLD-MCNC: 9.1 G/DL (ref 12–15.9)
UNIT  ABO: NORMAL
UNIT  RH: NORMAL

## 2020-06-05 PROCEDURE — 97530 THERAPEUTIC ACTIVITIES: CPT

## 2020-06-05 PROCEDURE — 87493 C DIFF AMPLIFIED PROBE: CPT | Performed by: INTERNAL MEDICINE

## 2020-06-05 PROCEDURE — 99232 SBSQ HOSP IP/OBS MODERATE 35: CPT | Performed by: NEUROLOGICAL SURGERY

## 2020-06-05 PROCEDURE — 93312 ECHO TRANSESOPHAGEAL: CPT

## 2020-06-05 PROCEDURE — 25010000002 DAPTOMYCIN PER 1 MG: Performed by: INTERNAL MEDICINE

## 2020-06-05 PROCEDURE — 25010000002 IOPAMIDOL 61 % SOLUTION: Performed by: INTERNAL MEDICINE

## 2020-06-05 PROCEDURE — 97162 PT EVAL MOD COMPLEX 30 MIN: CPT

## 2020-06-05 PROCEDURE — 97166 OT EVAL MOD COMPLEX 45 MIN: CPT

## 2020-06-05 PROCEDURE — 93320 DOPPLER ECHO COMPLETE: CPT | Performed by: INTERNAL MEDICINE

## 2020-06-05 PROCEDURE — 94799 UNLISTED PULMONARY SVC/PX: CPT

## 2020-06-05 PROCEDURE — 94640 AIRWAY INHALATION TREATMENT: CPT

## 2020-06-05 PROCEDURE — 71260 CT THORAX DX C+: CPT

## 2020-06-05 PROCEDURE — 25010000002 NA FERRIC GLUC CPLX PER 12.5 MG: Performed by: INTERNAL MEDICINE

## 2020-06-05 PROCEDURE — 85018 HEMOGLOBIN: CPT | Performed by: INTERNAL MEDICINE

## 2020-06-05 PROCEDURE — 97535 SELF CARE MNGMENT TRAINING: CPT

## 2020-06-05 PROCEDURE — 25010000002 HEPARIN (PORCINE) PER 1000 UNITS: Performed by: HOSPITALIST

## 2020-06-05 PROCEDURE — 25010000002 HYDROMORPHONE PER 4 MG: Performed by: INTERNAL MEDICINE

## 2020-06-05 PROCEDURE — 25010000002 ONDANSETRON PER 1 MG: Performed by: HOSPITALIST

## 2020-06-05 PROCEDURE — 93312 ECHO TRANSESOPHAGEAL: CPT | Performed by: INTERNAL MEDICINE

## 2020-06-05 PROCEDURE — 93321 DOPPLER ECHO F-UP/LMTD STD: CPT

## 2020-06-05 PROCEDURE — 99232 SBSQ HOSP IP/OBS MODERATE 35: CPT | Performed by: INTERNAL MEDICINE

## 2020-06-05 PROCEDURE — 93325 DOPPLER ECHO COLOR FLOW MAPG: CPT

## 2020-06-05 PROCEDURE — 85014 HEMATOCRIT: CPT | Performed by: INTERNAL MEDICINE

## 2020-06-05 PROCEDURE — 25010000002 CEFTAROLINE FOSAMIL PER 10 MG: Performed by: INTERNAL MEDICINE

## 2020-06-05 PROCEDURE — 93325 DOPPLER ECHO COLOR FLOW MAPG: CPT | Performed by: INTERNAL MEDICINE

## 2020-06-05 RX ORDER — NYSTATIN 100000 [USP'U]/G
POWDER TOPICAL EVERY 12 HOURS SCHEDULED
Status: DISCONTINUED | OUTPATIENT
Start: 2020-06-05 | End: 2020-06-16 | Stop reason: HOSPADM

## 2020-06-05 RX ORDER — HYDROMORPHONE HYDROCHLORIDE 1 MG/ML
0.25 INJECTION, SOLUTION INTRAMUSCULAR; INTRAVENOUS; SUBCUTANEOUS EVERY 4 HOURS PRN
Status: DISCONTINUED | OUTPATIENT
Start: 2020-06-05 | End: 2020-06-08

## 2020-06-05 RX ORDER — LOPERAMIDE HYDROCHLORIDE 2 MG/1
2 CAPSULE ORAL ONCE
Status: COMPLETED | OUTPATIENT
Start: 2020-06-05 | End: 2020-06-05

## 2020-06-05 RX ORDER — HYDROMORPHONE HYDROCHLORIDE 1 MG/ML
0.25 INJECTION, SOLUTION INTRAMUSCULAR; INTRAVENOUS; SUBCUTANEOUS EVERY 4 HOURS PRN
Status: CANCELLED | OUTPATIENT
Start: 2020-06-05 | End: 2020-06-15

## 2020-06-05 RX ADMIN — DAPTOMYCIN 600 MG: 500 INJECTION, POWDER, LYOPHILIZED, FOR SOLUTION INTRAVENOUS at 08:19

## 2020-06-05 RX ADMIN — SODIUM CHLORIDE 125 MG: 9 INJECTION, SOLUTION INTRAVENOUS at 15:06

## 2020-06-05 RX ADMIN — ONDANSETRON 4 MG: 2 INJECTION INTRAMUSCULAR; INTRAVENOUS at 15:10

## 2020-06-05 RX ADMIN — TIZANIDINE 4 MG: 4 TABLET ORAL at 20:19

## 2020-06-05 RX ADMIN — BUDESONIDE AND FORMOTEROL FUMARATE DIHYDRATE 2 PUFF: 160; 4.5 AEROSOL RESPIRATORY (INHALATION) at 09:46

## 2020-06-05 RX ADMIN — PANTOPRAZOLE SODIUM 40 MG: 40 TABLET, DELAYED RELEASE ORAL at 05:22

## 2020-06-05 RX ADMIN — BUDESONIDE AND FORMOTEROL FUMARATE DIHYDRATE 2 PUFF: 160; 4.5 AEROSOL RESPIRATORY (INHALATION) at 20:23

## 2020-06-05 RX ADMIN — ACETAMINOPHEN 650 MG: 325 TABLET, FILM COATED ORAL at 00:29

## 2020-06-05 RX ADMIN — GABAPENTIN 800 MG: 400 CAPSULE ORAL at 14:30

## 2020-06-05 RX ADMIN — HYDROCODONE BITARTRATE AND ACETAMINOPHEN 1 TABLET: 5; 325 TABLET ORAL at 08:31

## 2020-06-05 RX ADMIN — LEVETIRACETAM 500 MG: 500 TABLET ORAL at 08:19

## 2020-06-05 RX ADMIN — HYDROCODONE BITARTRATE AND ACETAMINOPHEN 1 TABLET: 5; 325 TABLET ORAL at 14:30

## 2020-06-05 RX ADMIN — IOPAMIDOL 95 ML: 612 INJECTION, SOLUTION INTRAVENOUS at 21:00

## 2020-06-05 RX ADMIN — LEVOTHYROXINE SODIUM 50 MCG: 50 TABLET ORAL at 08:18

## 2020-06-05 RX ADMIN — GABAPENTIN 800 MG: 400 CAPSULE ORAL at 21:22

## 2020-06-05 RX ADMIN — TIZANIDINE 4 MG: 4 TABLET ORAL at 08:31

## 2020-06-05 RX ADMIN — CEFTAROLINE FOSAMIL 600 MG: 600 POWDER, FOR SOLUTION INTRAVENOUS at 20:19

## 2020-06-05 RX ADMIN — HYDROMORPHONE HYDROCHLORIDE 0.5 MG: 1 INJECTION, SOLUTION INTRAMUSCULAR; INTRAVENOUS; SUBCUTANEOUS at 00:29

## 2020-06-05 RX ADMIN — HYDROMORPHONE HYDROCHLORIDE 0.25 MG: 1 INJECTION, SOLUTION INTRAMUSCULAR; INTRAVENOUS; SUBCUTANEOUS at 20:20

## 2020-06-05 RX ADMIN — CEFTAROLINE FOSAMIL 600 MG: 600 POWDER, FOR SOLUTION INTRAVENOUS at 08:19

## 2020-06-05 RX ADMIN — HEPARIN SODIUM 5000 UNITS: 5000 INJECTION, SOLUTION INTRAVENOUS; SUBCUTANEOUS at 14:30

## 2020-06-05 RX ADMIN — HYDROMORPHONE HYDROCHLORIDE 0.25 MG: 1 INJECTION, SOLUTION INTRAMUSCULAR; INTRAVENOUS; SUBCUTANEOUS at 16:14

## 2020-06-05 RX ADMIN — IPRATROPIUM BROMIDE AND ALBUTEROL SULFATE 3 ML: 2.5; .5 SOLUTION RESPIRATORY (INHALATION) at 09:46

## 2020-06-05 RX ADMIN — HEPARIN SODIUM 5000 UNITS: 5000 INJECTION, SOLUTION INTRAVENOUS; SUBCUTANEOUS at 05:22

## 2020-06-05 RX ADMIN — LOPERAMIDE HYDROCHLORIDE 2 MG: 2 CAPSULE ORAL at 20:20

## 2020-06-05 RX ADMIN — GABAPENTIN 800 MG: 400 CAPSULE ORAL at 05:22

## 2020-06-05 RX ADMIN — HEPARIN SODIUM 5000 UNITS: 5000 INJECTION, SOLUTION INTRAVENOUS; SUBCUTANEOUS at 21:21

## 2020-06-05 RX ADMIN — Medication 1 CAPSULE: at 08:18

## 2020-06-05 RX ADMIN — HYDROMORPHONE HYDROCHLORIDE 0.5 MG: 1 INJECTION, SOLUTION INTRAMUSCULAR; INTRAVENOUS; SUBCUTANEOUS at 05:22

## 2020-06-05 RX ADMIN — LEVETIRACETAM 500 MG: 500 TABLET ORAL at 20:19

## 2020-06-05 RX ADMIN — FLUOXETINE HYDROCHLORIDE 40 MG: 20 CAPSULE ORAL at 08:18

## 2020-06-05 RX ADMIN — SODIUM CHLORIDE 125 ML/HR: 9 INJECTION, SOLUTION INTRAVENOUS at 03:34

## 2020-06-05 NOTE — PROGRESS NOTES
Continued Stay Note  [facility]     Patient Name: Liliana Calderon  MRN: [id number]  Today's Date: 6/5/2020    Admit Date: 6/1/2020    Discharge Plan     Row Name 06/05/20 0933       Plan    Plan  Ongoing    Plan Comments  Pt. still running fever. Waiting on culture from spinal abcess. Still needs a REDD. Will cont. to follow and update.     Final Discharge Disposition Code  30 - still a patient        Discharge Codes    No documentation.             Komal Schuster RN

## 2020-06-05 NOTE — THERAPY EVALUATION
Acute Care - Occupational Therapy Initial Evaluation  [facility]     Patient Name: Liliana Calderon  : 1980  MRN: [id number]  Today's Date: 2020     Date of Referral to OT: 20       Admit Date: 2020       ICD-10-CM ICD-9-CM   1. Impaired mobility and ADLs Z74.09 799.89     Patient Active Problem List   Diagnosis   • Bradycardia with 41-50 beats per minute   • Essential hypertension   • Fibromyalgia syndrome   • GERD without esophagitis   • Sepsis (CMS/HCC)   • DDD (degenerative disc disease), cervical   • Anxiety   • Depression   • Seizure (CMS/HCC)   • Migraine     Past Medical History:   Diagnosis Date   • Anxiety and depression    • Chronic bronchitis (CMS/HCC)    • Fibromyalgia    • Heart valve problem    • Hypertension    • Migraine    • PCOS (polycystic ovarian syndrome)    • Pneumonia    • Seizures (CMS/HCC)      Past Surgical History:   Procedure Laterality Date   • CHOLECYSTECTOMY            OT ASSESSMENT FLOWSHEET (last 12 hours)      Occupational Therapy Evaluation     Row Name 20 1105                   OT Evaluation Time/Intention    Subjective Information  complains of;weakness;fatigue;pain  -KF        Document Type  evaluation  -KF        Mode of Treatment  occupational therapy  -KF        Patient Effort  fair  -KF        Symptoms Noted During/After Treatment  increased pain  -KF        Comment  very limited 2/2 pain and some inconsistencies with AROM demonstrated during functional tasks   -KF           General Information    Patient Profile Reviewed?  yes  -KF        Patient Observations  alert;cooperative;agree to therapy  -KF        Prior Level of Function  independent:;all household mobility;community mobility;transfer;ADL's;bed mobility  -KF        Equipment Currently Used at Home  none  -KF        Existing Precautions/Restrictions  fall;spinal;oxygen therapy device and L/min  -KF        Risks Reviewed  patient:;LOB;nausea/vomiting;dizziness;increased  discomfort;change in vital signs  -KF        Benefits Reviewed  patient:;improve function;increase independence;increase strength;increase balance;decrease pain;improve skin integrity;increase knowledge  -KF        Barriers to Rehab  medically complex  -KF           Relationship/Environment    Lives With  alone  -KF           Resource/Environmental Concerns    Current Living Arrangements  home/apartment/condo  -KF           Home Main Entrance    Number of Stairs, Main Entrance  four  -KF        Stair Railings, Main Entrance  railings on both sides of stairs  -KF           Cognitive Assessment/Interventions    Additional Documentation  Cognitive Assessment/Intervention (Group)  -KF           Cognitive Assessment/Intervention- PT/OT    Affect/Mental Status (Cognitive)  WFL  -KF        Orientation Status (Cognition)  oriented x 4  -KF        Follows Commands (Cognition)  WNL;follows one step commands;over 90% accuracy  -KF        Cognitive Function (Cognitive)  safety deficit  -KF        Safety Deficit (Cognitive)  mild deficit;insight into deficits/self awareness;judgment;awareness of need for assistance  -KF        Cognitive Interventions (Cognitive)  metacognitive skills training;occupation/activity based interventions  -KF           Safety Issues, Functional Mobility    Safety Issues Affecting Function (Mobility)  judgment;insight into deficits/self awareness;safety precaution awareness  -KF        Impairments Affecting Function (Mobility)  balance;endurance/activity tolerance;pain;postural/trunk control;range of motion (ROM);strength  -KF           Bed Mobility Assessment/Treatment    Bed Mobility Assessment/Treatment  rolling left;rolling right;scooting/bridging  -KF        Rolling Left [place] (Bed Mobility)  minimum assist (75% patient effort);2 person assist  -KF        Rolling Right [place] (Bed Mobility)  minimum assist (75% patient effort);2 person assist  -KF        Scooting/Bridging [place]  (Bed Mobility)  maximum assist (25% patient effort);2 person assist  -KF        Bed Mobility, Safety Issues  decreased use of arms for pushing/pulling;decreased use of legs for bridging/pushing;impaired trunk control for bed mobility  -KF        Assistive Device (Bed Mobility)  bed rails;draw sheet;head of bed elevated  -KF        Comment (Bed Mobility)  limited tolerance for HOB elevated but tolerated it for toileting on bed pan  -KF           Functional Mobility    Functional Mobility- Ind. Level  unable to perform;not tested  -KF        Functional Mobility- Comment  deferred 2/2 pain   -KF           Transfer Assessment/Treatment    Comment (Transfers)  Pt not agreeable to transfers this time due to pain   -KF           ADL Assessment/Intervention    07421 - OT Self Care/Mgmt Minutes  8  -KF        BADL Assessment/Intervention  toileting  -KF           Toileting Assessment/Training    [place] Level (Toileting)  adjust/manage clothing;toileting skills;perform perineal hygiene;change pad/brief;dependent (less than 25% patient effort);two person assist required  -KF        Assistive Devices (Toileting)  bedpan  -KF        Toileting Position  supine  -KF        Comment (Toileting)  dependent use of bed mobility with rolling able to reach across body well with RUE to grab bed rail   -KF           BADL Safety/Performance    Impairments, BADL Safety/Performance  balance;endurance/activity tolerance;pain;range of motion  -KF        Skilled BADL Treatment/Intervention  BADL process/adaptation training;cognitive/safety deficit modifications  -KF           General ROM    GENERAL ROM COMMENTS  RUE WFL, LUE WNL   -KF           MMT (Manual Muscle Testing)    General MMT Comments  RUE unable to assess MMT 2/2 pain increased, LUE grossly 4/5   -KF           Motor Assessment/Interventions    Additional Documentation  Gross Motor Coordination (Group);Fine Motor Testing & Training (Group)  -KF           Gross Motor Coordination     Gross Motor Impairments  AROM (active range of motion);coordination  -KF        Gross Motor Skill, Impairments Detail  mild deficits related to pain in RUE   -KF           Fine Motor Testing & Training    Training Activity, Fine Motor Coordination  other (see comments) AROM   -KF        Comment, Fine Motor Coordination  mild deficits related to edema in index finger of R hand opposition to digit III   -KF           Sensory Assessment/Intervention    Sensory General Assessment  no sensation deficits identified  -KF           Positioning and Restraints    Pre-Treatment Position  in bed  -KF        Post Treatment Position  bed  -KF        In Bed  notified nsg;supine;fowlers;side rails up x3;legs elevated;LUE elevated;RUE elevated;exit alarm on;call light within reach;encouraged to call for assist seizure pads  -KF           Pain Assessment    Additional Documentation  Pain Scale: Numbers Pre/Post-Treatment (Group)  -KF           Pain Scale: Numbers Pre/Post-Treatment    Pain Scale: Numbers, Pretreatment  9/10  -KF        Pain Scale: Numbers, Post-Treatment  10/10  -KF        Pain Location - Side  Bilateral  -KF        Pain Location - Orientation  generalized  -KF        Pre/Post Treatment Pain Comment  multiple joints  -KF        Pain Intervention(s)  Repositioned  -KF           Plan of Care Review    Plan of Care Reviewed With  patient  -KF        Progress  no change  -KF           Clinical Impression (OT)    Date of Referral to OT  06/04/20  -KF        OT Diagnosis  ADL decline   -KF        Patient/Family Goals Statement (OT Eval)  PLOF   -KF        Criteria for Skilled Therapeutic Interventions Met (OT Eval)  yes;treatment indicated  -KF        Rehab Potential (OT Eval)  good, to achieve stated therapy goals  -KF        Therapy Frequency (OT Eval)  3 times/wk  -KF        Care Plan Review (OT)  evaluation/treatment results reviewed;care plan/treatment goals reviewed;risks/benefits reviewed;current/potential  barriers reviewed;patient/other agree to care plan  -KF        Anticipated Equipment Needs at Discharge (OT)  -- TBD  -KF        Anticipated Discharge Disposition (OT)  inpatient rehabilitation facility  -KF           Vital Signs    Pre Systolic BP Rehab  -- RN cleared VSS  -KF        Pre SpO2 (%)  97  -KF        O2 Delivery Pre Treatment  supplemental O2  -KF        O2 Delivery Intra Treatment  supplemental O2  -KF        Post SpO2 (%)  98  -KF        O2 Delivery Post Treatment  supplemental O2  -KF        Pre Patient Position  Supine  -KF        Intra Patient Position  Side Lying  -KF        Post Patient Position  Supine  -KF           Planned OT Interventions    Planned Therapy Interventions (OT Eval)  activity tolerance training;adaptive equipment training;BADL retraining;cognitive/visual perception retraining;functional balance retraining;IADL retraining;neuromuscular control/coordination retraining;occupation/activity based interventions;patient/caregiver education/training;ROM/therapeutic exercise;strengthening exercise;transfer/mobility retraining  -KF           OT Goals    Bed Mobility Goal Selection (OT)  bed mobility, OT goal 1  -KF        Transfer Goal Selection (OT)  transfer, OT goal 1  -KF        Dressing Goal Selection (OT)  dressing, OT goal 1  -KF        Strength Goal Selection (OT)  strength, OT goal 1  -KF        Additional Documentation  Strength Goal Selection (OT) (Row)  -KF           Bed Mobility Goal 1 (OT)    Activity/Assistive Device (Bed Mobility Goal 1, OT)  sidelying to sit/sit to sidelying  -KF        [place] Level/Cues Needed (Bed Mobility Goal 1, OT)  minimum assist (75% or more patient effort);2 person assist  -KF        Time Frame (Bed Mobility Goal 1, OT)  long term goal (LTG);by discharge  -KF        Progress/Outcomes (Bed Mobility Goal 1, OT)  goal ongoing  -KF           Transfer Goal 1 (OT)    Activity/Assistive Device (Transfer Goal 1, OT)  sit-to-stand/stand-to-sit  -KF         [place] Level/Cues Needed (Transfer Goal 1, OT)  2 person assist;minimum assist (75% or more patient effort);verbal cues required  -KF        Time Frame (Transfer Goal 1, OT)  long term goal (LTG);by discharge  -KF        Progress/Outcome (Transfer Goal 1, OT)  goal ongoing  -KF           Dressing Goal 1 (OT)    Activity/Assistive Device (Dressing Goal 1, OT)  lower body dressing AE PRN socks/undergarment   -KF        [place]/Cues Needed (Dressing Goal 1, OT)  verbal cues required;moderate assist (50-74% patient effort)  -KF        Time Frame (Dressing Goal 1, OT)  long term goal (LTG);by discharge  -KF        Progress/Outcome (Dressing Goal 1, OT)  goal ongoing  -KF           Strength Goal 1 (OT)    Strength Goal 1 (OT)  Tolerated AROM RUE 10 reps to progress ADL completion   -KF        Time Frame (Strength Goal 1, OT)  long term goal (LTG);by discharge  -KF        Progress/Outcome (Strength Goal 1, OT)  goal ongoing  -KF           Living Environment    Home Accessibility  stairs to enter home;tub/shower is not walk in  -KF          User Key  (r) = Recorded By, (t) = Taken By, (c) = Cosigned By    Initials Name Effective Dates    KF Lucia Bass, OT 04/03/18 -          Occupational Therapy Education                 Title: PT OT SLP Therapies (Done)     Topic: Occupational Therapy (Done)     Point: ADL training (Done)     Description:   Instruct learner(s) on proper safety adaptation and remediation techniques during self care or transfers.   Instruct in proper use of assistive devices.              Learning Progress Summary           Patient Acceptance, E,TB,D, VU,DU,NR by KF at 6/5/2020 1105                   Point: Home exercise program (Done)     Description:   Instruct learner(s) on appropriate technique for monitoring, assisting and/or progressing therapeutic exercises/activities.              Learning Progress Summary           Patient Acceptance, E,TB,D, VU,DU,NR by KF at 6/5/2020 1105                    Point: Precautions (Done)     Description:   Instruct learner(s) on prescribed precautions during self-care and functional transfers.              Learning Progress Summary           Patient Acceptance, E,TB,D, VU,DU,NR by KF at 6/5/2020 1105                   Point: Body mechanics (Done)     Description:   Instruct learner(s) on proper positioning and spine alignment during self-care, functional mobility activities and/or exercises.              Learning Progress Summary           Patient Acceptance, E,TB,D, VU,DU,NR by KF at 6/5/2020 1105                               User Key     Initials Effective Dates Name Provider Type Discipline     04/03/18 -  Lucia Bass, OT Occupational Therapist OT                  OT Recommendation and Plan  Outcome Summary/Treatment Plan (OT)  Anticipated Equipment Needs at Discharge (OT): (TBD)  Anticipated Discharge Disposition (OT): inpatient rehabilitation facility  Planned Therapy Interventions (OT Eval): activity tolerance training, adaptive equipment training, BADL retraining, cognitive/visual perception retraining, functional balance retraining, IADL retraining, neuromuscular control/coordination retraining, occupation/activity based interventions, patient/caregiver education/training, ROM/therapeutic exercise, strengthening exercise, transfer/mobility retraining  Therapy Frequency (OT Eval): 3 times/wk  Plan of Care Review  Plan of Care Reviewed With: patient  Plan of Care Reviewed With: patient  Outcome Summary: OT eval completed Pt presents with deficits in strength, activity tolerance, and pain for AROM for ADL completion, unable to complete OOB due to pain today, able to roll R and L with minAx2 for toileting tasks with bedpan, recom IPOT 3xwk pending pain and participation in therapy, recom rehab pending progress    Outcome Measures     Row Name 06/05/20 1105             How much help from another is currently needed...    Putting on and taking  off regular lower body clothing?  1  -KF      Bathing (including washing, rinsing, and drying)  2  -KF      Toileting (which includes using toilet bed pan or urinal)  1  -KF      Putting on and taking off regular upper body clothing  2  -KF      Taking care of personal grooming (such as brushing teeth)  3  -KF      Eating meals  4  -KF      AM-PAC 6 Clicks Score (OT)  13  -KF         Functional Assessment    Outcome Measure Options  AM-PAC 6 Clicks Daily Activity (OT)  -KF        User Key  (r) = Recorded By, (t) = Taken By, (c) = Cosigned By    Initials Name Provider Type    Lucia Salvador OT Occupational Therapist          Time Calculation:   Time Calculation- OT     Row Name 06/05/20 1105             Time Calculation- OT    OT Start Time  1105  -KF      Total Timed Code Minutes- OT  8 minute(s)  -KF      OT Received On  06/05/20  -KF      OT Goal Re-Cert Due Date  06/15/20  -KF         Timed Charges    61442 - OT Self Care/Mgmt Minutes  8  -KF        User Key  (r) = Recorded By, (t) = Taken By, (c) = Cosigned By    Initials Name Provider Type    Lucia Salvador OT Occupational Therapist        Therapy Charges for Today     Code Description Service Date Service Provider Modifiers Qty    [id number] HC OT SELF CARE/MGMT/TRAIN EA 15 MIN 6/5/2020 Lucia Bass OT GO 1    [id number]  OT EVAL MOD COMPLEXITY 4 6/5/2020 Lucia Bass OT GO 1               Lucia Bass OT  6/5/2020

## 2020-06-05 NOTE — PROGRESS NOTES
[facility] Medicine Services  PROGRESS NOTE    Patient Name: Liliana Calderon  : 1980  MRN: [id number]    Date of Admission: 2020  Primary Care Physician: Ollie Flores MD    Subjective   Subjective     CC:  Follow-up for MRSA bacteremia and infective endocarditis tricuspid valve    HPI:  Still complaining of significant pain in her back, legs, and arm.  Denies significant chest pain, shortness of breath.  Did spike a fever overnight to 102.5    Review of Systems  CV- No chest pain, palpitations  Resp- No cough, dyspnea  GI- No N/V/D, abd pain    Objective   Objective     Vital Signs:   Temp:  [98.7 °F (37.1 °C)-102.5 °F (39.2 °C)] 99 °F (37.2 °C)  Heart Rate:  [65-89] 84  Resp:  [18-36] 36  BP: (102-134)/(58-89) 123/68        Physical Exam:  Constitutional: No acute distress, awake, alert, obese  HENT: NCAT, mucous membranes moist  Respiratory: Clear to auscultation bilaterally, respiratory effort normal  Cardiovascular: RRR, no murmurs, no lower extremity edema   Gastrointestinal: Positive bowel sounds, soft, nontender, nondistended, obese   Psychiatric: Appropriate affect, cooperative  Neurologic: Oriented x 3, Cranial Nerves grossly intact to confrontation, speech clear  Skin: Has several spots of hyperpigmentation across face    Results Reviewed:  Results from last 7 days   Lab Units 20  1532 20  0504 20  2239  20  0835  20  0407  20  0352 20  1751   WBC 10*3/mm3  --   --   --   --  14.45*  --  10.00  --  10.08 14.20*   HEMOGLOBIN g/dL 8.7* 7.5* 7.9*   < > 7.7*   < > 6.4*   < > 6.8* 8.8*   HEMATOCRIT % 29.8* 24.6* 26.0*   < > 26.2*   < > 22.3*   < > 22.9* 28.5*   PLATELETS 10*3/mm3  --   --   --   --  145  --  110*  --  130* 198   INR   --   --   --   --   --   --   --   --  1.23*  --    PROCALCITONIN ng/mL  --   --   --   --   --   --   --   --   --  0.59*    < > = values in this interval not displayed.     Results from  last 7 days   Lab Units 06/04/20  0835 06/03/20  0407 06/02/20  0352 05/31/20  1751   SODIUM mmol/L 137 134* 135* 133*   POTASSIUM mmol/L 4.3 3.8 3.8 3.6   CHLORIDE mmol/L 102 100 99 94*   CO2 mmol/L 24.0 23.0 23.0 19.6*   BUN mg/dL 8 9 12 20   CREATININE mg/dL 0.71 0.65 0.63 0.79   GLUCOSE mg/dL 196* 167* 143* 145*   CALCIUM mg/dL 7.9* 7.6* 7.8* 8.8   ALT (SGPT) U/L 30 15 11 16   AST (SGOT) U/L 42* 31 14 26   TROPONIN T ng/mL  --   --   --  <0.010   PROBNP pg/mL  --   --   --  1,230.0*     Estimated Creatinine Clearance: 129.9 mL/min (by C-G formula based on SCr of 0.71 mg/dL).    Microbiology Results Abnormal     Procedure Component Value - Date/Time    Blood Culture - Blood, Arm, Right [[id number]] Collected:  06/03/20 1229    Lab Status:  Preliminary result Specimen:  Blood from Arm, Right Updated:  06/05/20 1401     Blood Culture No growth at 2 days    Blood Culture - Blood, Arm, Left [[id number]] Collected:  06/03/20 1229    Lab Status:  Preliminary result Specimen:  Blood from Arm, Left Updated:  06/05/20 1401     Blood Culture No growth at 2 days    Body Fluid Culture - Aspirate, Back [[id number]] Collected:  06/04/20 1645    Lab Status:  Preliminary result Specimen:  Aspirate from Back Updated:  06/04/20 1854     Gram Stain No organisms seen      Many (4+) WBCs per low power field    Blood Culture - Blood, Wrist, Left [[id number]]  (Abnormal) Collected:  06/01/20 1601    Lab Status:  Final result Specimen:  Blood from Wrist, Left Updated:  06/04/20 0612     Blood Culture Staphylococcus aureus, MRSA     Comment:   Infectious disease consultation is highly recommended to rule out distant foci of infection.  Methicillin resistant Staphylococcus aureus, Patient may be an isolation risk.        Isolated from Aerobic Bottle     Gram Stain Aerobic Bottle Gram positive cocci in clusters    Narrative:       Refer to previous blood culture collected on 6/1/2020 1558 for MICs.      Blood Culture - Blood, Arm, Left  [[id number]]  (Abnormal)  (Susceptibility) Collected:  06/01/20 1558    Lab Status:  Final result Specimen:  Blood from Arm, Left Updated:  06/04/20 0610     Blood Culture Staphylococcus aureus, MRSA     Comment:   Infectious disease consultation is highly recommended to rule out distant foci of infection.  Methicillin resistant Staphylococcus aureus, Patient may be an isolation risk.        Isolated from Aerobic and Anaerobic Bottles     Gram Stain Aerobic Bottle Gram positive cocci in clusters      Anaerobic Bottle Gram positive cocci in clusters    Susceptibility      Staphylococcus aureus, MRSA     TYRONE     Gentamicin Susceptible     Oxacillin Resistant     Rifampin Susceptible     Vancomycin Susceptible                Susceptibility Comments     Staphylococcus aureus, MRSA    This isolate does not demonstrate inducible clindamycin resistance in vitro.               Blood Culture ID, PCR - Blood, Arm, Left [[id number]]  (Abnormal) Collected:  06/01/20 1558    Lab Status:  Final result Specimen:  Blood from Arm, Left Updated:  06/02/20 0721     BCID, PCR Staphylococcus aureus. mecA (methicillin resistance gene) detected. Identification by BCID PCR.     BOTTLE TYPE Aerobic Bottle          Imaging Results (Last 24 Hours)     Procedure Component Value Units Date/Time    MRI Lumbar Spine With & Without Contrast [[id number]] Collected:  06/03/20 0829     Updated:  06/04/20 2248    Narrative:       EXAMINATION: MRI LUMBAR SPINE W WO CONTRAST-      INDICATION: LBP, sepsis, rule  out abscess.      TECHNIQUE: Pre and postcontrast sagittal and axial T1, sagittal and  axial T2 and sagittal STIR images of the lumbar spine.     COMPARISON: None.     FINDINGS: Patient history indicates sepsis and lower back pain, history  of IVDA, evaluate for abscess.     Sagittal T2-weighted image #8 series 3 appears to show dorsal epidural  collections at L2-L3 and L3-L4, which showed patchy postcontrast  enhancement presumably abscesses,  possibly interconnected as seen on  sagittal postcontrast image 8 series 11. As seen on sagittal imaging  series, these measure 20 x 10 mm at L2-L3 and 22 x 9 mm at L3-L4.     Sagittal images show no obvious enhancing collection or focal mass  effect on the canal elsewhere.     There is normal vertebral marrow signal, with no evidence to suggest  osteomyelitis. There are disc protrusions at L4-L5 and L5-S1, but no  signal changes to suggest discitis. At the far right lateral margin of  sagittal images, there is a roughly 8 x 4 X 3 cm multilobular collection  within the posterior right paraspinous musculature, indicating abscess  here, generally from the L3-L4 axial level down to L5-S1 axial level.     Axial images, by disc space show no significant canal or foraminal  stenosis at L1-L2.     At L2-L3, canal appears lower limits of normal due to dorsal epidural  collection, but is not markedly stenotic. Foramina appear well  maintained.     At L3-L4, dorsal epidural collection is again seen, with only mild canal  narrowing. Foramina appear normal.     At L4-L5, there is a right paracentral disc protrusion with mild mass  effect on the canal, and probably moderate right-sided foraminal  stenosis. Left neural foramen appears normal.     At L5-S1, there is a left paracentral disc protrusion with mild canal  stenosis. Foramina appear well maintained.     Axial images show no evidence of psoas muscle abscess or inflammation,  or other evidence of retroperitoneal inflammation, but do show the  multilocular abscess in the right posterior paraspinous vasculature.       Impression:       1. Approximately 8 x 4 x 3 cm multilocular abscess of the right erector  spinae muscles, approximately from L3 to S1.  2. Two possibly interconnected dorsal epidural abscesses at L2-L3 and  L3-L4, but causing only mild canal stenosis.  3. No evidence to suggest osteomyelitis or discitis.  4. Moderate-sized L4-L5 and L5-S1 disc protrusions as  discussed above.     D:  06/03/2020  E:  06/03/2020     This report was finalized on 6/4/2020 10:45 PM by Dr. William Boateng MD.       CT Guided Biopsy Aspiration Injection [[id number]] Collected:  06/04/20 1730     Updated:  06/04/20 1740    Narrative:       PROCEDURE: Fluid collection aspiration     Procedural Personnel  Attending physician(s): HOLLY Huggins M.D.  Fellow physician(s): None  Resident physician(s): None  Advanced practice provider(s): None     Pre-procedure diagnosis: Sepsis with MRSA bacteremia, MRSA UTI and  pneumonia, also with multiple right-sided pulmonary nodules with  cavitation suspicious for septic emboli. IVDA.  -MRI L-spine concerning for multiple epidural  abscesses,?neurosurgery?has evaluated and no intervention is planned  Post-procedure diagnosis: Same  Indication: Diagnostic  Additional clinical history: None     Complications: No immediate complications.       Impression:          Percutaneous aspiration of very tiny abscess collection adjacent to  vertebral bodies/within the erector spinae muscle, yielding 2 mL of  purulent appearing fluid. No drainage catheter was left in place - too  small for drainage catheter/contraindicated to leave drainage catheter  in collection with possible communication with epidural space     Plan:      Follow-up lab results.  Continued care with primary team.  ______________________________________________________________________     PROCEDURE SUMMARY:  - Aspiration of tiny abscess pockets under fluoroscopic guidance  - Additional procedure(s): None     PROCEDURE DETAILS:     Pre-procedure  Consent: Informed consent for the procedure including risks, benefits  and alternatives was obtained and time-out was performed prior to the  procedure.  Preparation: The site was prepared and draped using maximal sterile  barrier technique including cutaneous antisepsis.     Anesthesia/sedation  Level of anesthesia/sedation: Moderate sedation (conscious  sedation)  Anesthesia/sedation administered by: Independent trained observer under  attending supervision with continuous monitoring of the patient?s level  of consciousness and physiologic status  Total intra-service sedation time (minutes): 12     Fluid collection aspiration  The patient was positioned prone on. Initial imaging was performed.  Local anesthesia was administered. The fluid collection was accessed  using a 22-gauge spinal needle under CT fluoroscopic guidance. Position  within the fluid collection was confirmed, and fluid aspiration was  performed. All instruments were then removed.  - Initial imaging findings: Fluid collections identified on comparison  MRI lumbar spine 6/4/2020 not as well visualized on CT.  - Aspiration needle/catheter: 22-gauge spinal needle  - Post-aspiration imaging findings: No immediate complication     Contrast  Contrast agent: None  Contrast volume (mL): 0     Radiation Dose  CT dose length product (mGy-cm): 975      Additional Details  Additional description of procedure: None  Equipment details: None  Specimens removed: Aspirated fluid sent for analysis.  Estimated blood loss (mL): Less than 10  Standardized report: DrainageAspiration     Attestation  I was present and scrubbed for the entire procedure. Imaging reviewed.  Agree with final report as written.     This report was finalized on 6/4/2020 5:36 PM by Yimi Huggins.             Results for orders placed during the hospital encounter of 06/01/20   Adult Transesophageal Echo (REDD) W/ Cont if Necessary Per Protocol    Narrative · Left ventricular systolic function is normal. Estimated EF appears to be   in the range of 56 - 60%  · The tricuspid valve is abnormal. There is a large vegetation measuring 1   x 1.5 cm on the tricuspid valve. Predominantly located on the posterior   leaflet but anterior leaflet appears involved as well.  · Estimated right ventricular systolic pressure from tricuspid   regurgitation is  moderately elevated (45-55 mmHg).  · Mild to moderate mitral regurgitation noted with an eccentric jet.   However no vegetations visualized on the mitral valve  · The other cardiac valves appear free of vegetations  · Discussed findings with ordering provider.          I have reviewed the medications:  Scheduled Meds:  budesonide-formoterol 2 puff Inhalation BID - RT   ceftaroline 600 mg Intravenous BID   DAPTOmycin 8 mg/kg (Adjusted) Intravenous Q24H   ferric gluconate 125 mg Intravenous Q24H   FLUoxetine 40 mg Oral Daily   gabapentin 800 mg Oral Q8H   heparin (porcine) 5,000 Units Subcutaneous Q8H   lactobacillus acidophilus 1 capsule Oral Daily   levETIRAcetam 500 mg Oral Q12H   levothyroxine 50 mcg Oral Daily   pantoprazole 40 mg Oral QAM     Continuous Infusions:  sodium chloride 125 mL/hr Last Rate: 125 mL/hr (06/05/20 0500)     PRN Meds:.•  acetaminophen  •  HYDROcodone-acetaminophen  •  HYDROmorphone  •  ipratropium-albuterol  •  ondansetron  •  tiZANidine    Assessment/Plan   Assessment & Plan     Active Hospital Problems    Diagnosis  POA   • **Sepsis (CMS/HCC) [A41.9]  Yes   • DDD (degenerative disc disease), cervical [M50.30]  Unknown   • Anxiety [F41.9]  Unknown   • Depression [F32.9]  Unknown   • Seizure (CMS/HCC) [R56.9]  Unknown   • Migraine [G43.909]  Unknown   • Essential hypertension [I10]  Yes   • Fibromyalgia syndrome [M79.7]  Yes   • GERD without esophagitis [K21.9]  Yes      Resolved Hospital Problems   No resolved problems to display.        Brief Hospital Course to date:  Liliana Calderon is a 39 y.o. female with a past medical history of depression/anxiety, fibromyalgia, COPD, hypertension, seizure disorder, remote IV drug use who was admitted on June 1 for sepsis with MRSA bacteremia, UTI, pneumonia, and epidural abscesses.  Following currently on Dapto and Teflaro.    MRSA bacteremia, UTI, pneumonia, and epidural abscesses  -Status post CT-guided drainage with IR of epidural abscess  yesterday  -Had REDD today with a tricuspid valve vegetation  -We will continue antibiotics per ID recommendations, get CT chest, and CT surgery consult    Acute on chronic low back pain  -Secondary to epidural abscesses-continue Dilaudid as needed for pain    Diarrhea  -Has been on antibiotics will check C. difficile, continue contact isolation until ruled out    DVT Prophylaxis: Subcu heparin  Disposition: I expect the patient to be discharged pending further clinical evaluation.    CODE STATUS:   Code Status and Medical Interventions:   Ordered at: 06/01/20 1441     Code Status:    CPR     Medical Interventions (Level of Support Prior to Arrest):    Full         Electronically signed by Verenice Keller MD, 06/05/20, 16:59.

## 2020-06-05 NOTE — ANESTHESIA PREPROCEDURE EVALUATION
Anesthesia Evaluation     Patient summary reviewed and Nursing notes reviewed                Airway   Mallampati: II  TM distance: >3 FB  Neck ROM: full  No difficulty expected  Dental - normal exam     Pulmonary - normal exam   (+) a smoker Former,   Cardiovascular - normal exam    (+) hypertension,     ROS comment: Normal EF, mild MR/TR    Neuro/Psych  (+) seizures,     GI/Hepatic/Renal/Endo    (+) morbid obesity, GERD,  thyroid problem hypothyroidism    Musculoskeletal     Abdominal  - normal exam    Bowel sounds: normal.   Substance History   (+) drug use (IVDA)     OB/GYN negative ob/gyn ROS         Other   arthritis,        Other Comment: Anemia HCT 26                  Anesthesia Plan    ASA 3     MAC     intravenous induction     Anesthetic plan, all risks, benefits, and alternatives have been provided, discussed and informed consent has been obtained with: patient.    Plan discussed with CRNA.

## 2020-06-05 NOTE — PROGRESS NOTES
"NEUROSURGERY PROGRESS NOTE     LOS: 4 days   Patient Care Team:  Ollie Flores MD as PCP - General (Internal Medicine)    Chief Complaint: Low back pain.    Interval History:   Patient Complaints: Ongoing low back pain, 7-8/10.  Patient Denies: Numbness or voiding difficulty.    Review of Systems:   Musculoskeletal and Neurological systems were reviewed and are negative except for:  Musculoskeletal: positive for back pain.  Neurological: positive for difficulty walking due to pain.    Vital Signs: Blood pressure 102/61, pulse 70, temperature 99 °F (37.2 °C), temperature source Oral, resp. rate 18, height 165.1 cm (65\"), weight 106 kg (233 lb), last menstrual period 04/01/2020, SpO2 99 %.  Intake/Output:     Intake/Output Summary (Last 24 hours) at 6/5/2020 0756  Last data filed at 6/5/2020 0500  Gross per 24 hour   Intake 1953.2 ml   Output --   Net 1953.2 ml       Physical Exam:  The patient is awake and alert.  She is immobile in bed.  Motor function and tone are normal in her lower extremities.  Sensation is intact to light touch testing.     Data Review:    Results from last 7 days   Lab Units 06/05/20  0504  06/04/20  0835   WBC 10*3/mm3  --   --  14.45*   HEMOGLOBIN g/dL 7.5*   < > 7.7*   HEMATOCRIT % 24.6*   < > 26.2*   PLATELETS 10*3/mm3  --   --  145    < > = values in this interval not displayed.         Assessment/Plan:  1.  L2-3 and L3-4 dorsal epidural abscess without neurologic compromise.  2.  Right paraspinous loculated abscess status post CT-guided aspiration.  3.  MRSA bacteremia.  4.  IV drug abuse.  5.  Disposition: Pain management.  I would repeat the lumbar MRI with and without gadolinium in approximately 2.5 weeks.      Yimi Alex MD  06/05/20  07:56      "

## 2020-06-05 NOTE — PROGRESS NOTES
Liliana Calderon  1980  [id number]    Date of Consult:6/2/20  Requesting Provider: Johnson Lux MD  Evaluating Physician: Rancho Lang MD    Chief Complaint: fever    Reason for Consultation: MRSA bacteremia    History of present illness:   Patient is a 39 y.o.  Yr old female with history of IVDU, depression/anxiety, COPD, and seizure d/o who initially presented to [facility] yesterday with c/o shortness of air and worsening low back pain. She last used IV heroin about 1 month prior.  She was doing relatively well until about 3 days PTA when she noticed worsening low back pain and worsening shortness of air with blood-tinged sputum. She additionally started having fevers up to 103.9F. She did not have contact with anyone known to have COVID-19 infection.     On arrival to [facility] on 5/31, the patient was found to have fevers and a significant leukocytosis. CT chest was concerning for pneumonia and possible septic pulm emboli. Blood cultures resulted with MRSA and she was transferred to [facility] for a higher level of care. Tmax since arrival here has been 102.7F. WBC was elevated to 14.2 but has improved to 10 on antibiotics. She was started on vancomycin and aztreonam by primary team. Repeat blood cultures from 6/1 are again positive for MRSA. TTE and MRI lumbar spine ordered. ID consulted for recommendations in the setting of her MRSA septicemia.    Subjective:    6/3/20: The patient is still having back pain. No new weakness in her lower extremities but does have extreme back pain with movement. Fevers improved. Tmax 100F. Does have some anemia with Hgb 6.4. No nausea or vomiting. Mild diarrhea. No new rash.     6/4/20: still having some back pain and had some fevers overnight. Tmax this morning was 102.7F.  Not feeling very well this morning when I saw her. No worsening of her diarrhea. No nausea. No new rash. Still able to move her extremities well with no new weakness but does have  significant pain lifting her legs off the bed.     6/5/20: Patient is unfortunately still having fevers up to 102.5°F.  She is still not feeling well with significant back pain.  No new weakness in her lower extremities.  She is having some diarrhea but only about 2 episodes in the last 24 hours.  No nausea.  No new rashes.  She went for REDD today and was noted to have about a 1 cm x 1 cm vegetation on the tricuspid valve per my discussion with Dr. Pinedo.    Past Medical History:   Diagnosis Date   • Anxiety and depression    • Chronic bronchitis (CMS/HCC)    • Fibromyalgia    • Heart valve problem    • Hypertension    • Migraine    • PCOS (polycystic ovarian syndrome)    • Pneumonia    • Seizures (CMS/HCC)        Past Surgical History:   Procedure Laterality Date   • CHOLECYSTECTOMY         Social History:  IVDU (heroin). Former smoker. No ETOH use.    Family History:  family history includes Alcohol abuse in her brother and sister; Alzheimer's disease in her maternal grandmother; Bone cancer in her mother; Breast cancer in her mother; Dementia in her father; Diabetes in her father; Heart disease in her father and paternal grandfather; Hypertension in her brother, father, mother, and sister; Mental illness in her brother and sister; Parkinsonism in her maternal grandmother; Pulmonary embolism in her father; Skin cancer in her mother; Stroke in her father.    Allergies   Allergen Reactions   • Penicillins Rash     Received cefepime and cefdinir in 2018   • Phenobarbital Rash       Medication:  Current Facility-Administered Medications   Medication Dose Route Frequency Provider Last Rate Last Dose   • acetaminophen (TYLENOL) tablet 650 mg  650 mg Oral Q6H PRN Malathi Vasquez APRN   650 mg at 06/05/20 0029   • budesonide-formoterol (SYMBICORT) 160-4.5 MCG/ACT inhaler 2 puff  2 puff Inhalation BID - RT Rancho Lang MD   2 puff at 06/05/20 0946   • ceftaroline (TEFLARO) 600 mg/100 mL 0.9% NS (mbp)  600 mg  Intravenous BID Rancho Lang MD   600 mg at 06/05/20 0819   • DAPTOmycin (CUBICIN) 600 mg in sodium chloride 0.9 % 50 mL IVPB  8 mg/kg (Adjusted) Intravenous Q24H Rancho Lang  mL/hr at 06/05/20 0819 600 mg at 06/05/20 0819   • ferric gluconate (FERRLECIT)125 MG IVPB  125 mg Intravenous Q24H Oskar Black MD   125 mg at 06/04/20 1140   • FLUoxetine (PROzac) capsule 40 mg  40 mg Oral Daily Vanessa Lopez MD   40 mg at 06/05/20 0818   • gabapentin (NEURONTIN) capsule 800 mg  800 mg Oral Q8H Vanessa Lopez MD   800 mg at 06/05/20 0522   • heparin (porcine) 5000 UNIT/ML injection 5,000 Units  5,000 Units Subcutaneous Q8H Vanessa Lopez MD   5,000 Units at 06/05/20 0522   • HYDROcodone-acetaminophen (NORCO) 5-325 MG per tablet 1 tablet  1 tablet Oral Q6H PRN Vanessa Lopez MD   1 tablet at 06/05/20 0831   • ipratropium-albuterol (DUO-NEB) nebulizer solution 3 mL  3 mL Nebulization Q6H PRN Vanessa Lopez MD   3 mL at 06/05/20 0946   • lactobacillus acidophilus (RISAQUAD) capsule 1 capsule  1 capsule Oral Daily Rancho Lang MD   1 capsule at 06/05/20 0818   • levETIRAcetam (KEPPRA) tablet 500 mg  500 mg Oral Q12H Oskar Black MD   500 mg at 06/05/20 0819   • levothyroxine (SYNTHROID, LEVOTHROID) tablet 50 mcg  50 mcg Oral Daily Vanessa Lopez MD   50 mcg at 06/05/20 0818   • ondansetron (ZOFRAN) injection 4 mg  4 mg Intravenous Q6H PRN Vanessa Lopez MD   4 mg at 06/03/20 2304   • pantoprazole (PROTONIX) EC tablet 40 mg  40 mg Oral QAM Ivanng, Phonekeo, MD   40 mg at 06/05/20 0522   • sodium chloride 0.9 % infusion  125 mL/hr Intravenous Continuous Vanessa Lopez  mL/hr at 06/05/20 0500 125 mL/hr at 06/05/20 0500   • tiZANidine (ZANAFLEX) tablet 4 mg  4 mg Oral Q8H PRN Vanessa Lopez MD   4 mg at 06/05/20 0831         Infectious History:  MRSA    Antibiotics:  Anti-Infectives (From admission, onward)  "   Ordered     Dose/Rate Route Frequency Start Stop    06/02/20 0819  ceftaroline (TEFLARO) 600 mg/100 mL 0.9% NS (mbp)     Rancho Lang MD reviewed the order on 06/05/20 0802.   Ordering Provider:  Rancho Lang MD    600 mg Intravenous 2 Times Daily 06/02/20 0915 06/16/20 0859    06/02/20 0743  DAPTOmycin (CUBICIN) 600 mg in sodium chloride 0.9 % 50 mL IVPB     Rancho Lang MD reviewed the order on 06/05/20 0802.   Ordering Provider:  Rancho Lang MD    8 mg/kg × 76.6 kg (Adjusted)  100 mL/hr over 30 Minutes Intravenous Every 24 Hours 06/02/20 0900 06/23/20 0859    06/01/20 1501  vancomycin 2250 mg/500 mL 0.9% NS IVPB ([facility])     Ordering Provider:  Vanessa Lopez MD    2,250 mg Intravenous Once 06/01/20 1600 06/01/20 1756            Review of Systems    As above    Physical Exam:   Vital Signs   /72   Pulse 85   Temp 99 °F (37.2 °C) (Oral)   Resp (!) 36   Ht 165.1 cm (65\")   Wt 106 kg (233 lb)   LMP 04/01/2020 (Approximate)   SpO2 98%   BMI 38.77 kg/m²     GENERAL: Awake and alert, Obese. Moderate distress  HENT: Normocephalic, atraumatic.  No external oral lesions noted.  NECK: Supple without nuchal rigidity. No mass.  HEART: RRR; No murmur, rubs, gallops.   LUNGS: CTAB. Normal respiratory effort. Nonlabored.   ABDOMEN: Soft, nontender, nondistended. Positive bowel sounds. No rebound or guarding.   EXT:  No cyanosis, clubbing or edema. No cord.  :   Without Petit catheter.  MSK: FROM without joint effusions noted arms/legs.  Some tenderness to palpation over her lumbar spine.  SKIN: has slightly discolored/gray patchy/papular lesions over her bilateral lower extremity- these appeared about 2 weeks ago per the patient.  No other possible peripheral stigmata of infective endocarditis noted over her extremities or elsewhere. No signs of skin breakdown or cellulitis.  NEURO: Oriented to PPT. No focal deficits on motor/sensory exam at arms/legs. 5 out of 5 strength " throughout  PSYCHIATRIC: Normal insight and judgement. Cooperative with PE    Laboratory Data    Results from last 7 days   Lab Units 06/05/20  0504 06/04/20  2239 06/04/20  1342 06/04/20  0835  06/03/20  0407  06/02/20  0352   WBC 10*3/mm3  --   --   --  14.45*  --  10.00  --  10.08   HEMOGLOBIN g/dL 7.5* 7.9* 8.2* 7.7*   < > 6.4*   < > 6.8*   HEMATOCRIT % 24.6* 26.0* 26.7* 26.2*   < > 22.3*   < > 22.9*   PLATELETS 10*3/mm3  --   --   --  145  --  110*  --  130*    < > = values in this interval not displayed.     Results from last 7 days   Lab Units 06/04/20  0835   SODIUM mmol/L 137   POTASSIUM mmol/L 4.3   CHLORIDE mmol/L 102   CO2 mmol/L 24.0   BUN mg/dL 8   CREATININE mg/dL 0.71   GLUCOSE mg/dL 196*   CALCIUM mg/dL 7.9*     Results from last 7 days   Lab Units 06/04/20  0835   ALK PHOS U/L 291*   BILIRUBIN mg/dL 0.9   ALT (SGPT) U/L 30   AST (SGOT) U/L 42*     Results from last 7 days   Lab Units 05/31/20  1752   SED RATE mm/hr 80*     Results from last 7 days   Lab Units 05/31/20  1751   CRP mg/dL 32.12*       Estimated Creatinine Clearance: 129.9 mL/min (by C-G formula based on SCr of 0.71 mg/dL).       Microbiology:    Blood Culture   Date Value Ref Range Status   06/01/2020 Abnormal Stain (C)  Preliminary   06/01/2020 Abnormal Stain (C)  Preliminary   05/31/2020 Abnormal Stain (C)  Preliminary   05/31/2020 Staphylococcus aureus, MRSA (C)  Preliminary     Comment:       Infectious disease consultation is highly recommended to rule out distant foci of infection.  Methicillin resistant Staphylococcus aureus, Patient may be an isolation risk.       Urine Culture   Date Value Ref Range Status   05/31/2020 50,000 CFU/mL Staphylococcus aureus, MRSA (A)  Preliminary     Comment:       Methicillin resistant Staph aureus, patient may be an isolation risk.  Staphylococcus aureus is not typically regarded as a uropathogen, except in cases with genitourinary instrumentation present.  It's presence suggests an alternate  source (e.g. bloodstream, abscess, SSTI, etc.).  Please evaluate accordingly.       Radiology:  Ct Chest With Contrast    Result Date: 5/31/2020  Multiple right lung nodules.  Since 1 of these nodules is cavitary this is worrisome for septic emboli.  Left lung opacities worrisome for pneumonia. Authenticated by Sukhdeep Burnette III, MD on 05/31/2020 08:39:58 PM    Ct Abdomen Pelvis With Contrast    Result Date: 5/31/2020  Splenomegaly.  Normal appendix.  Mild anasarca. Authenticated by Sukhdeep Burnette III, MD on 05/31/2020 08:39:59 PM    Ct Guided Biopsy Aspiration Injection    Result Date: 6/4/2020   Percutaneous aspiration of very tiny abscess collection adjacent to vertebral bodies/within the erector spinae muscle, yielding 2 mL of purulent appearing fluid. No drainage catheter was left in place - too small for drainage catheter/contraindicated to leave drainage catheter in collection with possible communication with epidural space  Plan:  Follow-up lab results. Continued care with primary team. ______________________________________________________________________  PROCEDURE SUMMARY: - Aspiration of tiny abscess pockets under fluoroscopic guidance - Additional procedure(s): None  PROCEDURE DETAILS:  Pre-procedure Consent: Informed consent for the procedure including risks, benefits and alternatives was obtained and time-out was performed prior to the procedure. Preparation: The site was prepared and draped using maximal sterile barrier technique including cutaneous antisepsis.  Anesthesia/sedation Level of anesthesia/sedation: Moderate sedation (conscious sedation) Anesthesia/sedation administered by: Independent trained observer under attending supervision with continuous monitoring of the patient?s level of consciousness and physiologic status Total intra-service sedation time (minutes): 12  Fluid collection aspiration The patient was positioned prone on. Initial imaging was performed. Local anesthesia was  administered. The fluid collection was accessed using a 22-gauge spinal needle under CT fluoroscopic guidance. Position within the fluid collection was confirmed, and fluid aspiration was performed. All instruments were then removed. - Initial imaging findings: Fluid collections identified on comparison MRI lumbar spine 6/4/2020 not as well visualized on CT. - Aspiration needle/catheter: 22-gauge spinal needle - Post-aspiration imaging findings: No immediate complication  Contrast Contrast agent: None Contrast volume (mL): 0  Radiation Dose CT dose length product (mGy-cm): 975  Additional Details Additional description of procedure: None Equipment details: None Specimens removed: Aspirated fluid sent for analysis. Estimated blood loss (mL): Less than 10 Standardized report: DrainageAspiration  Attestation I was present and scrubbed for the entire procedure. Imaging reviewed. Agree with final report as written.  This report was finalized on 6/4/2020 5:36 PM by Yimi Huggins.      Mri Lumbar Spine With & Without Contrast    Result Date: 6/4/2020  1. Approximately 8 x 4 x 3 cm multilocular abscess of the right erector spinae muscles, approximately from L3 to S1. 2. Two possibly interconnected dorsal epidural abscesses at L2-L3 and L3-L4, but causing only mild canal stenosis. 3. No evidence to suggest osteomyelitis or discitis. 4. Moderate-sized L4-L5 and L5-S1 disc protrusions as discussed above.  D:  06/03/2020 E:  06/03/2020  This report was finalized on 6/4/2020 10:45 PM by Dr. William Boateng MD.           Impression:     This is a 38 yo woman with a h/o IVDU presenting with severe low back pain and shortness of air and found to have fevers/sepsis with MRSA growing from multiple blood culture sets. Also has CT chest findings that are concerning for septic pulmonary emboli and now noted to have a decent size vegetation on her tricuspid valve on REDD consistent with infective endocarditis. MRI with 8x4 x3 cm multilobular  abscess of the right erector spinae muscles, approximately from L3-S1. Also with two possible interconnected dorsal epidural abscesses at L2-L3 and L3-L4 but only causing mild canal stenosis. Dr. Alex with neurosurgery has evaluated and does not think that she requires any surgical intervention at this time for her epidural abscesses as they are modest. Underwent CT-guided drainage after I evaluated her this morning and had 2cc of purulent drainage aspirated from her fluid collection in erector spinae muscles.     REDD today and was noted to have about a 1 cm x 1 cm vegetation on the tricuspid valve per my discussion with Dr. Pinedo. Also has some tricuspid valve regurgitation.  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 Weekly cpk level  -surveillance blood cultures from 6/3/20 -NGTD  -s/p CT-guided drainage of her right paraspinal abscess with only 2cc fluid aspirated  -Daptomycin 8mg/kg IV q24h. Baseline CPK level was 48  -Teflaro 600 mg IV BID (has tolerated cephalosporins in the recent past)  -Will consider repeat MRI L-spine in 2-3 weeks (per my discussed with Dr. Alex). Neurosurgery following  -recommend repeating CT chest today to rule out any complications such as lung abscess or empyema  -recommend consulting CT surgery.  May need  surgery versus angiovac procedure.     Complex set of medical issues requiring a high-level medical decision making.  The patient does have significant risk for for further morbidity and mortality in the setting of her severe MRSA infection. Has some risk for paralysis if her epidural abscesses worsen.    I have discussed her case in length with Dr. Pinedo today.  I discussed her case in length with Dr. Keller today.    I will be off until 6/15/20.  I will have one of my on-call partners follow the patient for me.    Rancho Lang MD  6/5/2020

## 2020-06-05 NOTE — PROGRESS NOTES
Continued Stay Note  [facility]     Patient Name: Liliana Calderon  MRN: [id number]  Today's Date: 6/5/2020    Admit Date: 6/1/2020    Discharge Plan     Row Name 06/05/20 1637       Plan    Plan  Peripherally following    Plan Comments  We are continuing to follow patient through hospital course at this time.          Discharge Codes    No documentation.             Bhumika Mooney RN ,BSN   Addiction Coordinator

## 2020-06-05 NOTE — PLAN OF CARE
Problem: Patient Care Overview  Goal: Plan of Care Review  Flowsheets (Taken 6/5/2020 2610)  Progress: no change  Plan of Care Reviewed With: patient  Outcome Summary: PT eval completed. Pt unable to perform AROM, PROM, MMT testing fully due to pain, however demonstrates use of BLEs against gravity during rolling with use of bed pain. Pt requires MIN A with rolling to each side, MAX A to dependence for scooting. She declines all other mobility testing as it is too painful. PT to initiate 3x per week frequency. Rehab recommended pending progress.

## 2020-06-05 NOTE — PLAN OF CARE
Problem: Patient Care Overview  Goal: Plan of Care Review  Outcome: Ongoing (interventions implemented as appropriate)  Flowsheets (Taken 6/5/2020 0050)  Progress: no change  Plan of Care Reviewed With: patient  Outcome Summary: VSS. Patient has had complaints of pain that have been relieved with PRN dilaudid tonight. Continues to have febrile episodes, that require PRN Tylenol. No acute distress noted, will continue to monitor patient at this time.

## 2020-06-05 NOTE — PLAN OF CARE
Problem: Patient Care Overview  Goal: Plan of Care Review  Flowsheets (Taken 6/5/2020 1105)  Outcome Summary: OT eval completed Pt presents with deficits in strength, activity tolerance, and pain for AROM for ADL completion, unable to complete OOB due to pain today, able to roll R and L with minAx2 for toileting tasks with bedpan, recom IPOT 3xwk pending pain and participation in therapy, recom rehab pending progress

## 2020-06-05 NOTE — THERAPY EVALUATION
Patient Name: Liliana Calderon  : 1980    MRN: [id number]                              Today's Date: 2020       Admit Date: 2020    Visit Dx:     ICD-10-CM ICD-9-CM   1. Impaired mobility and ADLs Z74.09 799.89     Patient Active Problem List   Diagnosis   • Bradycardia with 41-50 beats per minute   • Essential hypertension   • Fibromyalgia syndrome   • GERD without esophagitis   • Sepsis (CMS/HCC)   • DDD (degenerative disc disease), cervical   • Anxiety   • Depression   • Seizure (CMS/HCC)   • Migraine     Past Medical History:   Diagnosis Date   • Anxiety and depression    • Chronic bronchitis (CMS/HCC)    • Fibromyalgia    • Heart valve problem    • Hypertension    • Migraine    • PCOS (polycystic ovarian syndrome)    • Pneumonia    • Seizures (CMS/HCC)      Past Surgical History:   Procedure Laterality Date   • CHOLECYSTECTOMY       General Information     Row Name 20 1325          PT Evaluation Time/Intention    Document Type  evaluation  -EJ     Mode of Treatment  physical therapy  -EJ     Row Name 20 1325          General Information    Patient Profile Reviewed?  yes  -EJ     Prior Level of Function  independent:;all household mobility;community mobility;ADL's was on house arrest recently.  -EJ     Existing Precautions/Restrictions  fall;spinal;oxygen therapy device and L/min  -EJ     Barriers to Rehab  medically complex;ineffective coping  -EJ     Row Name 20 1325          Relationship/Environment    Lives With  alone  -EJ     Row Name 20 1325          Resource/Environmental Concerns    Current Living Arrangements  home/apartment/condo  -EJ     Row Name 20 1325          Home Main Entrance    Number of Stairs, Main Entrance  four  -EJ     Stair Railings, Main Entrance  railings on both sides of stairs  -EJ     Row Name 20 1325          Stairs Within Home, Primary    Stairs Comment, Within Home, Primary  pt has been avoiding going upstairs prior.   -EJ      Row Name 06/05/20 1325          Cognitive Assessment/Intervention- PT/OT    Orientation Status (Cognition)  oriented x 4  -EJ     Row Name 06/05/20 1325          Safety Issues, Functional Mobility    Safety Issues Affecting Function (Mobility)  judgment;insight into deficits/self awareness;safety precaution awareness;safety precautions follow-through/compliance  -EJ     Impairments Affecting Function (Mobility)  balance;endurance/activity tolerance;pain;postural/trunk control;range of motion (ROM);strength  -EJ       User Key  (r) = Recorded By, (t) = Taken By, (c) = Cosigned By    Initials Name Provider Type    EJ Elsy Hinojosa PT Physical Therapist        Mobility     Row Name 06/05/20 1326          Bed Mobility Assessment/Treatment    Bed Mobility Assessment/Treatment  rolling left;rolling right;scooting/bridging  -EJ     Rolling Left [place] (Bed Mobility)  minimum assist (75% patient effort);2 person assist  -EJ     Rolling Right [place] (Bed Mobility)  minimum assist (75% patient effort);2 person assist  -EJ     Scooting/Bridging [place] (Bed Mobility)  maximum assist (25% patient effort);2 person assist  -EJ     Assistive Device (Bed Mobility)  bed rails;draw sheet;head of bed elevated  -EJ     Comment (Bed Mobility)  Pt reported limited tolerence for HOB elevated, but requests it when foot of bed lowered for bed pan placement. Pt uses UEs and LEs for rolling to each side.   -     Row Name 06/05/20 1326          Transfer Assessment/Treatment    Comment (Transfers)  Pt declinees transfers reporting she is in too much pain.   -EJ       User Key  (r) = Recorded By, (t) = Taken By, (c) = Cosigned By    Initials Name Provider Type    Elsy Gatica PT Physical Therapist        Obj/Interventions     Row Name 06/05/20 1327          General ROM    GENERAL ROM COMMENTS  pt tolerates hip flexion, knee flexion during functional movement to 90, unable to tolerate when PT tested with  AAROM/PROM. PT demos DF to neutral, GTE intact. PF not tested as pt c/o gastroc pain.   -     Row Name 06/05/20 1327          MMT (Manual Muscle Testing)    General MMT Comments  DF RLE, LLE 4-/5, with c/o pain in top of foot on R, gastroc on L. PF NT as pt c/o gastroc pain. GTE painful on R, Knee extension/flexion and hip movement painful but pt demos 3/5 strength during function.   -     Row Name 06/05/20 1327          Sensory Assessment/Intervention    Sensory General Assessment  no sensation deficits identified  -EJ       User Key  (r) = Recorded By, (t) = Taken By, (c) = Cosigned By    Initials Name Provider Type    Elsy Gatica PT Physical Therapist        Goals/Plan     Row Name 06/05/20 1340          Bed Mobility Goal 1 (PT)    Activity/Assistive Device (Bed Mobility Goal 1, PT)  bed mobility activities, all  -EJ     [place] Level/Cues Needed (Bed Mobility Goal 1, PT)  minimum assist (75% or more patient effort)  -EJ     Time Frame (Bed Mobility Goal 1, PT)  long term goal (LTG);10 days  -     Row Name 06/05/20 1340          Transfer Goal 1 (PT)    Activity/Assistive Device (Transfer Goal 1, PT)  sit-to-stand/stand-to-sit;walker, rolling  -EJ     [place] Level/Cues Needed (Transfer Goal 1, PT)  minimum assist (75% or more patient effort)  -EJ     Time Frame (Transfer Goal 1, PT)  long term goal (LTG);10 days  -     Row Name 06/05/20 1340          Gait Training Goal 1 (PT)    Activity/Assistive Device (Gait Training Goal 1, PT)  gait (walking locomotion);walker, rolling  -EJ     [place] Level (Gait Training Goal 1, PT)  minimum assist (75% or more patient effort)  -EJ     Distance (Gait Goal 1, PT)  50  -EJ     Time Frame (Gait Training Goal 1, PT)  10 days  -EJ       User Key  (r) = Recorded By, (t) = Taken By, (c) = Cosigned By    Initials Name Provider Type    Elsy Gatica PT Physical Therapist        Clinical Impression     Row Name 06/05/20 1331          Pain  Scale: Numbers Pre/Post-Treatment    Pain Scale: Numbers, Pretreatment  9/10  -EJ     Pain Scale: Numbers, Post-Treatment  10/10  -EJ     Pain Location - Side  Bilateral  -EJ     Pain Location - Orientation  generalized  -EJ     Pre/Post Treatment Pain Comment  L gastroc, R great toe, R top of foot, BLE knees, R 2nd MCP and proximal phalanx, RUE forearm and elbow.   -EJ     Pain Intervention(s)  Repositioned;Ambulation/increased activity  -     Row Name 06/05/20 1331          Plan of Care Review    Plan of Care Reviewed With  patient  -EJ     Progress  no change  -EJ     Outcome Summary  PT eval completed. Pt unable to perform AROM, PROM, MMT testing fully due to pain, however demonstrates use of BLEs against gravity during rolling with use of bed pain. Pt requires MIN A with rolling to each side, MAX A to dependence for scooting. She declines all other mobility testing as it is too painful. PT to initiate 3x per week frequencing. Rehab recommended pending progress/   -EJ     Row Name 06/05/20 1331          Physical Therapy Clinical Impression    Criteria for Skilled Interventions Met (PT Clinical Impression)  yes;treatment indicated  -EJ     Rehab Potential (PT Clinical Summary)  fair, will monitor progress closely  -     Row Name 06/05/20 1331          Vital Signs    Pre Systolic BP Rehab  -- WNL  -EJ     Row Name 06/05/20 1331          Positioning and Restraints    Pre-Treatment Position  in bed  -EJ     Post Treatment Position  bed  -EJ     In Bed  supine;exit alarm on;call light within reach;encouraged to call for assist  -EJ       User Key  (r) = Recorded By, (t) = Taken By, (c) = Cosigned By    Initials Name Provider Type     Elsy Hinojosa, PT Physical Therapist        Outcome Measures     Row Name 06/05/20 1341          How much help from another person do you currently need...    Turning from your back to your side while in flat bed without using bedrails?  3  -EJ     Moving from lying on back to  sitting on the side of a flat bed without bedrails?  3  -EJ     Moving to and from a bed to a chair (including a wheelchair)?  3  -EJ     Standing up from a chair using your arms (e.g., wheelchair, bedside chair)?  2  -EJ     Climbing 3-5 steps with a railing?  2  -EJ     To walk in hospital room?  2  -EJ     AM-PAC 6 Clicks Score (PT)  15  -     Row Name 06/05/20 1341          Functional Assessment    Outcome Measure Options  AM-PAC 6 Clicks Basic Mobility (PT)  -       User Key  (r) = Recorded By, (t) = Taken By, (c) = Cosigned By    Initials Name Provider Type    Elsy Gatica PT Physical Therapist        Physical Therapy Education                 Title: PT OT SLP Therapies (In Progress)     Topic: Physical Therapy (In Progress)     Point: Mobility training (Done)     Description:   Instruct learner(s) on safety and technique for assisting patient out of bed, chair or wheelchair.  Instruct in the proper use of assistive devices, such as walker, crutches, cane or brace.              Patient Friendly Description:   It's important to get you on your feet again, but we need to do so in a way that is safe for you. Falling has serious consequences, and your personal safety is the most important thing of all.        When it's time to get out of bed, one of us or a family member will sit next to you on the bed to give you support.     If your doctor or nurse tells you to use a walker, crutches, a cane, or a brace, be sure you use it every time you get out of bed, even if you think you don't need it.    Learning Progress Summary           Patient Acceptance, E,TB, VU,NR by  at 6/5/2020 1342    Comment:  Pt advised in log roll to avoid unnecessary pain.                   Point: Home exercise program (Not Started)     Description:   Instruct learner(s) on appropriate technique for monitoring, assisting and/or progressing patient with therapeutic exercises and activities.              Learner Progress:   Not  documented in this visit.          Point: Body mechanics (Done)     Description:   Instruct learner(s) on proper positioning and spine alignment for patient and/or caregiver during mobility tasks and/or exercises.              Learning Progress Summary           Patient Acceptance, E,TB, VU,NR by MAURICIO at 6/5/2020 1342    Comment:  Pt advised in log roll to avoid unnecessary pain.                   Point: Precautions (Done)     Description:   Instruct learner(s) on prescribed precautions during mobility and gait tasks              Learning Progress Summary           Patient Acceptance, E,TB, VU,NR by MAURICIO at 6/5/2020 1342    Comment:  Pt advised in log roll to avoid unnecessary pain.                               User Key     Initials Effective Dates Name Provider Type Discipline     11/20/18 -  Elsy Hinojosa, PT Physical Therapist PT              PT Recommendation and Plan  Planned Therapy Interventions (PT Eval): balance training, bed mobility training, gait training, home exercise program, joint mobilization, lumbar stabilization, manual therapy techniques, motor coordination training, neuromuscular re-education, patient/family education, postural re-education, ROM (range of motion), stair training, strengthening, stretching, orthotic fitting/training, transfer training  Outcome Summary/Treatment Plan (PT)  Anticipated Discharge Disposition (PT): inpatient rehabilitation facility  Plan of Care Reviewed With: patient  Progress: no change  Outcome Summary: PT eval completed. Pt unable to perform AROM, PROM, MMT testing fully due to pain, however demonstrates use of BLEs against gravity during rolling with use of bed pain. Pt requires MIN A with rolling to each side, MAX A to dependence for scooting. She declines all other mobility testing as it is too painful. PT to initiate 3x per week frequencing. Rehab recommended pending progress/      Time Calculation:   PT Charges     Row Name 06/05/20 7662             Time  Calculation    Start Time  1100  -EJ      PT Received On  06/05/20  -EJ      PT Goal Re-Cert Due Date  06/15/20  -EJ         Time Calculation- PT    Total Timed Code Minutes- PT  8 minute(s)  -EJ         Timed Charges    03565 - PT Therapeutic Activity Minutes  8  -EJ        User Key  (r) = Recorded By, (t) = Taken By, (c) = Cosigned By    Initials Name Provider Type     Elsy Hinojosa, PT Physical Therapist        Therapy Charges for Today     Code Description Service Date Service Provider Modifiers Qty    27743750349 HC PT THERAPEUTIC ACT EA 15 MIN 6/5/2020 Elsy Hinojosa, PT GP 1    65869826216 HC PT EVAL MOD COMPLEXITY 4 6/5/2020 Elsy Hinojosa, PT GP 1          PT G-Codes  Outcome Measure Options: AM-PAC 6 Clicks Basic Mobility (PT)  AM-PAC 6 Clicks Score (PT): 15  AM-PAC 6 Clicks Score (OT): 13    Elsy Mario, PT  6/5/2020

## 2020-06-06 LAB
ABO GROUP BLD: NORMAL
ALBUMIN SERPL-MCNC: 2 G/DL (ref 3.5–5.2)
ALBUMIN/GLOB SERPL: 0.5 G/DL
ALP SERPL-CCNC: 228 U/L (ref 39–117)
ALT SERPL W P-5'-P-CCNC: 15 U/L (ref 1–33)
ANION GAP SERPL CALCULATED.3IONS-SCNC: 13 MMOL/L (ref 5–15)
ANION GAP SERPL CALCULATED.3IONS-SCNC: 13 MMOL/L (ref 5–15)
APTT PPP: 39.2 SECONDS (ref 50–95)
AST SERPL-CCNC: 16 U/L (ref 1–32)
BASOPHILS # BLD AUTO: 0.02 10*3/MM3 (ref 0–0.2)
BASOPHILS NFR BLD AUTO: 0.2 % (ref 0–1.5)
BILIRUB SERPL-MCNC: 0.9 MG/DL (ref 0.2–1.2)
BLD GP AB SCN SERPL QL: NEGATIVE
BUN BLD-MCNC: 7 MG/DL (ref 6–20)
BUN BLD-MCNC: 7 MG/DL (ref 6–20)
BUN/CREAT SERPL: 13.7 (ref 7–25)
BUN/CREAT SERPL: 13.7 (ref 7–25)
CALCIUM SPEC-SCNC: 8.1 MG/DL (ref 8.6–10.5)
CALCIUM SPEC-SCNC: 8.1 MG/DL (ref 8.6–10.5)
CHLORIDE SERPL-SCNC: 100 MMOL/L (ref 98–107)
CHLORIDE SERPL-SCNC: 100 MMOL/L (ref 98–107)
CO2 SERPL-SCNC: 21 MMOL/L (ref 22–29)
CO2 SERPL-SCNC: 21 MMOL/L (ref 22–29)
CREAT BLD-MCNC: 0.51 MG/DL (ref 0.57–1)
CREAT BLD-MCNC: 0.51 MG/DL (ref 0.57–1)
DEPRECATED RDW RBC AUTO: 48.3 FL (ref 37–54)
EOSINOPHIL # BLD AUTO: 0.01 10*3/MM3 (ref 0–0.4)
EOSINOPHIL NFR BLD AUTO: 0.1 % (ref 0.3–6.2)
ERYTHROCYTE [DISTWIDTH] IN BLOOD BY AUTOMATED COUNT: 17.8 % (ref 12.3–15.4)
GFR SERPL CREATININE-BSD FRML MDRD: 135 ML/MIN/1.73
GFR SERPL CREATININE-BSD FRML MDRD: 135 ML/MIN/1.73
GLOBULIN UR ELPH-MCNC: 4 GM/DL
GLUCOSE BLD-MCNC: 145 MG/DL (ref 65–99)
GLUCOSE BLD-MCNC: 145 MG/DL (ref 65–99)
HCT VFR BLD AUTO: 33.1 % (ref 34–46.6)
HCT VFR BLD AUTO: 33.1 % (ref 34–46.6)
HCT VFR BLD AUTO: 33.7 % (ref 34–46.6)
HGB BLD-MCNC: 10.1 G/DL (ref 12–15.9)
HGB BLD-MCNC: 9.7 G/DL (ref 12–15.9)
HGB BLD-MCNC: 9.7 G/DL (ref 12–15.9)
IMM GRANULOCYTES # BLD AUTO: 0.16 10*3/MM3 (ref 0–0.05)
IMM GRANULOCYTES NFR BLD AUTO: 1.7 % (ref 0–0.5)
INR PPP: 1.23 (ref 0.85–1.16)
LYMPHOCYTES # BLD AUTO: 0.71 10*3/MM3 (ref 0.7–3.1)
LYMPHOCYTES NFR BLD AUTO: 7.7 % (ref 19.6–45.3)
MCH RBC QN AUTO: 22.5 PG (ref 26.6–33)
MCHC RBC AUTO-ENTMCNC: 29.3 G/DL (ref 31.5–35.7)
MCV RBC AUTO: 76.8 FL (ref 79–97)
MONOCYTES # BLD AUTO: 0.3 10*3/MM3 (ref 0.1–0.9)
MONOCYTES NFR BLD AUTO: 3.2 % (ref 5–12)
NEUTROPHILS # BLD AUTO: 8.06 10*3/MM3 (ref 1.7–7)
NEUTROPHILS NFR BLD AUTO: 87.1 % (ref 42.7–76)
NRBC BLD AUTO-RTO: 0 /100 WBC (ref 0–0.2)
PLATELET # BLD AUTO: 282 10*3/MM3 (ref 140–450)
PMV BLD AUTO: 9.6 FL (ref 6–12)
POTASSIUM BLD-SCNC: 4.2 MMOL/L (ref 3.5–5.2)
POTASSIUM BLD-SCNC: 4.2 MMOL/L (ref 3.5–5.2)
PROT SERPL-MCNC: 6 G/DL (ref 6–8.5)
PROTHROMBIN TIME: 15.2 SECONDS (ref 11.5–14)
RBC # BLD AUTO: 4.31 10*6/MM3 (ref 3.77–5.28)
RH BLD: POSITIVE
SODIUM BLD-SCNC: 134 MMOL/L (ref 136–145)
SODIUM BLD-SCNC: 134 MMOL/L (ref 136–145)
T&S EXPIRATION DATE: NORMAL
WBC NRBC COR # BLD: 9.26 10*3/MM3 (ref 3.4–10.8)

## 2020-06-06 PROCEDURE — 25010000002 DAPTOMYCIN PER 1 MG: Performed by: INTERNAL MEDICINE

## 2020-06-06 PROCEDURE — 85014 HEMATOCRIT: CPT | Performed by: INTERNAL MEDICINE

## 2020-06-06 PROCEDURE — 25010000002 HYDROMORPHONE PER 4 MG: Performed by: INTERNAL MEDICINE

## 2020-06-06 PROCEDURE — 25010000002 HEPARIN (PORCINE) PER 1000 UNITS: Performed by: HOSPITALIST

## 2020-06-06 PROCEDURE — 85730 THROMBOPLASTIN TIME PARTIAL: CPT | Performed by: INTERNAL MEDICINE

## 2020-06-06 PROCEDURE — 25010000002 PROMETHAZINE PER 50 MG: Performed by: INTERNAL MEDICINE

## 2020-06-06 PROCEDURE — 25010000002 ONDANSETRON PER 1 MG: Performed by: HOSPITALIST

## 2020-06-06 PROCEDURE — 86850 RBC ANTIBODY SCREEN: CPT | Performed by: INTERNAL MEDICINE

## 2020-06-06 PROCEDURE — 85610 PROTHROMBIN TIME: CPT | Performed by: INTERNAL MEDICINE

## 2020-06-06 PROCEDURE — 80053 COMPREHEN METABOLIC PANEL: CPT | Performed by: INTERNAL MEDICINE

## 2020-06-06 PROCEDURE — 94799 UNLISTED PULMONARY SVC/PX: CPT

## 2020-06-06 PROCEDURE — 85018 HEMOGLOBIN: CPT | Performed by: INTERNAL MEDICINE

## 2020-06-06 PROCEDURE — 86900 BLOOD TYPING SEROLOGIC ABO: CPT | Performed by: INTERNAL MEDICINE

## 2020-06-06 PROCEDURE — 99222 1ST HOSP IP/OBS MODERATE 55: CPT | Performed by: THORACIC SURGERY (CARDIOTHORACIC VASCULAR SURGERY)

## 2020-06-06 PROCEDURE — 85025 COMPLETE CBC W/AUTO DIFF WBC: CPT | Performed by: INTERNAL MEDICINE

## 2020-06-06 PROCEDURE — 99232 SBSQ HOSP IP/OBS MODERATE 35: CPT | Performed by: INTERNAL MEDICINE

## 2020-06-06 PROCEDURE — 86901 BLOOD TYPING SEROLOGIC RH(D): CPT | Performed by: INTERNAL MEDICINE

## 2020-06-06 PROCEDURE — 25010000002 CEFTAROLINE FOSAMIL PER 10 MG: Performed by: INTERNAL MEDICINE

## 2020-06-06 RX ORDER — LOPERAMIDE HYDROCHLORIDE 2 MG/1
2 CAPSULE ORAL 4 TIMES DAILY PRN
Status: DISCONTINUED | OUTPATIENT
Start: 2020-06-06 | End: 2020-06-07

## 2020-06-06 RX ORDER — PROMETHAZINE HYDROCHLORIDE 25 MG/ML
25 INJECTION, SOLUTION INTRAMUSCULAR; INTRAVENOUS EVERY 6 HOURS PRN
Status: DISCONTINUED | OUTPATIENT
Start: 2020-06-06 | End: 2020-06-07

## 2020-06-06 RX ORDER — PROMETHAZINE HYDROCHLORIDE 25 MG/ML
12.5 INJECTION, SOLUTION INTRAMUSCULAR; INTRAVENOUS EVERY 6 HOURS PRN
Status: DISCONTINUED | OUTPATIENT
Start: 2020-06-06 | End: 2020-06-06

## 2020-06-06 RX ADMIN — PROMETHAZINE HYDROCHLORIDE 25 MG: 25 INJECTION, SOLUTION INTRAMUSCULAR; INTRAVENOUS at 19:26

## 2020-06-06 RX ADMIN — HYDROMORPHONE HYDROCHLORIDE 0.25 MG: 1 INJECTION, SOLUTION INTRAMUSCULAR; INTRAVENOUS; SUBCUTANEOUS at 22:58

## 2020-06-06 RX ADMIN — PROMETHAZINE HYDROCHLORIDE 12.5 MG: 25 INJECTION, SOLUTION INTRAMUSCULAR; INTRAVENOUS at 11:14

## 2020-06-06 RX ADMIN — HYDROMORPHONE HYDROCHLORIDE 0.25 MG: 1 INJECTION, SOLUTION INTRAMUSCULAR; INTRAVENOUS; SUBCUTANEOUS at 14:28

## 2020-06-06 RX ADMIN — LOPERAMIDE HYDROCHLORIDE 2 MG: 2 CAPSULE ORAL at 13:17

## 2020-06-06 RX ADMIN — LEVETIRACETAM 500 MG: 500 TABLET ORAL at 08:18

## 2020-06-06 RX ADMIN — PROMETHAZINE HYDROCHLORIDE 12.5 MG: 25 INJECTION INTRAMUSCULAR; INTRAVENOUS at 09:01

## 2020-06-06 RX ADMIN — LEVETIRACETAM 500 MG: 500 TABLET ORAL at 21:21

## 2020-06-06 RX ADMIN — SODIUM CHLORIDE 125 ML/HR: 9 INJECTION, SOLUTION INTRAVENOUS at 18:49

## 2020-06-06 RX ADMIN — ONDANSETRON 4 MG: 2 INJECTION INTRAMUSCULAR; INTRAVENOUS at 08:13

## 2020-06-06 RX ADMIN — HYDROCODONE BITARTRATE AND ACETAMINOPHEN 1 TABLET: 5; 325 TABLET ORAL at 08:18

## 2020-06-06 RX ADMIN — TIZANIDINE 4 MG: 4 TABLET ORAL at 04:37

## 2020-06-06 RX ADMIN — NYSTATIN: 100000 POWDER TOPICAL at 00:26

## 2020-06-06 RX ADMIN — SODIUM CHLORIDE 125 ML/HR: 9 INJECTION, SOLUTION INTRAVENOUS at 00:26

## 2020-06-06 RX ADMIN — LOPERAMIDE HYDROCHLORIDE 2 MG: 2 CAPSULE ORAL at 21:20

## 2020-06-06 RX ADMIN — SODIUM CHLORIDE 125 ML/HR: 9 INJECTION, SOLUTION INTRAVENOUS at 09:35

## 2020-06-06 RX ADMIN — TIZANIDINE 4 MG: 4 TABLET ORAL at 22:59

## 2020-06-06 RX ADMIN — GABAPENTIN 800 MG: 400 CAPSULE ORAL at 14:28

## 2020-06-06 RX ADMIN — HYDROMORPHONE HYDROCHLORIDE 0.25 MG: 1 INJECTION, SOLUTION INTRAMUSCULAR; INTRAVENOUS; SUBCUTANEOUS at 09:41

## 2020-06-06 RX ADMIN — GABAPENTIN 800 MG: 400 CAPSULE ORAL at 05:34

## 2020-06-06 RX ADMIN — GABAPENTIN 800 MG: 400 CAPSULE ORAL at 21:20

## 2020-06-06 RX ADMIN — HYDROMORPHONE HYDROCHLORIDE 0.25 MG: 1 INJECTION, SOLUTION INTRAMUSCULAR; INTRAVENOUS; SUBCUTANEOUS at 18:52

## 2020-06-06 RX ADMIN — NYSTATIN: 100000 POWDER TOPICAL at 21:21

## 2020-06-06 RX ADMIN — BUDESONIDE AND FORMOTEROL FUMARATE DIHYDRATE 2 PUFF: 160; 4.5 AEROSOL RESPIRATORY (INHALATION) at 19:49

## 2020-06-06 RX ADMIN — HEPARIN SODIUM 5000 UNITS: 5000 INJECTION, SOLUTION INTRAVENOUS; SUBCUTANEOUS at 14:50

## 2020-06-06 RX ADMIN — CEFTAROLINE FOSAMIL 600 MG: 600 POWDER, FOR SOLUTION INTRAVENOUS at 09:46

## 2020-06-06 RX ADMIN — HYDROMORPHONE HYDROCHLORIDE 0.25 MG: 1 INJECTION, SOLUTION INTRAMUSCULAR; INTRAVENOUS; SUBCUTANEOUS at 05:35

## 2020-06-06 RX ADMIN — HYDROMORPHONE HYDROCHLORIDE 0.25 MG: 1 INJECTION, SOLUTION INTRAMUSCULAR; INTRAVENOUS; SUBCUTANEOUS at 01:37

## 2020-06-06 RX ADMIN — HEPARIN SODIUM 5000 UNITS: 5000 INJECTION, SOLUTION INTRAVENOUS; SUBCUTANEOUS at 05:34

## 2020-06-06 RX ADMIN — NYSTATIN: 100000 POWDER TOPICAL at 09:47

## 2020-06-06 RX ADMIN — PANTOPRAZOLE SODIUM 40 MG: 40 TABLET, DELAYED RELEASE ORAL at 05:35

## 2020-06-06 RX ADMIN — FLUOXETINE HYDROCHLORIDE 40 MG: 20 CAPSULE ORAL at 08:18

## 2020-06-06 RX ADMIN — TIZANIDINE 4 MG: 4 TABLET ORAL at 14:52

## 2020-06-06 RX ADMIN — HYDROCODONE BITARTRATE AND ACETAMINOPHEN 1 TABLET: 5; 325 TABLET ORAL at 00:26

## 2020-06-06 RX ADMIN — CEFTAROLINE FOSAMIL 600 MG: 600 POWDER, FOR SOLUTION INTRAVENOUS at 21:21

## 2020-06-06 RX ADMIN — Medication 1 CAPSULE: at 08:18

## 2020-06-06 RX ADMIN — HYDROCODONE BITARTRATE AND ACETAMINOPHEN 1 TABLET: 5; 325 TABLET ORAL at 21:20

## 2020-06-06 RX ADMIN — DAPTOMYCIN 600 MG: 500 INJECTION, POWDER, LYOPHILIZED, FOR SOLUTION INTRAVENOUS at 08:19

## 2020-06-06 RX ADMIN — LEVOTHYROXINE SODIUM 50 MCG: 50 TABLET ORAL at 08:18

## 2020-06-06 NOTE — PLAN OF CARE
VSS on home amount of oxygen. Pt ask for pain medication often. Frequent BM but with some improvement as shift has gone on.

## 2020-06-06 NOTE — PLAN OF CARE
Problem: Patient Care Overview  Goal: Plan of Care Review  Outcome: Ongoing (interventions implemented as appropriate)  Flowsheets (Taken 6/6/2020 1514)  Progress: declining  Plan of Care Reviewed With: patient  Outcome Summary: VSS, 3L NC. PRN phenergan given for c/o n/v, pt unable to hold down oral medications this morning, Dr. Keller aware. Several episodes of diarrhea throughout morning and afternoon, PRN immodium given. Petechiae, blood in emesis, and bloody tears noted upon assessment this morning, Dr. Keller notified. Will continue to monitor.

## 2020-06-06 NOTE — CONSULTS
CTS Consult  Liliana Calderon  [id number]  1980    Patient Care Team:  Ollie Flores MD as PCP - General (Internal Medicine)    CC: I am hurting all over      Reason for Consult: Tricuspid valve endocarditis    HPI: 39-year-old  female with a previous past history of intravenous drug use.  This patient has multiple medical problems including morbid obesity, COPD, and history of depression and anxiety.  She has had a past seizure disorder.  Recently she has been complaining of shortness of breath and generalized back pain.  She has used heroin in the recent past.  She has been running fevers over the past 4 days with maximum fever of 103.9.  She is not had contact with known CO VID-19 infection.  She was admitted to [facility] with fever and leukocytosis.  The CT of the chest which I have reviewed was consistent with septic pulmonary emboli.  Blood cultures were positive for MRSA.  She was subsequently transferred to [facility].  Transthoracic echocardiogram revealed tricuspid valve regurgitation, transesophageal echocardiogram reveals a 1 x 1.4 cm very mobile tricuspid vegetation consistent with acute tricuspid endocarditis.  Her other evaluation was positive for epidural abscess which is currently being treated and has been aspirated.      Sepsis (CMS/HCC)    Essential hypertension    Fibromyalgia syndrome    GERD without esophagitis    DDD (degenerative disc disease), cervical    Anxiety    Depression    Seizure (CMS/HCC)    Migraine    Infective endocarditis    Epidural abscess    MRSA bacteremia      Review of Systems:  General: No anorexia, no weight loss, (+)general malaise and weakness.  (+) fever, chills, and night sweats.  HEENT: (+) headaches, no visual changes, no hearing loss, no rhinitis, no pharyngitis  Pulmonary: (+) shortness of breath, (+) cough, no hemoptysis  Heart: No palpitations,  (+) malaise and shortness of breath, no atrial fibrillation, no  bradycardia, no syncope.  No anginal quality chest pain.  Gastrointestinal: (+) nausea, (+)vomiting, no diarrhea, or constipation. No acholic stool, no jaundice.  Renal (+) dysuria, no frequency, no hematuria.  Skin: (+) rash, (+) skin lesions, no skin tumors.  Neurologic: No seizures, no muscle weakness, no sensory deficit, no amaurosis,  Psychiatric: (+) anxiety, no history of psychosis  Hematologic: No bleeding history, no ease of bruising, no history of blood disorder.  All other systems were reviewed and are negative.    History  Past Medical History:   Diagnosis Date   • Anxiety and depression    • Chronic bronchitis (CMS/HCC)    • Fibromyalgia    • Heart valve problem    • Hypertension    • Migraine    • PCOS (polycystic ovarian syndrome)    • Pneumonia    • Seizures (CMS/HCC)      Past Surgical History:   Procedure Laterality Date   • CHOLECYSTECTOMY       Family History   Problem Relation Age of Onset   • Breast cancer Mother    • Bone cancer Mother    • Skin cancer Mother    • Hypertension Mother    • Dementia Father    • Diabetes Father    • Heart disease Father    • Hypertension Father    • Stroke Father    • Pulmonary embolism Father    • Alcohol abuse Sister    • Hypertension Sister    • Mental illness Sister    • Alcohol abuse Brother    • Hypertension Brother    • Mental illness Brother    • Alzheimer's disease Maternal Grandmother    • Parkinsonism Maternal Grandmother    • Heart disease Paternal Grandfather      Social History     Tobacco Use   • Smoking status: Former Smoker     Packs/day: 0.25     Years: 5.00     Pack years: 1.25     Types: Cigarettes     Last attempt to quit: 2014     Years since quittin.4   • Smokeless tobacco: Never Used   Substance Use Topics   • Alcohol use: No   • Drug use: Not Currently     Types: IV     Comment: HEROIN; last used a month ago     Medications Prior to Admission   Medication Sig Dispense Refill Last Dose   • azithromycin (ZITHROMAX) 250 MG tablet Take 1  tablet by mouth Daily. Take 2 tablets the first day, then 1 tablet daily for 4 days. 6 tablet 0 Not Taking   • bisoprolol (ZEBeta) 5 MG tablet Take 1 tablet by mouth Daily. 30 tablet 0 Taking   • budesonide-formoterol (SYMBICORT) 160-4.5 MCG/ACT inhaler Inhale 2 puffs 2 (Two) Times a Day. 1 inhaler 0 Taking   • ferrous sulfate 324 (65 Fe) MG tablet delayed-release EC tablet Take 1 tablet by mouth 2 (Two) Times a Day With Meals. 60 tablet 0 Taking   • flunisolide (NASALIDE) 25 MCG/ACT (0.025%) solution nasal spray Inhale 1 spray Every 12 (Twelve) Hours. 1 bottle 5    • FLUoxetine (PROzac) 40 MG capsule Take 40 mg by mouth Daily.   Taking   • gabapentin (NEURONTIN) 800 MG tablet Take 800 mg by mouth 3 (Three) Times a Day.   Taking   • hydrOXYzine (VISTARIL) 25 MG capsule Take 25 mg by mouth 3 (Three) Times a Day.  0 Taking   • ipratropium-albuterol (DUO-NEB) 0.5-2.5 mg/3 ml nebulizer INHALE 1 VIAL (3 MLS)  BY NEBULIZATION TWO TIMES DAILY  3 Taking   • lansoprazole (PREVACID) 30 MG capsule Take 30 mg by mouth Daily.   Taking   • levETIRAcetam (KEPPRA) 500 MG tablet Take 1 tablet by mouth Every 12 (Twelve) Hours. 60 tablet 3 Taking   • levoFLOXacin (LEVAQUIN) 500 MG tablet TAKE 1 TABLET BY MOUTH EVERY 24 HOURS UNTIL GONE  0 Not Taking   • levothyroxine (SYNTHROID, LEVOTHROID) 50 MCG tablet Take 50 mcg by mouth Daily. 200001  3 Taking   • lisinopril (PRINIVIL,ZESTRIL) 2.5 MG tablet Take 2.5 mg by mouth Daily. 200001  2 Not Taking   • lisinopril-hydrochlorothiazide (PRINZIDE,ZESTORETIC) 20-12.5 MG per tablet Take 1 tablet by mouth Daily.   Taking   • metroNIDAZOLE (FLAGYL) 500 MG tablet TAKE 1 TABLET BY MOUTH EVERY TWELVE HOURS UNTIL GONE  0 Not Taking   • ondansetron ODT (ZOFRAN-ODT) 4 MG disintegrating tablet Take 1 tablet by mouth Every 6 (Six) Hours As Needed for Nausea or Vomiting. 20 tablet 0    • potassium chloride (K-DUR) 10 MEQ CR tablet TAKE 2 TABLETS BY MOUTH TWO TIMES A DAY WITH FOOD  0 Taking   • SUMAtriptan  (IMITREX) 50 MG tablet Take one tablet at onset of headache. May repeat dose one time in 2 hours if headache not relieved. 9 tablet 3 Taking   • tiZANidine (ZANAFLEX) 4 MG tablet 1 tablet Every 8 (Eight) Hours.  0 Taking     Allergies:  Penicillins and Phenobarbital    Objective    Vital Signs  Temp:  [96.7 °F (35.9 °C)-98.8 °F (37.1 °C)] 98.7 °F (37.1 °C)  Heart Rate:  [72-85] 77  Resp:  [20-36] 20  BP: (118-142)/(68-81) 142/81    Physical Exam:  General Appearance: alert, appears stated age and cooperative morbid obesity, generalized rash on the back and all 4 extremities as well as intertriginous regions.  Head: normocephalic, without obvious abnormality and atraumatic  Ears/Nose: no rhinitis, external ears normal  Throat: Unable to examine for oral lesions or for pharyngitis  Neck: no adenopathy, suppple, trachea midline, no thyromegaly, no carotid bruit and no JVD  Lungs: clear to auscultation anterior with diminished breath sounds at both bases, respirations regular, respirations even and respirations unlabored  Heart: regular rhythm & normal rate, normal S1, S2, no murmur  Abdomen: Obese, mild generalized tenderness, normal bowel sounds, no masses, soft  Extremities: moves extremities well and no edema  Pulses: pulses palpable and equal bilaterally (femoral, DP, PT)  Skin: no bleeding, bruising, generalized rash as noted above  Neurologic: mental status orientated to person, place, time and situation, CN intact, no motor or sensory loss          Data Review:  Results from last 7 days   Lab Units 06/06/20  0921   WBC 10*3/mm3 9.26   HEMOGLOBIN g/dL 9.7*  9.7*   HEMATOCRIT % 33.1*  33.1*   PLATELETS 10*3/mm3 282     Results from last 7 days   Lab Units 06/06/20  0921   SODIUM mmol/L 134*  134*   POTASSIUM mmol/L 4.2  4.2   CHLORIDE mmol/L 100  100   CO2 mmol/L 21.0*  21.0*   BUN mg/dL 7  7   CREATININE mg/dL 0.51*  0.51*   GLUCOSE mg/dL 145*  145*   CALCIUM mg/dL 8.1*  8.1*     Coagulation:   INR    Date Value Ref Range Status   06/06/2020 1.23 (H) 0.85 - 1.16 Final     Cardiac markers:   Results from last 7 days   Lab Units 05/31/20  1751   TROPONIN T ng/mL <0.010     ABGs:       Invalid input(s): PO2      Imaging Results (Last 72 Hours)     Procedure Component Value Units Date/Time    CT Chest With Contrast [[id number]] Collected:  06/05/20 2044     Updated:  06/05/20 2046    Narrative:       CT Chest W    INDICATION:   Shortness of the air, history of COPD, hypertension. Hospital admission 6/1/2020 for sepsis with MRSA bacteremia, epidural abscesses and infective endocarditis of the tricuspid valve    TECHNIQUE:   CT of the thorax with IV contrast. Coronal and sagittal reconstructions were obtained.  Radiation dose reduction techniques included automated exposure control or exposure modulation based on body size. Count of known CT and cardiac nuc med studies  performed in previous 12 months: 1.     COMPARISON:   None available.    FINDINGS:  Images through the thoracic inlet demonstrate no thyroid mass or supraclavicular adenopathy images through the chest demonstrate small mediastinal and hilar nodes. There is no pericardial fluid. There are small to moderate dependent bilateral pleural  effusions.    Lung window images demonstrate peripheral small nodules in both upper lobes. There are larger areas of rounded consolidation in the mid lung regions measuring up to 3 cm on the right, 1.9 cm in the lingula. There is dense consolidation which appears  somewhat ovoid and masslike in the medial aspect of the right middle lobe measuring up to 7.3 cm. These are likely infectious changes. Pneumonia and septic emboli are included in the differential.  The liver enhances normally without focal lesion. The spleen is prominent in size incompletely imaged. The adrenal glands are normal.    Bone window images demonstrate no vertebral or rib lesions.      Impression:       Abnormal chest CT with moderate dependent  bilateral pleural effusions. There are bilateral parenchymal changes in the lungs including well-circumscribed nodules which likely represent septic emboli and a poorly defined nodules and areas of airspace  change which could represent septic emboli or pneumonia. Findings most likely are all infectious. There are small reactive nodes in the mediastinum. The spleen is prominent in size but incompletely imaged. No upper abdominal adenopathy. No acute bone  lesions in the chest    Signer Name: Anushka Gutierrez MD   Signed: 6/5/2020 8:44 PM   Workstation Name: [id number]    [facility]    MRI Lumbar Spine With & Without Contrast [[id number]] Collected:  06/03/20 0829     Updated:  06/04/20 2248    Narrative:       EXAMINATION: MRI LUMBAR SPINE W WO CONTRAST-      INDICATION: LBP, sepsis, rule  out abscess.      TECHNIQUE: Pre and postcontrast sagittal and axial T1, sagittal and  axial T2 and sagittal STIR images of the lumbar spine.     COMPARISON: None.     FINDINGS: Patient history indicates sepsis and lower back pain, history  of IVDA, evaluate for abscess.     Sagittal T2-weighted image #8 series 3 appears to show dorsal epidural  collections at L2-L3 and L3-L4, which showed patchy postcontrast  enhancement presumably abscesses, possibly interconnected as seen on  sagittal postcontrast image 8 series 11. As seen on sagittal imaging  series, these measure 20 x 10 mm at L2-L3 and 22 x 9 mm at L3-L4.     Sagittal images show no obvious enhancing collection or focal mass  effect on the canal elsewhere.     There is normal vertebral marrow signal, with no evidence to suggest  osteomyelitis. There are disc protrusions at L4-L5 and L5-S1, but no  signal changes to suggest discitis. At the far right lateral margin of  sagittal images, there is a roughly 8 x 4 X 3 cm multilobular collection  within the posterior right paraspinous musculature, indicating abscess  here, generally from the L3-L4 axial  level down to L5-S1 axial level.     Axial images, by disc space show no significant canal or foraminal  stenosis at L1-L2.     At L2-L3, canal appears lower limits of normal due to dorsal epidural  collection, but is not markedly stenotic. Foramina appear well  maintained.     At L3-L4, dorsal epidural collection is again seen, with only mild canal  narrowing. Foramina appear normal.     At L4-L5, there is a right paracentral disc protrusion with mild mass  effect on the canal, and probably moderate right-sided foraminal  stenosis. Left neural foramen appears normal.     At L5-S1, there is a left paracentral disc protrusion with mild canal  stenosis. Foramina appear well maintained.     Axial images show no evidence of psoas muscle abscess or inflammation,  or other evidence of retroperitoneal inflammation, but do show the  multilocular abscess in the right posterior paraspinous vasculature.       Impression:       1. Approximately 8 x 4 x 3 cm multilocular abscess of the right erector  spinae muscles, approximately from L3 to S1.  2. Two possibly interconnected dorsal epidural abscesses at L2-L3 and  L3-L4, but causing only mild canal stenosis.  3. No evidence to suggest osteomyelitis or discitis.  4. Moderate-sized L4-L5 and L5-S1 disc protrusions as discussed above.     D:  06/03/2020  E:  06/03/2020     This report was finalized on 6/4/2020 10:45 PM by Dr. William Boateng MD.       CT Guided Biopsy Aspiration Injection [[id number]] Collected:  06/04/20 1730     Updated:  06/04/20 1740    Narrative:       PROCEDURE: Fluid collection aspiration     Procedural Personnel  Attending physician(s): HOLLY Huggins M.D.  Fellow physician(s): None  Resident physician(s): None  Advanced practice provider(s): None     Pre-procedure diagnosis: Sepsis with MRSA bacteremia, MRSA UTI and  pneumonia, also with multiple right-sided pulmonary nodules with  cavitation suspicious for septic emboli. IVDA.  -MRI L-spine concerning for multiple  epidural  abscesses,?neurosurgery?has evaluated and no intervention is planned  Post-procedure diagnosis: Same  Indication: Diagnostic  Additional clinical history: None     Complications: No immediate complications.       Impression:          Percutaneous aspiration of very tiny abscess collection adjacent to  vertebral bodies/within the erector spinae muscle, yielding 2 mL of  purulent appearing fluid. No drainage catheter was left in place - too  small for drainage catheter/contraindicated to leave drainage catheter  in collection with possible communication with epidural space     Plan:      Follow-up lab results.  Continued care with primary team.  ______________________________________________________________________     PROCEDURE SUMMARY:  - Aspiration of tiny abscess pockets under fluoroscopic guidance  - Additional procedure(s): None     PROCEDURE DETAILS:     Pre-procedure  Consent: Informed consent for the procedure including risks, benefits  and alternatives was obtained and time-out was performed prior to the  procedure.  Preparation: The site was prepared and draped using maximal sterile  barrier technique including cutaneous antisepsis.     Anesthesia/sedation  Level of anesthesia/sedation: Moderate sedation (conscious sedation)  Anesthesia/sedation administered by: Independent trained observer under  attending supervision with continuous monitoring of the patient?s level  of consciousness and physiologic status  Total intra-service sedation time (minutes): 12     Fluid collection aspiration  The patient was positioned prone on. Initial imaging was performed.  Local anesthesia was administered. The fluid collection was accessed  using a 22-gauge spinal needle under CT fluoroscopic guidance. Position  within the fluid collection was confirmed, and fluid aspiration was  performed. All instruments were then removed.  - Initial imaging findings: Fluid collections identified on comparison  MRI lumbar spine  6/4/2020 not as well visualized on CT.  - Aspiration needle/catheter: 22-gauge spinal needle  - Post-aspiration imaging findings: No immediate complication     Contrast  Contrast agent: None  Contrast volume (mL): 0     Radiation Dose  CT dose length product (mGy-cm): 975      Additional Details  Additional description of procedure: None  Equipment details: None  Specimens removed: Aspirated fluid sent for analysis.  Estimated blood loss (mL): Less than 10  Standardized report: DrainageAspiration     Attestation  I was present and scrubbed for the entire procedure. Imaging reviewed.  Agree with final report as written.     This report was finalized on 6/4/2020 5:36 PM by Yimi Huggins.               Imaging: Transthoracic echocardiogram, transesophageal echocardiogram, CT scan of the chest, and chest x-ray all reviewed.  Reports of been reviewed images have been reviewed and compared to the reports.        Assessment:   Tricuspid valve endocarditis  MRSA septicemia  Epidural abscess  Intravenous drug use  Morbid obesity        Plan:  CTS will continue to follow.  Once work-up of all of her medical issues has been completed evaluation for AngioVac removal of tricuspid vegetation.  If this is not deemed possible then the patient will likely require tricuspid valve excision. I have reviewed, verified, and confirmed the above history and current status.  I have examined the patient and confirmed the above physical findings.Above plan and treatment regimen discussed in detail with patient.  Options of treatment, attendant risks vs benefits, and my recommendations were discussed and all questions answered.      Greg Blanchard MD  06/06/20  13:46

## 2020-06-06 NOTE — PROGRESS NOTES
Liliana Calderon  1980  [id number]    Evaluating Physician: Elsy Augustin    Chief Complaint: fever    Reason for Consultation: MRSA bacteremia    History of present illness:   Patient is a 39 y.o.  Yr old female with history of IVDU, depression/anxiety, COPD, and seizure d/o who initially presented to [facility] yesterday with c/o shortness of air and worsening low back pain. She last used IV heroin about 1 month prior.  She was doing relatively well until about 3 days PTA when she noticed worsening low back pain and worsening shortness of air with blood-tinged sputum. She additionally started having fevers up to 103.9F. She did not have contact with anyone known to have COVID-19 infection.     On arrival to [facility] on 5/31, the patient was found to have fevers and a significant leukocytosis. CT chest was concerning for pneumonia and possible septic pulm emboli. Blood cultures resulted with MRSA and she was transferred to [facility] for a higher level of care. Tmax since arrival here has been 102.7F. WBC was elevated to 14.2 but has improved to 10 on antibiotics. She was started on vancomycin and aztreonam by primary team. Repeat blood cultures from 6/1 are again positive for MRSA. TTE and MRI lumbar spine ordered. ID consulted for recommendations in the setting of her MRSA septicemia.    Subjective:    6/3/20: The patient is still having back pain. No new weakness in her lower extremities but does have extreme back pain with movement. Fevers improved. Tmax 100F. Does have some anemia with Hgb 6.4. No nausea or vomiting. Mild diarrhea. No new rash.     6/4/20: still having some back pain and had some fevers overnight. Tmax this morning was 102.7F.  Not feeling very well this morning when I saw her. No worsening of her diarrhea. No nausea. No new rash. Still able to move her extremities well with no new weakness but does have significant pain lifting her legs off the bed.     6/5/20: Patient is  unfortunately still having fevers up to 102.5°F.  She is still not feeling well with significant back pain.  No new weakness in her lower extremities.  She is having some diarrhea but only about 2 episodes in the last 24 hours.  No nausea.  No new rashes.  She went for REDD today and was noted to have about a 1 cm x 1 cm vegetation on the tricuspid valve per my discussion with Dr. Pinedo.    6/6/2020: Patient states she is still receiving her antibiotics but has complaints of nausea and vomiting as well as diarrhea.  Discussion with nursing staff states that she is having blood-tinged  tears, bloody emesis and worsening petechial rash over her body.  RN staff states they have not noted any blood in her diarrhea at this point.  Afebrile overnight with some tachypnea noted.  Remains without leukocytosis.  6/4 body fluid culture from back returned positive for 2+ MRSA.  6/3 blood cultures currently remain no growth to date. Per RN her rash has gradually progressed over the last couple days    Past Medical History:   Diagnosis Date   • Anxiety and depression    • Chronic bronchitis (CMS/HCC)    • Fibromyalgia    • Heart valve problem    • Hypertension    • Migraine    • PCOS (polycystic ovarian syndrome)    • Pneumonia    • Seizures (CMS/HCC)        Past Surgical History:   Procedure Laterality Date   • CHOLECYSTECTOMY         Social History:  IVDU (heroin). Former smoker. No ETOH use.    Family History:  family history includes Alcohol abuse in her brother and sister; Alzheimer's disease in her maternal grandmother; Bone cancer in her mother; Breast cancer in her mother; Dementia in her father; Diabetes in her father; Heart disease in her father and paternal grandfather; Hypertension in her brother, father, mother, and sister; Mental illness in her brother and sister; Parkinsonism in her maternal grandmother; Pulmonary embolism in her father; Skin cancer in her mother; Stroke in her father.    Allergies   Allergen  Reactions   • Penicillins Rash     Received cefepime and cefdinir in 2018   • Phenobarbital Rash       Medication:  Current Facility-Administered Medications   Medication Dose Route Frequency Provider Last Rate Last Dose   • acetaminophen (TYLENOL) tablet 650 mg  650 mg Oral Q6H PRN Malathi Vasquez APRN   650 mg at 06/05/20 0029   • budesonide-formoterol (SYMBICORT) 160-4.5 MCG/ACT inhaler 2 puff  2 puff Inhalation BID - RT Rancho Lang MD   2 puff at 06/05/20 2023   • ceftaroline (TEFLARO) 600 mg/100 mL 0.9% NS (mbp)  600 mg Intravenous BID Rancho Lang MD   600 mg at 06/06/20 0946   • DAPTOmycin (CUBICIN) 600 mg in sodium chloride 0.9 % 50 mL IVPB  8 mg/kg (Adjusted) Intravenous Q24H Rancho Lang  mL/hr at 06/06/20 0819 600 mg at 06/06/20 0819   • ferric gluconate (FERRLECIT)125 MG IVPB  125 mg Intravenous Q24H Oskar Black MD   125 mg at 06/05/20 1506   • FLUoxetine (PROzac) capsule 40 mg  40 mg Oral Daily Vanessa Lopez MD   40 mg at 06/06/20 0818   • gabapentin (NEURONTIN) capsule 800 mg  800 mg Oral Q8H Vanessa Lopez MD   800 mg at 06/06/20 0534   • heparin (porcine) 5000 UNIT/ML injection 5,000 Units  5,000 Units Subcutaneous Q8H Vanessa Lopez MD   5,000 Units at 06/06/20 0534   • HYDROcodone-acetaminophen (NORCO) 5-325 MG per tablet 1 tablet  1 tablet Oral Q6H PRN Vanessa Lopez MD   1 tablet at 06/06/20 0818   • HYDROmorphone (DILAUDID) injection 0.25 mg  0.25 mg Intravenous Q4H PRN Verenice Keller MD   0.25 mg at 06/06/20 0941   • ipratropium-albuterol (DUO-NEB) nebulizer solution 3 mL  3 mL Nebulization Q6H PRN Vanessa Lopez MD   3 mL at 06/05/20 0946   • lactobacillus acidophilus (RISAQUAD) capsule 1 capsule  1 capsule Oral Daily Rancho Lang MD   1 capsule at 06/06/20 0818   • levETIRAcetam (KEPPRA) tablet 500 mg  500 mg Oral Q12H Oskar Black MD   500 mg at 06/06/20 0818   • levothyroxine (SYNTHROID, LEVOTHROID)  "tablet 50 mcg  50 mcg Oral Daily Vanessa Lopez MD   50 mcg at 06/06/20 0818   • nystatin (MYCOSTATIN) powder   Topical Q12H Verenice Keller MD       • ondansetron (ZOFRAN) injection 4 mg  4 mg Intravenous Q6H PRN Vanessa Lopez MD   4 mg at 06/06/20 0813   • pantoprazole (PROTONIX) EC tablet 40 mg  40 mg Oral QAM Vanessa Lopez MD   40 mg at 06/06/20 0535   • promethazine (PHENERGAN) injection 12.5 mg  12.5 mg Intravenous Q6H PRN Verenice Keller MD   12.5 mg at 06/06/20 0901   • sodium chloride 0.9 % infusion  125 mL/hr Intravenous Continuous Vanessa Lopez  mL/hr at 06/06/20 0935 125 mL/hr at 06/06/20 0935   • tiZANidine (ZANAFLEX) tablet 4 mg  4 mg Oral Q8H PRN Vanessa Lopez MD   4 mg at 06/06/20 0437         Infectious History:  MRSA    Antibiotics:  Anti-Infectives (From admission, onward)    Ordered     Dose/Rate Route Frequency Start Stop    06/02/20 0819  ceftaroline (TEFLARO) 600 mg/100 mL 0.9% NS (mbp)     Rancho Lang MD reviewed the order on 06/05/20 0802.   Ordering Provider:  Rancho Lang MD    600 mg Intravenous 2 Times Daily 06/02/20 0915 06/16/20 0859    06/02/20 0743  DAPTOmycin (CUBICIN) 600 mg in sodium chloride 0.9 % 50 mL IVPB     Rancho Lang MD reviewed the order on 06/05/20 0802.   Ordering Provider:  Rancho Lang MD    8 mg/kg × 76.6 kg (Adjusted)  100 mL/hr over 30 Minutes Intravenous Every 24 Hours 06/02/20 0900 06/23/20 0859    06/01/20 1501  vancomycin 2250 mg/500 mL 0.9% NS IVPB ([facility])     Ordering Provider:  Vanessa Lopez MD    2,250 mg Intravenous Once 06/01/20 1600 06/01/20 7646            Review of Systems    As above    Physical Exam:   Vital Signs   /72 (BP Location: Left arm, Patient Position: Lying)   Pulse 77   Temp 96.7 °F (35.9 °C) (Axillary)   Resp 24   Ht 165.1 cm (65\")   Wt 106 kg (233 lb)   LMP 04/01/2020 (Approximate)   SpO2 95%   BMI 38.77 kg/m²     GENERAL: " lethargic, Obese. Moderate distress  HENT: Normocephalic, atraumatic.  Conjunctiva with hemorrhages bilaterally  NECK: Supple without nuchal rigidity. No mass.  HEART: RRR; No murmur noted.   LUNGS: CTAB. Nonlabored.   ABDOMEN: Soft, nontender, nondistended. Positive bowel sounds.    EXT:  No cyanosis, clubbing or edema.   :   Without Petit catheter.  MSK: FROM without joint effusions noted arms/legs.  Some tenderness to palpation over her lumbar spine.  SKIN: has slightly discolored/gray patchy/papular lesions over her bilateral lower extremity- these appeared about 2 weeks ago per the patient.  No other possible peripheral stigmata of infective endocarditis noted over her extremities or elsewhere.  She is also developed a petechial rash over her entire back bilateral upper extremities, buttocks and inguinal and abdominal folds  NEURO: Oriented to PPT. No focal deficits on motor/sensory exam at arms/legs. 5 out of 5 strength throughout  PSYCHIATRIC: Normal insight and judgement. Cooperative with PE    Laboratory Data    Results from last 7 days   Lab Units 06/06/20  0921 06/05/20  2155 06/05/20  1532  06/04/20  0835  06/03/20  0407   WBC 10*3/mm3 9.26  --   --   --  14.45*  --  10.00   HEMOGLOBIN g/dL 9.7*  9.7* 9.1* 8.7*   < > 7.7*   < > 6.4*   HEMATOCRIT % 33.1*  33.1* 29.6* 29.8*   < > 26.2*   < > 22.3*   PLATELETS 10*3/mm3 282  --   --   --  145  --  110*    < > = values in this interval not displayed.     Results from last 7 days   Lab Units 06/06/20  0921   SODIUM mmol/L 134*  134*   POTASSIUM mmol/L 4.2  4.2   CHLORIDE mmol/L 100  100   CO2 mmol/L 21.0*  21.0*   BUN mg/dL 7  7   CREATININE mg/dL 0.51*  0.51*   GLUCOSE mg/dL 145*  145*   CALCIUM mg/dL 8.1*  8.1*     Results from last 7 days   Lab Units 06/06/20  0921   ALK PHOS U/L 228*   BILIRUBIN mg/dL 0.9   ALT (SGPT) U/L 15   AST (SGOT) U/L 16     Results from last 7 days   Lab Units 05/31/20  1752   SED RATE mm/hr 80*     Results from last 7  days   Lab Units 05/31/20  1751   CRP mg/dL 32.12*       Estimated Creatinine Clearance: 180.9 mL/min (A) (by C-G formula based on SCr of 0.51 mg/dL (L)).       Microbiology:    Blood Culture   Date Value Ref Range Status   06/01/2020 Abnormal Stain (C)  Preliminary   06/01/2020 Abnormal Stain (C)  Preliminary   05/31/2020 Abnormal Stain (C)  Preliminary   05/31/2020 Staphylococcus aureus, MRSA (C)  Preliminary     Comment:       Infectious disease consultation is highly recommended to rule out distant foci of infection.  Methicillin resistant Staphylococcus aureus, Patient may be an isolation risk.       Urine Culture   Date Value Ref Range Status   05/31/2020 50,000 CFU/mL Staphylococcus aureus, MRSA (A)  Preliminary     Comment:       Methicillin resistant Staph aureus, patient may be an isolation risk.  Staphylococcus aureus is not typically regarded as a uropathogen, except in cases with genitourinary instrumentation present.  It's presence suggests an alternate source (e.g. bloodstream, abscess, SSTI, etc.).  Please evaluate accordingly.       Radiology:  Ct Chest With Contrast    Result Date: 6/5/2020  Abnormal chest CT with moderate dependent bilateral pleural effusions. There are bilateral parenchymal changes in the lungs including well-circumscribed nodules which likely represent septic emboli and a poorly defined nodules and areas of airspace change which could represent septic emboli or pneumonia. Findings most likely are all infectious. There are small reactive nodes in the mediastinum. The spleen is prominent in size but incompletely imaged. No upper abdominal adenopathy. No acute bone lesions in the chest Signer Name: Anushka Gutierrez MD  Signed: 6/5/2020 8:44 PM  Workstation Name: [id number]  [facility]    Ct Chest With Contrast    Result Date: 5/31/2020  Multiple right lung nodules.  Since 1 of these nodules is cavitary this is worrisome for septic emboli.  Left lung  opacities worrisome for pneumonia. Authenticated by Sukhdeep Burnette III, MD on 05/31/2020 08:39:58 PM    Ct Abdomen Pelvis With Contrast    Result Date: 5/31/2020  Splenomegaly.  Normal appendix.  Mild anasarca. Authenticated by Sukhdeep Burnette III, MD on 05/31/2020 08:39:59 PM    Ct Guided Biopsy Aspiration Injection    Result Date: 6/4/2020   Percutaneous aspiration of very tiny abscess collection adjacent to vertebral bodies/within the erector spinae muscle, yielding 2 mL of purulent appearing fluid. No drainage catheter was left in place - too small for drainage catheter/contraindicated to leave drainage catheter in collection with possible communication with epidural space  Plan:  Follow-up lab results. Continued care with primary team. ______________________________________________________________________  PROCEDURE SUMMARY: - Aspiration of tiny abscess pockets under fluoroscopic guidance - Additional procedure(s): None  PROCEDURE DETAILS:  Pre-procedure Consent: Informed consent for the procedure including risks, benefits and alternatives was obtained and time-out was performed prior to the procedure. Preparation: The site was prepared and draped using maximal sterile barrier technique including cutaneous antisepsis.  Anesthesia/sedation Level of anesthesia/sedation: Moderate sedation (conscious sedation) Anesthesia/sedation administered by: Independent trained observer under attending supervision with continuous monitoring of the patient?s level of consciousness and physiologic status Total intra-service sedation time (minutes): 12  Fluid collection aspiration The patient was positioned prone on. Initial imaging was performed. Local anesthesia was administered. The fluid collection was accessed using a 22-gauge spinal needle under CT fluoroscopic guidance. Position within the fluid collection was confirmed, and fluid aspiration was performed. All instruments were then removed. - Initial imaging findings:  Fluid collections identified on comparison MRI lumbar spine 6/4/2020 not as well visualized on CT. - Aspiration needle/catheter: 22-gauge spinal needle - Post-aspiration imaging findings: No immediate complication  Contrast Contrast agent: None Contrast volume (mL): 0  Radiation Dose CT dose length product (mGy-cm): 975  Additional Details Additional description of procedure: None Equipment details: None Specimens removed: Aspirated fluid sent for analysis. Estimated blood loss (mL): Less than 10 Standardized report: DrainageAspiration  Attestation I was present and scrubbed for the entire procedure. Imaging reviewed. Agree with final report as written.  This report was finalized on 6/4/2020 5:36 PM by Yimi Huggins.      Mri Lumbar Spine With & Without Contrast    Result Date: 6/4/2020  1. Approximately 8 x 4 x 3 cm multilocular abscess of the right erector spinae muscles, approximately from L3 to S1. 2. Two possibly interconnected dorsal epidural abscesses at L2-L3 and L3-L4, but causing only mild canal stenosis. 3. No evidence to suggest osteomyelitis or discitis. 4. Moderate-sized L4-L5 and L5-S1 disc protrusions as discussed above.  D:  06/03/2020 E:  06/03/2020  This report was finalized on 6/4/2020 10:45 PM by Dr. William Boateng MD.           Impression:     This is a 38 yo woman with a h/o IVDU presenting with severe low back pain and shortness of air and found to have fevers/sepsis with MRSA growing from multiple blood culture sets. Also has CT chest findings that are concerning for septic pulmonary emboli and now noted to have a decent size vegetation on her tricuspid valve on REDD consistent with infective endocarditis. MRI with 8x4 x3 cm multilobular abscess of the right erector spinae muscles, approximately from L3-S1. Also with two possible interconnected dorsal epidural abscesses at L2-L3 and L3-L4 but only causing mild canal stenosis. Dr. Alex with neurosurgery has evaluated and does not think that she  requires any surgical intervention at this time for her epidural abscesses as they are modest. Underwent CT-guided drainage after I evaluated her this morning and had 2cc of purulent drainage aspirated from her fluid collection in erector spinae muscles.     REDD today and was noted to have about a 1 cm x 1 cm vegetation on the tricuspid valve per my discussion with Dr. Pinedo.    It appears that her petechial rash has progressed somewhat but her renal function, PT/INR and platelet count are stable which makes DIC less likely      Problems:  1. 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 MRSA septicemia  3. Epidural abscesses  4. Large loculated Paraspinal muscle abscess- s/p aspiration which is culture positive  5. Generalized petechial rash- possibly worsening but w/o other signs of DIC  6. Conjunctival hemorrhages  7. Hematemesis  8. Sepsis with fever, leukocytosis with neutrophilia, tachycardia- ongoing  9. Pneumonia/lung nodules- concern for septic pulmonary emboli  10. Acute on chronic low back pain  11. Urine cx with MRSA- represents systemic infection, not primary source  12. Microcytic anemia  13. History of PCN allergy   14. Recent IV drug use  15. Obesity  16. H/o COPD  17. H/o seizure disorder  18. H/o depression/anxiety    PLAN: Thank you for asking us to see Liliana Calderon, I recommend the followin. -continue to follow cbc with diff, bmp, esr, and crp closely. Weekly cpk level  2. -surveillance blood cultures from 6/3/20 -NGTD  3. -s/p CT-guided drainage of her right paraspinal abscess with only 2cc fluid aspirated- returned positive for MRSA  4. -Continue daptomycin 8mg/kg IV q24h. Baseline CPK level was 48  5. -Continue Teflaro 600 mg IV BID (has tolerated cephalosporins in the recent past)  6. -Will consider repeat MRI L-spine in 2-3 weeks. Neurosurgery following  7. CT surgery following  Complex set of medical issues requiring a high-level medical  decision making.  The patient does have significant risk for for further morbidity and mortality in the setting of her severe MRSA infection. Has some risk for paralysis if her epidural abscesses worsen.    Prognosis guarded    Dr. Elsy Augustin has obtained the history, performed the physical exam and formulated the above treatment plan.     Frederick Boyd, APRN  6/6/2020

## 2020-06-07 LAB
ALBUMIN SERPL-MCNC: 2.1 G/DL (ref 3.5–5.2)
ALBUMIN/GLOB SERPL: 0.6 G/DL
ALP SERPL-CCNC: 180 U/L (ref 39–117)
ALT SERPL W P-5'-P-CCNC: 11 U/L (ref 1–33)
ANION GAP SERPL CALCULATED.3IONS-SCNC: 12 MMOL/L (ref 5–15)
AST SERPL-CCNC: 13 U/L (ref 1–32)
BACTERIA FLD CULT: ABNORMAL
BASOPHILS # BLD AUTO: 0.03 10*3/MM3 (ref 0–0.2)
BASOPHILS NFR BLD AUTO: 0.2 % (ref 0–1.5)
BILIRUB SERPL-MCNC: 0.8 MG/DL (ref 0.2–1.2)
BUN BLD-MCNC: 9 MG/DL (ref 6–20)
BUN/CREAT SERPL: 18.4 (ref 7–25)
CALCIUM SPEC-SCNC: 8 MG/DL (ref 8.6–10.5)
CHLORIDE SERPL-SCNC: 100 MMOL/L (ref 98–107)
CO2 SERPL-SCNC: 22 MMOL/L (ref 22–29)
CREAT BLD-MCNC: 0.49 MG/DL (ref 0.57–1)
CRP SERPL-MCNC: 16.14 MG/DL (ref 0–0.5)
DEPRECATED RDW RBC AUTO: 48.6 FL (ref 37–54)
EOSINOPHIL # BLD AUTO: 0.01 10*3/MM3 (ref 0–0.4)
EOSINOPHIL NFR BLD AUTO: 0.1 % (ref 0.3–6.2)
ERYTHROCYTE [DISTWIDTH] IN BLOOD BY AUTOMATED COUNT: 18.4 % (ref 12.3–15.4)
ERYTHROCYTE [SEDIMENTATION RATE] IN BLOOD: 74 MM/HR (ref 0–20)
GFR SERPL CREATININE-BSD FRML MDRD: 141 ML/MIN/1.73
GLOBULIN UR ELPH-MCNC: 3.8 GM/DL
GLUCOSE BLD-MCNC: 146 MG/DL (ref 65–99)
GRAM STN SPEC: ABNORMAL
GRAM STN SPEC: ABNORMAL
HCT VFR BLD AUTO: 38.2 % (ref 34–46.6)
HGB BLD-MCNC: 11.5 G/DL (ref 12–15.9)
IMM GRANULOCYTES # BLD AUTO: 0.22 10*3/MM3 (ref 0–0.05)
IMM GRANULOCYTES NFR BLD AUTO: 1.7 % (ref 0–0.5)
LIPASE SERPL-CCNC: 12 U/L (ref 13–60)
LYMPHOCYTES # BLD AUTO: 0.73 10*3/MM3 (ref 0.7–3.1)
LYMPHOCYTES NFR BLD AUTO: 5.7 % (ref 19.6–45.3)
MCH RBC QN AUTO: 23 PG (ref 26.6–33)
MCHC RBC AUTO-ENTMCNC: 30.1 G/DL (ref 31.5–35.7)
MCV RBC AUTO: 76.6 FL (ref 79–97)
MONOCYTES # BLD AUTO: 0.44 10*3/MM3 (ref 0.1–0.9)
MONOCYTES NFR BLD AUTO: 3.4 % (ref 5–12)
NEUTROPHILS # BLD AUTO: 11.46 10*3/MM3 (ref 1.7–7)
NEUTROPHILS NFR BLD AUTO: 88.9 % (ref 42.7–76)
NRBC BLD AUTO-RTO: 0 /100 WBC (ref 0–0.2)
PLATELET # BLD AUTO: 405 10*3/MM3 (ref 140–450)
PMV BLD AUTO: 9.7 FL (ref 6–12)
POTASSIUM BLD-SCNC: 3.9 MMOL/L (ref 3.5–5.2)
PROT SERPL-MCNC: 5.9 G/DL (ref 6–8.5)
RBC # BLD AUTO: 4.99 10*6/MM3 (ref 3.77–5.28)
SODIUM BLD-SCNC: 134 MMOL/L (ref 136–145)
WBC NRBC COR # BLD: 12.89 10*3/MM3 (ref 3.4–10.8)

## 2020-06-07 PROCEDURE — 25010000002 HYDROMORPHONE PER 4 MG: Performed by: INTERNAL MEDICINE

## 2020-06-07 PROCEDURE — 25010000002 PROMETHAZINE PER 50 MG: Performed by: INTERNAL MEDICINE

## 2020-06-07 PROCEDURE — 85652 RBC SED RATE AUTOMATED: CPT | Performed by: NURSE PRACTITIONER

## 2020-06-07 PROCEDURE — 83690 ASSAY OF LIPASE: CPT | Performed by: INTERNAL MEDICINE

## 2020-06-07 PROCEDURE — 25010000002 PROCHLORPERAZINE 10 MG/2ML SOLUTION: Performed by: INTERNAL MEDICINE

## 2020-06-07 PROCEDURE — 94799 UNLISTED PULMONARY SVC/PX: CPT

## 2020-06-07 PROCEDURE — 85025 COMPLETE CBC W/AUTO DIFF WBC: CPT | Performed by: NURSE PRACTITIONER

## 2020-06-07 PROCEDURE — 86140 C-REACTIVE PROTEIN: CPT | Performed by: NURSE PRACTITIONER

## 2020-06-07 PROCEDURE — 25010000002 ONDANSETRON PER 1 MG: Performed by: HOSPITALIST

## 2020-06-07 PROCEDURE — 99232 SBSQ HOSP IP/OBS MODERATE 35: CPT | Performed by: INTERNAL MEDICINE

## 2020-06-07 PROCEDURE — 63710000001 PROCHLORPERAZINE MALEATE PER 5 MG: Performed by: INTERNAL MEDICINE

## 2020-06-07 PROCEDURE — 80053 COMPREHEN METABOLIC PANEL: CPT | Performed by: NURSE PRACTITIONER

## 2020-06-07 PROCEDURE — 25010000002 DAPTOMYCIN PER 1 MG: Performed by: INTERNAL MEDICINE

## 2020-06-07 PROCEDURE — 25010000002 CEFTAROLINE FOSAMIL PER 10 MG: Performed by: INTERNAL MEDICINE

## 2020-06-07 RX ORDER — PROCHLORPERAZINE EDISYLATE 5 MG/ML
5 INJECTION INTRAMUSCULAR; INTRAVENOUS EVERY 6 HOURS PRN
Status: DISCONTINUED | OUTPATIENT
Start: 2020-06-07 | End: 2020-06-16 | Stop reason: HOSPADM

## 2020-06-07 RX ORDER — PROCHLORPERAZINE MALEATE 5 MG/1
5 TABLET ORAL EVERY 6 HOURS PRN
Status: DISCONTINUED | OUTPATIENT
Start: 2020-06-07 | End: 2020-06-16 | Stop reason: HOSPADM

## 2020-06-07 RX ORDER — HYDROXYZINE HYDROCHLORIDE 25 MG/1
25 TABLET, FILM COATED ORAL NIGHTLY PRN
Status: DISCONTINUED | OUTPATIENT
Start: 2020-06-07 | End: 2020-06-16 | Stop reason: HOSPADM

## 2020-06-07 RX ORDER — DIPHENOXYLATE HYDROCHLORIDE AND ATROPINE SULFATE 2.5; .025 MG/1; MG/1
1 TABLET ORAL 4 TIMES DAILY PRN
Status: DISCONTINUED | OUTPATIENT
Start: 2020-06-07 | End: 2020-06-09

## 2020-06-07 RX ORDER — PROCHLORPERAZINE 25 MG
25 SUPPOSITORY, RECTAL RECTAL EVERY 12 HOURS PRN
Status: DISCONTINUED | OUTPATIENT
Start: 2020-06-07 | End: 2020-06-16 | Stop reason: HOSPADM

## 2020-06-07 RX ORDER — CHOLECALCIFEROL (VITAMIN D3) 125 MCG
5 CAPSULE ORAL NIGHTLY PRN
Status: DISCONTINUED | OUTPATIENT
Start: 2020-06-07 | End: 2020-06-16 | Stop reason: HOSPADM

## 2020-06-07 RX ADMIN — TIZANIDINE 4 MG: 4 TABLET ORAL at 23:15

## 2020-06-07 RX ADMIN — PANTOPRAZOLE SODIUM 40 MG: 40 TABLET, DELAYED RELEASE ORAL at 05:22

## 2020-06-07 RX ADMIN — PROMETHAZINE HYDROCHLORIDE 25 MG: 25 INJECTION, SOLUTION INTRAMUSCULAR; INTRAVENOUS at 01:23

## 2020-06-07 RX ADMIN — HYDROXYZINE HYDROCHLORIDE 25 MG: 25 TABLET, FILM COATED ORAL at 21:01

## 2020-06-07 RX ADMIN — HYDROMORPHONE HYDROCHLORIDE 0.25 MG: 1 INJECTION, SOLUTION INTRAMUSCULAR; INTRAVENOUS; SUBCUTANEOUS at 21:57

## 2020-06-07 RX ADMIN — HYDROCODONE BITARTRATE AND ACETAMINOPHEN 1 TABLET: 5; 325 TABLET ORAL at 05:23

## 2020-06-07 RX ADMIN — HYDROCODONE BITARTRATE AND ACETAMINOPHEN 1 TABLET: 5; 325 TABLET ORAL at 11:36

## 2020-06-07 RX ADMIN — DIPHENOXYLATE HYDROCHLORIDE AND ATROPINE SULFATE 1 TABLET: 2.5; .025 TABLET ORAL at 21:01

## 2020-06-07 RX ADMIN — GABAPENTIN 800 MG: 400 CAPSULE ORAL at 05:23

## 2020-06-07 RX ADMIN — BUDESONIDE AND FORMOTEROL FUMARATE DIHYDRATE 2 PUFF: 160; 4.5 AEROSOL RESPIRATORY (INHALATION) at 08:39

## 2020-06-07 RX ADMIN — FLUOXETINE HYDROCHLORIDE 40 MG: 20 CAPSULE ORAL at 07:46

## 2020-06-07 RX ADMIN — HYDROMORPHONE HYDROCHLORIDE 0.25 MG: 1 INJECTION, SOLUTION INTRAMUSCULAR; INTRAVENOUS; SUBCUTANEOUS at 07:45

## 2020-06-07 RX ADMIN — SODIUM CHLORIDE 125 ML/HR: 9 INJECTION, SOLUTION INTRAVENOUS at 03:54

## 2020-06-07 RX ADMIN — TIZANIDINE 4 MG: 4 TABLET ORAL at 06:49

## 2020-06-07 RX ADMIN — TIZANIDINE 4 MG: 4 TABLET ORAL at 13:52

## 2020-06-07 RX ADMIN — MELATONIN TAB 5 MG 5 MG: 5 TAB at 21:01

## 2020-06-07 RX ADMIN — HYDROCORTISONE: 25 CREAM TOPICAL at 11:36

## 2020-06-07 RX ADMIN — SODIUM CHLORIDE 125 ML/HR: 9 INJECTION, SOLUTION INTRAVENOUS at 13:54

## 2020-06-07 RX ADMIN — LEVETIRACETAM 500 MG: 500 TABLET ORAL at 21:01

## 2020-06-07 RX ADMIN — HYDROMORPHONE HYDROCHLORIDE 0.25 MG: 1 INJECTION, SOLUTION INTRAMUSCULAR; INTRAVENOUS; SUBCUTANEOUS at 13:52

## 2020-06-07 RX ADMIN — HYDROCORTISONE: 25 CREAM TOPICAL at 21:11

## 2020-06-07 RX ADMIN — PROCHLORPERAZINE EDISYLATE 5 MG: 5 INJECTION INTRAMUSCULAR; INTRAVENOUS at 23:16

## 2020-06-07 RX ADMIN — HYDROMORPHONE HYDROCHLORIDE 0.25 MG: 1 INJECTION, SOLUTION INTRAMUSCULAR; INTRAVENOUS; SUBCUTANEOUS at 03:54

## 2020-06-07 RX ADMIN — GABAPENTIN 800 MG: 400 CAPSULE ORAL at 21:01

## 2020-06-07 RX ADMIN — LEVETIRACETAM 500 MG: 500 TABLET ORAL at 07:46

## 2020-06-07 RX ADMIN — PROMETHAZINE HYDROCHLORIDE 25 MG: 25 INJECTION, SOLUTION INTRAMUSCULAR; INTRAVENOUS at 07:46

## 2020-06-07 RX ADMIN — DAPTOMYCIN 600 MG: 500 INJECTION, POWDER, LYOPHILIZED, FOR SOLUTION INTRAVENOUS at 07:44

## 2020-06-07 RX ADMIN — LEVOTHYROXINE SODIUM 50 MCG: 50 TABLET ORAL at 07:46

## 2020-06-07 RX ADMIN — NYSTATIN: 100000 POWDER TOPICAL at 11:35

## 2020-06-07 RX ADMIN — GABAPENTIN 800 MG: 400 CAPSULE ORAL at 13:52

## 2020-06-07 RX ADMIN — CEFTAROLINE FOSAMIL 600 MG: 600 POWDER, FOR SOLUTION INTRAVENOUS at 07:45

## 2020-06-07 RX ADMIN — BUDESONIDE AND FORMOTEROL FUMARATE DIHYDRATE 2 PUFF: 160; 4.5 AEROSOL RESPIRATORY (INHALATION) at 20:04

## 2020-06-07 RX ADMIN — PROCHLORPERAZINE MALEATE 5 MG: 5 TABLET ORAL at 17:15

## 2020-06-07 RX ADMIN — NYSTATIN: 100000 POWDER TOPICAL at 21:08

## 2020-06-07 RX ADMIN — Medication 1 CAPSULE: at 07:46

## 2020-06-07 RX ADMIN — LOPERAMIDE HYDROCHLORIDE 2 MG: 2 CAPSULE ORAL at 05:23

## 2020-06-07 RX ADMIN — CEFTAROLINE FOSAMIL 600 MG: 600 POWDER, FOR SOLUTION INTRAVENOUS at 21:08

## 2020-06-07 RX ADMIN — HYDROMORPHONE HYDROCHLORIDE 0.25 MG: 1 INJECTION, SOLUTION INTRAMUSCULAR; INTRAVENOUS; SUBCUTANEOUS at 18:05

## 2020-06-07 RX ADMIN — ONDANSETRON 4 MG: 2 INJECTION INTRAMUSCULAR; INTRAVENOUS at 03:54

## 2020-06-07 RX ADMIN — SODIUM CHLORIDE 125 ML/HR: 9 INJECTION, SOLUTION INTRAVENOUS at 21:08

## 2020-06-07 RX ADMIN — PROCHLORPERAZINE MALEATE 5 MG: 5 TABLET ORAL at 11:36

## 2020-06-07 RX ADMIN — DIPHENOXYLATE HYDROCHLORIDE AND ATROPINE SULFATE 1 TABLET: 2.5; .025 TABLET ORAL at 11:39

## 2020-06-07 RX ADMIN — HYDROCODONE BITARTRATE AND ACETAMINOPHEN 1 TABLET: 5; 325 TABLET ORAL at 17:15

## 2020-06-07 RX ADMIN — HYDROCODONE BITARTRATE AND ACETAMINOPHEN 1 TABLET: 5; 325 TABLET ORAL at 23:15

## 2020-06-07 NOTE — PROGRESS NOTES
Liliana Calderon  1980  [id number]    Evaluating Physician: Elsy Augustin    Chief Complaint: fever    Reason for Consultation: MRSA bacteremia    History of present illness:   Patient is a 39 y.o.  Yr old female with history of IVDU, depression/anxiety, COPD, and seizure d/o who initially presented to [facility] yesterday with c/o shortness of air and worsening low back pain. She last used IV heroin about 1 month prior.  She was doing relatively well until about 3 days PTA when she noticed worsening low back pain and worsening shortness of air with blood-tinged sputum. She additionally started having fevers up to 103.9F. She did not have contact with anyone known to have COVID-19 infection.     On arrival to [facility] on 5/31, the patient was found to have fevers and a significant leukocytosis. CT chest was concerning for pneumonia and possible septic pulm emboli. Blood cultures resulted with MRSA and she was transferred to [facility] for a higher level of care. Tmax since arrival here has been 102.7F. WBC was elevated to 14.2 but has improved to 10 on antibiotics. She was started on vancomycin and aztreonam by primary team. Repeat blood cultures from 6/1 are again positive for MRSA. TTE and MRI lumbar spine ordered. ID consulted for recommendations in the setting of her MRSA septicemia.    Subjective:    6/3/20: The patient is still having back pain. No new weakness in her lower extremities but does have extreme back pain with movement. Fevers improved. Tmax 100F. Does have some anemia with Hgb 6.4. No nausea or vomiting. Mild diarrhea. No new rash.     6/4/20: still having some back pain and had some fevers overnight. Tmax this morning was 102.7F.  Not feeling very well this morning when I saw her. No worsening of her diarrhea. No nausea. No new rash. Still able to move her extremities well with no new weakness but does have significant pain lifting her legs off the bed.     6/5/20: Patient is  "unfortunately still having fevers up to 102.5°F.  She is still not feeling well with significant back pain.  No new weakness in her lower extremities.  She is having some diarrhea but only about 2 episodes in the last 24 hours.  No nausea.  No new rashes.  She went for REDD today and was noted to have about a 1 cm x 1 cm vegetation on the tricuspid valve per my discussion with Dr. Pinedo.    6/6/2020: Patient states she is still receiving her antibiotics but has complaints of nausea and vomiting as well as diarrhea.  Discussion with nursing staff states that she is having blood-tinged  tears, bloody emesis and worsening petechial rash over her body.  RN staff states they have not noted any blood in her diarrhea at this point.  Afebrile overnight with some tachypnea noted.  Remains without leukocytosis.  6/4 body fluid culture from back returned positive for 2+ MRSA.  6/3 blood cultures currently remain no growth to date. Per RN her rash has gradually progressed over the last couple days    6/7/2020: Patient laying in bed awake Heparin stopped last pm   RN states that he was told by the tech that there was blood in the stool but did not personally check it.   She feels \"horrible\" today.  She remains with nausea, vomiting and significant amounts of diarrhea- 3 stools so far today  Documentation shows she has been afebrile and hemodynamically stable overnight. Leukocytosis is slightly improved from 14-12.  H&H has improved from yesterday's values to 11 and 38.  Platelets of 405.  7/C. difficile toxin was negative.  6/4 body fluid back aspirate positive for MRSA.  Cdiff negative    Past Medical History:   Diagnosis Date   • Anxiety and depression    • Chronic bronchitis (CMS/HCC)    • Fibromyalgia    • Heart valve problem    • Hypertension    • Migraine    • PCOS (polycystic ovarian syndrome)    • Pneumonia    • Seizures (CMS/HCC)        Past Surgical History:   Procedure Laterality Date   • CHOLECYSTECTOMY         "       Social History:  IVDU (heroin). Former smoker. No ETOH use.    Family History:  family history includes Alcohol abuse in her brother and sister; Alzheimer's disease in her maternal grandmother; Bone cancer in her mother; Breast cancer in her mother; Dementia in her father; Diabetes in her father; Heart disease in her father and paternal grandfather; Hypertension in her brother, father, mother, and sister; Mental illness in her brother and sister; Parkinsonism in her maternal grandmother; Pulmonary embolism in her father; Skin cancer in her mother; Stroke in her father.    Allergies   Allergen Reactions   • Penicillins Rash     Received cefepime and cefdinir in 2018   • Phenobarbital Rash       Medication:  Current Facility-Administered Medications   Medication Dose Route Frequency Provider Last Rate Last Dose   • acetaminophen (TYLENOL) tablet 650 mg  650 mg Oral Q6H PRN Malathi Vasquez APRN   650 mg at 06/05/20 0029   • budesonide-formoterol (SYMBICORT) 160-4.5 MCG/ACT inhaler 2 puff  2 puff Inhalation BID - RT Rancho Lang MD   2 puff at 06/07/20 0839   • ceftaroline (TEFLARO) 600 mg/100 mL 0.9% NS (mbp)  600 mg Intravenous BID Rancho Lang MD   600 mg at 06/07/20 0745   • DAPTOmycin (CUBICIN) 600 mg in sodium chloride 0.9 % 50 mL IVPB  8 mg/kg (Adjusted) Intravenous Q24H Rancho Lang  mL/hr at 06/07/20 0744 600 mg at 06/07/20 0744   • diphenoxylate-atropine (LOMOTIL) 2.5-0.025 MG per tablet 1 tablet  1 tablet Oral 4x Daily PRN Verenice Keller MD       • ferric gluconate (FERRLECIT)125 MG IVPB  125 mg Intravenous Q24H Oskar Black MD   125 mg at 06/05/20 1506   • FLUoxetine (PROzac) capsule 40 mg  40 mg Oral Daily Vanessa Lopez MD   40 mg at 06/07/20 0746   • gabapentin (NEURONTIN) capsule 800 mg  800 mg Oral Q8H Vanessa Lopez MD   800 mg at 06/07/20 0523   • HYDROcodone-acetaminophen (NORCO) 5-325 MG per tablet 1 tablet  1 tablet Oral Q6H PRN John,  MD Vanessa   1 tablet at 06/07/20 0523   • HYDROmorphone (DILAUDID) injection 0.25 mg  0.25 mg Intravenous Q4H PRN Verenice Keller MD   0.25 mg at 06/07/20 0745   • ipratropium-albuterol (DUO-NEB) nebulizer solution 3 mL  3 mL Nebulization Q6H PRN Vanessa Lopez MD   3 mL at 06/05/20 0946   • lactobacillus acidophilus (RISAQUAD) capsule 1 capsule  1 capsule Oral Daily Rancho Lang MD   1 capsule at 06/07/20 0746   • levETIRAcetam (KEPPRA) tablet 500 mg  500 mg Oral Q12H Oskar Black MD   500 mg at 06/07/20 0746   • levothyroxine (SYNTHROID, LEVOTHROID) tablet 50 mcg  50 mcg Oral Daily Vanessa Lopez MD   50 mcg at 06/07/20 0746   • nystatin (MYCOSTATIN) powder   Topical Q12H Verenice Keller MD       • pantoprazole (PROTONIX) EC tablet 40 mg  40 mg Oral QA Vanessa Lopez MD   40 mg at 06/07/20 0522   • prochlorperazine (COMPAZINE) injection 5 mg  5 mg Intravenous Q6H PRN Verenice Keller MD        Or   • prochlorperazine (COMPAZINE) tablet 5 mg  5 mg Oral Q6H PRN Verenice Keller MD        Or   • prochlorperazine (COMPAZINE) suppository 25 mg  25 mg Rectal Q12H PRN Verenice Keller MD       • sodium chloride 0.9 % infusion  125 mL/hr Intravenous Continuous Vanessa Lopez  mL/hr at 06/07/20 0354 125 mL/hr at 06/07/20 0354   • tiZANidine (ZANAFLEX) tablet 4 mg  4 mg Oral Q8H PRN Vanessa Lopez MD   4 mg at 06/07/20 0649         Infectious History:  MRSA    Antibiotics:  Anti-Infectives (From admission, onward)    Ordered     Dose/Rate Route Frequency Start Stop    06/02/20 0819  ceftaroline (TEFLARO) 600 mg/100 mL 0.9% NS (mbp)     Rancho Lang MD reviewed the order on 06/05/20 0802.   Ordering Provider:  Rancho Lang MD    600 mg Intravenous 2 Times Daily 06/02/20 0915 06/16/20 0859    06/02/20 0743  DAPTOmycin (CUBICIN) 600 mg in sodium chloride 0.9 % 50 mL IVPB     Rancho Lang MD reviewed the order on 06/05/20 0802.   Ordering  "Provider:  Rancho Lang MD    8 mg/kg × 76.6 kg (Adjusted)  100 mL/hr over 30 Minutes Intravenous Every 24 Hours 06/02/20 0900 06/23/20 0859    06/01/20 1501  vancomycin 2250 mg/500 mL 0.9% NS IVPB ([facility])     Ordering Provider:  aVnessa Lopez MD    2,250 mg Intravenous Once 06/01/20 1600 06/01/20 1756            Review of Systems    As above    Physical Exam:   Vital Signs   /93 (BP Location: Left arm, Patient Position: Lying)   Pulse 69   Temp 98 °F (36.7 °C) (Oral)   Resp 20   Ht 165.1 cm (65\")   Wt 106 kg (233 lb)   LMP 04/01/2020 (Approximate)   SpO2 98%   BMI 38.77 kg/m²     GENERAL: Awake alert, Obese. Moderate distress  HENT: Normocephalic, atraumatic.  Conjunctiva with hemorrhages bilaterally  NECK: Supple without nuchal rigidity. No mass.  HEART: RRR; No murmur, gallops or rubs noted.   LUNGS: CTAB. Nonlabored.  No wheezing noted  ABDOMEN: Soft, nontender, nondistended. Positive bowel sounds.  No guarding noted  EXT:  No cyanosis, clubbing or edema.   :   Without Petit catheter.  MSK: FROM without joint effusions noted arms/legs.  Some tenderness to palpation over her lumbar spine.  SKIN: has slightly discolored/gray patchy/papular lesions over her bilateral lower extremity- these appeared about 2 weeks ago per the patient.  No other possible peripheral stigmata of infective endocarditis noted over her extremities or elsewhere.  She has also developed a petechial rash over her entire back bilateral upper extremities, buttocks and inguinal and abdominal folds  NEURO: Oriented to PPT. No focal deficits on motor/sensory exam at arms/legs. 5 out of 5 strength throughout  PSYCHIATRIC: Normal insight and judgement. Cooperative with PE    Laboratory Data    Results from last 7 days   Lab Units 06/07/20  0456 06/06/20  2231 06/06/20  0921  06/04/20  0835   WBC 10*3/mm3 12.89*  --  9.26  --  14.45*   HEMOGLOBIN g/dL 11.5* 10.1* 9.7*  9.7*   < > 7.7*   HEMATOCRIT % 38.2 33.7* 33.1*  " 33.1*   < > 26.2*   PLATELETS 10*3/mm3 405  --  282  --  145    < > = values in this interval not displayed.     Results from last 7 days   Lab Units 06/07/20  0456   SODIUM mmol/L 134*   POTASSIUM mmol/L 3.9   CHLORIDE mmol/L 100   CO2 mmol/L 22.0   BUN mg/dL 9   CREATININE mg/dL 0.49*   GLUCOSE mg/dL 146*   CALCIUM mg/dL 8.0*     Results from last 7 days   Lab Units 06/07/20  0456   ALK PHOS U/L 180*   BILIRUBIN mg/dL 0.8   ALT (SGPT) U/L 11   AST (SGOT) U/L 13     Results from last 7 days   Lab Units 06/07/20  0456   SED RATE mm/hr 74*     Results from last 7 days   Lab Units 06/07/20 0456   CRP mg/dL 16.14*       Estimated Creatinine Clearance: 188.2 mL/min (A) (by C-G formula based on SCr of 0.49 mg/dL (L)).       Microbiology:    Microbiology Results (last 10 days)     Procedure Component Value - Date/Time    Clostridium Difficile Toxin - Stool, Per Rectum [[id number]] Collected:  06/05/20 1748    Lab Status:  Final result Specimen:  Stool from Per Rectum Updated:  06/05/20 1858    Narrative:       The following orders were created for panel order Clostridium Difficile Toxin - Stool, Per Rectum.  Procedure                               Abnormality         Status                     ---------                               -----------         ------                     Clostridium Difficile To...[[id number]]  Normal              Final result                 Please view results for these tests on the individual orders.    Clostridium Difficile Toxin, PCR - Stool, Per Rectum [[id number]]  (Normal) Collected:  06/05/20 1748    Lab Status:  Final result Specimen:  Stool from Per Rectum Updated:  06/05/20 1858     C. Difficile Toxins by PCR Not Detected    Narrative:       Performance characteristics of test not established for patients <2 years of age.  Negative for Toxigenic C. Difficile    Body Fluid Culture - Aspirate, Back [[id number]]  (Abnormal)  (Susceptibility) Collected:  06/04/20 6656    Lab Status:  Final  result Specimen:  Aspirate from Back Updated:  06/07/20 0701     Body Fluid Culture Light growth (2+) Staphylococcus aureus, MRSA     Comment:   Methicillin resistant Staphylococcus aureus, Patient may be an isolation risk.        Gram Stain No organisms seen      Many (4+) WBCs per low power field    Susceptibility      Staphylococcus aureus, MRSA     TYRONE     Gentamicin Susceptible     Oxacillin Resistant     Rifampin Susceptible     Vancomycin Susceptible                Susceptibility Comments     Staphylococcus aureus, MRSA    This isolate does not demonstrate inducible clindamycin resistance in vitro.               Blood Culture - Blood, Arm, Right [[id number]] Collected:  06/03/20 1229    Lab Status:  Preliminary result Specimen:  Blood from Arm, Right Updated:  06/06/20 1400     Blood Culture No growth at 3 days    Blood Culture - Blood, Arm, Left [[id number]] Collected:  06/03/20 1229    Lab Status:  Preliminary result Specimen:  Blood from Arm, Left Updated:  06/06/20 1400     Blood Culture No growth at 3 days    Blood Culture - Blood, Wrist, Left [[id number]]  (Abnormal) Collected:  06/01/20 1601    Lab Status:  Final result Specimen:  Blood from Wrist, Left Updated:  06/04/20 0612     Blood Culture Staphylococcus aureus, MRSA     Comment:   Infectious disease consultation is highly recommended to rule out distant foci of infection.  Methicillin resistant Staphylococcus aureus, Patient may be an isolation risk.        Isolated from Aerobic Bottle     Gram Stain Aerobic Bottle Gram positive cocci in clusters    Narrative:       Refer to previous blood culture collected on 6/1/2020 1558 for MICs.      Blood Culture - Blood, Arm, Left [[id number]]  (Abnormal)  (Susceptibility) Collected:  06/01/20 1558    Lab Status:  Final result Specimen:  Blood from Arm, Left Updated:  06/04/20 0610     Blood Culture Staphylococcus aureus, MRSA     Comment:   Infectious disease consultation is highly recommended to rule out  distant foci of infection.  Methicillin resistant Staphylococcus aureus, Patient may be an isolation risk.        Isolated from Aerobic and Anaerobic Bottles     Gram Stain Aerobic Bottle Gram positive cocci in clusters      Anaerobic Bottle Gram positive cocci in clusters    Susceptibility      Staphylococcus aureus, MRSA     TYRONE     Gentamicin Susceptible     Oxacillin Resistant     Rifampin Susceptible     Vancomycin Susceptible                Susceptibility Comments     Staphylococcus aureus, MRSA    This isolate does not demonstrate inducible clindamycin resistance in vitro.               Blood Culture ID, PCR - Blood, Arm, Left [[id number]]  (Abnormal) Collected:  06/01/20 1558    Lab Status:  Final result Specimen:  Blood from Arm, Left Updated:  06/02/20 0721     BCID, PCR Staphylococcus aureus. mecA (methicillin resistance gene) detected. Identification by BCID PCR.     BOTTLE TYPE Aerobic Bottle    Urine Culture - Urine, Urine, Clean Catch [[id number]]  (Abnormal)  (Susceptibility) Collected:  05/31/20 1914    Lab Status:  Final result Specimen:  Urine, Clean Catch Updated:  06/02/20 0938     Urine Culture 50,000 CFU/mL Staphylococcus aureus, MRSA     Comment:   Methicillin resistant Staph aureus, patient may be an isolation risk.  Staphylococcus aureus is not typically regarded as a uropathogen, except in cases with genitourinary instrumentation present.  It's presence suggests an alternate source (e.g. bloodstream, abscess, SSTI, etc.).  Please evaluate accordingly.       Susceptibility      Staphylococcus aureus, MRSA     TYRONE     Nitrofurantoin Susceptible     Oxacillin Resistant     Tetracycline Susceptible     Trimethoprim + Sulfamethoxazole Susceptible     Vancomycin Susceptible                Susceptibility Comments     Staphylococcus aureus, MRSA    This isolate does not demonstrate inducible clindamycin resistance in vitro.               Blood Culture - Blood, Arm, Left [[id number]]  (Abnormal)   (Susceptibility) Collected:  05/31/20 1805    Lab Status:  Final result Specimen:  Blood from Arm, Left Updated:  06/03/20 0700     Blood Culture Staphylococcus aureus, MRSA     Comment:   Infectious disease consultation is highly recommended to rule out distant foci of infection.  Methicillin resistant Staphylococcus aureus, Patient may be an isolation risk.        Isolated from Aerobic and Anaerobic Bottles     Gram Stain Anaerobic Bottle Gram positive cocci in pairs and clusters      Aerobic Bottle Gram positive cocci in pairs and clusters    Susceptibility      Staphylococcus aureus, MRSA     TYRONE     Gentamicin Susceptible     Oxacillin Resistant     Rifampin Susceptible     Vancomycin Susceptible                Susceptibility Comments     Staphylococcus aureus, MRSA    This isolate does not demonstrate inducible clindamycin resistance in vitro.               Blood Culture ID, PCR - Blood, Arm, Left [[id number]]  (Abnormal) Collected:  05/31/20 1805    Lab Status:  Final result Specimen:  Blood from Arm, Left Updated:  06/01/20 0628     BCID, PCR Staphylococcus aureus. mecA (methicillin resistance gene) detected. Identification by BCID PCR.    Blood Culture - Blood, Arm, Right [[id number]]  (Abnormal) Collected:  05/31/20 1757    Lab Status:  Final result Specimen:  Blood from Arm, Right Updated:  06/03/20 0701     Blood Culture Staphylococcus aureus, MRSA     Comment:   Infectious disease consultation is highly recommended to rule out distant foci of infection.  Methicillin resistant Staphylococcus aureus, Patient may be an isolation risk.        Isolated from Aerobic and Anaerobic Bottles     Gram Stain Aerobic Bottle Gram positive cocci in pairs and clusters      Anaerobic Bottle Gram positive cocci in pairs and clusters    Narrative:       Refer to  blood culture collected on 5/31/2020 1805 for MICs.    Influenza Antigen, Rapid - Swab, Nasopharynx [[id number]]  (Normal) Collected:  05/31/20 1752    Lab Status:   Final result Specimen:  Swab from Nasopharynx Updated:  05/31/20 1825     Influenza A Ag, EIA Negative     Influenza B Ag, EIA Negative    COVID-19,[facility] URI IN-HOUSE, NP SWAB IN TRANSPORT MEDIA 8-12 HR TAT - Swab, Nasopharynx [[id number]]  (Normal) Collected:  05/31/20 1743    Lab Status:  Final result Specimen:  Swab from Nasopharynx Updated:  06/01/20 1420     COVID19 Not Detected          Radiology:  Ct Chest With Contrast    Result Date: 6/5/2020  Abnormal chest CT with moderate dependent bilateral pleural effusions. There are bilateral parenchymal changes in the lungs including well-circumscribed nodules which likely represent septic emboli and a poorly defined nodules and areas of airspace change which could represent septic emboli or pneumonia. Findings most likely are all infectious. There are small reactive nodes in the mediastinum. The spleen is prominent in size but incompletely imaged. No upper abdominal adenopathy. No acute bone lesions in the chest Signer Name: Anushka Gutierrez MD  Signed: 6/5/2020 8:44 PM  Workstation Name: [id number]  [facility]    Ct Chest With Contrast    Result Date: 5/31/2020  Multiple right lung nodules.  Since 1 of these nodules is cavitary this is worrisome for septic emboli.  Left lung opacities worrisome for pneumonia. Authenticated by Sukhdeep Burnette III, MD on 05/31/2020 08:39:58 PM    Ct Abdomen Pelvis With Contrast    Result Date: 5/31/2020  Splenomegaly.  Normal appendix.  Mild anasarca. Authenticated by Sukhdeep Burnette III, MD on 05/31/2020 08:39:59 PM    Ct Guided Biopsy Aspiration Injection    Result Date: 6/4/2020   Percutaneous aspiration of very tiny abscess collection adjacent to vertebral bodies/within the erector spinae muscle, yielding 2 mL of purulent appearing fluid. No drainage catheter was left in place - too small for drainage catheter/contraindicated to leave drainage catheter in collection with possible communication with  epidural space  Plan:  Follow-up lab results. Continued care with primary team. ______________________________________________________________________  PROCEDURE SUMMARY: - Aspiration of tiny abscess pockets under fluoroscopic guidance - Additional procedure(s): None  PROCEDURE DETAILS:  Pre-procedure Consent: Informed consent for the procedure including risks, benefits and alternatives was obtained and time-out was performed prior to the procedure. Preparation: The site was prepared and draped using maximal sterile barrier technique including cutaneous antisepsis.  Anesthesia/sedation Level of anesthesia/sedation: Moderate sedation (conscious sedation) Anesthesia/sedation administered by: Independent trained observer under attending supervision with continuous monitoring of the patient?s level of consciousness and physiologic status Total intra-service sedation time (minutes): 12  Fluid collection aspiration The patient was positioned prone on. Initial imaging was performed. Local anesthesia was administered. The fluid collection was accessed using a 22-gauge spinal needle under CT fluoroscopic guidance. Position within the fluid collection was confirmed, and fluid aspiration was performed. All instruments were then removed. - Initial imaging findings: Fluid collections identified on comparison MRI lumbar spine 6/4/2020 not as well visualized on CT. - Aspiration needle/catheter: 22-gauge spinal needle - Post-aspiration imaging findings: No immediate complication  Contrast Contrast agent: None Contrast volume (mL): 0  Radiation Dose CT dose length product (mGy-cm): 975  Additional Details Additional description of procedure: None Equipment details: None Specimens removed: Aspirated fluid sent for analysis. Estimated blood loss (mL): Less than 10 Standardized report: DrainageAspiration  Attestation I was present and scrubbed for the entire procedure. Imaging reviewed. Agree with final report as written.  This report  was finalized on 6/4/2020 5:36 PM by Yimi Huggins.      Mri Lumbar Spine With & Without Contrast    Result Date: 6/4/2020  1. Approximately 8 x 4 x 3 cm multilocular abscess of the right erector spinae muscles, approximately from L3 to S1. 2. Two possibly interconnected dorsal epidural abscesses at L2-L3 and L3-L4, but causing only mild canal stenosis. 3. No evidence to suggest osteomyelitis or discitis. 4. Moderate-sized L4-L5 and L5-S1 disc protrusions as discussed above.  D:  06/03/2020 E:  06/03/2020  This report was finalized on 6/4/2020 10:45 PM by Dr. William Boateng MD.           Impression:     This is a 40 yo woman with a h/o IVDU presenting with severe low back pain and shortness of air and found to have fevers/sepsis with MRSA growing from multiple blood culture sets. Also has CT chest findings that are concerning for septic pulmonary emboli and now noted to have a decent size vegetation on her tricuspid valve on REDD consistent with infective endocarditis. MRI with 8x4 x3 cm multilobular abscess of the right erector spinae muscles, approximately from L3-S1. Also with two possible interconnected dorsal epidural abscesses at L2-L3 and L3-L4 but only causing mild canal stenosis. Dr. Alex with neurosurgery has evaluated and does not think that she requires any surgical intervention at this time for her epidural abscesses as they are modest. Underwent CT-guided drainage after I evaluated her this morning and had 2cc of purulent drainage aspirated from her fluid collection in erector spinae muscles.     REDD today and was noted to have about a 1 cm x 1 cm vegetation on the tricuspid valve per my discussion with Dr. Pinedo.    It appears that her petechial rash has progressed somewhat but her renal function, PT/INR and platelet count are stable which makes DIC less likely      Problems:  1. 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 MRSA septicemia  3. Epidural  abscesses  4. Large loculated Paraspinal muscle abscess- s/p aspiration which is culture positive  5. Generalized petechial rash- possibly worsening but w/o other signs of DIC  6. Conjunctival hemorrhages  7. Hematemesis- evidently resolved after the heparin was stopped  Wonder about ischemia from mesenteric emboli  8. Sepsis with fever, leukocytosis with neutrophilia, tachycardia- ongoing  9. Pneumonia/lung nodules- concern for septic pulmonary emboli  10. Acute on chronic low back pain  11. Urine cx with MRSA- represents systemic infection, not primary source  12. Microcytic anemia  13. History of PCN allergy   14. Recent IV drug use  15. Obesity  16. H/o COPD  17. H/o seizure disorder  18. H/o depression/anxiety    PLAN: Thank you for asking us to see Liliana Calderon, I recommend the followin. Continue to follow cbc with diff, bmp, esr, and crp closely. Weekly cpk level  2. Surveillance blood cultures from 6/3/20 -NGTD  3. S/p CT-guided drainage of her right paraspinal abscess with only 2cc fluid aspirated- returned positive for MRSA  4. Continue daptomycin 8mg/kg IV q24h. Baseline CPK level was 48  5. Continue Teflaro 600 mg IV BID (has tolerated cephalosporins in the recent past)  6. Will consider repeat MRI L-spine in 2-3 weeks. Neurosurgery following  7. CT surgery following  8. GI panel  Check lactic acid    Complex set of medical issues requiring a high-level medical decision making.  The patient does have significant risk for for further morbidity and mortality in the setting of her severe MRSA infection. Has some risk for paralysis if her epidural abscesses worsen.    Prognosis guarded    Dr. Elsy Augustin has obtained the history, performed the physical exam and formulated the above treatment plan.     Frederick Boyd, APRN  2020

## 2020-06-07 NOTE — PLAN OF CARE
Problem: Patient Care Overview  Goal: Plan of Care Review  Outcome: Ongoing (interventions implemented as appropriate)  Flowsheets (Taken 6/7/2020 1601)  Progress: no change  Plan of Care Reviewed With: patient  Note:   VSS, anti-diarrheal meds changed, Copazine added in place of phenergan and zofran.

## 2020-06-07 NOTE — PROGRESS NOTES
"Continued Stay Note  [facility]     Patient Name: Liliana Calderon  MRN: [id number]  Today's Date: 6/7/2020    Admit Date: 6/1/2020    Discharge Plan     Row Name 06/07/20 1135       Plan    Plan Ongoing    Plan Comments Rounded on patient at bedside. She was visiting with her sister. She stated she was \"fine\" and does not need anything. Will continue to follow patient and offer recovery support and services.        Discharge Codes    No documentation.             Lynda Cardozo   PSS, Chemical Dependency Team  Ext. [phone]    "

## 2020-06-07 NOTE — PROGRESS NOTES
CTS Progress Note    Liliana Calderon  [id number]  1980      Subjective: General status similar to yesterday.  Patient is currently on antibiotic treatment is being followed by neurosurgery and by infectious disease.  Surgical intervention for the tricuspid valve given the patient's general status would be high risk.  CTS will continue to follow.  At some point will need to consider angios VAC removal of tricuspid vegetation.    Plan: I have reviewed, verified, and confirmed the patient's history and current status. Will proceed as outlined above.      Greg Blanchard MD  06/07/20  10:30

## 2020-06-07 NOTE — PLAN OF CARE
VSS on 3L nc. Pt complains of pain often. Requires PRN pain and nausea medication. Pt has frequent bloody stool. Petechia appear wherever pressure is applied (ex bp cuff or IV site). Provider was contacted about the increase of bloody stool (hold Heparin, watch vitals and am H/H). Pt rested poorly between care.

## 2020-06-07 NOTE — PROGRESS NOTES
[facility] Medicine Services  PROGRESS NOTE    Patient Name: Liliana Calderon  : 1980  MRN: [id number]    Date of Admission: 2020  Primary Care Physician: Ollie Flores MD    Subjective   Subjective     CC:  Follow-up for tricuspid valve endocarditis, MRSA bacteremia, epidural abscesses, and septic emboli    HPI:  Still continues to have significant nausea, Phenergan helps a little bit.  She is having diarrhea also she says approximately every 5 minutes, and she feels like Imodium is giving her worsening abdominal cramps.  Denies any fever overnight.  No significant bleeding.  Rash on her arms is more prominent today and raised, she says it is slightly painful, but not itchy.    Review of Systems  CV- No chest pain, palpitations  Resp- No cough, dyspnea    Objective   Objective     Vital Signs:   Temp:  [98 °F (36.7 °C)-98.7 °F (37.1 °C)] 98 °F (36.7 °C)  Heart Rate:  [66-82] 69  Resp:  [20-24] 20  BP: (121-153)/(68-93) 153/93        Physical Exam:  Constitutional: Appears ill, awake, alert  HENT: NCAT, mucous membranes moist  Respiratory: Clear to auscultation bilaterally, respiratory effort normal   Cardiovascular: RRR, no murmurs  Gastrointestinal: Positive bowel sounds, soft, nontender, nondistended, obese  Psychiatric: Appropriate affect, cooperative  Neurologic: Oriented x 3, Cranial Nerves grossly intact to confrontation, speech clear  Skin: On right flank and bilateral arms mainly on posterior aspect there is a raised papules that coalesce into patches, no evidence of bleeding or drainage    Results Reviewed:  Results from last 7 days   Lab Units 20  0456 20  2231 20  0921  20  0835  20  0352 20  1751   WBC 10*3/mm3 12.89*  --  9.26  --  14.45*   < > 10.08 14.20*   HEMOGLOBIN g/dL 11.5* 10.1* 9.7*  9.7*   < > 7.7*   < > 6.8* 8.8*   HEMATOCRIT % 38.2 33.7* 33.1*  33.1*   < > 26.2*   < > 22.9* 28.5*   PLATELETS 10*3/mm3 405   --  282  --  145   < > 130* 198   INR   --   --  1.23*  --   --   --  1.23*  --    PROCALCITONIN ng/mL  --   --   --   --   --   --   --  0.59*    < > = values in this interval not displayed.     Results from last 7 days   Lab Units 06/07/20  0456 06/06/20  0921 06/04/20  0835  05/31/20  1751   SODIUM mmol/L 134* 134*  134* 137   < > 133*   POTASSIUM mmol/L 3.9 4.2  4.2 4.3   < > 3.6   CHLORIDE mmol/L 100 100  100 102   < > 94*   CO2 mmol/L 22.0 21.0*  21.0* 24.0   < > 19.6*   BUN mg/dL 9 7  7 8   < > 20   CREATININE mg/dL 0.49* 0.51*  0.51* 0.71   < > 0.79   GLUCOSE mg/dL 146* 145*  145* 196*   < > 145*   CALCIUM mg/dL 8.0* 8.1*  8.1* 7.9*   < > 8.8   ALT (SGPT) U/L 11 15 30   < > 16   AST (SGOT) U/L 13 16 42*   < > 26   TROPONIN T ng/mL  --   --   --   --  <0.010   PROBNP pg/mL  --   --   --   --  1,230.0*    < > = values in this interval not displayed.     Estimated Creatinine Clearance: 188.2 mL/min (A) (by C-G formula based on SCr of 0.49 mg/dL (L)).    Microbiology Results Abnormal     Procedure Component Value - Date/Time    Body Fluid Culture - Aspirate, Back [[id number]]  (Abnormal)  (Susceptibility) Collected:  06/04/20 1645    Lab Status:  Final result Specimen:  Aspirate from Back Updated:  06/07/20 0701     Body Fluid Culture Light growth (2+) Staphylococcus aureus, MRSA     Comment:   Methicillin resistant Staphylococcus aureus, Patient may be an isolation risk.        Gram Stain No organisms seen      Many (4+) WBCs per low power field    Susceptibility      Staphylococcus aureus, MRSA     TYRONE     Gentamicin Susceptible     Oxacillin Resistant     Rifampin Susceptible     Vancomycin Susceptible                Susceptibility Comments     Staphylococcus aureus, MRSA    This isolate does not demonstrate inducible clindamycin resistance in vitro.               Blood Culture - Blood, Arm, Right [[id number]] Collected:  06/03/20 1229    Lab Status:  Preliminary result Specimen:  Blood from Arm, Right  Updated:  06/06/20 1400     Blood Culture No growth at 3 days    Blood Culture - Blood, Arm, Left [[id number]] Collected:  06/03/20 1229    Lab Status:  Preliminary result Specimen:  Blood from Arm, Left Updated:  06/06/20 1400     Blood Culture No growth at 3 days    Clostridium Difficile Toxin - Stool, Per Rectum [[id number]] Collected:  06/05/20 1748    Lab Status:  Final result Specimen:  Stool from Per Rectum Updated:  06/05/20 1858    Narrative:       The following orders were created for panel order Clostridium Difficile Toxin - Stool, Per Rectum.  Procedure                               Abnormality         Status                     ---------                               -----------         ------                     Clostridium Difficile To...[[id number]]  Normal              Final result                 Please view results for these tests on the individual orders.    Clostridium Difficile Toxin, PCR - Stool, Per Rectum [[id number]]  (Normal) Collected:  06/05/20 1748    Lab Status:  Final result Specimen:  Stool from Per Rectum Updated:  06/05/20 1858     C. Difficile Toxins by PCR Not Detected    Narrative:       Performance characteristics of test not established for patients <2 years of age.  Negative for Toxigenic C. Difficile    Blood Culture - Blood, Wrist, Left [[id number]]  (Abnormal) Collected:  06/01/20 1601    Lab Status:  Final result Specimen:  Blood from Wrist, Left Updated:  06/04/20 0612     Blood Culture Staphylococcus aureus, MRSA     Comment:   Infectious disease consultation is highly recommended to rule out distant foci of infection.  Methicillin resistant Staphylococcus aureus, Patient may be an isolation risk.        Isolated from Aerobic Bottle     Gram Stain Aerobic Bottle Gram positive cocci in clusters    Narrative:       Refer to previous blood culture collected on 6/1/2020 1558 for MICs.      Blood Culture - Blood, Arm, Left [[id number]]  (Abnormal)  (Susceptibility) Collected:   06/01/20 1558    Lab Status:  Final result Specimen:  Blood from Arm, Left Updated:  06/04/20 0610     Blood Culture Staphylococcus aureus, MRSA     Comment:   Infectious disease consultation is highly recommended to rule out distant foci of infection.  Methicillin resistant Staphylococcus aureus, Patient may be an isolation risk.        Isolated from Aerobic and Anaerobic Bottles     Gram Stain Aerobic Bottle Gram positive cocci in clusters      Anaerobic Bottle Gram positive cocci in clusters    Susceptibility      Staphylococcus aureus, MRSA     TYRONE     Gentamicin Susceptible     Oxacillin Resistant     Rifampin Susceptible     Vancomycin Susceptible                Susceptibility Comments     Staphylococcus aureus, MRSA    This isolate does not demonstrate inducible clindamycin resistance in vitro.               Blood Culture ID, PCR - Blood, Arm, Left [[id number]]  (Abnormal) Collected:  06/01/20 1558    Lab Status:  Final result Specimen:  Blood from Arm, Left Updated:  06/02/20 0721     BCID, PCR Staphylococcus aureus. mecA (methicillin resistance gene) detected. Identification by BCID PCR.     BOTTLE TYPE Aerobic Bottle          Imaging Results (Last 24 Hours)     ** No results found for the last 24 hours. **          Results for orders placed during the hospital encounter of 06/01/20   Adult Transesophageal Echo (REDD) W/ Cont if Necessary Per Protocol    Narrative · Left ventricular systolic function is normal. Estimated EF appears to be   in the range of 56 - 60%  · The tricuspid valve is abnormal. There is a large vegetation measuring 1   x 1.5 cm on the tricuspid valve. Predominantly located on the posterior   leaflet but anterior leaflet appears involved as well.  · Estimated right ventricular systolic pressure from tricuspid   regurgitation is moderately elevated (45-55 mmHg).  · Mild to moderate mitral regurgitation noted with an eccentric jet.   However no vegetations visualized on the mitral valve  ·  The other cardiac valves appear free of vegetations  · Discussed findings with ordering provider.          I have reviewed the medications:  Scheduled Meds:  budesonide-formoterol 2 puff Inhalation BID - RT   ceftaroline 600 mg Intravenous BID   DAPTOmycin 8 mg/kg (Adjusted) Intravenous Q24H   ferric gluconate 125 mg Intravenous Q24H   FLUoxetine 40 mg Oral Daily   gabapentin 800 mg Oral Q8H   lactobacillus acidophilus 1 capsule Oral Daily   levETIRAcetam 500 mg Oral Q12H   levothyroxine 50 mcg Oral Daily   nystatin  Topical Q12H   pantoprazole 40 mg Oral QAM     Continuous Infusions:  sodium chloride 125 mL/hr Last Rate: 125 mL/hr (06/07/20 0354)     PRN Meds:.•  acetaminophen  •  diphenoxylate-atropine  •  HYDROcodone-acetaminophen  •  HYDROmorphone  •  ipratropium-albuterol  •  prochlorperazine **OR** prochlorperazine **OR** prochlorperazine  •  tiZANidine    Assessment/Plan   Assessment & Plan     Active Hospital Problems    Diagnosis  POA   • **Sepsis (CMS/HCC) [A41.9]  Yes   • Infective endocarditis [I33.0]  Unknown   • Epidural abscess [G06.2]  Unknown   • MRSA bacteremia [R78.81]  Unknown   • DDD (degenerative disc disease), cervical [M50.30]  Unknown   • Anxiety [F41.9]  Unknown   • Depression [F32.9]  Unknown   • Seizure (CMS/HCC) [R56.9]  Unknown   • Migraine [G43.909]  Unknown   • Essential hypertension [I10]  Yes   • Fibromyalgia syndrome [M79.7]  Yes   • GERD without esophagitis [K21.9]  Yes      Resolved Hospital Problems   No resolved problems to display.        Brief Hospital Course to date:  Liliana Calderon is a 39 y.o. female with a past medical history of depression/anxiety, fibromyalgia, COPD, hypertension, seizure disorder, remote IV drug use who was admitted on June 1 for sepsis with MRSA bacteremia, UTI, pneumonia, and epidural abscesses.  Following currently on Dapto and Teflaro.    Rash  -Yesterday was more petechiae and now it is very erythematous, raised, distributed over posterior arms  bilaterally  -Unclear etiology, yesterday thought it was DIC, but blood counts were normal  -We will continue to monitor, try hydrocortisone cream daily     MRSA bacteremia, UTI, pneumonia, tricuspid valve endocarditis, and epidural abscesses  -Had REDD today with a tricuspid valve vegetation approximately 1 cm x 1.2 cm in size  -Repeat CT chest with dependent bilateral pleural effusions, with bilateral parenchymal changes including well-circumscribed nodules representing septic emboli and poorly defined nodules  -We will continue antibiotics per ID recommendations  -CT surgery consult-will eventually need removal of tricuspid valve vegetation but is too high risk at this point     Acute on chronic low back pain  -Secondary to epidural abscesses-continue Dilaudid as needed for pain  -Had CT-guided drainage of her right paraspinal abscess on June 4 that was positive for MRSA-continue antibiotics     Diarrhea  -C. difficile not detected, continue probiotic; odium seem to increase her abdominal cramps, will switch to Lomotil and monitor     Nausea and vomiting  - Phenergan not helping very much, will try Compazine and monitor     DVT Prophylaxis: Subcu heparin     Disposition: I expect the patient to be discharged pending clinical improvement in nausea, vomiting, and eventual treatment for tricuspid valve vegetation.    CODE STATUS:   Code Status and Medical Interventions:   Ordered at: 06/01/20 1441     Code Status:    CPR     Medical Interventions (Level of Support Prior to Arrest):    Full         Electronically signed by Verenice Keller MD, 06/07/20, 10:57.

## 2020-06-08 ENCOUNTER — APPOINTMENT (OUTPATIENT)
Dept: GENERAL RADIOLOGY | Facility: HOSPITAL | Age: 40
End: 2020-06-08

## 2020-06-08 LAB
ALBUMIN SERPL-MCNC: 2.1 G/DL (ref 3.5–5.2)
ALBUMIN/GLOB SERPL: 0.6 G/DL
ALP SERPL-CCNC: 148 U/L (ref 39–117)
ALT SERPL W P-5'-P-CCNC: 8 U/L (ref 1–33)
ANION GAP SERPL CALCULATED.3IONS-SCNC: 14 MMOL/L (ref 5–15)
AST SERPL-CCNC: 12 U/L (ref 1–32)
BACTERIA SPEC AEROBE CULT: NORMAL
BACTERIA SPEC AEROBE CULT: NORMAL
BASOPHILS # BLD AUTO: 0.05 10*3/MM3 (ref 0–0.2)
BASOPHILS NFR BLD AUTO: 0.3 % (ref 0–1.5)
BILIRUB CONJ SERPL-MCNC: 0.4 MG/DL (ref 0.2–0.3)
BILIRUB SERPL-MCNC: 0.6 MG/DL (ref 0.2–1.2)
BUN BLD-MCNC: 11 MG/DL (ref 6–20)
BUN/CREAT SERPL: 21.2 (ref 7–25)
CALCIUM SPEC-SCNC: 7.9 MG/DL (ref 8.6–10.5)
CHLORIDE SERPL-SCNC: 103 MMOL/L (ref 98–107)
CK SERPL-CCNC: 31 U/L (ref 20–180)
CO2 SERPL-SCNC: 20 MMOL/L (ref 22–29)
CREAT BLD-MCNC: 0.52 MG/DL (ref 0.57–1)
D-LACTATE SERPL-SCNC: 1.3 MMOL/L (ref 0.5–2)
DEPRECATED RDW RBC AUTO: 48 FL (ref 37–54)
EOSINOPHIL # BLD AUTO: 0.01 10*3/MM3 (ref 0–0.4)
EOSINOPHIL NFR BLD AUTO: 0.1 % (ref 0.3–6.2)
ERYTHROCYTE [DISTWIDTH] IN BLOOD BY AUTOMATED COUNT: 18.9 % (ref 12.3–15.4)
GFR SERPL CREATININE-BSD FRML MDRD: 132 ML/MIN/1.73
GLOBULIN UR ELPH-MCNC: 3.5 GM/DL
GLUCOSE BLD-MCNC: 167 MG/DL (ref 65–99)
HCT VFR BLD AUTO: 40.8 % (ref 34–46.6)
HGB BLD-MCNC: 12.1 G/DL (ref 12–15.9)
IMM GRANULOCYTES # BLD AUTO: 0.32 10*3/MM3 (ref 0–0.05)
IMM GRANULOCYTES NFR BLD AUTO: 2 % (ref 0–0.5)
LYMPHOCYTES # BLD AUTO: 1.06 10*3/MM3 (ref 0.7–3.1)
LYMPHOCYTES NFR BLD AUTO: 6.6 % (ref 19.6–45.3)
MCH RBC QN AUTO: 22.7 PG (ref 26.6–33)
MCHC RBC AUTO-ENTMCNC: 29.7 G/DL (ref 31.5–35.7)
MCV RBC AUTO: 76.7 FL (ref 79–97)
MONOCYTES # BLD AUTO: 0.59 10*3/MM3 (ref 0.1–0.9)
MONOCYTES NFR BLD AUTO: 3.7 % (ref 5–12)
NEUTROPHILS # BLD AUTO: 13.95 10*3/MM3 (ref 1.7–7)
NEUTROPHILS NFR BLD AUTO: 87.3 % (ref 42.7–76)
NRBC BLD AUTO-RTO: 0 /100 WBC (ref 0–0.2)
PLATELET # BLD AUTO: 463 10*3/MM3 (ref 140–450)
PMV BLD AUTO: 9 FL (ref 6–12)
POTASSIUM BLD-SCNC: 3.9 MMOL/L (ref 3.5–5.2)
PROT SERPL-MCNC: 5.6 G/DL (ref 6–8.5)
RBC # BLD AUTO: 5.32 10*6/MM3 (ref 3.77–5.28)
SODIUM BLD-SCNC: 137 MMOL/L (ref 136–145)
WBC NRBC COR # BLD: 15.98 10*3/MM3 (ref 3.4–10.8)

## 2020-06-08 PROCEDURE — 87427 SHIGA-LIKE TOXIN AG IA: CPT | Performed by: INTERNAL MEDICINE

## 2020-06-08 PROCEDURE — 99232 SBSQ HOSP IP/OBS MODERATE 35: CPT | Performed by: NEUROLOGICAL SURGERY

## 2020-06-08 PROCEDURE — 94799 UNLISTED PULMONARY SVC/PX: CPT

## 2020-06-08 PROCEDURE — 87045 FECES CULTURE AEROBIC BACT: CPT | Performed by: INTERNAL MEDICINE

## 2020-06-08 PROCEDURE — 97530 THERAPEUTIC ACTIVITIES: CPT

## 2020-06-08 PROCEDURE — 63710000001 PROCHLORPERAZINE MALEATE PER 5 MG: Performed by: INTERNAL MEDICINE

## 2020-06-08 PROCEDURE — 85025 COMPLETE CBC W/AUTO DIFF WBC: CPT | Performed by: INTERNAL MEDICINE

## 2020-06-08 PROCEDURE — 97110 THERAPEUTIC EXERCISES: CPT

## 2020-06-08 PROCEDURE — 87046 STOOL CULTR AEROBIC BACT EA: CPT | Performed by: INTERNAL MEDICINE

## 2020-06-08 PROCEDURE — 82248 BILIRUBIN DIRECT: CPT | Performed by: INTERNAL MEDICINE

## 2020-06-08 PROCEDURE — 73030 X-RAY EXAM OF SHOULDER: CPT

## 2020-06-08 PROCEDURE — 80053 COMPREHEN METABOLIC PANEL: CPT | Performed by: INTERNAL MEDICINE

## 2020-06-08 PROCEDURE — 25010000002 HYDROMORPHONE 1 MG/ML SOLUTION: Performed by: INTERNAL MEDICINE

## 2020-06-08 PROCEDURE — 25010000002 DAPTOMYCIN PER 1 MG: Performed by: INTERNAL MEDICINE

## 2020-06-08 PROCEDURE — 99232 SBSQ HOSP IP/OBS MODERATE 35: CPT | Performed by: INTERNAL MEDICINE

## 2020-06-08 PROCEDURE — 25010000002 CEFTAROLINE FOSAMIL PER 10 MG: Performed by: INTERNAL MEDICINE

## 2020-06-08 PROCEDURE — 25010000002 HYDROMORPHONE PER 4 MG: Performed by: INTERNAL MEDICINE

## 2020-06-08 PROCEDURE — 83605 ASSAY OF LACTIC ACID: CPT | Performed by: INTERNAL MEDICINE

## 2020-06-08 PROCEDURE — 82550 ASSAY OF CK (CPK): CPT | Performed by: INTERNAL MEDICINE

## 2020-06-08 RX ORDER — MORPHINE SULFATE 15 MG/1
15 TABLET, FILM COATED, EXTENDED RELEASE ORAL EVERY 12 HOURS SCHEDULED
Status: DISCONTINUED | OUTPATIENT
Start: 2020-06-08 | End: 2020-06-15 | Stop reason: ALTCHOICE

## 2020-06-08 RX ORDER — DIPHENHYDRAMINE HCL 25 MG
25 CAPSULE ORAL ONCE
Status: COMPLETED | OUTPATIENT
Start: 2020-06-09 | End: 2020-06-08

## 2020-06-08 RX ADMIN — BUDESONIDE AND FORMOTEROL FUMARATE DIHYDRATE 2 PUFF: 160; 4.5 AEROSOL RESPIRATORY (INHALATION) at 08:46

## 2020-06-08 RX ADMIN — NYSTATIN: 100000 POWDER TOPICAL at 22:29

## 2020-06-08 RX ADMIN — HYDROCORTISONE: 25 CREAM TOPICAL at 08:41

## 2020-06-08 RX ADMIN — LEVETIRACETAM 500 MG: 500 TABLET ORAL at 20:10

## 2020-06-08 RX ADMIN — SODIUM CHLORIDE 125 ML/HR: 9 INJECTION, SOLUTION INTRAVENOUS at 18:16

## 2020-06-08 RX ADMIN — Medication 1 CAPSULE: at 08:41

## 2020-06-08 RX ADMIN — HYDROCODONE BITARTRATE AND ACETAMINOPHEN 1 TABLET: 5; 325 TABLET ORAL at 05:09

## 2020-06-08 RX ADMIN — PROCHLORPERAZINE MALEATE 5 MG: 5 TABLET ORAL at 10:44

## 2020-06-08 RX ADMIN — ACETAMINOPHEN 650 MG: 325 TABLET, FILM COATED ORAL at 10:04

## 2020-06-08 RX ADMIN — HYDROCODONE BITARTRATE AND ACETAMINOPHEN 1 TABLET: 5; 325 TABLET ORAL at 10:44

## 2020-06-08 RX ADMIN — TIZANIDINE 4 MG: 4 TABLET ORAL at 08:41

## 2020-06-08 RX ADMIN — HYDROCODONE BITARTRATE AND ACETAMINOPHEN 1 TABLET: 5; 325 TABLET ORAL at 22:29

## 2020-06-08 RX ADMIN — LEVETIRACETAM 500 MG: 500 TABLET ORAL at 08:41

## 2020-06-08 RX ADMIN — HYDROCODONE BITARTRATE AND ACETAMINOPHEN 1 TABLET: 5; 325 TABLET ORAL at 15:17

## 2020-06-08 RX ADMIN — DIPHENHYDRAMINE HYDROCHLORIDE 25 MG: 25 CAPSULE ORAL at 23:52

## 2020-06-08 RX ADMIN — GABAPENTIN 800 MG: 400 CAPSULE ORAL at 20:10

## 2020-06-08 RX ADMIN — CEFTAROLINE FOSAMIL 600 MG: 600 POWDER, FOR SOLUTION INTRAVENOUS at 08:41

## 2020-06-08 RX ADMIN — HYDROXYZINE HYDROCHLORIDE 25 MG: 25 TABLET, FILM COATED ORAL at 20:10

## 2020-06-08 RX ADMIN — HYDROMORPHONE HYDROCHLORIDE 1 MG: 1 INJECTION, SOLUTION INTRAMUSCULAR; INTRAVENOUS; SUBCUTANEOUS at 01:42

## 2020-06-08 RX ADMIN — TIZANIDINE 4 MG: 4 TABLET ORAL at 16:22

## 2020-06-08 RX ADMIN — DIPHENOXYLATE HYDROCHLORIDE AND ATROPINE SULFATE 1 TABLET: 2.5; .025 TABLET ORAL at 05:09

## 2020-06-08 RX ADMIN — HYDROMORPHONE HYDROCHLORIDE 0.25 MG: 1 INJECTION, SOLUTION INTRAMUSCULAR; INTRAVENOUS; SUBCUTANEOUS at 08:40

## 2020-06-08 RX ADMIN — PROCHLORPERAZINE MALEATE 5 MG: 5 TABLET ORAL at 05:09

## 2020-06-08 RX ADMIN — NYSTATIN: 100000 POWDER TOPICAL at 08:41

## 2020-06-08 RX ADMIN — MELATONIN TAB 5 MG 5 MG: 5 TAB at 20:10

## 2020-06-08 RX ADMIN — GABAPENTIN 800 MG: 400 CAPSULE ORAL at 05:09

## 2020-06-08 RX ADMIN — CEFTAROLINE FOSAMIL 600 MG: 600 POWDER, FOR SOLUTION INTRAVENOUS at 21:18

## 2020-06-08 RX ADMIN — MORPHINE SULFATE 15 MG: 15 TABLET, FILM COATED, EXTENDED RELEASE ORAL at 12:22

## 2020-06-08 RX ADMIN — MORPHINE SULFATE 15 MG: 15 TABLET, FILM COATED, EXTENDED RELEASE ORAL at 20:10

## 2020-06-08 RX ADMIN — LEVOTHYROXINE SODIUM 50 MCG: 50 TABLET ORAL at 05:09

## 2020-06-08 RX ADMIN — PROCHLORPERAZINE MALEATE 5 MG: 5 TABLET ORAL at 22:29

## 2020-06-08 RX ADMIN — GABAPENTIN 800 MG: 400 CAPSULE ORAL at 15:17

## 2020-06-08 RX ADMIN — DAPTOMYCIN 600 MG: 500 INJECTION, POWDER, LYOPHILIZED, FOR SOLUTION INTRAVENOUS at 08:42

## 2020-06-08 RX ADMIN — ACETAMINOPHEN 650 MG: 325 TABLET, FILM COATED ORAL at 23:52

## 2020-06-08 RX ADMIN — PROCHLORPERAZINE MALEATE 5 MG: 5 TABLET ORAL at 16:22

## 2020-06-08 RX ADMIN — SODIUM CHLORIDE 125 ML/HR: 9 INJECTION, SOLUTION INTRAVENOUS at 08:38

## 2020-06-08 RX ADMIN — TIZANIDINE 4 MG: 4 TABLET ORAL at 23:52

## 2020-06-08 RX ADMIN — HYDROMORPHONE HYDROCHLORIDE 0.25 MG: 1 INJECTION, SOLUTION INTRAMUSCULAR; INTRAVENOUS; SUBCUTANEOUS at 03:18

## 2020-06-08 RX ADMIN — FLUOXETINE HYDROCHLORIDE 40 MG: 20 CAPSULE ORAL at 08:41

## 2020-06-08 RX ADMIN — DIPHENOXYLATE HYDROCHLORIDE AND ATROPINE SULFATE 1 TABLET: 2.5; .025 TABLET ORAL at 16:22

## 2020-06-08 NOTE — PROGRESS NOTES
Continued Stay Note  [facility]     Patient Name: Liliana Calderon  MRN: [id number]  Today's Date: 6/8/2020    Admit Date: 6/1/2020    Discharge Plan     Row Name 06/08/20 2110       Plan    Plan  Ongoing    Plan Comments  Peripherally following at this time.  Pt does not appear to want to engage in treatment/recovery options or conversations.        Discharge Codes    No documentation.             Bhumika Mooney RN ,BSN   Addiction Coordinator

## 2020-06-08 NOTE — PLAN OF CARE
VSS on 3L nc. Denies SOA. Pt complains of pain often and asks for medication as soon as it is available. Pt complains of nausea and has received PRN medication. Frequent Liquid stool. PRN medication given for diarrhea. Pt complains of worsening pain as shift has gone on.

## 2020-06-08 NOTE — THERAPY TREATMENT NOTE
Acute Care - Occupational Therapy Treatment Note  [facility]     Patient Name: Liliana Calderon  : 1980  MRN: [id number]  Today's Date: 2020     Date of Referral to OT: 20       Admit Date: 2020       ICD-10-CM ICD-9-CM   1. Impaired mobility and ADLs Z74.09 799.89     Patient Active Problem List   Diagnosis   • Bradycardia with 41-50 beats per minute   • Essential hypertension   • Fibromyalgia syndrome   • GERD without esophagitis   • Sepsis (CMS/HCC)   • DDD (degenerative disc disease), cervical   • Anxiety   • Depression   • Seizure (CMS/HCC)   • Migraine   • Infective endocarditis   • Epidural abscess   • MRSA bacteremia     Past Medical History:   Diagnosis Date   • Anxiety and depression    • Chronic bronchitis (CMS/HCC)    • Fibromyalgia    • Heart valve problem    • Hypertension    • Migraine    • PCOS (polycystic ovarian syndrome)    • Pneumonia    • Seizures (CMS/HCC)      Past Surgical History:   Procedure Laterality Date   • CHOLECYSTECTOMY         Therapy Treatment    Rehabilitation Treatment Summary     Row Name 20 1145             Treatment Time/Intention    Discipline  occupational therapist  -KF      Document Type  therapy note (daily note)  -KF      Subjective Information  complains of;weakness;fatigue;pain  -KF      Mode of Treatment  occupational therapy  -KF      Therapy Frequency (OT Eval)  3 times/wk  -KF      Patient Effort  adequate  -KF      Existing Precautions/Restrictions  fall;spinal;other (see comments) limited by pain R shoulder  -KF      Patient Response to Treatment  tolerated   -KF      Recorded by [KF] Lucia Bass, OT 20 1354      Row Name 20 1145             Vital Signs    Pre Systolic BP Rehab  159 RN cleared VSS   -KF      Pre Treatment Diastolic BP  103  -KF      Pre SpO2 (%)  96  -KF      O2 Delivery Pre Treatment  room air  -KF      O2 Delivery Post Treatment  room air  -KF      Pre Patient Position  Sitting  -KF      Intra  Patient Position  Standing  -KF      Post Patient Position  Supine  -KF      Recorded by [KF] Lucia Bass, OT 06/08/20 1354      Row Name 06/08/20 1145             Cognitive Assessment/Intervention    Additional Documentation  Cognitive Assessment/Intervention (Group)  -KF      Recorded by [KF] Lucia Bass, OT 06/08/20 1354      Row Name 06/08/20 1145             Cognitive Assessment/Intervention- PT/OT    Affect/Mental Status (Cognitive)  WFL  -KF      Behavioral Issues (Cognitive)  overwhelmed easily  -KF      Orientation Status (Cognition)  oriented x 4  -KF      Follows Commands (Cognition)  WFL;follows one step commands;over 90% accuracy  -KF      Cognitive Function (Cognitive)  safety deficit  -KF      Safety Deficit (Cognitive)  mild deficit;awareness of need for assistance;insight into deficits/self awareness;problem solving;judgment;safety precautions awareness  -KF      Cognitive Interventions (Cognitive)  metacognitive skills training;occupation/activity based interventions  -KF      Recorded by [KF] Lucia Bass, OT 06/08/20 1354      Row Name 06/08/20 1145             Safety Issues, Functional Mobility    Safety Issues Affecting Function (Mobility)  judgment;insight into deficits/self awareness;awareness of need for assistance;safety precaution awareness;safety precautions follow-through/compliance  -KF      Impairments Affecting Function (Mobility)  balance;endurance/activity tolerance;pain;strength  -KF      Recorded by [KF] Lucia Bass, OT 06/08/20 1354      Row Name 06/08/20 1145             Bed Mobility Assessment/Treatment    Bed Mobility Assessment/Treatment  sit-supine  -KF      Sit-Supine [place] (Bed Mobility)  2 person assist;moderate assist (50% patient effort)  -KF      Bed Mobility, Safety Issues  decreased use of legs for bridging/pushing;decreased use of arms for pushing/pulling  -KF      Assistive Device (Bed Mobility)  bed rails;draw sheet;head of bed  elevated  -KF      Recorded by [KF] Luica Bass, OT 06/08/20 1354      Row Name 06/08/20 1145             Functional Mobility    Functional Mobility- Comment  deferred Pt declined due to pain deferred to PT   -KF      Recorded by [KF] Lucia Bass, OT 06/08/20 1354      Row Name 06/08/20 1145             Transfer Assessment/Treatment    Transfer Assessment/Treatment  sit-stand transfer;stand-sit transfer;chair-bed transfer  -KF      Comment (Transfers)  requires cues for body mechanics, not pushing off with RUE due to pain but able to then push with RUE to scoot back into bed 2x without complaint of RUE   -KF      Recorded by [KF] Lucia Bass, OT 06/08/20 1354      Row Name 06/08/20 1145             Chair-Bed Transfer    Chair-Bed [place] (Transfers)  maximum assist (25% patient effort);2 person assist  -KF      Assistive Device (Chair-Bed Transfers)  other (see comments) BUE   -KF      Recorded by [KF] Lucia Bass, OT 06/08/20 1354      Row Name 06/08/20 1145             Sit-Stand Transfer    Sit-Stand [place] (Transfers)  moderate assist (50% patient effort);2 person assist  -KF      Assistive Device (Sit-Stand Transfers)  other (see comments) BUE   -KF      Recorded by [KF] Lucia Bass, OT 06/08/20 1354      Row Name 06/08/20 1145             Stand-Sit Transfer    Stand-Sit [place] (Transfers)  moderate assist (50% patient effort);2 person assist;verbal cues;nonverbal cues (demo/gesture)  -KF      Recorded by [KF] Lucia Bass, OT 06/08/20 1354      Row Name 06/08/20 1145             BADL Safety/Performance    Impairments, BADL Safety/Performance  pain;range of motion;strength;endurance/activity tolerance  -KF      Skilled BADL Treatment/Intervention  BADL process/adaptation training;cognitive/safety deficit modifications  -KF      Recorded by [KF] Lucia Bass, OT 06/08/20 1354      Row Name 06/08/20 1145             Motor Skills  Assessment/Interventions    Additional Documentation  Balance (Group);Balance Interventions (Group);Therapeutic Exercise (Group);Therapeutic Exercise Interventions (Group)  -KF      Recorded by [KF] Lucia Bass, OT 06/08/20 1354      Row Name 06/08/20 1145             Therapeutic Exercise    Therapeutic Exercise  seated, upper extremities  -KF      Additional Documentation  Therapeutic Exercise (Row)  -KF      Recorded by [KF] Lucia Bass, OT 06/08/20 1354      Row Name 06/08/20 1145             Upper Extremity Seated Therapeutic Exercise    Performed, Seated Upper Extremity (Therapeutic Exercise)  shoulder abduction/adduction;shoulder flexion/extension;elbow flexion/extension within pain free range on RUE  -KF      Exercise Type, Seated Upper Extremity (Therapeutic Exercise)  AROM (active range of motion)  -KF      Expected Outcomes, Seated Upper Extremity (Therapeutic Exercise)  improve performance, BADLs;improve functional stability;improve functional tolerance, self-care activity  -KF      Sets/Reps Detail, Seated Upper Extremity (Therapeutic Exercise)  1/5  -KF      Transfers Skills, Training to Functional Activity, Seated Upper Extremity (Therapeutic Exercise)  beginning to transfer skills to functional activity  -KF      Comment, Seated Upper Extremity (Therapeutic Exercise)  BUE limited tolerance   -KF      Recorded by [KF] Lucia Bass, OT 06/08/20 1354      Row Name 06/08/20 1145             Balance    Balance  static sitting balance;static standing balance;dynamic standing balance;dynamic sitting balance  -KF      Recorded by [KF] Lucia Bass, OT 06/08/20 1354      Row Name 06/08/20 1145             Static Sitting Balance    Level of [place] (Unsupported Sitting, Static Balance)  supervision  -KF      Sitting Position (Unsupported Sitting, Static Balance)  sitting in chair  -KF      Recorded by [KF] Lucia Bass, OT 06/08/20 1354      Row Name 06/08/20 1145              Dynamic Sitting Balance    Level of [place], Reaches Outside Midline (Sitting, Dynamic Balance)  standby assist  -KF      Sitting Position, Reaches Outside Midline (Sitting, Dynamic Balance)  sitting in chair  -KF      Recorded by [KF] Lucia Bass, OT 06/08/20 1354      Row Name 06/08/20 1145             Static Standing Balance    Level of [place] (Supported Standing, Static Balance)  maximal assist, 25 to 49% patient effort;2 person assist  -KF      Assistive Device Utilized (Supported Standing, Static Balance)  other (see comments) BUE support   -KF      Recorded by [KF] Lucia Bass, OT 06/08/20 1354      Row Name 06/08/20 1145             Dynamic Standing Balance    Level of [place], Reaches Outside Midline (Standing, Dynamic Balance)  maximal assist, 25 to 49% patient effort;2 person assist  -KF      Recorded by [] Lucia Bass, OT 06/08/20 1354      Row Name 06/08/20 1145             Positioning and Restraints    Pre-Treatment Position  sitting in chair/recliner  -KF      Post Treatment Position  bed  -KF      In Bed  notified nsg;supine;fowlers;call light within reach;encouraged to call for assist;exit alarm on;side rails up x2;legs elevated  -KF      Recorded by [KF] Lucia Bass, OT 06/08/20 1354      Row Name 06/08/20 1145             Pain Assessment    Additional Documentation  Pain Scale: Numbers Pre/Post-Treatment (Group)  -KF      Recorded by [] Lucia Bass, OT 06/08/20 1354      Row Name 06/08/20 1145             Pain Scale: Numbers Pre/Post-Treatment    Pain Scale: Numbers, Pretreatment  9/10  -KF      Pain Scale: Numbers, Post-Treatment  9/10  -KF      Pain Location - Side  Right  -KF      Pain Location - Orientation  generalized  -KF      Pain Location  shoulder  -KF      Pre/Post Treatment Pain Comment  R knee as well mostly complaint during   -KF      Pain Intervention(s)  Repositioned;Ambulation/increased activity  -KF      Recorded by [KF]  Lucia Bass, OT 06/08/20 1354      Row Name 06/08/20 1145             Plan of Care Review    Plan of Care Reviewed With  patient  -KF      Progress  no change  -KF      Recorded by [KF] Lucia Bass, OT 06/08/20 1354      Row Name 06/08/20 1145             Outcome Summary/Treatment Plan (OT)    Daily Summary of Progress (OT)  progress toward functional goals as expected  -KF      Recorded by [KF] Lucia Bass OT 06/08/20 1354        User Key  (r) = Recorded By, (t) = Taken By, (c) = Cosigned By    Initials Name Effective Dates Discipline    KF Lucia Bass, OT 04/03/18 -  OT             Occupational Therapy Education                 Title: PT OT SLP Therapies (In Progress)     Topic: Occupational Therapy (Done)     Point: ADL training (Done)     Description:   Instruct learner(s) on proper safety adaptation and remediation techniques during self care or transfers.   Instruct in proper use of assistive devices.              Learning Progress Summary           Patient Acceptance, E,TB,D, VU,DU by  at 6/8/2020 1145    Acceptance, E,TB,D, VU,DU,NR by KF at 6/5/2020 1105                   Point: Home exercise program (Done)     Description:   Instruct learner(s) on appropriate technique for monitoring, assisting and/or progressing therapeutic exercises/activities.              Learning Progress Summary           Patient Acceptance, E,TB,D, VU,DU by KF at 6/8/2020 1145    Acceptance, E,TB,D, VU,DU,NR by KF at 6/5/2020 1105                   Point: Precautions (Done)     Description:   Instruct learner(s) on prescribed precautions during self-care and functional transfers.              Learning Progress Summary           Patient Acceptance, E,TB,D, VU,DU by KF at 6/8/2020 1145    Acceptance, E,TB,D, VU,DU,NR by KF at 6/5/2020 1105                   Point: Body mechanics (Done)     Description:   Instruct learner(s) on proper positioning and spine alignment during self-care, functional mobility  activities and/or exercises.              Learning Progress Summary           Patient Acceptance, E,TB,D, VU,DU by KF at 6/8/2020 1145    Acceptance, E,TB,D, VU,DU,NR by KF at 6/5/2020 1105                               User Key     Initials Effective Dates Name Provider Type Discipline     04/03/18 -  Lucia Bass, OT Occupational Therapist OT                OT Recommendation and Plan  Outcome Summary/Treatment Plan (OT)  Daily Summary of Progress (OT): progress toward functional goals as expected  Anticipated Equipment Needs at Discharge (OT): (TBD)  Anticipated Discharge Disposition (OT): inpatient rehabilitation facility  Planned Therapy Interventions (OT Eval): activity tolerance training, adaptive equipment training, BADL retraining, cognitive/visual perception retraining, functional balance retraining, IADL retraining, neuromuscular control/coordination retraining, occupation/activity based interventions, patient/caregiver education/training, ROM/therapeutic exercise, strengthening exercise, transfer/mobility retraining  Therapy Frequency (OT Eval): 3 times/wk  Daily Summary of Progress (OT): progress toward functional goals as expected  Plan of Care Review  Plan of Care Reviewed With: patient  Plan of Care Reviewed With: patient  Outcome Summary: Pt continued to be limited by pain and activity tolerance, max Ax2 STS without able to push with RUE but able to reposition and incorporate RUE into repositioning to scoot back into bed tolerated, tolerated BUE gentle AROM ther ex seated in chair, cont IPOT per POC  Outcome Measures     Row Name 06/08/20 1145             How much help from another is currently needed...    Putting on and taking off regular lower body clothing?  2  -KF      Bathing (including washing, rinsing, and drying)  2  -KF      Toileting (which includes using toilet bed pan or urinal)  2  -KF      Putting on and taking off regular upper body clothing  2  -KF      Taking care of  personal grooming (such as brushing teeth)  3  -KF      Eating meals  4  -KF      AM-PAC 6 Clicks Score (OT)  15  -KF         Functional Assessment    Outcome Measure Options  AM-PAC 6 Clicks Daily Activity (OT)  -KF        User Key  (r) = Recorded By, (t) = Taken By, (c) = Cosigned By    Initials Name Provider Type    Lucia Salvador OT Occupational Therapist           Time Calculation:   Time Calculation- OT     Row Name 06/08/20 1145             Time Calculation- OT    OT Start Time  1145  -KF      Total Timed Code Minutes- OT  13 minute(s)  -KF      OT Received On  06/08/20  -KF         Timed Charges    27126 - OT Therapeutic Exercise Minutes  13  -KF        User Key  (r) = Recorded By, (t) = Taken By, (c) = Cosigned By    Initials Name Provider Type    Lucia Salvador OT Occupational Therapist        Therapy Charges for Today     Code Description Service Date Service Provider Modifiers Qty    [id number] HC OT THER PROC EA 15 MIN 6/8/2020 Lucia Bass OT GO 1               Lucia Bass OT  6/8/2020

## 2020-06-08 NOTE — PROGRESS NOTES
"NEUROSURGERY PROGRESS NOTE     LOS: 7 days   Patient Care Team:  Ollie Flores MD as PCP - General (Internal Medicine)    Chief Complaint: Low back pain.    Interval History:   Patient Complaints: Ongoing pain in the low back, worse with movement.  Patient Denies: Lower extremity pain, weakness, numbness.  She has no voiding difficulty.    Review of Systems:   Musculoskeletal and Neurological systems were reviewed and are negative except for:  Musculoskeletal: positive for back pain.  Neurological: positive for difficulty walking due to pain..    Vital Signs: Blood pressure 167/86, pulse 74, temperature 97.9 °F (36.6 °C), temperature source Oral, resp. rate 20, height 165.1 cm (65\"), weight 106 kg (233 lb), last menstrual period 04/01/2020, SpO2 99 %.  Intake/Output:     Intake/Output Summary (Last 24 hours) at 6/8/2020 1242  Last data filed at 6/8/2020 0900  Gross per 24 hour   Intake 4707 ml   Output 350 ml   Net 4357 ml       Physical Exam:  The patient is awake alert.  She is sitting up in a chair and appears uncomfortable.  Any movement of her lower extremities induces severe back pain.  Motor function and tone are normal in her lower extremities.  Sensation is intact to light touch testing in her lower extremities.     Assessment/Plan:  1.  L2-3 and L3-4 dorsal epidural abscess without neurologic compromise.  2.  Right paraspinous loculated abscess status post CT-guided aspiration.  3.  MRSA bacteremia.  4.  IV drug abuse.  5.  Disposition: Pain management.  Repeat MRI of the lumbar spine with and without gadolinium in a couple of weeks.    Yimi Alex MD  06/08/20  12:42      "

## 2020-06-08 NOTE — PROGRESS NOTES
Continued Stay Note  [facility]     Patient Name: Liliana Calderon  MRN: [id number]  Today's Date: 6/8/2020    Admit Date: 6/1/2020    Discharge Plan     Row Name 06/08/20 0951       Plan    Plan  Ongoing    Patient/Family in Agreement with Plan  yes    Plan Comments  Spoke with pt. at bedside. Pt. appears pale and weak. Complains of right shoulder pain. States doesn't feel well. Per MD pt. may be here awhile, is very sick. Will cont. to monitor and update.     Final Discharge Disposition Code  30 - still a patient        Discharge Codes    No documentation.             Komal Schuster RN

## 2020-06-08 NOTE — PROGRESS NOTES
[facility] Medicine Services  PROGRESS NOTE    Patient Name: Liliana Calderon  : 1980  MRN: [id number]    Date of Admission: 2020  Primary Care Physician: Ollie Flores MD    Subjective   Subjective     CC:  Follow-up for MRSA bacteremia, tricuspid valve endocarditis, epidural abscesses    HPI:  Patient trying to get up to chair this morning, knees are very weak she says and painful.  She is also complaining of right shoulder pain for the last couple days that is worse this morning, she does not have complete range of motion due to significant pain.  Denies any fever or chills overnight.  Adjusting her medications improved her nausea and her diarrhea.     Review of Systems  CV- No chest pain, palpitations  Resp- No cough, dyspnea    Objective   Objective     Vital Signs:   Temp:  [97.7 °F (36.5 °C)-97.9 °F (36.6 °C)] 97.9 °F (36.6 °C)  Heart Rate:  [68-78] 74  Resp:  [18-20] 20  BP: (161-167)/(84-86) 167/86        Physical Exam:  Constitutional: No acute distress, awake, alert, obese  HENT: NCAT, mucous membranes moist, subconjunctival hemorrhages bilaterally  Respiratory: Clear to auscultation bilaterally, respiratory effort normal on room air  Cardiovascular: RRR, no murmurs, rubs  Gastrointestinal: Positive bowel sounds, soft, nontender, nondistended  Musculoskeletal: Right shoulder is mildly erythematous compared to left, is warmer to the touch compared to the left side, and is tender to the touch  Psychiatric: Appropriate affect, cooperative  Neurologic: Oriented x 3, Cranial Nerves grossly intact to confrontation, speech clear  Skin: Erythematous papules on bilateral arms are able today, has not spread but no active bleeding, or overlying crust    Results Reviewed:  Results from last 7 days   Lab Units 20  0513 20  0456 20  2231 20  0921  20  0352   WBC 10*3/mm3 15.98* 12.89*  --  9.26   < > 10.08   HEMOGLOBIN g/dL 12.1 11.5* 10.1*  9.7*  9.7*   < > 6.8*   HEMATOCRIT % 40.8 38.2 33.7* 33.1*  33.1*   < > 22.9*   PLATELETS 10*3/mm3 463* 405  --  282   < > 130*   INR   --   --   --  1.23*  --  1.23*    < > = values in this interval not displayed.     Results from last 7 days   Lab Units 06/08/20  0513 06/07/20  0456 06/06/20  0921   SODIUM mmol/L 137 134* 134*  134*   POTASSIUM mmol/L 3.9 3.9 4.2  4.2   CHLORIDE mmol/L 103 100 100  100   CO2 mmol/L 20.0* 22.0 21.0*  21.0*   BUN mg/dL 11 9 7  7   CREATININE mg/dL 0.52* 0.49* 0.51*  0.51*   GLUCOSE mg/dL 167* 146* 145*  145*   CALCIUM mg/dL 7.9* 8.0* 8.1*  8.1*   ALT (SGPT) U/L 8 11 15   AST (SGOT) U/L 12 13 16     Estimated Creatinine Clearance: 177.4 mL/min (A) (by C-G formula based on SCr of 0.52 mg/dL (L)).    Microbiology Results Abnormal     Procedure Component Value - Date/Time    Blood Culture - Blood, Arm, Right [[id number]] Collected:  06/03/20 1229    Lab Status:  Preliminary result Specimen:  Blood from Arm, Right Updated:  06/07/20 1401     Blood Culture No growth at 4 days    Blood Culture - Blood, Arm, Left [[id number]] Collected:  06/03/20 1229    Lab Status:  Preliminary result Specimen:  Blood from Arm, Left Updated:  06/07/20 1401     Blood Culture No growth at 4 days    Anaerobic Culture - Aspirate, Back [[id number]] Collected:  06/04/20 1643    Lab Status:  Preliminary result Specimen:  Aspirate from Back Updated:  06/07/20 1108     Anaerobic Culture No anaerobes isolated at 3 days    Body Fluid Culture - Aspirate, Back [[id number]]  (Abnormal)  (Susceptibility) Collected:  06/04/20 1645    Lab Status:  Final result Specimen:  Aspirate from Back Updated:  06/07/20 0701     Body Fluid Culture Light growth (2+) Staphylococcus aureus, MRSA     Comment:   Methicillin resistant Staphylococcus aureus, Patient may be an isolation risk.        Gram Stain No organisms seen      Many (4+) WBCs per low power field    Susceptibility      Staphylococcus aureus, MRSA     TYRONE      Gentamicin Susceptible     Oxacillin Resistant     Rifampin Susceptible     Vancomycin Susceptible                Susceptibility Comments     Staphylococcus aureus, MRSA    This isolate does not demonstrate inducible clindamycin resistance in vitro.               Clostridium Difficile Toxin - Stool, Per Rectum [[id number]] Collected:  06/05/20 1748    Lab Status:  Final result Specimen:  Stool from Per Rectum Updated:  06/05/20 1858    Narrative:       The following orders were created for panel order Clostridium Difficile Toxin - Stool, Per Rectum.  Procedure                               Abnormality         Status                     ---------                               -----------         ------                     Clostridium Difficile To...[[id number]]  Normal              Final result                 Please view results for these tests on the individual orders.    Clostridium Difficile Toxin, PCR - Stool, Per Rectum [[id number]]  (Normal) Collected:  06/05/20 1748    Lab Status:  Final result Specimen:  Stool from Per Rectum Updated:  06/05/20 1858     C. Difficile Toxins by PCR Not Detected    Narrative:       Performance characteristics of test not established for patients <2 years of age.  Negative for Toxigenic C. Difficile    Blood Culture - Blood, Wrist, Left [[id number]]  (Abnormal) Collected:  06/01/20 1601    Lab Status:  Final result Specimen:  Blood from Wrist, Left Updated:  06/04/20 0612     Blood Culture Staphylococcus aureus, MRSA     Comment:   Infectious disease consultation is highly recommended to rule out distant foci of infection.  Methicillin resistant Staphylococcus aureus, Patient may be an isolation risk.        Isolated from Aerobic Bottle     Gram Stain Aerobic Bottle Gram positive cocci in clusters    Narrative:       Refer to previous blood culture collected on 6/1/2020 1558 for MICs.      Blood Culture - Blood, Arm, Left [[id number]]  (Abnormal)  (Susceptibility) Collected:   06/01/20 1558    Lab Status:  Final result Specimen:  Blood from Arm, Left Updated:  06/04/20 0610     Blood Culture Staphylococcus aureus, MRSA     Comment:   Infectious disease consultation is highly recommended to rule out distant foci of infection.  Methicillin resistant Staphylococcus aureus, Patient may be an isolation risk.        Isolated from Aerobic and Anaerobic Bottles     Gram Stain Aerobic Bottle Gram positive cocci in clusters      Anaerobic Bottle Gram positive cocci in clusters    Susceptibility      Staphylococcus aureus, MRSA     TYRONE     Gentamicin Susceptible     Oxacillin Resistant     Rifampin Susceptible     Vancomycin Susceptible                Susceptibility Comments     Staphylococcus aureus, MRSA    This isolate does not demonstrate inducible clindamycin resistance in vitro.               Blood Culture ID, PCR - Blood, Arm, Left [[id number]]  (Abnormal) Collected:  06/01/20 1558    Lab Status:  Final result Specimen:  Blood from Arm, Left Updated:  06/02/20 0721     BCID, PCR Staphylococcus aureus. mecA (methicillin resistance gene) detected. Identification by BCID PCR.     BOTTLE TYPE Aerobic Bottle          Imaging Results (Last 24 Hours)     Procedure Component Value Units Date/Time    XR Shoulder 2+ View Right [[id number]] Collected:  06/08/20 0955     Updated:  06/08/20 1009    Narrative:          EXAMINATION: XR SHOULDER 2+ VW, RIGHT-06/08/2020:      INDICATION: Right shoulder pain; Z74.09-Other reduced mobility.      COMPARISON: NONE.     FINDINGS: Two views of the right shoulder reveal no evidence of fracture  or dislocation. The cortex is intact. Joint spaces are preserved. No  joint effusion. No acute bony abnormality.           Impression:       No acute bony abnormality.     D:  06/08/2020  E:  06/08/2020                   Results for orders placed during the hospital encounter of 06/01/20   Adult Transesophageal Echo (REDD) W/ Cont if Necessary Per Protocol    Narrative · Left  ventricular systolic function is normal. Estimated EF appears to be   in the range of 56 - 60%  · The tricuspid valve is abnormal. There is a large vegetation measuring 1   x 1.5 cm on the tricuspid valve. Predominantly located on the posterior   leaflet but anterior leaflet appears involved as well.  · Estimated right ventricular systolic pressure from tricuspid   regurgitation is moderately elevated (45-55 mmHg).  · Mild to moderate mitral regurgitation noted with an eccentric jet.   However no vegetations visualized on the mitral valve  · The other cardiac valves appear free of vegetations  · Discussed findings with ordering provider.          I have reviewed the medications:  Scheduled Meds:  budesonide-formoterol 2 puff Inhalation BID - RT   ceftaroline 600 mg Intravenous BID   DAPTOmycin 8 mg/kg (Adjusted) Intravenous Q24H   FLUoxetine 40 mg Oral Daily   gabapentin 800 mg Oral Q8H   hydrocortisone  Topical Q12H   lactobacillus acidophilus 1 capsule Oral Daily   levETIRAcetam 500 mg Oral Q12H   levothyroxine 50 mcg Oral Daily   nystatin  Topical Q12H   pantoprazole 40 mg Oral QAM     Continuous Infusions:  sodium chloride 125 mL/hr Last Rate: 125 mL/hr (06/08/20 0838)     PRN Meds:.•  acetaminophen  •  diphenoxylate-atropine  •  HYDROcodone-acetaminophen  •  HYDROmorphone  •  hydrOXYzine  •  ipratropium-albuterol  •  melatonin  •  prochlorperazine **OR** prochlorperazine **OR** prochlorperazine  •  tiZANidine    Assessment/Plan   Assessment & Plan     Active Hospital Problems    Diagnosis  POA   • **Sepsis (CMS/HCC) [A41.9]  Yes   • Infective endocarditis [I33.0]  Unknown   • Epidural abscess [G06.2]  Unknown   • MRSA bacteremia [R78.81]  Unknown   • DDD (degenerative disc disease), cervical [M50.30]  Unknown   • Anxiety [F41.9]  Unknown   • Depression [F32.9]  Unknown   • Seizure (CMS/HCC) [R56.9]  Unknown   • Migraine [G43.909]  Unknown   • Essential hypertension [I10]  Yes   • Fibromyalgia syndrome [M79.7]   Yes   • GERD without esophagitis [K21.9]  Yes      Resolved Hospital Problems   No resolved problems to display.        Brief Hospital Course to date:  Liliana Calderon is a 39 y.o. female with a past medical history of depression/anxiety, fibromyalgia, COPD, hypertension, seizure disorder, and remote IV drug use who was admitted on June 1 for sepsis with MRSA bacteremia, UTI, pneumonia, and epidural abscesses.  Following currently on Dapto and Teflaro.     Right shoulder pain  -Appears erythematous, warm, and tender to the touch when compared to left shoulder, has never had right shoulder surgery  -X-ray of right shoulder ordered  -Concern for septic arthritis due to bacteremia, may need synovial fluid analysis     Rash, stable  -Unclear etiology, thought it was possibly DIC, but blood counts were normal and continue to stay normal  -Continue hydrocortisone cream     MRSA bacteremia, UTI, pneumonia, tricuspid valve endocarditis, and epidural abscesses  -Had REDD with a tricuspid valve vegetation approximately 1 cm x 1.2 cm in size  -Repeat CT chest with dependent bilateral pleural effusions, with bilateral parenchymal changes including well-circumscribed nodules representing septic emboli and poorly defined nodules  -We will continue antibiotics per ID recommendations  -CT surgery consult-will eventually need removal of tricuspid valve vegetation but is too high risk at this point     Acute on chronic low back pain  -Secondary to epidural abscesses-continue Dilaudid as needed for pain  -Had CT-guided drainage of her right paraspinal abscess on June 4 that was positive for MRSA-continue antibiotics     Diarrhea  -C. difficile not detected, continue probiotic;  improved on Lomotil, will continue     Nausea and vomiting  -  Proved with Compazine, will continue this for now     DVT Prophylaxis: Subcu heparin                Disposition: I expect the patient to be discharged pending clinical improvement in nausea,  vomiting, and eventual treatment for tricuspid valve vegetation.      CODE STATUS:   Code Status and Medical Interventions:   Ordered at: 06/01/20 1441     Code Status:    CPR     Medical Interventions (Level of Support Prior to Arrest):    Full         Electronically signed by Verenice Keller MD, 06/08/20, 11:04.

## 2020-06-08 NOTE — PLAN OF CARE
Problem: Patient Care Overview  Goal: Plan of Care Review  Flowsheets (Taken 6/8/2020 1144)  Outcome Summary: Pt continued to be limited by pain and activity tolerance, max Ax2 STS without able to push with RUE but able to reposition and incorporate RUE into repositioning to scoot back into bed tolerated, tolerated BUE gentle AROM ther ex seated in chair, cont IPOT per POC

## 2020-06-08 NOTE — PLAN OF CARE
Problem: Patient Care Overview  Goal: Plan of Care Review  Outcome: Ongoing (interventions implemented as appropriate)  Flowsheets (Taken 6/8/2020 6707)  Progress: no change  Plan of Care Reviewed With: patient  Note:   VSS, 2 view x-ray of right shoulder, Dilauded DC'd and Pain meds scheduled

## 2020-06-08 NOTE — PROGRESS NOTES
CTS Progress Note    Liliana Calderon  [id number]  1980      Subjective: No acute events overnight. VSS on 3L NC. Complaints of pain, diarrhea and nausea overnight.  Antibiotic treatment has been continued as per ID and patient is being followed by neurosurgery.  Surgical intervention for the tricuspid valve given the patient's general status would be high risk.  CTS will continue to follow.  At some point will need to consider angio VAC removal of tricuspid vegetation.    Plan: I have reviewed, verified, and confirmed the patient's history and current status. Will proceed as outlined above.      Daily Crooks PA-C  06/08/20  07:23

## 2020-06-08 NOTE — PROGRESS NOTES
Liliana Calderon  1980  [id number]    Evaluating Physician: Elsy Augustin    Chief Complaint: fever    Reason for Consultation: MRSA bacteremia    History of present illness:   Patient is a 39 y.o.  Yr old female with history of IVDU, depression/anxiety, COPD, and seizure d/o who initially presented to [facility] yesterday with c/o shortness of air and worsening low back pain. She last used IV heroin about 1 month prior.  She was doing relatively well until about 3 days PTA when she noticed worsening low back pain and worsening shortness of air with blood-tinged sputum. She additionally started having fevers up to 103.9F. She did not have contact with anyone known to have COVID-19 infection.     On arrival to [facility] on 5/31, the patient was found to have fevers and a significant leukocytosis. CT chest was concerning for pneumonia and possible septic pulm emboli. Blood cultures resulted with MRSA and she was transferred to [facility] for a higher level of care. Tmax since arrival here has been 102.7F. WBC was elevated to 14.2 but has improved to 10 on antibiotics. She was started on vancomycin and aztreonam by primary team. Repeat blood cultures from 6/1 are again positive for MRSA. TTE and MRI lumbar spine ordered. ID consulted for recommendations in the setting of her MRSA septicemia.    Subjective:    6/3/20: The patient is still having back pain. No new weakness in her lower extremities but does have extreme back pain with movement. Fevers improved. Tmax 100F. Does have some anemia with Hgb 6.4. No nausea or vomiting. Mild diarrhea. No new rash.     6/4/20: still having some back pain and had some fevers overnight. Tmax this morning was 102.7F.  Not feeling very well this morning when I saw her. No worsening of her diarrhea. No nausea. No new rash. Still able to move her extremities well with no new weakness but does have significant pain lifting her legs off the bed.     6/5/20: Patient is  "unfortunately still having fevers up to 102.5°F.  She is still not feeling well with significant back pain.  No new weakness in her lower extremities.  She is having some diarrhea but only about 2 episodes in the last 24 hours.  No nausea.  No new rashes.  She went for REDD today and was noted to have about a 1 cm x 1 cm vegetation on the tricuspid valve per my discussion with Dr. Pinedo.    6/6/2020: Patient states she is still receiving her antibiotics but has complaints of nausea and vomiting as well as diarrhea.  Discussion with nursing staff states that she is having blood-tinged  tears, bloody emesis and worsening petechial rash over her body.  RN staff states they have not noted any blood in her diarrhea at this point.  Afebrile overnight with some tachypnea noted.  Remains without leukocytosis.  6/4 body fluid culture from back returned positive for 2+ MRSA.  6/3 blood cultures currently remain no growth to date. Per RN her rash has gradually progressed over the last couple days    6/7/2020: Patient laying in bed awake Heparin stopped last pm   RN states that he was told by the tech that there was blood in the stool but did not personally check it.   She feels \"horrible\" today.  She remains with nausea, vomiting and significant amounts of diarrhea- 3 stools so far today  Documentation shows she has been afebrile and hemodynamically stable overnight. Leukocytosis is slightly improved from 14-12.  H&H has improved from yesterday's values to 11 and 38.  Platelets of 405.  7/C. difficile toxin was negative.  6/4 body fluid back aspirate positive for MRSA.  Cdiff negative  6/8 alert, afebrile, no new skin lesions, c/o discomfort at the site of the R upper arm lesion that may have a hemorrhagic center    Past Medical History:   Diagnosis Date   • Anxiety and depression    • Chronic bronchitis (CMS/HCC)    • Fibromyalgia    • Heart valve problem    • Hypertension    • Migraine    • PCOS (polycystic ovarian " syndrome)    • Pneumonia    • Seizures (CMS/HCC)        Past Surgical History:   Procedure Laterality Date   • CHOLECYSTECTOMY         Social History:  IVDU (heroin). Former smoker. No ETOH use.    Family History:  family history includes Alcohol abuse in her brother and sister; Alzheimer's disease in her maternal grandmother; Bone cancer in her mother; Breast cancer in her mother; Dementia in her father; Diabetes in her father; Heart disease in her father and paternal grandfather; Hypertension in her brother, father, mother, and sister; Mental illness in her brother and sister; Parkinsonism in her maternal grandmother; Pulmonary embolism in her father; Skin cancer in her mother; Stroke in her father.    Allergies   Allergen Reactions   • Penicillins Rash     Received cefepime and cefdinir in 2018   • Phenobarbital Rash       Medication:  Current Facility-Administered Medications   Medication Dose Route Frequency Provider Last Rate Last Dose   • acetaminophen (TYLENOL) tablet 650 mg  650 mg Oral Q6H PRN Malathi Vasquez APRN   650 mg at 06/08/20 1004   • budesonide-formoterol (SYMBICORT) 160-4.5 MCG/ACT inhaler 2 puff  2 puff Inhalation BID - RT Rancho Lang MD   2 puff at 06/08/20 0846   • ceftaroline (TEFLARO) 600 mg/100 mL 0.9% NS (mbp)  600 mg Intravenous BID Rancho Lang MD   600 mg at 06/08/20 0841   • DAPTOmycin (CUBICIN) 600 mg in sodium chloride 0.9 % 50 mL IVPB  8 mg/kg (Adjusted) Intravenous Q24H Rancho Lang  mL/hr at 06/08/20 0842 600 mg at 06/08/20 0842   • diphenoxylate-atropine (LOMOTIL) 2.5-0.025 MG per tablet 1 tablet  1 tablet Oral 4x Daily PRN Verenice Keller MD   1 tablet at 06/08/20 0509   • FLUoxetine (PROzac) capsule 40 mg  40 mg Oral Daily Vanessa Lopez MD   40 mg at 06/08/20 0841   • gabapentin (NEURONTIN) capsule 800 mg  800 mg Oral Q8H Vanessa Lopez MD   800 mg at 06/08/20 1517   • HYDROcodone-acetaminophen (NORCO) 5-325 MG per tablet 1 tablet   1 tablet Oral Q6H PRN Vanessa Lopez MD   1 tablet at 06/08/20 1517   • hydrocortisone 2.5 % cream   Topical Q12H Verenice Keller MD       • hydrOXYzine (ATARAX) tablet 25 mg  25 mg Oral Nightly PRN Jae Cesar PA-C   25 mg at 06/07/20 2101   • ipratropium-albuterol (DUO-NEB) nebulizer solution 3 mL  3 mL Nebulization Q6H PRN Vanessa Lopez MD   3 mL at 06/05/20 0946   • lactobacillus acidophilus (RISAQUAD) capsule 1 capsule  1 capsule Oral Daily Rancho Lang MD   1 capsule at 06/08/20 0841   • levETIRAcetam (KEPPRA) tablet 500 mg  500 mg Oral Q12H Oskar Black MD   500 mg at 06/08/20 0841   • levothyroxine (SYNTHROID, LEVOTHROID) tablet 50 mcg  50 mcg Oral Daily Vanessa Lopez MD   50 mcg at 06/08/20 0509   • melatonin tablet 5 mg  5 mg Oral Nightly PRN Jae Cesar PA-C   5 mg at 06/07/20 2101   • Morphine (MS CONTIN) 12 hr tablet 15 mg  15 mg Oral Q12H Verenice Keller MD   15 mg at 06/08/20 1222   • nystatin (MYCOSTATIN) powder   Topical Q12H Verenice Keller MD       • pantoprazole (PROTONIX) EC tablet 40 mg  40 mg Oral QA Vanessa Lopez MD   40 mg at 06/07/20 0522   • prochlorperazine (COMPAZINE) injection 5 mg  5 mg Intravenous Q6H PRN Verenice Keller MD   5 mg at 06/07/20 2316    Or   • prochlorperazine (COMPAZINE) tablet 5 mg  5 mg Oral Q6H PRN Verenice Keller MD   5 mg at 06/08/20 1044    Or   • prochlorperazine (COMPAZINE) suppository 25 mg  25 mg Rectal Q12H PRN Verenice Keller MD       • sodium chloride 0.9 % infusion  125 mL/hr Intravenous Continuous Vanessa Lopez  mL/hr at 06/08/20 0838 125 mL/hr at 06/08/20 0838   • tiZANidine (ZANAFLEX) tablet 4 mg  4 mg Oral Q8H PRN Vanessa Lopez MD   4 mg at 06/08/20 0841         Infectious History:  MRSA    Antibiotics:  Anti-Infectives (From admission, onward)    Ordered     Dose/Rate Route Frequency Start Stop    06/02/20 0819  ceftaroline (TEFLARO) 600 mg/100 mL 0.9% NS (mbp)   "   Rancho Lang MD reviewed the order on 06/05/20 0802.   Ordering Provider:  Rancho Lang MD    600 mg Intravenous 2 Times Daily 06/02/20 0915 06/16/20 0859    06/02/20 0743  DAPTOmycin (CUBICIN) 600 mg in sodium chloride 0.9 % 50 mL IVPB     Rancho Lang MD reviewed the order on 06/05/20 0802.   Ordering Provider:  Rancho Lang MD    8 mg/kg × 76.6 kg (Adjusted)  100 mL/hr over 30 Minutes Intravenous Every 24 Hours 06/02/20 0900 06/23/20 0859    06/01/20 1501  vancomycin 2250 mg/500 mL 0.9% NS IVPB ([facility])     Ordering Provider:  Vanessa Lopez MD    2,250 mg Intravenous Once 06/01/20 1600 06/01/20 1756            Review of Systems    As above    Physical Exam:   Vital Signs   /86 (BP Location: Left arm, Patient Position: Lying)   Pulse 77   Temp 97.9 °F (36.6 °C) (Oral)   Resp 20   Ht 165.1 cm (65\")   Wt 106 kg (233 lb)   LMP 04/01/2020 (Approximate)   SpO2 98%   BMI 38.77 kg/m²     GENERAL: Awake alert, Obese. Mild distress  HENT: Normocephalic, atraumatic.  Conjunctiva with hemorrhages bilaterally  NECK: Supple without nuchal rigidity. No mass.  HEART: RRR; No murmur, gallops or rubs noted.   LUNGS: CTAB. Nonlabored.  No wheezing noted  ABDOMEN: Soft, nontender, nondistended. Positive bowel sounds.  No guarding noted  EXT:  No cyanosis, clubbing or edema.   :   Without Petit catheter.  MSK: FROM without joint effusions noted arms/legs.  Some tenderness to palpation over her lumbar spine.  SKIN: has slightly discolored/gray patchy/papular lesions over her bilateral lower extremity- these appeared about 2 weeks ago per the patient.  No other possible peripheral stigmata of infective endocarditis noted over her extremities or elsewhere.  She has also developed a petechial rash over her entire back bilateral upper extremities, buttocks and inguinal and abdominal folds which is improveing  On the R upper arm there remains a large lesion with a hemorrhagic center but " minimal induration and no fuctuance  NEURO: Oriented to PPT. No focal deficits on motor/sensory exam at arms/legs. 5 out of 5 strength throughout  PSYCHIATRIC: Normal insight and judgement. Cooperative with PE    Laboratory Data    Results from last 7 days   Lab Units 06/08/20  0513 06/07/20  0456 06/06/20 2231 06/06/20  0921   WBC 10*3/mm3 15.98* 12.89*  --  9.26   HEMOGLOBIN g/dL 12.1 11.5* 10.1* 9.7*  9.7*   HEMATOCRIT % 40.8 38.2 33.7* 33.1*  33.1*   PLATELETS 10*3/mm3 463* 405  --  282     Results from last 7 days   Lab Units 06/08/20  0513   SODIUM mmol/L 137   POTASSIUM mmol/L 3.9   CHLORIDE mmol/L 103   CO2 mmol/L 20.0*   BUN mg/dL 11   CREATININE mg/dL 0.52*   GLUCOSE mg/dL 167*   CALCIUM mg/dL 7.9*     Results from last 7 days   Lab Units 06/08/20  0513   ALK PHOS U/L 148*   BILIRUBIN mg/dL 0.6   BILIRUBIN DIRECT mg/dL 0.4*   ALT (SGPT) U/L 8   AST (SGOT) U/L 12     Results from last 7 days   Lab Units 06/07/20  0456   SED RATE mm/hr 74*     Results from last 7 days   Lab Units 06/07/20  0456   CRP mg/dL 16.14*       Estimated Creatinine Clearance: 177.4 mL/min (A) (by C-G formula based on SCr of 0.52 mg/dL (L)).       Microbiology:    Microbiology Results (last 10 days)     Procedure Component Value - Date/Time    Clostridium Difficile Toxin - Stool, Per Rectum [[id number]] Collected:  06/05/20 1748    Lab Status:  Final result Specimen:  Stool from Per Rectum Updated:  06/05/20 1858    Narrative:       The following orders were created for panel order Clostridium Difficile Toxin - Stool, Per Rectum.  Procedure                               Abnormality         Status                     ---------                               -----------         ------                     Clostridium Difficile To...[[id number]]  Normal              Final result                 Please view results for these tests on the individual orders.    Clostridium Difficile Toxin, PCR - Stool, Per Rectum [[id number]]  (Normal)  Collected:  06/05/20 1748    Lab Status:  Final result Specimen:  Stool from Per Rectum Updated:  06/05/20 1858     C. Difficile Toxins by PCR Not Detected    Narrative:       Performance characteristics of test not established for patients <2 years of age.  Negative for Toxigenic C. Difficile    Body Fluid Culture - Aspirate, Back [[id number]]  (Abnormal)  (Susceptibility) Collected:  06/04/20 1645    Lab Status:  Final result Specimen:  Aspirate from Back Updated:  06/07/20 0701     Body Fluid Culture Light growth (2+) Staphylococcus aureus, MRSA     Comment:   Methicillin resistant Staphylococcus aureus, Patient may be an isolation risk.        Gram Stain No organisms seen      Many (4+) WBCs per low power field    Susceptibility      Staphylococcus aureus, MRSA     TYRONE     Gentamicin Susceptible     Oxacillin Resistant     Rifampin Susceptible     Vancomycin Susceptible                Susceptibility Comments     Staphylococcus aureus, MRSA    This isolate does not demonstrate inducible clindamycin resistance in vitro.               Anaerobic Culture - Aspirate, Back [[id number]] Collected:  06/04/20 1643    Lab Status:  Preliminary result Specimen:  Aspirate from Back Updated:  06/07/20 1108     Anaerobic Culture No anaerobes isolated at 3 days    Blood Culture - Blood, Arm, Right [[id number]] Collected:  06/03/20 1229    Lab Status:  Final result Specimen:  Blood from Arm, Right Updated:  06/08/20 1401     Blood Culture No growth at 5 days    Blood Culture - Blood, Arm, Left [[id number]] Collected:  06/03/20 1229    Lab Status:  Final result Specimen:  Blood from Arm, Left Updated:  06/08/20 1401     Blood Culture No growth at 5 days    Blood Culture - Blood, Wrist, Left [[id number]]  (Abnormal) Collected:  06/01/20 1601    Lab Status:  Final result Specimen:  Blood from Wrist, Left Updated:  06/04/20 0612     Blood Culture Staphylococcus aureus, MRSA     Comment:   Infectious disease consultation is highly  recommended to rule out distant foci of infection.  Methicillin resistant Staphylococcus aureus, Patient may be an isolation risk.        Isolated from Aerobic Bottle     Gram Stain Aerobic Bottle Gram positive cocci in clusters    Narrative:       Refer to previous blood culture collected on 6/1/2020 1558 for MICs.      Blood Culture - Blood, Arm, Left [[id number]]  (Abnormal)  (Susceptibility) Collected:  06/01/20 1558    Lab Status:  Final result Specimen:  Blood from Arm, Left Updated:  06/04/20 0610     Blood Culture Staphylococcus aureus, MRSA     Comment:   Infectious disease consultation is highly recommended to rule out distant foci of infection.  Methicillin resistant Staphylococcus aureus, Patient may be an isolation risk.        Isolated from Aerobic and Anaerobic Bottles     Gram Stain Aerobic Bottle Gram positive cocci in clusters      Anaerobic Bottle Gram positive cocci in clusters    Susceptibility      Staphylococcus aureus, MRSA     TYRONE     Gentamicin Susceptible     Oxacillin Resistant     Rifampin Susceptible     Vancomycin Susceptible                Susceptibility Comments     Staphylococcus aureus, MRSA    This isolate does not demonstrate inducible clindamycin resistance in vitro.               Blood Culture ID, PCR - Blood, Arm, Left [[id number]]  (Abnormal) Collected:  06/01/20 1558    Lab Status:  Final result Specimen:  Blood from Arm, Left Updated:  06/02/20 0721     BCID, PCR Staphylococcus aureus. mecA (methicillin resistance gene) detected. Identification by BCID PCR.     BOTTLE TYPE Aerobic Bottle    Urine Culture - Urine, Urine, Clean Catch [[id number]]  (Abnormal)  (Susceptibility) Collected:  05/31/20 1914    Lab Status:  Final result Specimen:  Urine, Clean Catch Updated:  06/02/20 0938     Urine Culture 50,000 CFU/mL Staphylococcus aureus, MRSA     Comment:   Methicillin resistant Staph aureus, patient may be an isolation risk.  Staphylococcus aureus is not typically regarded as a  uropathogen, except in cases with genitourinary instrumentation present.  It's presence suggests an alternate source (e.g. bloodstream, abscess, SSTI, etc.).  Please evaluate accordingly.       Susceptibility      Staphylococcus aureus, MRSA     TYRONE     Nitrofurantoin Susceptible     Oxacillin Resistant     Tetracycline Susceptible     Trimethoprim + Sulfamethoxazole Susceptible     Vancomycin Susceptible                Susceptibility Comments     Staphylococcus aureus, MRSA    This isolate does not demonstrate inducible clindamycin resistance in vitro.               Blood Culture - Blood, Arm, Left [[id number]]  (Abnormal)  (Susceptibility) Collected:  05/31/20 1805    Lab Status:  Final result Specimen:  Blood from Arm, Left Updated:  06/03/20 0700     Blood Culture Staphylococcus aureus, MRSA     Comment:   Infectious disease consultation is highly recommended to rule out distant foci of infection.  Methicillin resistant Staphylococcus aureus, Patient may be an isolation risk.        Isolated from Aerobic and Anaerobic Bottles     Gram Stain Anaerobic Bottle Gram positive cocci in pairs and clusters      Aerobic Bottle Gram positive cocci in pairs and clusters    Susceptibility      Staphylococcus aureus, MRSA     TYRONE     Gentamicin Susceptible     Oxacillin Resistant     Rifampin Susceptible     Vancomycin Susceptible                Susceptibility Comments     Staphylococcus aureus, MRSA    This isolate does not demonstrate inducible clindamycin resistance in vitro.               Blood Culture ID, PCR - Blood, Arm, Left [[id number]]  (Abnormal) Collected:  05/31/20 1805    Lab Status:  Final result Specimen:  Blood from Arm, Left Updated:  06/01/20 0628     BCID, PCR Staphylococcus aureus. mecA (methicillin resistance gene) detected. Identification by BCID PCR.    Blood Culture - Blood, Arm, Right [[id number]]  (Abnormal) Collected:  05/31/20 1757    Lab Status:  Final result Specimen:  Blood from Arm, Right  Updated:  06/03/20 0701     Blood Culture Staphylococcus aureus, MRSA     Comment:   Infectious disease consultation is highly recommended to rule out distant foci of infection.  Methicillin resistant Staphylococcus aureus, Patient may be an isolation risk.        Isolated from Aerobic and Anaerobic Bottles     Gram Stain Aerobic Bottle Gram positive cocci in pairs and clusters      Anaerobic Bottle Gram positive cocci in pairs and clusters    Narrative:       Refer to  blood culture collected on 5/31/2020 1805 for MICs.    Influenza Antigen, Rapid - Swab, Nasopharynx [[id number]]  (Normal) Collected:  05/31/20 1752    Lab Status:  Final result Specimen:  Swab from Nasopharynx Updated:  05/31/20 1825     Influenza A Ag, EIA Negative     Influenza B Ag, EIA Negative    COVID-19,[facility] URI IN-HOUSE, NP SWAB IN TRANSPORT MEDIA 8-12 HR TAT - Swab, Nasopharynx [[id number]]  (Normal) Collected:  05/31/20 1743    Lab Status:  Final result Specimen:  Swab from Nasopharynx Updated:  06/01/20 1420     COVID19 Not Detected          Radiology:  Xr Shoulder 2+ View Right    Result Date: 6/8/2020  No acute bony abnormality.  D:  06/08/2020 E:  06/08/2020        Ct Chest With Contrast    Result Date: 6/5/2020  Abnormal chest CT with moderate dependent bilateral pleural effusions. There are bilateral parenchymal changes in the lungs including well-circumscribed nodules which likely represent septic emboli and a poorly defined nodules and areas of airspace change which could represent septic emboli or pneumonia. Findings most likely are all infectious. There are small reactive nodes in the mediastinum. The spleen is prominent in size but incompletely imaged. No upper abdominal adenopathy. No acute bone lesions in the chest Signer Name: Anushka Gutierrez MD  Signed: 6/5/2020 8:44 PM  Workstation Name: [id number]  [facility]    Ct Chest With Contrast    Result Date: 5/31/2020  Multiple right lung nodules.  Since 1  of these nodules is cavitary this is worrisome for septic emboli.  Left lung opacities worrisome for pneumonia. Authenticated by Sukhdeep Burnette III, MD on 05/31/2020 08:39:58 PM    Ct Abdomen Pelvis With Contrast    Result Date: 5/31/2020  Splenomegaly.  Normal appendix.  Mild anasarca. Authenticated by Sukhdeep Burnette III, MD on 05/31/2020 08:39:59 PM    Ct Guided Biopsy Aspiration Injection    Result Date: 6/4/2020   Percutaneous aspiration of very tiny abscess collection adjacent to vertebral bodies/within the erector spinae muscle, yielding 2 mL of purulent appearing fluid. No drainage catheter was left in place - too small for drainage catheter/contraindicated to leave drainage catheter in collection with possible communication with epidural space  Plan:  Follow-up lab results. Continued care with primary team. ______________________________________________________________________  PROCEDURE SUMMARY: - Aspiration of tiny abscess pockets under fluoroscopic guidance - Additional procedure(s): None  PROCEDURE DETAILS:  Pre-procedure Consent: Informed consent for the procedure including risks, benefits and alternatives was obtained and time-out was performed prior to the procedure. Preparation: The site was prepared and draped using maximal sterile barrier technique including cutaneous antisepsis.  Anesthesia/sedation Level of anesthesia/sedation: Moderate sedation (conscious sedation) Anesthesia/sedation administered by: Independent trained observer under attending supervision with continuous monitoring of the patient?s level of consciousness and physiologic status Total intra-service sedation time (minutes): 12  Fluid collection aspiration The patient was positioned prone on. Initial imaging was performed. Local anesthesia was administered. The fluid collection was accessed using a 22-gauge spinal needle under CT fluoroscopic guidance. Position within the fluid collection was confirmed, and fluid aspiration  was performed. All instruments were then removed. - Initial imaging findings: Fluid collections identified on comparison MRI lumbar spine 6/4/2020 not as well visualized on CT. - Aspiration needle/catheter: 22-gauge spinal needle - Post-aspiration imaging findings: No immediate complication  Contrast Contrast agent: None Contrast volume (mL): 0  Radiation Dose CT dose length product (mGy-cm): 975  Additional Details Additional description of procedure: None Equipment details: None Specimens removed: Aspirated fluid sent for analysis. Estimated blood loss (mL): Less than 10 Standardized report: DrainageAspiration  Attestation I was present and scrubbed for the entire procedure. Imaging reviewed. Agree with final report as written.  This report was finalized on 6/4/2020 5:36 PM by Yimi Huggins.      Mri Lumbar Spine With & Without Contrast    Result Date: 6/4/2020  1. Approximately 8 x 4 x 3 cm multilocular abscess of the right erector spinae muscles, approximately from L3 to S1. 2. Two possibly interconnected dorsal epidural abscesses at L2-L3 and L3-L4, but causing only mild canal stenosis. 3. No evidence to suggest osteomyelitis or discitis. 4. Moderate-sized L4-L5 and L5-S1 disc protrusions as discussed above.  D:  06/03/2020 E:  06/03/2020  This report was finalized on 6/4/2020 10:45 PM by Dr. William Boateng MD.           Impression:     This is a 40 yo woman with a h/o IVDU presenting with severe low back pain and shortness of air and found to have fevers/sepsis with MRSA growing from multiple blood culture sets. Also has CT chest findings that are concerning for septic pulmonary emboli and now noted to have a decent size vegetation on her tricuspid valve on REDD consistent with infective endocarditis. MRI with 8x4 x3 cm multilobular abscess of the right erector spinae muscles, approximately from L3-S1. Also with two possible interconnected dorsal epidural abscesses at L2-L3 and L3-L4 but only causing mild canal  stenosis. Dr. Alex with neurosurgery has evaluated and does not think that she requires any surgical intervention at this time for her epidural abscesses as they are modest. Underwent CT-guided drainage  and had 2cc of purulent drainage aspirated from her fluid collection in erector spinae muscles. Repeat blood culture sent 6/3 is negative    REDD with a 1 cm x 1 cm vegetation on the tricuspid valve    It appears that her petechial rash has progressed somewhat but her renal function, PT/INR and platelet count are stable which makes DIC less likely      Problems:  1. 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 MRSA septicemia  Bacteremia appears to have cleared  3. Epidural abscesses  4. Large loculated Paraspinal muscle abscess- s/p aspiration which is culture positive  5. Generalized petechial rash- possibly worsening but w/o other signs of DIC  6. Conjunctival hemorrhages  7. Hematemesis- evidently resolved after the heparin was stopped  Wonder about ischemia from mesenteric emboli but normal lactic acid makes this less likely  8. Sepsis with fever, leukocytosis with neutrophilia, tachycardia- ongoing  9. Pneumonia/lung nodules- concern for septic pulmonary emboli  10. Acute on chronic low back pain  11. Urine cx with MRSA- represents systemic infection, not primary source  12. Microcytic anemia  13. History of PCN allergy   14. Recent IV drug use  15. Obesity  16. H/o COPD  17. H/o seizure disorder  18. H/o depression/anxiety    PLAN: Thank you for asking us to see Liliana Calderon, I recommend the followin. Continue to follow cbc with diff, bmp, esr, and crp closely. Weekly cpk level  2. Surveillance blood cultures from 6/3/20 -NGTD  3. S/p CT-guided drainage of her right paraspinal abscess with only 2cc fluid aspirated- returned positive for MRSA  4. Continue daptomycin 8mg/kg IV q24h. Baseline CPK level was 48 and remains normal  5. Continue Teflaro 600 mg IV  BID (has tolerated cephalosporins in the recent past)  6. Will consider repeat MRI L-spine in 2-3 weeks. Neurosurgery following  7. CT surgery following  8. GI panel     Complex set of medical issues requiring a high-level medical decision making.  The patient does have significant risk for for further morbidity and mortality in the setting of her severe MRSA infection. Has some risk for paralysis if her epidural abscesses worsen.    Prognosis guarded        Elsy Augustin MD  6/8/2020

## 2020-06-08 NOTE — PLAN OF CARE
Problem: Patient Care Overview  Goal: Plan of Care Review  Flowsheets (Taken 6/8/2020 5987)  Progress: no change  Plan of Care Reviewed With: patient  Outcome Summary: Pt limited by pain and activity tolerence during session, demonstrating need for MAX A x2 for t/f, MOD Ax2 for bed mobility. Per NSG tech pt had demonstrated t/f prior with much less assist. Pt c/o pain with every movement in ther ex, which consisted AROM at knees and ankles.

## 2020-06-08 NOTE — THERAPY TREATMENT NOTE
Patient Name: Liliana Calderon  : 1980    MRN: [id number]                              Today's Date: 2020       Admit Date: 2020    Visit Dx:     ICD-10-CM ICD-9-CM   1. Impaired mobility and ADLs Z74.09 799.89     Patient Active Problem List   Diagnosis   • Bradycardia with 41-50 beats per minute   • Essential hypertension   • Fibromyalgia syndrome   • GERD without esophagitis   • Sepsis (CMS/HCC)   • DDD (degenerative disc disease), cervical   • Anxiety   • Depression   • Seizure (CMS/HCC)   • Migraine   • Infective endocarditis   • Epidural abscess   • MRSA bacteremia     Past Medical History:   Diagnosis Date   • Anxiety and depression    • Chronic bronchitis (CMS/HCC)    • Fibromyalgia    • Heart valve problem    • Hypertension    • Migraine    • PCOS (polycystic ovarian syndrome)    • Pneumonia    • Seizures (CMS/HCC)      Past Surgical History:   Procedure Laterality Date   • CHOLECYSTECTOMY       General Information     Row Name 20 1415          PT Evaluation Time/Intention    Document Type  therapy note (daily note)  -EJ     Mode of Treatment  physical therapy  -EJ     Row Name 20 1415          General Information    Patient Profile Reviewed?  yes  -EJ     Existing Precautions/Restrictions  fall;spinal;other (see comments) Pt reports pain R shoulder, spinal precautions for pt comfort as she has multiple epidural abscesses  -EJ     Barriers to Rehab  medically complex;ineffective coping  -EJ     Row Name 20 1415          Cognitive Assessment/Intervention- PT/OT    Orientation Status (Cognition)  oriented x 4  -EJ     Row Name 20 1415          Safety Issues, Functional Mobility    Safety Issues Affecting Function (Mobility)  insight into deficits/self awareness;safety precaution awareness;safety precautions follow-through/compliance;awareness of need for assistance  -EJ     Impairments Affecting Function (Mobility)  strength;pain;balance;endurance/activity  tolerance;postural/trunk control;range of motion (ROM)  -EJ       User Key  (r) = Recorded By, (t) = Taken By, (c) = Cosigned By    Initials Name Provider Type    Elsy Gatica PT Physical Therapist        Mobility     Row Name 06/08/20 1417          Bed Mobility Assessment/Treatment    Bed Mobility Assessment/Treatment  sit-supine  -EJ     Sit-Supine [place] (Bed Mobility)  2 person assist;moderate assist (50% patient effort)  -EJ     Assistive Device (Bed Mobility)  bed rails;draw sheet;head of bed elevated  -EJ     Comment (Bed Mobility)  Pt is assisted at LEs and trunk to perform log roll technique to return to sidelying then supine in bed. Cueing and education on log roll performed.  -     Row Name 06/08/20 1417          Transfer Assessment/Treatment    Comment (Transfers)  Pt performed sit<>stand 1x with inability to progress to pivot to bed 2/2 pt c/o pain and inability to move LEs to take steps toward bed when cued. Chair repositioned for pivot to bed only. MAX A x2 performed for t/f with blocking of BLEs knees, Support at BUEs, and use of gait belt.  -     Row Name 06/08/20 1417          Bed-Chair Transfer    Bed-Chair [place] (Transfers)  maximum assist (25% patient effort);2 person assist  -EJ     Row Name 06/08/20 1417          Sit-Stand Transfer    Sit-Stand [place] (Transfers)  moderate assist (50% patient effort);2 person assist  -EJ     Assistive Device (Sit-Stand Transfers)  other (see comments) BUE   -     Row Name 06/08/20 1417          Gait/Stairs Assessment/Training    [place]s Level (Gait)  maximum assist (25% patient effort);2 person assist  -EJ     Comment (Gait/Stairs)  for movement of LEs during pivot.   -EJ       User Key  (r) = Recorded By, (t) = Taken By, (c) = Cosigned By    Initials Name Provider Type    Elsy Gatica PT Physical Therapist        Obj/Interventions     Row Name 06/08/20 1422          Therapeutic Exercise    Lower Extremity  (Therapeutic Exercise)  LAQ (long arc quad), bilateral  -EJ     Lower Extremity Range of Motion (Therapeutic Exercise)  ankle dorsiflexion/plantar flexion, bilateral  -EJ     Position (Therapeutic Exercise)  seated  -EJ     Sets/Reps (Therapeutic Exercise)  10 total reps, pt took rest breaks between reps.   -[facility] Name 06/08/20 1422          Static Sitting Balance    Level of [place] (Unsupported Sitting, Static Balance)  supervision  -EJ     Sitting Position (Unsupported Sitting, Static Balance)  sitting on edge of bed  -     Time Able to Maintain Position (Unsupported Sitting, Static Balance)  45 to 60 seconds  -[facility] Name 06/08/20 1422          Dynamic Sitting Balance    Level of [place], Reaches Outside Midline (Sitting, Dynamic Balance)  standby assist  -EJ     Sitting Position, Reaches Outside Midline (Sitting, Dynamic Balance)  sitting on edge of bed  -EJ     Comment, Reaches Outside Midline (Sitting, Dynamic Balance)  pt required multiple attempts to get scooted back onto bed for safe positioning before sit>sup.  -EJ     Row Name 06/08/20 1422          Static Standing Balance    Level of [place] (Supported Standing, Static Balance)  maximal assist, 25 to 49% patient effort;2 person assist  -EJ     Assistive Device Utilized (Supported Standing, Static Balance)  other (see comments) BUE support.  -[facility] Name 06/08/20 1422          Dynamic Standing Balance    Level of [place], Reaches Outside Midline (Standing, Dynamic Balance)  maximal assist, 25 to 49% patient effort;2 person assist  -EJ       User Key  (r) = Recorded By, (t) = Taken By, (c) = Cosigned By    Initials Name Provider Type    Elsy Gatica PT Physical Therapist        Goals/Plan    No documentation.       Clinical Impression     [facility] Name 06/08/20 1424          Pain Scale: Numbers Pre/Post-Treatment    Pain Scale: Numbers, Pretreatment  9/10  -     Pain Scale: Numbers, Post-Treatment  9/10  -EJ      Pain Location - Side  Right  -EJ     Pain Location - Orientation  generalized  -EJ     Pain Location  shoulder  -EJ     Pre/Post Treatment Pain Comment  x-ray (-) for injury in shoulder. Pt also c/o RLE knee during attempt at t/f.  -EJ     Pain Intervention(s)  Repositioned;Ambulation/increased activity  -EJ     Row Name 06/08/20 1424          Plan of Care Review    Plan of Care Reviewed With  patient  -EJ     Progress  no change  -EJ     Outcome Summary  Pt limited by pain and activity tolerence during session, demonstrating need for MAX A x2 for t/f, MOD Ax2 for bed mobility. Per NSG tech pt had demonstrated t/f prior with much less assist. Pt c/o pain with every movement in ther ex, which consisted AROM at knees and ankles.   -EJ     Row Name 06/08/20 1424          Positioning and Restraints    Pre-Treatment Position  in bed  -EJ     Post Treatment Position  chair  -EJ     In Bed  fowlers;call light within reach;encouraged to call for assist;exit alarm on;notified nsg Pt asks to talk to [facility] for pain medication immediately following session.   -EJ     In Chair  notified nsg  -EJ       User Key  (r) = Recorded By, (t) = Taken By, (c) = Cosigned By    Initials Name Provider Type    EJ Elsy Hinojosa, PT Physical Therapist        Outcome Measures     Row Name 06/08/20 1430          How much help from another person do you currently need...    Turning from your back to your side while in flat bed without using bedrails?  4  -EJ     Moving from lying on back to sitting on the side of a flat bed without bedrails?  2  -EJ     Moving to and from a bed to a chair (including a wheelchair)?  2  -EJ     Standing up from a chair using your arms (e.g., wheelchair, bedside chair)?  2  -EJ     Climbing 3-5 steps with a railing?  2  -EJ     To walk in hospital room?  2  -EJ     AM-PAC 6 Clicks Score (PT)  14  -EJ     Row Name 06/08/20 1430          Functional Assessment    Outcome Measure Options  AM-PAC 6 Clicks Basic  Mobility (PT)  -       User Key  (r) = Recorded By, (t) = Taken By, (c) = Cosigned By    Initials Name Provider Type    Elsy Gatica PT Physical Therapist        Physical Therapy Education                 Title: PT OT SLP Therapies (In Progress)     Topic: Physical Therapy (In Progress)     Point: Mobility training (In Progress)     Description:   Instruct learner(s) on safety and technique for assisting patient out of bed, chair or wheelchair.  Instruct in the proper use of assistive devices, such as walker, crutches, cane or brace.              Patient Friendly Description:   It's important to get you on your feet again, but we need to do so in a way that is safe for you. Falling has serious consequences, and your personal safety is the most important thing of all.        When it's time to get out of bed, one of us or a family member will sit next to you on the bed to give you support.     If your doctor or nurse tells you to use a walker, crutches, a cane, or a brace, be sure you use it every time you get out of bed, even if you think you don't need it.    Learning Progress Summary           Patient Acceptance, E, NR by MAURICIO at 6/8/2020 1431    Comment:  Limited education based on pt's tolerance of PT during session.    Acceptance, E,TB, VU,NR by MAURICIO at 6/5/2020 1342    Comment:  Pt advised in log roll to avoid unnecessary pain.                   Point: Home exercise program (In Progress)     Description:   Instruct learner(s) on appropriate technique for monitoring, assisting and/or progressing patient with therapeutic exercises and activities.              Learning Progress Summary           Patient Acceptance, E, NR by MAURICIO at 6/8/2020 1431    Comment:  Limited education based on pt's tolerance of PT during session.                   Point: Body mechanics (In Progress)     Description:   Instruct learner(s) on proper positioning and spine alignment for patient and/or caregiver during mobility tasks  and/or exercises.              Learning Progress Summary           Patient Acceptance, E, NR by MAURICIO at 6/8/2020 1431    Comment:  Limited education based on pt's tolerance of PT during session.    Acceptance, E,TB, VU,NR by MAURICIO at 6/5/2020 1342    Comment:  Pt advised in log roll to avoid unnecessary pain.                   Point: Precautions (In Progress)     Description:   Instruct learner(s) on prescribed precautions during mobility and gait tasks              Learning Progress Summary           Patient Acceptance, E, NR by MAURICIO at 6/8/2020 1431    Comment:  Limited education based on pt's tolerance of PT during session.    Acceptance, E,TB, VU,NR by MAURICIO at 6/5/2020 1342    Comment:  Pt advised in log roll to avoid unnecessary pain.                               User Key     Initials Effective Dates Name Provider Type Discipline     11/20/18 -  Elsy Hinojosa, PT Physical Therapist PT              PT Recommendation and Plan  Planned Therapy Interventions (PT Eval): balance training, bed mobility training, gait training, home exercise program, joint mobilization, lumbar stabilization, manual therapy techniques, motor coordination training, neuromuscular re-education, patient/family education, postural re-education, ROM (range of motion), stair training, strengthening, stretching, orthotic fitting/training, transfer training  Outcome Summary/Treatment Plan (PT)  Anticipated Discharge Disposition (PT): inpatient rehabilitation facility  Plan of Care Reviewed With: patient  Progress: no change  Outcome Summary: Pt limited by pain and activity tolerence during session, demonstrating need for MAX A x2 for t/f, MOD Ax2 for bed mobility. Per NSG tech pt had demonstrated t/f prior with much less assist. Pt c/o pain with every movement in ther ex, which consisted AROM at knees and ankles.      Time Calculation:   PT Charges     Row Name 06/08/20 1432             Time Calculation    Start Time  1140  -EJ      PT Received  On  06/08/20  -EJ      PT Goal Re-Cert Due Date  06/15/20  -EJ         Time Calculation- PT    Total Timed Code Minutes- PT  13 minute(s)  -EJ         Timed Charges    61077 - PT Therapeutic Exercise Minutes  5  -EJ      94181 - PT Therapeutic Activity Minutes  8  -EJ        User Key  (r) = Recorded By, (t) = Taken By, (c) = Cosigned By    Initials Name Provider Type     Elsy Hinojosa PT Physical Therapist        Therapy Charges for Today     Code Description Service Date Service Provider Modifiers Qty    73382814708 HC PT THERAPEUTIC ACT EA 15 MIN 6/8/2020 Elsy Hinojosa PT GP 1          PT G-Codes  Outcome Measure Options: AM-PAC 6 Clicks Basic Mobility (PT)  AM-PAC 6 Clicks Score (PT): 14  AM-PAC 6 Clicks Score (OT): 15    Elsy Mario PT  6/8/2020

## 2020-06-09 LAB
ANION GAP SERPL CALCULATED.3IONS-SCNC: 12 MMOL/L (ref 5–15)
BACTERIA SPEC ANAEROBE CULT: NORMAL
BASOPHILS # BLD AUTO: 0.05 10*3/MM3 (ref 0–0.2)
BASOPHILS NFR BLD AUTO: 0.3 % (ref 0–1.5)
BUN BLD-MCNC: 11 MG/DL (ref 6–20)
BUN/CREAT SERPL: 20 (ref 7–25)
CALCIUM SPEC-SCNC: 7.6 MG/DL (ref 8.6–10.5)
CHLORIDE SERPL-SCNC: 104 MMOL/L (ref 98–107)
CO2 SERPL-SCNC: 19 MMOL/L (ref 22–29)
CREAT BLD-MCNC: 0.55 MG/DL (ref 0.57–1)
DEPRECATED RDW RBC AUTO: 49.6 FL (ref 37–54)
EOSINOPHIL # BLD AUTO: 0.03 10*3/MM3 (ref 0–0.4)
EOSINOPHIL NFR BLD AUTO: 0.2 % (ref 0.3–6.2)
ERYTHROCYTE [DISTWIDTH] IN BLOOD BY AUTOMATED COUNT: 19.1 % (ref 12.3–15.4)
FLUAV AG NPH QL: NEGATIVE
FLUBV AG NPH QL IA: NEGATIVE
GFR SERPL CREATININE-BSD FRML MDRD: 124 ML/MIN/1.73
GLUCOSE BLD-MCNC: 148 MG/DL (ref 65–99)
HCT VFR BLD AUTO: 38.9 % (ref 34–46.6)
HGB BLD-MCNC: 11.6 G/DL (ref 12–15.9)
IMM GRANULOCYTES # BLD AUTO: 0.17 10*3/MM3 (ref 0–0.05)
IMM GRANULOCYTES NFR BLD AUTO: 1.1 % (ref 0–0.5)
LYMPHOCYTES # BLD AUTO: 1.29 10*3/MM3 (ref 0.7–3.1)
LYMPHOCYTES NFR BLD AUTO: 8 % (ref 19.6–45.3)
MCH RBC QN AUTO: 22.8 PG (ref 26.6–33)
MCHC RBC AUTO-ENTMCNC: 29.8 G/DL (ref 31.5–35.7)
MCV RBC AUTO: 76.4 FL (ref 79–97)
MONOCYTES # BLD AUTO: 0.86 10*3/MM3 (ref 0.1–0.9)
MONOCYTES NFR BLD AUTO: 5.4 % (ref 5–12)
NEUTROPHILS # BLD AUTO: 13.66 10*3/MM3 (ref 1.7–7)
NEUTROPHILS NFR BLD AUTO: 85 % (ref 42.7–76)
NRBC BLD AUTO-RTO: 0 /100 WBC (ref 0–0.2)
PLATELET # BLD AUTO: 445 10*3/MM3 (ref 140–450)
PMV BLD AUTO: 9 FL (ref 6–12)
POTASSIUM BLD-SCNC: 3.7 MMOL/L (ref 3.5–5.2)
RBC # BLD AUTO: 5.09 10*6/MM3 (ref 3.77–5.28)
SODIUM BLD-SCNC: 135 MMOL/L (ref 136–145)
WBC NRBC COR # BLD: 16.06 10*3/MM3 (ref 3.4–10.8)

## 2020-06-09 PROCEDURE — 63710000001 PROCHLORPERAZINE MALEATE PER 5 MG: Performed by: INTERNAL MEDICINE

## 2020-06-09 PROCEDURE — 99232 SBSQ HOSP IP/OBS MODERATE 35: CPT | Performed by: INTERNAL MEDICINE

## 2020-06-09 PROCEDURE — 80048 BASIC METABOLIC PNL TOTAL CA: CPT | Performed by: INTERNAL MEDICINE

## 2020-06-09 PROCEDURE — 25010000002 DAPTOMYCIN PER 1 MG: Performed by: INTERNAL MEDICINE

## 2020-06-09 PROCEDURE — 94799 UNLISTED PULMONARY SVC/PX: CPT

## 2020-06-09 PROCEDURE — 25010000002 CEFTAROLINE FOSAMIL PER 10 MG: Performed by: INTERNAL MEDICINE

## 2020-06-09 PROCEDURE — 63710000001 DIPHENHYDRAMINE PER 50 MG: Performed by: NURSE PRACTITIONER

## 2020-06-09 PROCEDURE — 99231 SBSQ HOSP IP/OBS SF/LOW 25: CPT | Performed by: PHYSICIAN ASSISTANT

## 2020-06-09 PROCEDURE — 85025 COMPLETE CBC W/AUTO DIFF WBC: CPT | Performed by: INTERNAL MEDICINE

## 2020-06-09 RX ORDER — ZOLPIDEM TARTRATE 5 MG/1
5 TABLET ORAL NIGHTLY PRN
Status: DISCONTINUED | OUTPATIENT
Start: 2020-06-09 | End: 2020-06-16 | Stop reason: HOSPADM

## 2020-06-09 RX ORDER — LABETALOL HYDROCHLORIDE 5 MG/ML
10 INJECTION, SOLUTION INTRAVENOUS ONCE
Status: COMPLETED | OUTPATIENT
Start: 2020-06-09 | End: 2020-06-09

## 2020-06-09 RX ORDER — LOPERAMIDE HYDROCHLORIDE 2 MG/1
2 CAPSULE ORAL 4 TIMES DAILY PRN
Status: DISCONTINUED | OUTPATIENT
Start: 2020-06-09 | End: 2020-06-15

## 2020-06-09 RX ADMIN — BUDESONIDE AND FORMOTEROL FUMARATE DIHYDRATE 2 PUFF: 160; 4.5 AEROSOL RESPIRATORY (INHALATION) at 20:34

## 2020-06-09 RX ADMIN — CEFTAROLINE FOSAMIL 600 MG: 600 POWDER, FOR SOLUTION INTRAVENOUS at 20:58

## 2020-06-09 RX ADMIN — HYDROCODONE BITARTRATE AND ACETAMINOPHEN 1 TABLET: 5; 325 TABLET ORAL at 22:57

## 2020-06-09 RX ADMIN — DIPHENOXYLATE HYDROCHLORIDE AND ATROPINE SULFATE 1 TABLET: 2.5; .025 TABLET ORAL at 01:08

## 2020-06-09 RX ADMIN — TIZANIDINE 4 MG: 4 TABLET ORAL at 04:12

## 2020-06-09 RX ADMIN — BUDESONIDE AND FORMOTEROL FUMARATE DIHYDRATE 2 PUFF: 160; 4.5 AEROSOL RESPIRATORY (INHALATION) at 07:25

## 2020-06-09 RX ADMIN — NYSTATIN: 100000 POWDER TOPICAL at 09:21

## 2020-06-09 RX ADMIN — CEFTAROLINE FOSAMIL 600 MG: 600 POWDER, FOR SOLUTION INTRAVENOUS at 10:15

## 2020-06-09 RX ADMIN — GABAPENTIN 800 MG: 400 CAPSULE ORAL at 14:34

## 2020-06-09 RX ADMIN — LEVETIRACETAM 500 MG: 500 TABLET ORAL at 19:55

## 2020-06-09 RX ADMIN — HYDROXYZINE HYDROCHLORIDE 25 MG: 25 TABLET, FILM COATED ORAL at 04:12

## 2020-06-09 RX ADMIN — LEVETIRACETAM 500 MG: 500 TABLET ORAL at 09:21

## 2020-06-09 RX ADMIN — DIPHENOXYLATE HYDROCHLORIDE AND ATROPINE SULFATE 1 TABLET: 2.5; .025 TABLET ORAL at 10:15

## 2020-06-09 RX ADMIN — HYDROCORTISONE: 25 CREAM TOPICAL at 09:24

## 2020-06-09 RX ADMIN — SODIUM CHLORIDE 125 ML/HR: 9 INJECTION, SOLUTION INTRAVENOUS at 09:17

## 2020-06-09 RX ADMIN — LEVOTHYROXINE SODIUM 50 MCG: 50 TABLET ORAL at 04:11

## 2020-06-09 RX ADMIN — Medication 1 CAPSULE: at 09:21

## 2020-06-09 RX ADMIN — TIZANIDINE 4 MG: 4 TABLET ORAL at 22:57

## 2020-06-09 RX ADMIN — HYDROCODONE BITARTRATE AND ACETAMINOPHEN 1 TABLET: 5; 325 TABLET ORAL at 17:11

## 2020-06-09 RX ADMIN — TIZANIDINE 4 MG: 4 TABLET ORAL at 14:35

## 2020-06-09 RX ADMIN — HYDROCODONE BITARTRATE AND ACETAMINOPHEN 1 TABLET: 5; 325 TABLET ORAL at 04:12

## 2020-06-09 RX ADMIN — HYDROCODONE BITARTRATE AND ACETAMINOPHEN 1 TABLET: 5; 325 TABLET ORAL at 10:15

## 2020-06-09 RX ADMIN — BISMUTH SUBSALICYLATE 30 ML: 262 LIQUID ORAL at 17:21

## 2020-06-09 RX ADMIN — DIPHENOXYLATE HYDROCHLORIDE AND ATROPINE SULFATE 1 TABLET: 2.5; .025 TABLET ORAL at 04:11

## 2020-06-09 RX ADMIN — HYDROCORTISONE: 25 CREAM TOPICAL at 20:57

## 2020-06-09 RX ADMIN — ZOLPIDEM TARTRATE 5 MG: 5 TABLET ORAL at 19:55

## 2020-06-09 RX ADMIN — LOPERAMIDE HYDROCHLORIDE 2 MG: 2 CAPSULE ORAL at 19:55

## 2020-06-09 RX ADMIN — LOPERAMIDE HYDROCHLORIDE 2 MG: 2 CAPSULE ORAL at 12:31

## 2020-06-09 RX ADMIN — NYSTATIN: 100000 POWDER TOPICAL at 20:58

## 2020-06-09 RX ADMIN — GABAPENTIN 800 MG: 400 CAPSULE ORAL at 04:12

## 2020-06-09 RX ADMIN — PROCHLORPERAZINE MALEATE 5 MG: 5 TABLET ORAL at 10:15

## 2020-06-09 RX ADMIN — MORPHINE SULFATE 15 MG: 15 TABLET, FILM COATED, EXTENDED RELEASE ORAL at 09:14

## 2020-06-09 RX ADMIN — PANTOPRAZOLE SODIUM 40 MG: 40 TABLET, DELAYED RELEASE ORAL at 04:12

## 2020-06-09 RX ADMIN — GABAPENTIN 800 MG: 400 CAPSULE ORAL at 19:55

## 2020-06-09 RX ADMIN — SODIUM CHLORIDE 125 ML/HR: 9 INJECTION, SOLUTION INTRAVENOUS at 06:44

## 2020-06-09 RX ADMIN — MORPHINE SULFATE 15 MG: 15 TABLET, FILM COATED, EXTENDED RELEASE ORAL at 19:55

## 2020-06-09 RX ADMIN — FLUOXETINE HYDROCHLORIDE 40 MG: 20 CAPSULE ORAL at 09:21

## 2020-06-09 RX ADMIN — LABETALOL 20 MG/4 ML (5 MG/ML) INTRAVENOUS SYRINGE 10 MG: at 21:53

## 2020-06-09 RX ADMIN — HYDROXYZINE HYDROCHLORIDE 25 MG: 25 TABLET, FILM COATED ORAL at 22:57

## 2020-06-09 RX ADMIN — DAPTOMYCIN 600 MG: 500 INJECTION, POWDER, LYOPHILIZED, FOR SOLUTION INTRAVENOUS at 09:14

## 2020-06-09 NOTE — PLAN OF CARE
VSS, NSR on telemetry, room air, difficult night unable to get comfortable with po pain meds , requested dilaudid frequently, loose stool continues, lomitol given prn, requested something for sleep, providers at night not comfortable giving more meds, pt given mscontin, lortab, tylenol, zanaflex, compazine, atarax, with no relief, refuses to get out of bed, incontinent/bedpan use, lost iv site, new iv started per ultrasound, difficulty demanding pt iv fluids/abx continue

## 2020-06-09 NOTE — PROGRESS NOTES
Adult Nutrition  Assessment/PES    Patient Name:  Liliana Calderon  YOB: 1980  MRN: [id number]  Admit Date:  6/1/2020    Assessment Date:  6/9/2020        Reason for Assessment     Row Name 06/09/20 1327          Reason for Assessment    Reason For Assessment  -- LOS, NPO>3d.   25 mins     Diagnosis  -- R shoulder pain, MRSA bacteremia, UTI, IVDU, n/v/d, L2-3 and L 3-4 dorsal epidural abscess. Complete dx list per MD notes this adm.            Anthropometrics     Row Name 06/09/20 1329          Admit Weight    Admit Weight  -- ht=65in, cr=484rp per bed wt on 6/1. BMI=38.7               Nutrition Prescription Ordered     Row Name 06/09/20 1331          Nutrition Prescription PO    Current PO Diet  Clear Liquid         Evaluation of Received Nutrient/Fluid Intake     Row Name 06/09/20 1331          PO Evaluation    Number of Meals  3; at past 3 meals recorded while on CL diet.     % PO Intake  25               Problem/Interventions:  Problem 1     Row Name 06/09/20 1331          Nutrition Diagnoses Problem 1    Problem 1  Inadequate Intake/Infusion     Etiology (related to)  MNT for Treatment/Condition     Signs/Symptoms (evidenced by)  PO Intake   NPO/CL diet order x >3d     Percent (%) intake recorded  25 %     Over number of meals  3               Intervention Goal     Row Name 06/09/20 1331          Intervention Goal    PO  Increase intake; advance diet as feasible.         Nutrition Intervention     Row Name 06/09/20 1331          Nutrition Intervention    RD/Tech Action  Follow Tx progress;Supplement provided         Nutrition Prescription     Row Name 06/09/20 1331          Nutrition Prescription PO    PO Prescription  Begin/change supplement     Supplement  Boost Breeze     Supplement Frequency  3 times a day     New PO Prescription Ordered?  Yes         Education/Evaluation     Row Name 06/09/20 1331          Monitor/Evaluation    Monitor  Per protocol           Electronically signed by:  Eloise  B MS M[place]geta,RD,LD  06/09/20 13:33

## 2020-06-09 NOTE — PROGRESS NOTES
CTS Progress Note      POD  s/p        LOS: 8 days   Patient Care Team:  Ollie Flores MD as PCP - General (Internal Medicine)    Subjective  Awake, alert, cooperative  Still complains of pain mostly in the low back.  Denies any fever or chills    CC:     Objective    Vital Signs  Temp:  [97.9 °F (36.6 °C)-98.1 °F (36.7 °C)] 98.1 °F (36.7 °C)  Heart Rate:  [76-86] 78  Resp:  [18-20] 18  BP: (150-169)/() 150/87    Physical Exam:   General Appearance:               Lungs: Scattered rhonchi throughout, poor cough.  Secondary to pain   Heart: regular rhythm & normal rate, normal S1, S2, no murmur, no gallop, no rub and no click   Skin: Warm, dry   Abdomen: Soft, nontender, bowel sounds present throughout.  Results   Results from last 7 days   Lab Units 06/08/20  0513   WBC 10*3/mm3 15.98*   HEMOGLOBIN g/dL 12.1   HEMATOCRIT % 40.8   PLATELETS 10*3/mm3 463*     Results from last 7 days   Lab Units 06/08/20  0513   SODIUM mmol/L 137   POTASSIUM mmol/L 3.9   CHLORIDE mmol/L 103   CO2 mmol/L 20.0*   BUN mg/dL 11   CREATININE mg/dL 0.52*   GLUCOSE mg/dL 167*   CALCIUM mg/dL 7.9*           Imaging Results (Last 24 Hours)     Procedure Component Value Units Date/Time    XR Shoulder 2+ View Right [[id number]] Collected:  06/08/20 0955     Updated:  06/08/20 1633    Narrative:          EXAMINATION: XR SHOULDER 2+ VW, RIGHT-06/08/2020:      INDICATION: Right shoulder pain; Z74.09-Other reduced mobility.      COMPARISON: NONE.     FINDINGS: Two views of the right shoulder reveal no evidence of fracture  or dislocation. The cortex is intact. Joint spaces are preserved. No  joint effusion. No acute bony abnormality.           Impression:       No acute bony abnormality.     D:  06/08/2020  E:  06/08/2020     This report was finalized on 6/8/2020 4:30 PM by Dr. Jasmine Marie MD.             Assessment      Sepsis (CMS/HCC)    Essential hypertension    Fibromyalgia syndrome    GERD without esophagitis    DDD  (degenerative disc disease), cervical    Anxiety    Depression    Seizure (CMS/HCC)    Migraine    Infective endocarditis    Epidural abscess    MRSA bacteremia  Seeing patient for tricuspid bacterial endocarditis  White count of 15,000  Continue IV antibiotics per ID      Plan   We will continue to follow with you  Patient would best be served by angio VAC removal of tricuspid vegetation    JENELLE Stone  06/09/20  08:36    As outlined above.  Nothing to add to current treatment regimen.I have reviewed, verified, and confirmed the above history and current status.    Greg Blanchard MD  [facility]  06/09/20   10:06

## 2020-06-09 NOTE — PROGRESS NOTES
Continued Stay Note  [facility]     Patient Name: Liliana Calderon  MRN: [id number]  Today's Date: 6/9/2020    Admit Date: 6/1/2020    Discharge Plan     Row Name 06/09/20 0841       Plan    Plan  Ongoing    Plan Comments  Pt. still very sick. ID and CTS are monitoring closely. Cannot be dc'd with PICC so if PICC needed she will either have to stay here or go to [facility]. May need angio vac of tricuspid valve. Will cont. to monitor and update.     Final Discharge Disposition Code  30 - still a patient        Discharge Codes    No documentation.             Komal Schuster RN

## 2020-06-09 NOTE — PLAN OF CARE
Problem: Patient Care Overview  Goal: Plan of Care Review  Flowsheets (Taken 6/9/2020 1225)  Plan of Care Reviewed With: patient  Outcome Summary: VSS, RA-2L NC. Pt continues to c/o back pain and abdominal cramping. PRN norco and zanaflex given. Pt has had large amounts of dark red, liquid diarrhea throughout shift. Dr. Keller notified. PRN immodium given. Will continue to monitor.

## 2020-06-10 LAB
ALBUMIN SERPL-MCNC: 2 G/DL (ref 3.5–5.2)
ALBUMIN/GLOB SERPL: 0.8 G/DL
ALP SERPL-CCNC: 135 U/L (ref 39–117)
ALT SERPL W P-5'-P-CCNC: 7 U/L (ref 1–33)
ANION GAP SERPL CALCULATED.3IONS-SCNC: 14 MMOL/L (ref 5–15)
AST SERPL-CCNC: 15 U/L (ref 1–32)
BASOPHILS # BLD AUTO: 0.04 10*3/MM3 (ref 0–0.2)
BASOPHILS NFR BLD AUTO: 0.2 % (ref 0–1.5)
BILIRUB SERPL-MCNC: 0.7 MG/DL (ref 0.2–1.2)
BUN BLD-MCNC: 14 MG/DL (ref 6–20)
BUN/CREAT SERPL: 21.9 (ref 7–25)
CALCIUM SPEC-SCNC: 7.5 MG/DL (ref 8.6–10.5)
CHLORIDE SERPL-SCNC: 102 MMOL/L (ref 98–107)
CO2 SERPL-SCNC: 18 MMOL/L (ref 22–29)
CREAT BLD-MCNC: 0.64 MG/DL (ref 0.57–1)
DEPRECATED RDW RBC AUTO: 50 FL (ref 37–54)
EOSINOPHIL # BLD AUTO: 0.05 10*3/MM3 (ref 0–0.4)
EOSINOPHIL NFR BLD AUTO: 0.3 % (ref 0.3–6.2)
ERYTHROCYTE [DISTWIDTH] IN BLOOD BY AUTOMATED COUNT: 18.9 % (ref 12.3–15.4)
GFR SERPL CREATININE-BSD FRML MDRD: 104 ML/MIN/1.73
GLOBULIN UR ELPH-MCNC: 2.6 GM/DL
GLUCOSE BLD-MCNC: 151 MG/DL (ref 65–99)
HCT VFR BLD AUTO: 33.8 % (ref 34–46.6)
HGB BLD-MCNC: 10.3 G/DL (ref 12–15.9)
IMM GRANULOCYTES # BLD AUTO: 0.16 10*3/MM3 (ref 0–0.05)
IMM GRANULOCYTES NFR BLD AUTO: 0.9 % (ref 0–0.5)
LYMPHOCYTES # BLD AUTO: 1.7 10*3/MM3 (ref 0.7–3.1)
LYMPHOCYTES NFR BLD AUTO: 9.8 % (ref 19.6–45.3)
MCH RBC QN AUTO: 23.1 PG (ref 26.6–33)
MCHC RBC AUTO-ENTMCNC: 30.5 G/DL (ref 31.5–35.7)
MCV RBC AUTO: 76 FL (ref 79–97)
MONOCYTES # BLD AUTO: 1.15 10*3/MM3 (ref 0.1–0.9)
MONOCYTES NFR BLD AUTO: 6.6 % (ref 5–12)
NEUTROPHILS # BLD AUTO: 14.25 10*3/MM3 (ref 1.7–7)
NEUTROPHILS NFR BLD AUTO: 82.2 % (ref 42.7–76)
NRBC BLD AUTO-RTO: 0 /100 WBC (ref 0–0.2)
PLATELET # BLD AUTO: 370 10*3/MM3 (ref 140–450)
PMV BLD AUTO: 9.4 FL (ref 6–12)
POTASSIUM BLD-SCNC: 3.4 MMOL/L (ref 3.5–5.2)
PROT SERPL-MCNC: 4.6 G/DL (ref 6–8.5)
RBC # BLD AUTO: 4.45 10*6/MM3 (ref 3.77–5.28)
SODIUM BLD-SCNC: 134 MMOL/L (ref 136–145)
WBC NRBC COR # BLD: 17.35 10*3/MM3 (ref 3.4–10.8)

## 2020-06-10 PROCEDURE — 80053 COMPREHEN METABOLIC PANEL: CPT | Performed by: INTERNAL MEDICINE

## 2020-06-10 PROCEDURE — 25010000002 CEFTAROLINE FOSAMIL PER 10 MG: Performed by: INTERNAL MEDICINE

## 2020-06-10 PROCEDURE — 99232 SBSQ HOSP IP/OBS MODERATE 35: CPT | Performed by: INTERNAL MEDICINE

## 2020-06-10 PROCEDURE — 97530 THERAPEUTIC ACTIVITIES: CPT

## 2020-06-10 PROCEDURE — 94799 UNLISTED PULMONARY SVC/PX: CPT

## 2020-06-10 PROCEDURE — 63710000001 PROCHLORPERAZINE MALEATE PER 5 MG: Performed by: INTERNAL MEDICINE

## 2020-06-10 PROCEDURE — 99231 SBSQ HOSP IP/OBS SF/LOW 25: CPT | Performed by: PHYSICIAN ASSISTANT

## 2020-06-10 PROCEDURE — 85025 COMPLETE CBC W/AUTO DIFF WBC: CPT | Performed by: INTERNAL MEDICINE

## 2020-06-10 PROCEDURE — 25010000002 DAPTOMYCIN PER 1 MG: Performed by: INTERNAL MEDICINE

## 2020-06-10 RX ORDER — CYCLOBENZAPRINE HCL 10 MG
5 TABLET ORAL 3 TIMES DAILY PRN
Status: DISCONTINUED | OUTPATIENT
Start: 2020-06-10 | End: 2020-06-16 | Stop reason: HOSPADM

## 2020-06-10 RX ORDER — POTASSIUM CHLORIDE 7.45 MG/ML
10 INJECTION INTRAVENOUS
Status: DISCONTINUED | OUTPATIENT
Start: 2020-06-10 | End: 2020-06-16 | Stop reason: HOSPADM

## 2020-06-10 RX ORDER — POTASSIUM CHLORIDE 750 MG/1
40 CAPSULE, EXTENDED RELEASE ORAL AS NEEDED
Status: DISCONTINUED | OUTPATIENT
Start: 2020-06-10 | End: 2020-06-16 | Stop reason: HOSPADM

## 2020-06-10 RX ORDER — POTASSIUM CHLORIDE 1.5 G/1.77G
40 POWDER, FOR SOLUTION ORAL AS NEEDED
Status: DISCONTINUED | OUTPATIENT
Start: 2020-06-10 | End: 2020-06-16 | Stop reason: HOSPADM

## 2020-06-10 RX ADMIN — ZOLPIDEM TARTRATE 5 MG: 5 TABLET ORAL at 19:41

## 2020-06-10 RX ADMIN — CEFTAROLINE FOSAMIL 600 MG: 600 POWDER, FOR SOLUTION INTRAVENOUS at 09:18

## 2020-06-10 RX ADMIN — PANTOPRAZOLE SODIUM 40 MG: 40 TABLET, DELAYED RELEASE ORAL at 05:38

## 2020-06-10 RX ADMIN — DAPTOMYCIN 600 MG: 500 INJECTION, POWDER, LYOPHILIZED, FOR SOLUTION INTRAVENOUS at 08:15

## 2020-06-10 RX ADMIN — GABAPENTIN 800 MG: 400 CAPSULE ORAL at 19:41

## 2020-06-10 RX ADMIN — BUDESONIDE AND FORMOTEROL FUMARATE DIHYDRATE 2 PUFF: 160; 4.5 AEROSOL RESPIRATORY (INHALATION) at 09:03

## 2020-06-10 RX ADMIN — POTASSIUM CHLORIDE 40 MEQ: 10 CAPSULE, COATED, EXTENDED RELEASE ORAL at 17:08

## 2020-06-10 RX ADMIN — TIZANIDINE 4 MG: 4 TABLET ORAL at 12:46

## 2020-06-10 RX ADMIN — CYCLOBENZAPRINE HYDROCHLORIDE 5 MG: 10 TABLET, FILM COATED ORAL at 15:34

## 2020-06-10 RX ADMIN — CEFTAROLINE FOSAMIL 600 MG: 600 POWDER, FOR SOLUTION INTRAVENOUS at 19:42

## 2020-06-10 RX ADMIN — HYDROXYZINE HYDROCHLORIDE 25 MG: 25 TABLET, FILM COATED ORAL at 19:41

## 2020-06-10 RX ADMIN — HYDROCORTISONE: 25 CREAM TOPICAL at 08:15

## 2020-06-10 RX ADMIN — PROCHLORPERAZINE MALEATE 5 MG: 5 TABLET ORAL at 19:41

## 2020-06-10 RX ADMIN — LOPERAMIDE HYDROCHLORIDE 2 MG: 2 CAPSULE ORAL at 09:18

## 2020-06-10 RX ADMIN — ACETAMINOPHEN 650 MG: 325 TABLET, FILM COATED ORAL at 19:41

## 2020-06-10 RX ADMIN — MORPHINE SULFATE 15 MG: 15 TABLET, FILM COATED, EXTENDED RELEASE ORAL at 19:41

## 2020-06-10 RX ADMIN — HYDROCODONE BITARTRATE AND ACETAMINOPHEN 1 TABLET: 5; 325 TABLET ORAL at 03:39

## 2020-06-10 RX ADMIN — LEVETIRACETAM 500 MG: 500 TABLET ORAL at 08:14

## 2020-06-10 RX ADMIN — FLUOXETINE HYDROCHLORIDE 40 MG: 20 CAPSULE ORAL at 08:14

## 2020-06-10 RX ADMIN — SODIUM CHLORIDE 125 ML/HR: 9 INJECTION, SOLUTION INTRAVENOUS at 19:49

## 2020-06-10 RX ADMIN — GABAPENTIN 800 MG: 400 CAPSULE ORAL at 05:38

## 2020-06-10 RX ADMIN — MORPHINE SULFATE 15 MG: 15 TABLET, FILM COATED, EXTENDED RELEASE ORAL at 08:14

## 2020-06-10 RX ADMIN — LOPERAMIDE HYDROCHLORIDE 2 MG: 2 CAPSULE ORAL at 18:45

## 2020-06-10 RX ADMIN — IPRATROPIUM BROMIDE AND ALBUTEROL SULFATE 3 ML: 2.5; .5 SOLUTION RESPIRATORY (INHALATION) at 20:43

## 2020-06-10 RX ADMIN — BUDESONIDE AND FORMOTEROL FUMARATE DIHYDRATE 2 PUFF: 160; 4.5 AEROSOL RESPIRATORY (INHALATION) at 20:43

## 2020-06-10 RX ADMIN — ACETAMINOPHEN 650 MG: 325 TABLET, FILM COATED ORAL at 03:28

## 2020-06-10 RX ADMIN — HYDROCODONE BITARTRATE AND ACETAMINOPHEN 1 TABLET: 5; 325 TABLET ORAL at 18:45

## 2020-06-10 RX ADMIN — LEVETIRACETAM 500 MG: 500 TABLET ORAL at 19:41

## 2020-06-10 RX ADMIN — Medication 1 CAPSULE: at 08:14

## 2020-06-10 RX ADMIN — GABAPENTIN 800 MG: 400 CAPSULE ORAL at 15:30

## 2020-06-10 RX ADMIN — HYDROCODONE BITARTRATE AND ACETAMINOPHEN 1 TABLET: 5; 325 TABLET ORAL at 12:46

## 2020-06-10 RX ADMIN — NYSTATIN: 100000 POWDER TOPICAL at 19:42

## 2020-06-10 RX ADMIN — MELATONIN TAB 5 MG 5 MG: 5 TAB at 19:41

## 2020-06-10 RX ADMIN — LEVOTHYROXINE SODIUM 50 MCG: 50 TABLET ORAL at 05:38

## 2020-06-10 RX ADMIN — SODIUM CHLORIDE 125 ML/HR: 9 INJECTION, SOLUTION INTRAVENOUS at 00:02

## 2020-06-10 RX ADMIN — NYSTATIN: 100000 POWDER TOPICAL at 08:15

## 2020-06-10 NOTE — PLAN OF CARE
Problem: Patient Care Overview  Goal: Plan of Care Review  Outcome: Ongoing (interventions implemented as appropriate)  Flowsheets (Taken 6/10/2020 0637)  Progress: improving  Plan of Care Reviewed With: patient  Outcome Summary: pt on 2L O2, BP elevated, labetalol IV given and trying to control pain, pain meds given PRN, pt not having as many BM's, she has rested some throughout the night, will continue to monitor.

## 2020-06-10 NOTE — THERAPY TREATMENT NOTE
Patient Name: Liliana Calderon  : 1980    MRN: [id number]                              Today's Date: 6/10/2020       Admit Date: 2020    Visit Dx:     ICD-10-CM ICD-9-CM   1. Impaired mobility and ADLs Z74.09 V49.89    Z78.9      Patient Active Problem List   Diagnosis   • Bradycardia with 41-50 beats per minute   • Essential hypertension   • Fibromyalgia syndrome   • GERD without esophagitis   • Sepsis (CMS/HCC)   • DDD (degenerative disc disease), cervical   • Anxiety   • Depression   • Seizure (CMS/HCC)   • Migraine   • Infective endocarditis   • Epidural abscess   • MRSA bacteremia     Past Medical History:   Diagnosis Date   • Anxiety and depression    • Chronic bronchitis (CMS/HCC)    • Fibromyalgia    • Heart valve problem    • Hypertension    • Migraine    • PCOS (polycystic ovarian syndrome)    • Pneumonia    • Seizures (CMS/HCC)      Past Surgical History:   Procedure Laterality Date   • CHOLECYSTECTOMY       General Information     Row Name 06/10/20 1531          PT Evaluation Time/Intention    Document Type  therapy note (daily note)  -EJ     Mode of Treatment  physical therapy  -EJ     Row Name 06/10/20 153          General Information    Patient Profile Reviewed?  yes  -EJ     Existing Precautions/Restrictions  fall;spinal;other (see comments) Pt reports pain R shoulder, spinal precautions for pt comfort as she has multiple epidural abscesses  -EJ     Barriers to Rehab  medically complex;ineffective coping  -EJ     Row Name 06/10/20 153          Cognitive Assessment/Intervention- PT/OT    Orientation Status (Cognition)  oriented x 4  -EJ     Row Name 06/10/20 1531          Safety Issues, Functional Mobility    Safety Issues Affecting Function (Mobility)  insight into deficits/self awareness;safety precaution awareness;safety precautions follow-through/compliance  -EJ     Impairments Affecting Function (Mobility)  strength;pain;balance;endurance/activity tolerance;postural/trunk  control;range of motion (ROM)  -EJ       User Key  (r) = Recorded By, (t) = Taken By, (c) = Cosigned By    Initials Name Provider Type    Elsy Gatica PT Physical Therapist        Mobility     Row Name 06/10/20 1533          Bed Mobility Assessment/Treatment    Rolling Left [place] (Bed Mobility)  contact guard  -EJ     Sit-Supine [place] (Bed Mobility)  contact guard  -EJ     Assistive Device (Bed Mobility)  bed rails  -EJ     Comment (Bed Mobility)  extra time needed for task.   -EJ     Row Name 06/10/20 1533          Sit-Stand Transfer    Sit-Stand [place] (Transfers)  minimum assist (75% patient effort);2 person assist  -EJ     Assistive Device (Sit-Stand Transfers)  walker, front-wheeled  -EJ     Row Name 06/10/20 1533          Gait/Stairs Assessment/Training    [place] Level (Gait)  2 person assist;contact guard  -EJ     Assistive Device (Gait)  walker, front-wheeled  -EJ     Distance in Feet (Gait)  2  -EJ     Pattern (Gait)  step-to  -EJ     Comment (Gait/Stairs)  Pt takes side steps toward HOB only. Pt reports increased pain through LLE calf, preventing her from shifting weight to LLE on flat foot. Pt adjusted to TTWB only. Takes 3 sidesteps each LE.  -EJ       User Key  (r) = Recorded By, (t) = Taken By, (c) = Cosigned By    Initials Name Provider Type    Elsy Gatica PT Physical Therapist        Obj/Interventions     Row Name 06/10/20 1540          Therapeutic Exercise    Lower Extremity (Therapeutic Exercise)  LAQ (long arc quad), bilateral  -EJ     Lower Extremity Range of Motion (Therapeutic Exercise)  ankle dorsiflexion/plantar flexion, bilateral  -EJ     Sets/Reps (Therapeutic Exercise)  1/5  -EJ     Row Name 06/10/20 1540          Static Standing Balance    Level of [place] (Supported Standing, Static Balance)  maximal assist, 25 to 49% patient effort;2 person assist  -EJ       User Key  (r) = Recorded By, (t) = Taken By, (c) = Cosigned By    Initials  Name Provider Type     Elsy Hinojosa PT Physical Therapist        Goals/Plan    No documentation.       Clinical Impression     Row Name 06/10/20 1542          Pain Assessment    Additional Documentation  Pain Scale: FACES Pre/Post-Treatment (Group)  -[facility] Name 06/10/20 1542          Pain Scale: Numbers Pre/Post-Treatment    Pain Location - Orientation  generalized  -     Pain Location  shoulder  -EJ     Pain Intervention(s)  Repositioned;Ambulation/increased activity  -     Row Name 06/10/20 1542          Pain Scale: FACES Pre/Post-Treatment    Pain: FACES Scale, Pretreatment  2-->hurts little bit  -EJ     Pain: FACES Scale, Post-Treatment  2-->hurts little bit  -EJ     Pre/Post Treatment Pain Comment  pt c/o pain during movements, less complaint than last visit  -     Row Name 06/10/20 1542          Plan of Care Review    Plan of Care Reviewed With  patient  -     Progress  improving  -     Outcome Summary  Pt ambulated 3 steps towrad HOB with BLEs using RWx, CGAx2. She required MIN Ax2 for sit to stand and CGA for bed mobility. Pt c/o LLE calf pain with weightbearing-she reports causes her to TTWB as demonstated this date. PT to continue during stay as able.  -     Row Name 06/10/20 1542          Positioning and Restraints    Pre-Treatment Position  in bed  -     Post Treatment Position  bed  -EJ     In Bed  supine;call light within reach;encouraged to call for assist;exit alarm on;notified nsg;side rails up x2  -       User Key  (r) = Recorded By, (t) = Taken By, (c) = Cosigned By    Initials Name Provider Type     Elsy Hinojosa, PT Physical Therapist        Outcome Measures     Row Name 06/10/20 2989          How much help from another person do you currently need...    Turning from your back to your side while in flat bed without using bedrails?  4  -EJ     Moving from lying on back to sitting on the side of a flat bed without bedrails?  4  -EJ     Moving to and from a bed  to a chair (including a wheelchair)?  3  -EJ     Standing up from a chair using your arms (e.g., wheelchair, bedside chair)?  3  -EJ     Climbing 3-5 steps with a railing?  2  -EJ     To walk in hospital room?  3  -EJ     AM-PAC 6 Clicks Score (PT)  19  -EJ     Row Name 06/10/20 1550          Functional Assessment    Outcome Measure Options  AM-PAC 6 Clicks Basic Mobility (PT)  -       User Key  (r) = Recorded By, (t) = Taken By, (c) = Cosigned By    Initials Name Provider Type    Elsy Gatica PT Physical Therapist        Physical Therapy Education                 Title: PT OT SLP Therapies (Done)     Topic: Physical Therapy (Done)     Point: Mobility training (Done)     Description:   Instruct learner(s) on safety and technique for assisting patient out of bed, chair or wheelchair.  Instruct in the proper use of assistive devices, such as walker, crutches, cane or brace.              Patient Friendly Description:   It's important to get you on your feet again, but we need to do so in a way that is safe for you. Falling has serious consequences, and your personal safety is the most important thing of all.        When it's time to get out of bed, one of us or a family member will sit next to you on the bed to give you support.     If your doctor or nurse tells you to use a walker, crutches, a cane, or a brace, be sure you use it every time you get out of bed, even if you think you don't need it.    Learning Progress Summary           Patient Acceptance, E, VU,NR by MAURICIO at 6/10/2020 1551    Acceptance, E, NR by MAURICIO at 6/8/2020 1431    Comment:  Limited education based on pt's tolerance of PT during session.    Acceptance, E,TB, VU,NR by MAURICIO at 6/5/2020 1342    Comment:  Pt advised in log roll to avoid unnecessary pain.                   Point: Home exercise program (Done)     Description:   Instruct learner(s) on appropriate technique for monitoring, assisting and/or progressing patient with therapeutic  exercises and activities.              Learning Progress Summary           Patient Acceptance, E, VU,NR by MAURICIO at 6/10/2020 1551    Acceptance, E, NR by MAURICIO at 6/8/2020 1431    Comment:  Limited education based on pt's tolerance of PT during session.                   Point: Body mechanics (Done)     Description:   Instruct learner(s) on proper positioning and spine alignment for patient and/or caregiver during mobility tasks and/or exercises.              Learning Progress Summary           Patient Acceptance, E, VU,NR by MAURICIO at 6/10/2020 1551    Acceptance, E, NR by EJ at 6/8/2020 1431    Comment:  Limited education based on pt's tolerance of PT during session.    Acceptance, E,TB, VU,NR by MAURICIO at 6/5/2020 1342    Comment:  Pt advised in log roll to avoid unnecessary pain.                   Point: Precautions (Done)     Description:   Instruct learner(s) on prescribed precautions during mobility and gait tasks              Learning Progress Summary           Patient Acceptance, E, VU,NR by MAURICIO at 6/10/2020 1551    Acceptance, E, NR by MAURICIO at 6/8/2020 1431    Comment:  Limited education based on pt's tolerance of PT during session.    Acceptance, E,TB, VU,NR by MAURICIO at 6/5/2020 1342    Comment:  Pt advised in log roll to avoid unnecessary pain.                               User Key     Initials Effective Dates Name Provider Type Discipline     11/20/18 -  Elsy Hinojosa, PT Physical Therapist PT              PT Recommendation and Plan  Planned Therapy Interventions (PT Eval): balance training, bed mobility training, gait training, home exercise program, joint mobilization, lumbar stabilization, manual therapy techniques, motor coordination training, neuromuscular re-education, patient/family education, postural re-education, ROM (range of motion), stair training, strengthening, stretching, orthotic fitting/training, transfer training  Outcome Summary/Treatment Plan (PT)  Anticipated Discharge Disposition (PT):  inpatient rehabilitation facility  Plan of Care Reviewed With: patient  Progress: improving  Outcome Summary: Pt ambulated 3 steps towrad HOB with BLEs using RWx, CGAx2. She required MIN Ax2 for sit to stand and CGA for bed mobility. Pt c/o LLE calf pain with weightbearing-she reports causes her to TTWB as demonstated this date. PT to continue during stay as able.     Time Calculation:   PT Charges     Row Name 06/10/20 1551             Time Calculation    Start Time  1504  -EJ      PT Received On  06/10/20  -EJ      PT Goal Re-Cert Due Date  06/15/20  -EJ         Time Calculation- PT    Total Timed Code Minutes- PT  19 minute(s)  -EJ         Timed Charges    61481 - PT Therapeutic Activity Minutes  15  -EJ        User Key  (r) = Recorded By, (t) = Taken By, (c) = Cosigned By    Initials Name Provider Type     Elsy Hinojosa PT Physical Therapist        Therapy Charges for Today     Code Description Service Date Service Provider Modifiers Qty    [id number] HC PT THERAPEUTIC ACT EA 15 MIN 6/10/2020 Elsy Hinojosa PT GP 1          PT G-Codes  Outcome Measure Options: AM-PAC 6 Clicks Basic Mobility (PT)  AM-PAC 6 Clicks Score (PT): 19  AM-PAC 6 Clicks Score (OT): 15    Elsy Mario PT  6/10/2020

## 2020-06-10 NOTE — PROGRESS NOTES
Liliana Calderon  1980  [id number]    Evaluating Physician: Elsy Augustin    Chief Complaint: fever    Reason for Consultation: MRSA bacteremia    History of present illness:   Patient is a 39 y.o.  Yr old female with history of IVDU, depression/anxiety, COPD, and seizure d/o who initially presented to [facility] yesterday with c/o shortness of air and worsening low back pain. She last used IV heroin about 1 month prior.  She was doing relatively well until about 3 days PTA when she noticed worsening low back pain and worsening shortness of air with blood-tinged sputum. She additionally started having fevers up to 103.9F. She did not have contact with anyone known to have COVID-19 infection.     On arrival to [facility] on 5/31, the patient was found to have fevers and a significant leukocytosis. CT chest was concerning for pneumonia and possible septic pulm emboli. Blood cultures resulted with MRSA and she was transferred to [facility] for a higher level of care. Tmax since arrival here has been 102.7F. WBC was elevated to 14.2 but has improved to 10 on antibiotics. She was started on vancomycin and aztreonam by primary team. Repeat blood cultures from 6/1 are again positive for MRSA. TTE and MRI lumbar spine ordered. ID consulted for recommendations in the setting of her MRSA septicemia.    Subjective:    6/3/20: The patient is still having back pain. No new weakness in her lower extremities but does have extreme back pain with movement. Fevers improved. Tmax 100F. Does have some anemia with Hgb 6.4. No nausea or vomiting. Mild diarrhea. No new rash.     6/4/20: still having some back pain and had some fevers overnight. Tmax this morning was 102.7F.  Not feeling very well this morning when I saw her. No worsening of her diarrhea. No nausea. No new rash. Still able to move her extremities well with no new weakness but does have significant pain lifting her legs off the bed.     6/5/20: Patient is  "unfortunately still having fevers up to 102.5°F.  She is still not feeling well with significant back pain.  No new weakness in her lower extremities.  She is having some diarrhea but only about 2 episodes in the last 24 hours.  No nausea.  No new rashes.  She went for REDD today and was noted to have about a 1 cm x 1 cm vegetation on the tricuspid valve per my discussion with Dr. Pinedo.    6/6/2020: Patient states she is still receiving her antibiotics but has complaints of nausea and vomiting as well as diarrhea.  Discussion with nursing staff states that she is having blood-tinged  tears, bloody emesis and worsening petechial rash over her body.  RN staff states they have not noted any blood in her diarrhea at this point.  Afebrile overnight with some tachypnea noted.  Remains without leukocytosis.  6/4 body fluid culture from back returned positive for 2+ MRSA.  6/3 blood cultures currently remain no growth to date. Per RN her rash has gradually progressed over the last couple days    6/7/2020: Patient laying in bed awake Heparin stopped last pm   RN states that he was told by the tech that there was blood in the stool but did not personally check it.   She feels \"horrible\" today.  She remains with nausea, vomiting and significant amounts of diarrhea- 3 stools so far today  Documentation shows she has been afebrile and hemodynamically stable overnight. Leukocytosis is slightly improved from 14-12.  H&H has improved from yesterday's values to 11 and 38.  Platelets of 405.  7/C. difficile toxin was negative.  6/4 body fluid back aspirate positive for MRSA.  Cdiff negative  6/8 alert, afebrile, no new skin lesions, c/o discomfort at the site of the R upper arm lesion that may have a hemorrhagic center  6/10  Alert, no new skin lesions and the largest one on the right upper arm is receding and is less tender although it is starting to ulcerate  Back pain improved    Past Medical History:   Diagnosis Date   • " Anxiety and depression    • Chronic bronchitis (CMS/HCC)    • Fibromyalgia    • Heart valve problem    • Hypertension    • Migraine    • PCOS (polycystic ovarian syndrome)    • Pneumonia    • Seizures (CMS/HCC)        Past Surgical History:   Procedure Laterality Date   • CHOLECYSTECTOMY         Social History:  IVDU (heroin). Former smoker. No ETOH use.    Family History:  family history includes Alcohol abuse in her brother and sister; Alzheimer's disease in her maternal grandmother; Bone cancer in her mother; Breast cancer in her mother; Dementia in her father; Diabetes in her father; Heart disease in her father and paternal grandfather; Hypertension in her brother, father, mother, and sister; Mental illness in her brother and sister; Parkinsonism in her maternal grandmother; Pulmonary embolism in her father; Skin cancer in her mother; Stroke in her father.    Allergies   Allergen Reactions   • Penicillins Rash     Received cefepime and cefdinir in 2018   • Phenobarbital Rash       Medication:  Current Facility-Administered Medications   Medication Dose Route Frequency Provider Last Rate Last Dose   • acetaminophen (TYLENOL) tablet 650 mg  650 mg Oral Q6H PRN Malathi Vasquez APRN   650 mg at 06/10/20 0328   • bismuth subsalicylate (PEPTO BISMOL) 262 MG/15ML suspension 30 mL  30 mL Oral Q6H PRN Verenice Keller MD   30 mL at 06/09/20 1721   • budesonide-formoterol (SYMBICORT) 160-4.5 MCG/ACT inhaler 2 puff  2 puff Inhalation BID - RT Rancho Lang MD   2 puff at 06/10/20 0903   • ceftaroline (TEFLARO) 600 mg/100 mL 0.9% NS (mbp)  600 mg Intravenous BID Rancho Lang MD   600 mg at 06/10/20 0918   • DAPTOmycin (CUBICIN) 600 mg in sodium chloride 0.9 % 50 mL IVPB  8 mg/kg (Adjusted) Intravenous Q24H Rancho Lang  mL/hr at 06/10/20 0815 600 mg at 06/10/20 0815   • FLUoxetine (PROzac) capsule 40 mg  40 mg Oral Daily Vanessa Lopez MD   40 mg at 06/10/20 0814   • gabapentin (NEURONTIN)  capsule 800 mg  800 mg Oral Q8H Vanessa Lopez MD   800 mg at 06/10/20 0538   • HYDROcodone-acetaminophen (NORCO) 5-325 MG per tablet 1 tablet  1 tablet Oral Q6H PRN Vanessa Lopez MD   1 tablet at 06/10/20 0339   • hydrocortisone 2.5 % cream   Topical Q12H Verenice Keller MD       • hydrOXYzine (ATARAX) tablet 25 mg  25 mg Oral Nightly PRN Jae Cesar PA-C   25 mg at 06/09/20 2257   • ipratropium-albuterol (DUO-NEB) nebulizer solution 3 mL  3 mL Nebulization Q6H PRN Vanessa Lopez MD   3 mL at 06/05/20 0946   • lactobacillus acidophilus (RISAQUAD) capsule 1 capsule  1 capsule Oral Daily Rancho Lang MD   1 capsule at 06/10/20 0814   • levETIRAcetam (KEPPRA) tablet 500 mg  500 mg Oral Q12H Oskar Black MD   500 mg at 06/10/20 0814   • levothyroxine (SYNTHROID, LEVOTHROID) tablet 50 mcg  50 mcg Oral Daily Vanessa Lopez MD   50 mcg at 06/10/20 0538   • loperamide (IMODIUM) capsule 2 mg  2 mg Oral 4x Daily PRN Verenice Keller MD   2 mg at 06/10/20 0918   • melatonin tablet 5 mg  5 mg Oral Nightly PRN Jae Cesar PA-C   5 mg at 06/08/20 2010   • Morphine (MS CONTIN) 12 hr tablet 15 mg  15 mg Oral Q12H Verenice Keller MD   15 mg at 06/10/20 0814   • nystatin (MYCOSTATIN) powder   Topical Q12H Verenice Keller MD       • pantoprazole (PROTONIX) EC tablet 40 mg  40 mg Oral QAVanessa Michael MD   40 mg at 06/10/20 0538   • prochlorperazine (COMPAZINE) injection 5 mg  5 mg Intravenous Q6H PRN Verenice Keller MD   5 mg at 06/07/20 2316    Or   • prochlorperazine (COMPAZINE) tablet 5 mg  5 mg Oral Q6H PRN Verenice Keller MD   5 mg at 06/09/20 1015    Or   • prochlorperazine (COMPAZINE) suppository 25 mg  25 mg Rectal Q12H PRN Verenice Keller MD       • sodium chloride 0.9 % infusion  125 mL/hr Intravenous Continuous Vanessa Lopez  mL/hr at 06/10/20 0002 125 mL/hr at 06/10/20 0002   • tiZANidine (ZANAFLEX) tablet 4 mg  4 mg Oral Q8H PRN  "Vanessa Lopez MD   4 mg at 06/09/20 2257   • zolpidem (AMBIEN) tablet 5 mg  5 mg Oral Nightly PRN Verenice Keller MD   5 mg at 06/09/20 1955         Infectious History:  MRSA    Antibiotics:  Anti-Infectives (From admission, onward)    Ordered     Dose/Rate Route Frequency Start Stop    06/02/20 0819  ceftaroline (TEFLARO) 600 mg/100 mL 0.9% NS (mbp)     Rancho Lang MD reviewed the order on 06/05/20 0802.   Ordering Provider:  Rancho Lang MD    600 mg Intravenous 2 Times Daily 06/02/20 0915 06/16/20 0859    06/02/20 0743  DAPTOmycin (CUBICIN) 600 mg in sodium chloride 0.9 % 50 mL IVPB     Rancho Lang MD reviewed the order on 06/05/20 0802.   Ordering Provider:  Rancho Lang MD    8 mg/kg × 76.6 kg (Adjusted)  100 mL/hr over 30 Minutes Intravenous Every 24 Hours 06/02/20 0900 06/23/20 0859    06/01/20 1501  vancomycin 2250 mg/500 mL 0.9% NS IVPB ([facility])     Ordering Provider:  Vanessa Lopez MD    2,250 mg Intravenous Once 06/01/20 1600 06/01/20 1756            Review of Systems    As above    Physical Exam:   Vital Signs   /83 (BP Location: Left arm, Patient Position: Lying)   Pulse 105   Temp 99.3 °F (37.4 °C) (Oral)   Resp 18   Ht 165.1 cm (65\")   Wt 106 kg (233 lb)   LMP 04/01/2020 (Approximate)   SpO2 100%   BMI 38.77 kg/m²     GENERAL: Awake alert, Obese. Mild distress  HENT: Normocephalic, atraumatic.  Conjunctiva with hemorrhages bilaterally  NECK: Supple without nuchal rigidity. No mass.  HEART: RRR; No murmur, gallops or rubs noted.   LUNGS: CTAB. Nonlabored.  No wheezing noted  ABDOMEN: Soft, nontender, nondistended. Positive bowel sounds.  No guarding noted  EXT:  No cyanosis, clubbing or edema.   :   Without Petit catheter.  MSK: FROM without joint effusions noted arms/legs.  Some tenderness to palpation over her lumbar spine.  SKIN: has slightly discolored/gray patchy/papular lesions over her bilateral lower extremity- these appeared about " 2 weeks ago per the patient.  No other possible peripheral stigmata of infective endocarditis noted over her extremities or elsewhere.  She has also developed a petechial rash over her entire back bilateral upper extremities, buttocks and inguinal and abdominal folds which is improveing  On the R upper arm there remains a large lesion with a hemorrhagic center but minimal induration and no fuctuance  NEURO: Oriented to PPT. No focal deficits on motor/sensory exam at arms/legs. 5 out of 5 strength throughout  PSYCHIATRIC: Normal insight and judgement. Cooperative with PE    Laboratory Data    Results from last 7 days   Lab Units 06/10/20  0530 06/09/20  1028 06/08/20  0513   WBC 10*3/mm3 17.35* 16.06* 15.98*   HEMOGLOBIN g/dL 10.3* 11.6* 12.1   HEMATOCRIT % 33.8* 38.9 40.8   PLATELETS 10*3/mm3 370 445 463*     Results from last 7 days   Lab Units 06/10/20  0530   SODIUM mmol/L 134*   POTASSIUM mmol/L 3.4*   CHLORIDE mmol/L 102   CO2 mmol/L 18.0*   BUN mg/dL 14   CREATININE mg/dL 0.64   GLUCOSE mg/dL 151*   CALCIUM mg/dL 7.5*     Results from last 7 days   Lab Units 06/10/20  0530 06/08/20  0513   ALK PHOS U/L 135* 148*   BILIRUBIN mg/dL 0.7 0.6   BILIRUBIN DIRECT mg/dL  --  0.4*   ALT (SGPT) U/L 7 8   AST (SGOT) U/L 15 12     Results from last 7 days   Lab Units 06/07/20  0456   SED RATE mm/hr 74*     Results from last 7 days   Lab Units 06/07/20  0456   CRP mg/dL 16.14*       Estimated Creatinine Clearance: 144.1 mL/min (by C-G formula based on SCr of 0.64 mg/dL).       Microbiology:    Microbiology Results (last 10 days)     Procedure Component Value - Date/Time    Clostridium Difficile Toxin - Stool, Per Rectum [[id number]] Collected:  06/05/20 1748    Lab Status:  Final result Specimen:  Stool from Per Rectum Updated:  06/05/20 5418    Narrative:       The following orders were created for panel order Clostridium Difficile Toxin - Stool, Per Rectum.  Procedure                               Abnormality          Status                     ---------                               -----------         ------                     Clostridium Difficile To...[[id number]]  Normal              Final result                 Please view results for these tests on the individual orders.    Clostridium Difficile Toxin, PCR - Stool, Per Rectum [[id number]]  (Normal) Collected:  06/05/20 1748    Lab Status:  Final result Specimen:  Stool from Per Rectum Updated:  06/05/20 1858     C. Difficile Toxins by PCR Not Detected    Narrative:       Performance characteristics of test not established for patients <2 years of age.  Negative for Toxigenic C. Difficile    Body Fluid Culture - Aspirate, Back [[id number]]  (Abnormal)  (Susceptibility) Collected:  06/04/20 1645    Lab Status:  Final result Specimen:  Aspirate from Back Updated:  06/07/20 0701     Body Fluid Culture Light growth (2+) Staphylococcus aureus, MRSA     Comment:   Methicillin resistant Staphylococcus aureus, Patient may be an isolation risk.        Gram Stain No organisms seen      Many (4+) WBCs per low power field    Susceptibility      Staphylococcus aureus, MRSA     TYRONE     Gentamicin Susceptible     Oxacillin Resistant     Rifampin Susceptible     Vancomycin Susceptible                Susceptibility Comments     Staphylococcus aureus, MRSA    This isolate does not demonstrate inducible clindamycin resistance in vitro.               Anaerobic Culture - Aspirate, Back [[id number]] Collected:  06/04/20 1643    Lab Status:  Final result Specimen:  Aspirate from Back Updated:  06/09/20 0741     Anaerobic Culture No anaerobes isolated at 5 days    Blood Culture - Blood, Arm, Right [[id number]] Collected:  06/03/20 1229    Lab Status:  Final result Specimen:  Blood from Arm, Right Updated:  06/08/20 1401     Blood Culture No growth at 5 days    Blood Culture - Blood, Arm, Left [[id number]] Collected:  06/03/20 1229    Lab Status:  Final result Specimen:  Blood from Arm, Left Updated:   06/08/20 1401     Blood Culture No growth at 5 days    Blood Culture - Blood, Wrist, Left [[id number]]  (Abnormal) Collected:  06/01/20 1601    Lab Status:  Final result Specimen:  Blood from Wrist, Left Updated:  06/04/20 0612     Blood Culture Staphylococcus aureus, MRSA     Comment:   Infectious disease consultation is highly recommended to rule out distant foci of infection.  Methicillin resistant Staphylococcus aureus, Patient may be an isolation risk.        Isolated from Aerobic Bottle     Gram Stain Aerobic Bottle Gram positive cocci in clusters    Narrative:       Refer to previous blood culture collected on 6/1/2020 1558 for MICs.      Blood Culture - Blood, Arm, Left [[id number]]  (Abnormal)  (Susceptibility) Collected:  06/01/20 1558    Lab Status:  Final result Specimen:  Blood from Arm, Left Updated:  06/04/20 0610     Blood Culture Staphylococcus aureus, MRSA     Comment:   Infectious disease consultation is highly recommended to rule out distant foci of infection.  Methicillin resistant Staphylococcus aureus, Patient may be an isolation risk.        Isolated from Aerobic and Anaerobic Bottles     Gram Stain Aerobic Bottle Gram positive cocci in clusters      Anaerobic Bottle Gram positive cocci in clusters    Susceptibility      Staphylococcus aureus, MRSA     TYRONE     Gentamicin Susceptible     Oxacillin Resistant     Rifampin Susceptible     Vancomycin Susceptible                Susceptibility Comments     Staphylococcus aureus, MRSA    This isolate does not demonstrate inducible clindamycin resistance in vitro.               Blood Culture ID, PCR - Blood, Arm, Left [[id number]]  (Abnormal) Collected:  06/01/20 1558    Lab Status:  Final result Specimen:  Blood from Arm, Left Updated:  06/02/20 0721     BCID, PCR Staphylococcus aureus. mecA (methicillin resistance gene) detected. Identification by BCID PCR.     BOTTLE TYPE Aerobic Bottle    Urine Culture - Urine, Urine, Clean Catch [[id number]]   (Abnormal)  (Susceptibility) Collected:  05/31/20 1914    Lab Status:  Final result Specimen:  Urine, Clean Catch Updated:  06/02/20 0938     Urine Culture 50,000 CFU/mL Staphylococcus aureus, MRSA     Comment:   Methicillin resistant Staph aureus, patient may be an isolation risk.  Staphylococcus aureus is not typically regarded as a uropathogen, except in cases with genitourinary instrumentation present.  It's presence suggests an alternate source (e.g. bloodstream, abscess, SSTI, etc.).  Please evaluate accordingly.       Susceptibility      Staphylococcus aureus, MRSA     TYRONE     Nitrofurantoin Susceptible     Oxacillin Resistant     Tetracycline Susceptible     Trimethoprim + Sulfamethoxazole Susceptible     Vancomycin Susceptible                Susceptibility Comments     Staphylococcus aureus, MRSA    This isolate does not demonstrate inducible clindamycin resistance in vitro.               Blood Culture - Blood, Arm, Left [[id number]]  (Abnormal)  (Susceptibility) Collected:  05/31/20 1805    Lab Status:  Final result Specimen:  Blood from Arm, Left Updated:  06/03/20 0700     Blood Culture Staphylococcus aureus, MRSA     Comment:   Infectious disease consultation is highly recommended to rule out distant foci of infection.  Methicillin resistant Staphylococcus aureus, Patient may be an isolation risk.        Isolated from Aerobic and Anaerobic Bottles     Gram Stain Anaerobic Bottle Gram positive cocci in pairs and clusters      Aerobic Bottle Gram positive cocci in pairs and clusters    Susceptibility      Staphylococcus aureus, MRSA     TYRONE     Gentamicin Susceptible     Oxacillin Resistant     Rifampin Susceptible     Vancomycin Susceptible                Susceptibility Comments     Staphylococcus aureus, MRSA    This isolate does not demonstrate inducible clindamycin resistance in vitro.               Blood Culture ID, PCR - Blood, Arm, Left [[id number]]  (Abnormal) Collected:  05/31/20 1805    Lab  Status:  Final result Specimen:  Blood from Arm, Left Updated:  06/01/20 0628     BCID, PCR Staphylococcus aureus. mecA (methicillin resistance gene) detected. Identification by BCID PCR.    Blood Culture - Blood, Arm, Right [[id number]]  (Abnormal) Collected:  05/31/20 1757    Lab Status:  Final result Specimen:  Blood from Arm, Right Updated:  06/03/20 0701     Blood Culture Staphylococcus aureus, MRSA     Comment:   Infectious disease consultation is highly recommended to rule out distant foci of infection.  Methicillin resistant Staphylococcus aureus, Patient may be an isolation risk.        Isolated from Aerobic and Anaerobic Bottles     Gram Stain Aerobic Bottle Gram positive cocci in pairs and clusters      Anaerobic Bottle Gram positive cocci in pairs and clusters    Narrative:       Refer to  blood culture collected on 5/31/2020 1805 for MICs.    Influenza Antigen, Rapid - Swab, Nasopharynx [[id number]]  (Normal) Collected:  05/31/20 1752    Lab Status:  Edited Result - FINAL Specimen:  Swab from Nasopharynx Updated:  06/09/20 1221     Influenza A Ag, EIA Negative     Influenza B Ag, EIA Negative    COVID-19, URI IN-HOUSE, NP SWAB IN TRANSPORT MEDIA 8-12 HR TAT - Swab, Nasopharynx [[id number]]  (Normal) Collected:  05/31/20 1743    Lab Status:  Final result Specimen:  Swab from Nasopharynx Updated:  06/01/20 1420     COVID19 Not Detected          Radiology:  Xr Shoulder 2+ View Right    Result Date: 6/8/2020  No acute bony abnormality.  D:  06/08/2020 E:  06/08/2020  This report was finalized on 6/8/2020 4:30 PM by Dr. Jasmine Marie MD.      Ct Chest With Contrast    Result Date: 6/5/2020  Abnormal chest CT with moderate dependent bilateral pleural effusions. There are bilateral parenchymal changes in the lungs including well-circumscribed nodules which likely represent septic emboli and a poorly defined nodules and areas of airspace change which could represent septic emboli or pneumonia. Findings most  likely are all infectious. There are small reactive nodes in the mediastinum. The spleen is prominent in size but incompletely imaged. No upper abdominal adenopathy. No acute bone lesions in the chest Signer Name: Anushka Gutierrez MD  Signed: 6/5/2020 8:44 PM  Workstation Name: [id number]  [facility]    Ct Guided Biopsy Aspiration Injection    Result Date: 6/4/2020   Percutaneous aspiration of very tiny abscess collection adjacent to vertebral bodies/within the erector spinae muscle, yielding 2 mL of purulent appearing fluid. No drainage catheter was left in place - too small for drainage catheter/contraindicated to leave drainage catheter in collection with possible communication with epidural space  Plan:  Follow-up lab results. Continued care with primary team. ______________________________________________________________________  PROCEDURE SUMMARY: - Aspiration of tiny abscess pockets under fluoroscopic guidance - Additional procedure(s): None  PROCEDURE DETAILS:  Pre-procedure Consent: Informed consent for the procedure including risks, benefits and alternatives was obtained and time-out was performed prior to the procedure. Preparation: The site was prepared and draped using maximal sterile barrier technique including cutaneous antisepsis.  Anesthesia/sedation Level of anesthesia/sedation: Moderate sedation (conscious sedation) Anesthesia/sedation administered by: Independent trained observer under attending supervision with continuous monitoring of the patient?s level of consciousness and physiologic status Total intra-service sedation time (minutes): 12  Fluid collection aspiration The patient was positioned prone on. Initial imaging was performed. Local anesthesia was administered. The fluid collection was accessed using a 22-gauge spinal needle under CT fluoroscopic guidance. Position within the fluid collection was confirmed, and fluid aspiration was performed. All instruments  were then removed. - Initial imaging findings: Fluid collections identified on comparison MRI lumbar spine 6/4/2020 not as well visualized on CT. - Aspiration needle/catheter: 22-gauge spinal needle - Post-aspiration imaging findings: No immediate complication  Contrast Contrast agent: None Contrast volume (mL): 0  Radiation Dose CT dose length product (mGy-cm): 975  Additional Details Additional description of procedure: None Equipment details: None Specimens removed: Aspirated fluid sent for analysis. Estimated blood loss (mL): Less than 10 Standardized report: DrainageAspiration  Attestation I was present and scrubbed for the entire procedure. Imaging reviewed. Agree with final report as written.  This report was finalized on 6/4/2020 5:36 PM by Yimi Huggins.      Mri Lumbar Spine With & Without Contrast    Result Date: 6/4/2020  1. Approximately 8 x 4 x 3 cm multilocular abscess of the right erector spinae muscles, approximately from L3 to S1. 2. Two possibly interconnected dorsal epidural abscesses at L2-L3 and L3-L4, but causing only mild canal stenosis. 3. No evidence to suggest osteomyelitis or discitis. 4. Moderate-sized L4-L5 and L5-S1 disc protrusions as discussed above.  D:  06/03/2020 E:  06/03/2020  This report was finalized on 6/4/2020 10:45 PM by Dr. William Boateng MD.           Impression:     This is a 38 yo woman with a h/o IVDU presenting with severe low back pain and shortness of air and found to have fevers/sepsis with MRSA growing from multiple blood culture sets. Also has CT chest findings that are concerning for septic pulmonary emboli and now noted to have a decent size vegetation on her tricuspid valve on REDD consistent with infective endocarditis. MRI with 8x4 x3 cm multilobular abscess of the right erector spinae muscles, approximately from L3-S1. Also with two possible interconnected dorsal epidural abscesses at L2-L3 and L3-L4 but only causing mild canal stenosis. Dr. Alex with  neurosurgery has evaluated and does not think that she requires any surgical intervention at this time for her epidural abscesses as they are modest. Underwent CT-guided drainage  and had 2cc of purulent drainage aspirated from her fluid collection in erector spinae muscles. Repeat blood culture sent 6/3 is negative    REDD with a 1 cm x 1 cm vegetation on the tricuspid valve    It appears that her petechial rash has progressed somewhat but her renal function, PT/INR and platelet count are stable which makes DIC less likely      Problems:  1. 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 MRSA septicemia  Bacteremia appears to have cleared  3. Epidural abscesses  4. Large loculated Paraspinal muscle abscess- s/p aspiration which is culture positive  5. Generalized petechial rash- possibly worsening but w/o other signs of DIC  6. Conjunctival hemorrhages  7. Hematemesis- evidently resolved after the heparin was stopped  Wonder about ischemia from mesenteric emboli but normal lactic acid makes this less likely  8. Sepsis with fever, leukocytosis with neutrophilia, tachycardia- ongoing  9. Pneumonia/lung nodules- concern for septic pulmonary emboli  10. Acute on chronic low back pain  11. Urine cx with MRSA- represents systemic infection, not primary source  12. Microcytic anemia  13. History of PCN allergy   14. Recent IV drug use  15. Obesity  16. H/o COPD  17. H/o seizure disorder  18. H/o depression/anxiety    PLAN: Thank you for asking us to see Liliana Calderon, I recommend the followin. Continue to follow cbc with diff, bmp, esr, and crp closely. Weekly cpk level  2. Surveillance blood cultures from 6/3/20 -NGTD  3. S/p CT-guided drainage of her right paraspinal abscess with only 2cc fluid aspirated- returned positive for MRSA  4. Continue daptomycin 8mg/kg IV q24h. Baseline CPK level was 48 and remains  normal  5. Continue Teflaro 600 mg IV BID (has tolerated cephalosporins in the recent past)  6. Will consider repeat MRI L-spine in 2-3 weeks. Neurosurgery following  7. CT surgery following  8. GI panel     Complex set of medical issues requiring a high-level medical decision making.  The patient does have significant risk for for further morbidity and mortality in the setting of her severe MRSA infection. Has some risk for paralysis if her epidural abscesses worsen.    Prognosis guarded        Elsy Augustin MD  6/10/2020

## 2020-06-10 NOTE — PLAN OF CARE
"  Problem: Patient Care Overview  Goal: Plan of Care Review  Outcome: Ongoing (interventions implemented as appropriate)  Flowsheets (Taken 6/10/2020 1832)  Progress: no change  Plan of Care Reviewed With: patient  Outcome Summary: VSS. Pt states she is still having a hard time catching her breath. C/o of back pain. Pt has slept on and off this afternoon. In regards to \"bloody stool\", pt stated she has not had a peroid since Feburary. Pt thinks blood is coming from her rectum, however, pt has blood up and around her groin prior to going to the restroom. H&H's are being monitored. Potassium is being replaced.  is aware of the pt, working in her a bed placement. It may be a few days. Will continue to monitor.     Problem: Fall Risk (Adult)  Goal: Identify Related Risk Factors and Signs and Symptoms  Outcome: Ongoing (interventions implemented as appropriate)     Problem: Sepsis/Septic Shock (Adult)  Goal: Signs and Symptoms of Listed Potential Problems Will be Absent, Minimized or Managed (Sepsis/Septic Shock)  Outcome: Ongoing (interventions implemented as appropriate)     Problem: Pain, Chronic (Adult)  Goal: Identify Related Risk Factors and Signs and Symptoms  Outcome: Ongoing (interventions implemented as appropriate)     Problem: Skin Injury Risk (Adult)  Goal: Identify Related Risk Factors and Signs and Symptoms  Outcome: Ongoing (interventions implemented as appropriate)     "

## 2020-06-10 NOTE — PROGRESS NOTES
[facility] Medicine Services  PROGRESS NOTE    Patient Name: Liliana Calderon  : 1980  MRN: [id number]    Date of Admission: 2020  Primary Care Physician: Ollie Flores MD    Subjective   Subjective     CC:  Follow-up for MRSA bacteremia, tricuspid valve endocarditis, epidural abscesses, and septic pulmonary emboli    HPI:  No acute events overnight.  Patient still complaining of significant nausea, Imodium has helped her diarrhea, and her cramping has gotten better with Pepto-Bismol.  Denies any fever or chills.    Review of Systems  CV- No chest pain, palpitations  Resp- No cough, dyspnea    Objective   Objective     Vital Signs:   Temp:  [97.8 °F (36.6 °C)-99.3 °F (37.4 °C)] 99.3 °F (37.4 °C)  Heart Rate:  [] 105  Resp:  [16-20] 18  BP: (141-190)/() 141/83        Physical Exam:  Constitutional: No acute distress, awake, alert  HENT: NCAT, mucous membranes moist  Respiratory: Clear to auscultation bilaterally, respiratory effort normal on room air  Cardiovascular: RRR, no murmurs  Gastrointestinal: Positive bowel sounds, soft, nontender, nondistended  Psychiatric: Appropriate affect, cooperative  Neurologic: Oriented x 3, Cranial Nerves grossly intact to confrontation, speech clear  Skin: Petechiae on bilateral feet stable, bruise on right upper arm stable    Results Reviewed:  Results from last 7 days   Lab Units 06/10/20  0530 20  1028 20  0513  20  0921   WBC 10*3/mm3 17.35* 16.06* 15.98*   < > 9.26   HEMOGLOBIN g/dL 10.3* 11.6* 12.1   < > 9.7*  9.7*   HEMATOCRIT % 33.8* 38.9 40.8   < > 33.1*  33.1*   PLATELETS 10*3/mm3 370 445 463*   < > 282   INR   --   --   --   --  1.23*    < > = values in this interval not displayed.     Results from last 7 days   Lab Units 06/10/20  0530 20  1028 20  0513 20  0456   SODIUM mmol/L 134* 135* 137 134*   POTASSIUM mmol/L 3.4* 3.7 3.9 3.9   CHLORIDE mmol/L 102 104 103 100   CO2  mmol/L 18.0* 19.0* 20.0* 22.0   BUN mg/dL 14 11 11 9   CREATININE mg/dL 0.64 0.55* 0.52* 0.49*   GLUCOSE mg/dL 151* 148* 167* 146*   CALCIUM mg/dL 7.5* 7.6* 7.9* 8.0*   ALT (SGPT) U/L 7  --  8 11   AST (SGOT) U/L 15  --  12 13     Estimated Creatinine Clearance: 144.1 mL/min (by C-G formula based on SCr of 0.64 mg/dL).    Microbiology Results Abnormal     Procedure Component Value - Date/Time    Anaerobic Culture - Aspirate, Back [[id number]] Collected:  06/04/20 1643    Lab Status:  Final result Specimen:  Aspirate from Back Updated:  06/09/20 0741     Anaerobic Culture No anaerobes isolated at 5 days    Blood Culture - Blood, Arm, Right [[id number]] Collected:  06/03/20 1229    Lab Status:  Final result Specimen:  Blood from Arm, Right Updated:  06/08/20 1401     Blood Culture No growth at 5 days    Blood Culture - Blood, Arm, Left [[id number]] Collected:  06/03/20 1229    Lab Status:  Final result Specimen:  Blood from Arm, Left Updated:  06/08/20 1401     Blood Culture No growth at 5 days    Body Fluid Culture - Aspirate, Back [[id number]]  (Abnormal)  (Susceptibility) Collected:  06/04/20 1645    Lab Status:  Final result Specimen:  Aspirate from Back Updated:  06/07/20 0701     Body Fluid Culture Light growth (2+) Staphylococcus aureus, MRSA     Comment:   Methicillin resistant Staphylococcus aureus, Patient may be an isolation risk.        Gram Stain No organisms seen      Many (4+) WBCs per low power field    Susceptibility      Staphylococcus aureus, MRSA     TYRONE     Gentamicin Susceptible     Oxacillin Resistant     Rifampin Susceptible     Vancomycin Susceptible                Susceptibility Comments     Staphylococcus aureus, MRSA    This isolate does not demonstrate inducible clindamycin resistance in vitro.               Clostridium Difficile Toxin - Stool, Per Rectum [[id number]] Collected:  06/05/20 7495    Lab Status:  Final result Specimen:  Stool from Per Rectum Updated:  06/05/20 5166    Narrative:        The following orders were created for panel order Clostridium Difficile Toxin - Stool, Per Rectum.  Procedure                               Abnormality         Status                     ---------                               -----------         ------                     Clostridium Difficile To...[[id number]]  Normal              Final result                 Please view results for these tests on the individual orders.    Clostridium Difficile Toxin, PCR - Stool, Per Rectum [[id number]]  (Normal) Collected:  06/05/20 1748    Lab Status:  Final result Specimen:  Stool from Per Rectum Updated:  06/05/20 1858     C. Difficile Toxins by PCR Not Detected    Narrative:       Performance characteristics of test not established for patients <2 years of age.  Negative for Toxigenic C. Difficile    Blood Culture - Blood, Wrist, Left [[id number]]  (Abnormal) Collected:  06/01/20 1601    Lab Status:  Final result Specimen:  Blood from Wrist, Left Updated:  06/04/20 0612     Blood Culture Staphylococcus aureus, MRSA     Comment:   Infectious disease consultation is highly recommended to rule out distant foci of infection.  Methicillin resistant Staphylococcus aureus, Patient may be an isolation risk.        Isolated from Aerobic Bottle     Gram Stain Aerobic Bottle Gram positive cocci in clusters    Narrative:       Refer to previous blood culture collected on 6/1/2020 1558 for MICs.      Blood Culture - Blood, Arm, Left [[id number]]  (Abnormal)  (Susceptibility) Collected:  06/01/20 1558    Lab Status:  Final result Specimen:  Blood from Arm, Left Updated:  06/04/20 0610     Blood Culture Staphylococcus aureus, MRSA     Comment:   Infectious disease consultation is highly recommended to rule out distant foci of infection.  Methicillin resistant Staphylococcus aureus, Patient may be an isolation risk.        Isolated from Aerobic and Anaerobic Bottles     Gram Stain Aerobic Bottle Gram positive cocci in clusters      Anaerobic  Bottle Gram positive cocci in clusters    Susceptibility      Staphylococcus aureus, MRSA     TYRONE     Gentamicin Susceptible     Oxacillin Resistant     Rifampin Susceptible     Vancomycin Susceptible                Susceptibility Comments     Staphylococcus aureus, MRSA    This isolate does not demonstrate inducible clindamycin resistance in vitro.               Blood Culture ID, PCR - Blood, Arm, Left [[id number]]  (Abnormal) Collected:  06/01/20 1558    Lab Status:  Final result Specimen:  Blood from Arm, Left Updated:  06/02/20 0721     BCID, PCR Staphylococcus aureus. mecA (methicillin resistance gene) detected. Identification by BCID PCR.     BOTTLE TYPE Aerobic Bottle          Imaging Results (Last 24 Hours)     ** No results found for the last 24 hours. **          Results for orders placed during the hospital encounter of 06/01/20   Adult Transesophageal Echo (REDD) W/ Cont if Necessary Per Protocol    Narrative · Left ventricular systolic function is normal. Estimated EF appears to be   in the range of 56 - 60%  · The tricuspid valve is abnormal. There is a large vegetation measuring 1   x 1.5 cm on the tricuspid valve. Predominantly located on the posterior   leaflet but anterior leaflet appears involved as well.  · Estimated right ventricular systolic pressure from tricuspid   regurgitation is moderately elevated (45-55 mmHg).  · Mild to moderate mitral regurgitation noted with an eccentric jet.   However no vegetations visualized on the mitral valve  · The other cardiac valves appear free of vegetations  · Discussed findings with ordering provider.          I have reviewed the medications:  Scheduled Meds:  budesonide-formoterol 2 puff Inhalation BID - RT   ceftaroline 600 mg Intravenous BID   DAPTOmycin 8 mg/kg (Adjusted) Intravenous Q24H   FLUoxetine 40 mg Oral Daily   gabapentin 800 mg Oral Q8H   hydrocortisone  Topical Q12H   lactobacillus acidophilus 1 capsule Oral Daily   levETIRAcetam 500 mg Oral  Q12H   levothyroxine 50 mcg Oral Daily   Morphine 15 mg Oral Q12H   nystatin  Topical Q12H   pantoprazole 40 mg Oral QAM     Continuous Infusions:  sodium chloride 125 mL/hr Last Rate: 125 mL/hr (06/10/20 0002)     PRN Meds:.•  acetaminophen  •  bismuth subsalicylate  •  HYDROcodone-acetaminophen  •  hydrOXYzine  •  ipratropium-albuterol  •  loperamide  •  melatonin  •  prochlorperazine **OR** prochlorperazine **OR** prochlorperazine  •  tiZANidine  •  zolpidem    Assessment/Plan   Assessment & Plan     Active Hospital Problems    Diagnosis  POA   • **Sepsis (CMS/HCC) [A41.9]  Yes   • Infective endocarditis [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        Brief Hospital Course to date:  Liliana Calderon is a 39 y.o. female with a past medical history of depression/anxiety, fibromyalgia, COPD, hypertension, seizure disorder, and remote IV drug use who was admitted on June 1 for sepsis with MRSA bacteremia, UTI, pneumonia, and epidural abscesses. ID following, currently on Dapto and Teflaro.      MRSA bacteremia, UTI, pneumonia, tricuspid valve endocarditis, and epidural abscesses  -Had REDD with a tricuspid valve vegetation approximately 1 cm x 1.2 cm in size  -Repeat CT chest with dependent bilateral pleural effusions, with bilateral parenchymal changes including well-circumscribed nodules representing septic emboli and poorly defined nodules  -We will continue antibiotics per ID recommendations  -CT surgery consult-patient needs Angiovac which cannot be done here.  Transfer to      Diarrhea  -C. difficile not detected, continue probiotic, improving on Imodium     Nausea and vomiting  -   Improved with Compazine, will continue this for now     Right shoulder pain  -Appears erythematous, warm, and tender to the touch and now there is a new rash there, she is already on antibiotics, continue these for now  -X-ray of right shoulder without acute fracture or dislocation    Rash, stable  -Unclear etiology, blood counts continue to stay normal  -Continue hydrocortisone cream     Acute on chronic low back pain  -Secondary to epidural abscesses-continue scheduled MS Contin and Norco as needed  -Had CT-guided drainage of her right paraspinal abscess on June 4 that was positive for MRSA-continue antibiotics-will need repeat imaging at some point  -Add Flexeril as needed 3 times daily    DVT Prophylaxis: Subcu heparin     Disposition: I expect the patient to be discharged pending transfer to .  I spoke with cardiologist Dr. Delarosa, who accepted the patient, and hospitalist will accept the patient as primary.  I spoke with Dr. Jesus Garcia.  Currently there medicine service is full and transfer may take a couple of days.    CODE STATUS:   Code Status and Medical Interventions:   Ordered at: 06/01/20 1441     Code Status:    CPR     Medical Interventions (Level of Support Prior to Arrest):    Full         Electronically signed by Verenice Keller MD, 06/10/20, 12:55.

## 2020-06-10 NOTE — PLAN OF CARE
Problem: Patient Care Overview  Goal: Plan of Care Review  Flowsheets (Taken 6/10/2020 3747)  Progress: improving  Plan of Care Reviewed With: patient  Outcome Summary: Pt ambulated 3 steps towrad HOB with BLEs using RWx, CGAx2. She required MIN Ax2 for sit to stand and CGA for bed mobility. Pt c/o LLE calf pain with weightbearing-she reports causes her to TTWB as demonstated this date. PT to continue during stay as able.

## 2020-06-10 NOTE — PROGRESS NOTES
Continued Stay Note  [facility]     Patient Name: Liliana Calderon  MRN: [id number]  Today's Date: 6/10/2020    Admit Date: 6/1/2020    Discharge Plan     Row Name 06/10/20 1440       Plan    Plan Transfer to Another Facility    Plan Comments It is noted the patient will be transferred to  for further care.    Row Name 06/10/20 1420       Plan    Plan  Transfer to another facility    Plan Comments  Looks like patient is to be transferred to  later this week.        Discharge Codes    No documentation.             Lynda Cardozo   PSS, Chemical Dependency Team  Ext. [phone]

## 2020-06-10 NOTE — PROGRESS NOTES
Continued Stay Note  [facility]     Patient Name: Liliana Calderon  MRN: [id number]  Today's Date: 6/10/2020    Admit Date: 6/1/2020    Discharge Plan     Row Name 06/10/20 0935       Plan    Plan  Update    Plan Comments  CTSurgery still monitoring and suggesting angio vac of vegetation on tricuspid valve. ID cont. to monitor abx. therapy. Still very sick. Will cont. to follow.     Final Discharge Disposition Code  30 - still a patient        Discharge Codes    No documentation.             Komal Schuster RN

## 2020-06-10 NOTE — PROGRESS NOTES
CTS Progress Note              LOS: 9 days   Patient Care Team:  Ollie Flores MD as PCP - General (Internal Medicine)    Chief Complaint:  Abdominal pain, diarrhea    Subjective  Continued low back pain, abdominal pain and diarrhea per nursing.  Asleep in bed this morning.    Objective    Vital Signs  Temp:  [97.8 °F (36.6 °C)-98.5 °F (36.9 °C)] 98.5 °F (36.9 °C)  Heart Rate:  [] 113  Resp:  [16-20] 20  BP: (150-190)/() 179/113    Physical Exam:   General Appearance: Asleep               Lungs: scattered rhonchi   Heart: regular rhythm & normal rate, normal S1, S2, no murmur, no gallop, no rub and no click   Skin: Warm, dry   Abdomen: Soft, nontender, bowel sounds present throughout.  Results     Results from last 7 days   Lab Units 06/10/20  0530   WBC 10*3/mm3 17.35*   HEMOGLOBIN g/dL 10.3*   HEMATOCRIT % 33.8*   PLATELETS 10*3/mm3 370     Results from last 7 days   Lab Units 06/10/20  0530   SODIUM mmol/L 134*   POTASSIUM mmol/L 3.4*   CHLORIDE mmol/L 102   CO2 mmol/L 18.0*   BUN mg/dL 14   CREATININE mg/dL 0.64   GLUCOSE mg/dL 151*   CALCIUM mg/dL 7.5*           Imaging Results (Last 24 Hours)     ** No results found for the last 24 hours. **          Assessment      Sepsis (CMS/HCC)    Essential hypertension    Fibromyalgia syndrome    GERD without esophagitis    DDD (degenerative disc disease), cervical    Anxiety    Depression    Seizure (CMS/HCC)    Migraine    Infective endocarditis    Epidural abscess    MRSA bacteremia  Seeing patient for tricuspid bacterial endocarditis  White count of 17k  Continue IV antibiotics per ID      Plan   Continue antibiotics as per ID.  Comorbidities as per hospitalist. We will continue to follow with you. Patient would best be served by angio VAC removal of tricuspid vegetation    Daily Crooks PA-C  06/10/20  07:23

## 2020-06-10 NOTE — PROGRESS NOTES
Continued Stay Note  [facility]     Patient Name: Liliana Calderon  MRN: [id number]  Today's Date: 6/10/2020    Admit Date: 6/1/2020    Discharge Plan     Row Name 06/10/20 1420       Plan    Plan  Transfer to another facility    Plan Comments  Looks like patient is to be transferred to  later this week.        Discharge Codes    No documentation.             Bhumika Mooney RN ,BSN   Addiction Coordinator

## 2020-06-11 ENCOUNTER — APPOINTMENT (OUTPATIENT)
Dept: GENERAL RADIOLOGY | Facility: HOSPITAL | Age: 40
End: 2020-06-11

## 2020-06-11 ENCOUNTER — APPOINTMENT (OUTPATIENT)
Dept: CT IMAGING | Facility: HOSPITAL | Age: 40
End: 2020-06-11

## 2020-06-11 ENCOUNTER — APPOINTMENT (OUTPATIENT)
Dept: NEUROLOGY | Facility: HOSPITAL | Age: 40
End: 2020-06-11

## 2020-06-11 LAB
ALBUMIN SERPL-MCNC: 2.2 G/DL (ref 3.5–5.2)
ALBUMIN/GLOB SERPL: 0.7 G/DL
ALP SERPL-CCNC: 123 U/L (ref 39–117)
ALT SERPL W P-5'-P-CCNC: 7 U/L (ref 1–33)
ANION GAP SERPL CALCULATED.3IONS-SCNC: 13 MMOL/L (ref 5–15)
AST SERPL-CCNC: 12 U/L (ref 1–32)
BASOPHILS # BLD MANUAL: 0.1 10*3/MM3 (ref 0–0.2)
BASOPHILS NFR BLD AUTO: 1 % (ref 0–1.5)
BILIRUB SERPL-MCNC: 0.8 MG/DL (ref 0.2–1.2)
BUN BLD-MCNC: 14 MG/DL (ref 6–20)
BUN/CREAT SERPL: 21.2 (ref 7–25)
CALCIUM SPEC-SCNC: 7.8 MG/DL (ref 8.6–10.5)
CHLORIDE SERPL-SCNC: 102 MMOL/L (ref 98–107)
CO2 SERPL-SCNC: 20 MMOL/L (ref 22–29)
CREAT BLD-MCNC: 0.66 MG/DL (ref 0.57–1)
DEPRECATED RDW RBC AUTO: 52 FL (ref 37–54)
DOHLE BODIES: PRESENT
EOSINOPHIL # BLD MANUAL: 0.31 10*3/MM3 (ref 0–0.4)
EOSINOPHIL NFR BLD MANUAL: 3 % (ref 0.3–6.2)
ERYTHROCYTE [DISTWIDTH] IN BLOOD BY AUTOMATED COUNT: 18.9 % (ref 12.3–15.4)
GFR SERPL CREATININE-BSD FRML MDRD: 100 ML/MIN/1.73
GLOBULIN UR ELPH-MCNC: 3 GM/DL
GLUCOSE BLD-MCNC: 130 MG/DL (ref 65–99)
GLUCOSE BLDC GLUCOMTR-MCNC: 106 MG/DL (ref 70–130)
HCT VFR BLD AUTO: 30.8 % (ref 34–46.6)
HGB BLD-MCNC: 9.3 G/DL (ref 12–15.9)
HYPOCHROMIA BLD QL: ABNORMAL
LIPASE SERPL-CCNC: 9 U/L (ref 13–60)
LYMPHOCYTES # BLD MANUAL: 0.51 10*3/MM3 (ref 0.7–3.1)
LYMPHOCYTES NFR BLD MANUAL: 4 % (ref 5–12)
LYMPHOCYTES NFR BLD MANUAL: 5 % (ref 19.6–45.3)
MCH RBC QN AUTO: 22.9 PG (ref 26.6–33)
MCHC RBC AUTO-ENTMCNC: 30.2 G/DL (ref 31.5–35.7)
MCV RBC AUTO: 75.9 FL (ref 79–97)
MICROCYTES BLD QL: ABNORMAL
MONOCYTES # BLD AUTO: 0.41 10*3/MM3 (ref 0.1–0.9)
NEUTROPHILS # BLD AUTO: 8.91 10*3/MM3 (ref 1.7–7)
NEUTROPHILS NFR BLD MANUAL: 64 % (ref 42.7–76)
NEUTS BAND NFR BLD MANUAL: 23 % (ref 0–5)
PLAT MORPH BLD: NORMAL
PLATELET # BLD AUTO: 276 10*3/MM3 (ref 140–450)
PMV BLD AUTO: 8.9 FL (ref 6–12)
POTASSIUM BLD-SCNC: 3.9 MMOL/L (ref 3.5–5.2)
PROT SERPL-MCNC: 5.2 G/DL (ref 6–8.5)
RBC # BLD AUTO: 4.06 10*6/MM3 (ref 3.77–5.28)
SODIUM BLD-SCNC: 135 MMOL/L (ref 136–145)
TROPONIN T SERPL-MCNC: <0.01 NG/ML (ref 0–0.03)
WBC NRBC COR # BLD: 10.24 10*3/MM3 (ref 3.4–10.8)

## 2020-06-11 PROCEDURE — 85025 COMPLETE CBC W/AUTO DIFF WBC: CPT | Performed by: INTERNAL MEDICINE

## 2020-06-11 PROCEDURE — 99231 SBSQ HOSP IP/OBS SF/LOW 25: CPT | Performed by: PHYSICIAN ASSISTANT

## 2020-06-11 PROCEDURE — 99233 SBSQ HOSP IP/OBS HIGH 50: CPT | Performed by: HOSPITALIST

## 2020-06-11 PROCEDURE — 25010000002 ONDANSETRON PER 1 MG: Performed by: HOSPITALIST

## 2020-06-11 PROCEDURE — 85007 BL SMEAR W/DIFF WBC COUNT: CPT | Performed by: INTERNAL MEDICINE

## 2020-06-11 PROCEDURE — 74018 RADEX ABDOMEN 1 VIEW: CPT

## 2020-06-11 PROCEDURE — 82962 GLUCOSE BLOOD TEST: CPT

## 2020-06-11 PROCEDURE — 25010000002 MORPHINE PER 10 MG: Performed by: NURSE PRACTITIONER

## 2020-06-11 PROCEDURE — 74177 CT ABD & PELVIS W/CONTRAST: CPT

## 2020-06-11 PROCEDURE — 25010000002 MORPHINE PER 10 MG: Performed by: HOSPITALIST

## 2020-06-11 PROCEDURE — 93010 ELECTROCARDIOGRAM REPORT: CPT | Performed by: INTERNAL MEDICINE

## 2020-06-11 PROCEDURE — 25010000002 LEVETRIRACETAM PER 10 MG: Performed by: PSYCHIATRY & NEUROLOGY

## 2020-06-11 PROCEDURE — 95816 EEG AWAKE AND DROWSY: CPT

## 2020-06-11 PROCEDURE — 93005 ELECTROCARDIOGRAM TRACING: CPT | Performed by: NURSE PRACTITIONER

## 2020-06-11 PROCEDURE — 93005 ELECTROCARDIOGRAM TRACING: CPT | Performed by: HOSPITALIST

## 2020-06-11 PROCEDURE — 63710000001 DIPHENHYDRAMINE PER 50 MG: Performed by: NURSE PRACTITIONER

## 2020-06-11 PROCEDURE — 87040 BLOOD CULTURE FOR BACTERIA: CPT | Performed by: INTERNAL MEDICINE

## 2020-06-11 PROCEDURE — 25010000002 CEFTAROLINE FOSAMIL PER 10 MG: Performed by: INTERNAL MEDICINE

## 2020-06-11 PROCEDURE — 70450 CT HEAD/BRAIN W/O DYE: CPT

## 2020-06-11 PROCEDURE — 83690 ASSAY OF LIPASE: CPT | Performed by: INTERNAL MEDICINE

## 2020-06-11 PROCEDURE — 25010000002 HYDROMORPHONE PER 4 MG: Performed by: HOSPITALIST

## 2020-06-11 PROCEDURE — 84484 ASSAY OF TROPONIN QUANT: CPT | Performed by: NURSE PRACTITIONER

## 2020-06-11 PROCEDURE — 80053 COMPREHEN METABOLIC PANEL: CPT | Performed by: INTERNAL MEDICINE

## 2020-06-11 PROCEDURE — 25010000002 DAPTOMYCIN PER 1 MG: Performed by: INTERNAL MEDICINE

## 2020-06-11 PROCEDURE — 94799 UNLISTED PULMONARY SVC/PX: CPT

## 2020-06-11 PROCEDURE — 25010000002 IOPAMIDOL 61 % SOLUTION: Performed by: HOSPITALIST

## 2020-06-11 PROCEDURE — 99222 1ST HOSP IP/OBS MODERATE 55: CPT | Performed by: PSYCHIATRY & NEUROLOGY

## 2020-06-11 PROCEDURE — 63710000001 PROCHLORPERAZINE MALEATE PER 5 MG: Performed by: INTERNAL MEDICINE

## 2020-06-11 RX ORDER — MORPHINE SULFATE 2 MG/ML
1 INJECTION, SOLUTION INTRAMUSCULAR; INTRAVENOUS ONCE
Status: COMPLETED | OUTPATIENT
Start: 2020-06-11 | End: 2020-06-11

## 2020-06-11 RX ORDER — DIPHENHYDRAMINE HCL 25 MG
25 CAPSULE ORAL ONCE
Status: COMPLETED | OUTPATIENT
Start: 2020-06-11 | End: 2020-06-11

## 2020-06-11 RX ORDER — ONDANSETRON 2 MG/ML
4 INJECTION INTRAMUSCULAR; INTRAVENOUS EVERY 6 HOURS PRN
Status: DISCONTINUED | OUTPATIENT
Start: 2020-06-11 | End: 2020-06-16 | Stop reason: HOSPADM

## 2020-06-11 RX ORDER — HYDROCODONE BITARTRATE AND ACETAMINOPHEN 5; 325 MG/1; MG/1
1 TABLET ORAL EVERY 6 HOURS PRN
Status: DISCONTINUED | OUTPATIENT
Start: 2020-06-11 | End: 2020-06-16 | Stop reason: HOSPADM

## 2020-06-11 RX ORDER — MORPHINE SULFATE 2 MG/ML
1 INJECTION, SOLUTION INTRAMUSCULAR; INTRAVENOUS
Status: DISCONTINUED | OUTPATIENT
Start: 2020-06-11 | End: 2020-06-16 | Stop reason: HOSPADM

## 2020-06-11 RX ORDER — HYDROMORPHONE HYDROCHLORIDE 1 MG/ML
0.25 INJECTION, SOLUTION INTRAMUSCULAR; INTRAVENOUS; SUBCUTANEOUS EVERY 4 HOURS PRN
Status: DISCONTINUED | OUTPATIENT
Start: 2020-06-11 | End: 2020-06-16 | Stop reason: HOSPADM

## 2020-06-11 RX ADMIN — CYCLOBENZAPRINE HYDROCHLORIDE 5 MG: 10 TABLET, FILM COATED ORAL at 10:23

## 2020-06-11 RX ADMIN — CYCLOBENZAPRINE HYDROCHLORIDE 5 MG: 10 TABLET, FILM COATED ORAL at 20:16

## 2020-06-11 RX ADMIN — BUDESONIDE AND FORMOTEROL FUMARATE DIHYDRATE 2 PUFF: 160; 4.5 AEROSOL RESPIRATORY (INHALATION) at 07:19

## 2020-06-11 RX ADMIN — HYDROCORTISONE: 25 CREAM TOPICAL at 08:38

## 2020-06-11 RX ADMIN — HYDROXYZINE HYDROCHLORIDE 25 MG: 25 TABLET, FILM COATED ORAL at 20:16

## 2020-06-11 RX ADMIN — IOPAMIDOL 95 ML: 612 INJECTION, SOLUTION INTRAVENOUS at 13:15

## 2020-06-11 RX ADMIN — POTASSIUM CHLORIDE 40 MEQ: 10 CAPSULE, COATED, EXTENDED RELEASE ORAL at 00:40

## 2020-06-11 RX ADMIN — MORPHINE SULFATE 15 MG: 15 TABLET, FILM COATED, EXTENDED RELEASE ORAL at 20:16

## 2020-06-11 RX ADMIN — ZOLPIDEM TARTRATE 5 MG: 5 TABLET ORAL at 20:16

## 2020-06-11 RX ADMIN — LOPERAMIDE HYDROCHLORIDE 2 MG: 2 CAPSULE ORAL at 06:02

## 2020-06-11 RX ADMIN — LEVOTHYROXINE SODIUM 50 MCG: 50 TABLET ORAL at 04:47

## 2020-06-11 RX ADMIN — ACETAMINOPHEN 650 MG: 325 TABLET, FILM COATED ORAL at 04:47

## 2020-06-11 RX ADMIN — LOPERAMIDE HYDROCHLORIDE 2 MG: 2 CAPSULE ORAL at 08:36

## 2020-06-11 RX ADMIN — HYDROMORPHONE HYDROCHLORIDE 0.25 MG: 1 INJECTION, SOLUTION INTRAMUSCULAR; INTRAVENOUS; SUBCUTANEOUS at 20:17

## 2020-06-11 RX ADMIN — LEVETIRACETAM 750 MG: 100 INJECTION, SOLUTION INTRAVENOUS at 20:14

## 2020-06-11 RX ADMIN — PANTOPRAZOLE SODIUM 40 MG: 40 TABLET, DELAYED RELEASE ORAL at 04:48

## 2020-06-11 RX ADMIN — Medication 1 CAPSULE: at 08:35

## 2020-06-11 RX ADMIN — ONDANSETRON 4 MG: 2 INJECTION INTRAMUSCULAR; INTRAVENOUS at 10:20

## 2020-06-11 RX ADMIN — CEFTAROLINE FOSAMIL 600 MG: 600 POWDER, FOR SOLUTION INTRAVENOUS at 08:37

## 2020-06-11 RX ADMIN — AZTREONAM 2 G: 2 INJECTION, POWDER, LYOPHILIZED, FOR SOLUTION INTRAMUSCULAR; INTRAVENOUS at 17:38

## 2020-06-11 RX ADMIN — MORPHINE SULFATE 1 MG: 2 INJECTION, SOLUTION INTRAMUSCULAR; INTRAVENOUS at 06:17

## 2020-06-11 RX ADMIN — LOPERAMIDE HYDROCHLORIDE 2 MG: 2 CAPSULE ORAL at 00:37

## 2020-06-11 RX ADMIN — HYDROCORTISONE: 25 CREAM TOPICAL at 00:38

## 2020-06-11 RX ADMIN — DIPHENHYDRAMINE HYDROCHLORIDE 25 MG: 25 CAPSULE ORAL at 05:01

## 2020-06-11 RX ADMIN — MORPHINE SULFATE 1 MG: 2 INJECTION, SOLUTION INTRAMUSCULAR; INTRAVENOUS at 23:00

## 2020-06-11 RX ADMIN — NYSTATIN: 100000 POWDER TOPICAL at 08:38

## 2020-06-11 RX ADMIN — LEVETIRACETAM 500 MG: 500 TABLET ORAL at 08:36

## 2020-06-11 RX ADMIN — METRONIDAZOLE 500 MG: 500 INJECTION, SOLUTION INTRAVENOUS at 17:38

## 2020-06-11 RX ADMIN — CYCLOBENZAPRINE HYDROCHLORIDE 5 MG: 10 TABLET, FILM COATED ORAL at 00:37

## 2020-06-11 RX ADMIN — HYDROCORTISONE 1 APPLICATION: 25 CREAM TOPICAL at 20:15

## 2020-06-11 RX ADMIN — HYDROCODONE BITARTRATE AND ACETAMINOPHEN 1 TABLET: 5; 325 TABLET ORAL at 00:37

## 2020-06-11 RX ADMIN — GABAPENTIN 800 MG: 400 CAPSULE ORAL at 04:47

## 2020-06-11 RX ADMIN — GABAPENTIN 800 MG: 400 CAPSULE ORAL at 20:15

## 2020-06-11 RX ADMIN — HYDROCODONE BITARTRATE AND ACETAMINOPHEN 1 TABLET: 5; 325 TABLET ORAL at 06:02

## 2020-06-11 RX ADMIN — HYDROMORPHONE HYDROCHLORIDE 0.25 MG: 1 INJECTION, SOLUTION INTRAMUSCULAR; INTRAVENOUS; SUBCUTANEOUS at 16:05

## 2020-06-11 RX ADMIN — BUDESONIDE AND FORMOTEROL FUMARATE DIHYDRATE 2 PUFF: 160; 4.5 AEROSOL RESPIRATORY (INHALATION) at 20:18

## 2020-06-11 RX ADMIN — FLUOXETINE HYDROCHLORIDE 40 MG: 20 CAPSULE ORAL at 08:35

## 2020-06-11 RX ADMIN — ONDANSETRON 4 MG: 2 INJECTION INTRAMUSCULAR; INTRAVENOUS at 16:05

## 2020-06-11 RX ADMIN — MORPHINE SULFATE 1 MG: 2 INJECTION, SOLUTION INTRAMUSCULAR; INTRAVENOUS at 17:46

## 2020-06-11 RX ADMIN — MORPHINE SULFATE 1 MG: 2 INJECTION, SOLUTION INTRAMUSCULAR; INTRAVENOUS at 11:35

## 2020-06-11 RX ADMIN — MORPHINE SULFATE 1 MG: 2 INJECTION, SOLUTION INTRAMUSCULAR; INTRAVENOUS at 13:45

## 2020-06-11 RX ADMIN — PROCHLORPERAZINE MALEATE 5 MG: 5 TABLET ORAL at 04:47

## 2020-06-11 RX ADMIN — DAPTOMYCIN 600 MG: 500 INJECTION, POWDER, LYOPHILIZED, FOR SOLUTION INTRAVENOUS at 08:37

## 2020-06-11 RX ADMIN — NYSTATIN 1 APPLICATION: 100000 POWDER TOPICAL at 20:14

## 2020-06-11 RX ADMIN — MORPHINE SULFATE 15 MG: 15 TABLET, FILM COATED, EXTENDED RELEASE ORAL at 08:36

## 2020-06-11 NOTE — PROGRESS NOTES
[facility] Medicine Services  PROGRESS NOTE    Patient Name: Liliana Calderon  : 1980  MRN: [id number]    Date of Admission: 2020  Primary Care Physician: Ollie Flores MD    Subjective   Subjective     CC:  Sepsis POA    HPI:  Pain reported this morning.  IV morphine given early this am.  Called again about pain.  RRT called for unresponsiveness.  Not communicating at first, but then ultimately she says she needs something for pain.  She had reported projectile vomiting today.  Seen by ID immediately prior to Rapid Response    Review of Systems    Unable to assess    Objective   Objective     Vital Signs:   Temp:  [98 °F (36.7 °C)-100.4 °F (38 °C)] 100.4 °F (38 °C)  Heart Rate:  [100-116] 112  Resp:  [18-24] 24  BP: (140-148)/(74-98) 141/89        Physical Exam:    Constitutional: No acute distress, awake, alert  HENT: NCAT, mucous membranes moist  Respiratory: Clear to auscultation bilaterally, respiratory effort normal   Cardiovascular: tachycardic, s1 and s2, + murmur  Gastrointestinal: hypoactive bowel sounds, distended, obese abdomen, no guarding  Musculoskeletal: No bilateral ankle edema  Psychiatric: Appropriate affect, cooperative  Neurologic: Oriented x 3, strength symmetric in all extremities, Cranial Nerves grossly intact to confrontation, speech clear  Skin: feet petechiae, Right upper arm bruise    Results Reviewed:  Results from last 7 days   Lab Units 06/11/20  0409 06/10/20  0530 06/09/20  1028  20  0921   WBC 10*3/mm3 10.24 17.35* 16.06*   < > 9.26   HEMOGLOBIN g/dL 9.3* 10.3* 11.6*   < > 9.7*  9.7*   HEMATOCRIT % 30.8* 33.8* 38.9   < > 33.1*  33.1*   PLATELETS 10*3/mm3 276 370 445   < > 282   INR   --   --   --   --  1.23*    < > = values in this interval not displayed.     Results from last 7 days   Lab Units 20  0409 06/10/20  0530 20  1028 20  0513   SODIUM mmol/L 135* 134* 135* 137   POTASSIUM mmol/L 3.9 3.4* 3.7 3.9      CHLORIDE mmol/L 102 102 104 103   CO2 mmol/L 20.0* 18.0* 19.0* 20.0*   BUN mg/dL 14 14 11 11   CREATININE mg/dL 0.66 0.64 0.55* 0.52*   GLUCOSE mg/dL 130* 151* 148* 167*   CALCIUM mg/dL 7.8* 7.5* 7.6* 7.9*   ALT (SGPT) U/L 7 7  --  8   AST (SGOT) U/L 12 15  --  12   TROPONIN T ng/mL <0.010  --   --   --      Estimated Creatinine Clearance: 139.8 mL/min (by C-G formula based on SCr of 0.66 mg/dL).    Microbiology Results Abnormal     Procedure Component Value - Date/Time    Stool Culture (Reference Lab) - Stool, Per Rectum [[id number]] Collected:  06/08/20 0350    Lab Status:  Preliminary result Specimen:  Stool from Per Rectum Updated:  06/10/20 2008     Salmonella/Shigella Screen Final report     Result 1 Comment     Comment: No Salmonella or Shigella recovered.        E coli, Shiga toxin Assay Negative    Narrative:       Performed at:  [address] [place] [address]  [id number]  : Lam Roman PhD, Phone:  [phone]    Anaerobic Culture - Aspirate, Back [[id number]] Collected:  06/04/20 1643    Lab Status:  Final result Specimen:  Aspirate from Back Updated:  06/09/20 0741     Anaerobic Culture No anaerobes isolated at 5 days    Blood Culture - Blood, Arm, Right [[id number]] Collected:  06/03/20 1229    Lab Status:  Final result Specimen:  Blood from Arm, Right Updated:  06/08/20 1401     Blood Culture No growth at 5 days    Blood Culture - Blood, Arm, Left [[id number]] Collected:  06/03/20 1229    Lab Status:  Final result Specimen:  Blood from Arm, Left Updated:  06/08/20 1401     Blood Culture No growth at 5 days    Body Fluid Culture - Aspirate, Back [[id number]]  (Abnormal)  (Susceptibility) Collected:  06/04/20 1645    Lab Status:  Final result Specimen:  Aspirate from Back Updated:  06/07/20 0701     Body Fluid Culture Light growth (2+) Staphylococcus aureus, MRSA     Comment:   Methicillin resistant Staphylococcus aureus, Patient may be an isolation risk.        Gram Stain  No organisms seen      Many (4+) WBCs per low power field    Susceptibility      Staphylococcus aureus, MRSA     TYRONE     Gentamicin Susceptible     Oxacillin Resistant     Rifampin Susceptible     Vancomycin Susceptible                Susceptibility Comments     Staphylococcus aureus, MRSA    This isolate does not demonstrate inducible clindamycin resistance in vitro.               Clostridium Difficile Toxin - Stool, Per Rectum [[id number]] Collected:  06/05/20 1748    Lab Status:  Final result Specimen:  Stool from Per Rectum Updated:  06/05/20 1858    Narrative:       The following orders were created for panel order Clostridium Difficile Toxin - Stool, Per Rectum.  Procedure                               Abnormality         Status                     ---------                               -----------         ------                     Clostridium Difficile To...[[id number]]  Normal              Final result                 Please view results for these tests on the individual orders.    Clostridium Difficile Toxin, PCR - Stool, Per Rectum [[id number]]  (Normal) Collected:  06/05/20 1748    Lab Status:  Final result Specimen:  Stool from Per Rectum Updated:  06/05/20 1858     C. Difficile Toxins by PCR Not Detected    Narrative:       Performance characteristics of test not established for patients <2 years of age.  Negative for Toxigenic C. Difficile    Blood Culture - Blood, Wrist, Left [[id number]]  (Abnormal) Collected:  06/01/20 1601    Lab Status:  Final result Specimen:  Blood from Wrist, Left Updated:  06/04/20 0612     Blood Culture Staphylococcus aureus, MRSA     Comment:   Infectious disease consultation is highly recommended to rule out distant foci of infection.  Methicillin resistant Staphylococcus aureus, Patient may be an isolation risk.        Isolated from Aerobic Bottle     Gram Stain Aerobic Bottle Gram positive cocci in clusters    Narrative:       Refer to previous blood culture collected on  6/1/2020 1558 for MICs.      Blood Culture - Blood, Arm, Left [[id number]]  (Abnormal)  (Susceptibility) Collected:  06/01/20 1558    Lab Status:  Final result Specimen:  Blood from Arm, Left Updated:  06/04/20 0610     Blood Culture Staphylococcus aureus, MRSA     Comment:   Infectious disease consultation is highly recommended to rule out distant foci of infection.  Methicillin resistant Staphylococcus aureus, Patient may be an isolation risk.        Isolated from Aerobic and Anaerobic Bottles     Gram Stain Aerobic Bottle Gram positive cocci in clusters      Anaerobic Bottle Gram positive cocci in clusters    Susceptibility      Staphylococcus aureus, MRSA     TYRONE     Gentamicin Susceptible     Oxacillin Resistant     Rifampin Susceptible     Vancomycin Susceptible                Susceptibility Comments     Staphylococcus aureus, MRSA    This isolate does not demonstrate inducible clindamycin resistance in vitro.               Blood Culture ID, PCR - Blood, Arm, Left [[id number]]  (Abnormal) Collected:  06/01/20 1558    Lab Status:  Final result Specimen:  Blood from Arm, Left Updated:  06/02/20 0721     BCID, PCR Staphylococcus aureus. mecA (methicillin resistance gene) detected. Identification by BCID PCR.     BOTTLE TYPE Aerobic Bottle          Imaging Results (Last 24 Hours)     Procedure Component Value Units Date/Time    XR Abdomen KUB [[id number]] Collected:  06/11/20 0955     Updated:  06/11/20 1014    Narrative:       EXAMINATION: XR ABDOMEN KUB- 06/11/2020     INDICATION: vomiting; Z74.09-Other reduced mobility; Z78.9-Other  specified health status      COMPARISON: NONE     FINDINGS: Supine views of the abdomen reveal dilated gas-filled small  bowel measuring up to 6.4 cm in diameter concerning for obstructive  bowel gas pattern as there is paucity of air throughout the large bowel.  No gross intraperitoneal free air.           Impression:       Severely dilated gas-filled small bowel loops throughout  the  mid abdomen consistent with obstructive bowel gas pattern.     D:  06/11/2020  E:  06/11/2020                Results for orders placed during the hospital encounter of 06/01/20   Adult Transesophageal Echo (REDD) W/ Cont if Necessary Per Protocol    Narrative · Left ventricular systolic function is normal. Estimated EF appears to be   in the range of 56 - 60%  · The tricuspid valve is abnormal. There is a large vegetation measuring 1   x 1.5 cm on the tricuspid valve. Predominantly located on the posterior   leaflet but anterior leaflet appears involved as well.  · Estimated right ventricular systolic pressure from tricuspid   regurgitation is moderately elevated (45-55 mmHg).  · Mild to moderate mitral regurgitation noted with an eccentric jet.   However no vegetations visualized on the mitral valve  · The other cardiac valves appear free of vegetations  · Discussed findings with ordering provider.        I have reviewed the medications:  Scheduled Meds:  budesonide-formoterol 2 puff Inhalation BID - RT   ceftaroline 600 mg Intravenous BID   DAPTOmycin 8 mg/kg (Adjusted) Intravenous Q24H   FLUoxetine 40 mg Oral Daily   gabapentin 800 mg Oral Q8H   hydrocortisone  Topical Q12H   lactobacillus acidophilus 1 capsule Oral Daily   levETIRAcetam 500 mg Oral Q12H   levothyroxine 50 mcg Oral Daily   Morphine 15 mg Oral Q12H   nystatin  Topical Q12H   pantoprazole 40 mg Oral QAM     Continuous Infusions:  sodium chloride 125 mL/hr Last Rate: 125 mL/hr (06/10/20 1949)     PRN Meds:.•  acetaminophen  •  bismuth subsalicylate  •  cyclobenzaprine  •  HYDROcodone-acetaminophen  •  hydrOXYzine  •  ipratropium-albuterol  •  loperamide  •  melatonin  •  Morphine  •  ondansetron  •  potassium chloride **OR** potassium chloride **OR** potassium chloride  •  prochlorperazine **OR** prochlorperazine **OR** prochlorperazine  •  zolpidem    Assessment/Plan   Assessment & Plan     Active Hospital Problems    Diagnosis  POA   •  **Sepsis (CMS/HCC) [A41.9]  Yes   • Infective endocarditis [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        Brief Hospital Course to date:  Liliana Calderon is a 39 y.o. female with past medical history of depression/anxiety, fibromyalgia, COPD, hypertension, seizure disorder, and remote IV drug use who was admitted on June 1 for sepsis with MRSA bacteremia, UTI, pneumonia and epidural abscess.  ID has been following.  Today she has had intractable pain issues and ultimately started having projectile vomiting with vomitus that look like stool.  Abdominal x-ray is concerning for bowel obstruction.  Surgery has been consulted and a CT with contrast has been ordered    Projectile vomiting  -Abdominal x-ray  -CT of the abdomen pelvis with IV contrast and oral contrast  -General surgery evaluation  -NG tube    Unresponsiveness  -Intermittent periods of responding to questioning  -EEG  -Neurology evaluation    MRSA bacteremia  -She is being treated for UTI pneumonia tricuspid valve endocarditis and epidural abscesses  -REDD with tricuspid valve vegetation approximately 1 cm x 1.2 cm  -CT chest concerning for pneumonia  -Possibly has septic pulmonary emboli  -IV antibiotics per ID  -Plan to transfer to [facility] due to need for angiolymphatic which cannot be done here    Diarrhea  -Probiotic  -Negative for C. Difficile  -Imodium as needed    Acute on chronic nausea and vomiting  -Add antiemetics today  -Worsening GI status  -CT abdomen pelvis    Right shoulder pain  -Warmth, erythema and tender to  the touch  -X-ray of the right shoulder with no acute fracture or dislocation    Rash  -Stable  -Unclear etiology  -Cortisone cream    Acute on chronic low back pain  -Suspected due to epidural abscesses  -Pain medication as needed  -She had a CT-guided drainage of a right para spinal abscess on June 4 was positive for MRSA  -Flexeril has been added 3 times a day    RRT today due to change in status - reported as unresponsiveness    CT abdomen/pelvis with contrast - General Surgery Consultation    DVT Prophylaxis:  SCDs    Daily Care Communication  Due to current limited visitation policies, an attempt will be made daily to update patient's identified best point-of-contact(s)   Contact:  Madison Mccallum   Relation:   sister   Type of communication (phone or televideo):   phone   Time of communication:  11:57 am   Notes (if applicable):  Discussed condition and RRT today     Disposition: I expect the patient to be discharged TBD    CODE STATUS:   Code Status and Medical Interventions:   Ordered at: 06/01/20 1441     Code Status:    CPR     Medical Interventions (Level of Support Prior to Arrest):    Full       Electronically signed by Nathan Ashley MD, 06/11/20, 11:44.

## 2020-06-11 NOTE — CONSULTS
General Surgery Consultation Note    Date of Service: 6/11/2020  Liliana Calderon  [id number]  1980      Referring Provider: Nathan Ashley MD    Location of Consult: Hospital floor     Reason for Consultation: Nausea and vomiting       History of Present Illness:  I am seeing, Liliana Calderon, in consultation for Nathan Ashley MD regarding nausea and vomiting.  39-year-old young lady presented through the emergency room with fever and bacteremia on 6/1/2020.  She has a history of IV drug abuse, polysubstance abuse, hypertension, anxiety/depression, and fibromyalgia.  Over the last 24 hours she has had worsening nausea and vomiting, which was feculent according to Dr. Ashley.  A CT scan of the abdomen and pelvis was obtained by her medical team which demonstrated a possible bowel obstruction.  Her last bowel movement was this morning which she relates as normal.  Her clinical picture is likely resultant from septic emboli.  She had a nasogastric tube placed this morning which had voluminous return and essentially resulted in resolution of her abdominal complaints.  She has not passed any flatus today.    Problem List Items Addressed This Visit     None      Visit Diagnoses     Impaired mobility and ADLs    -  Primary          Past Medical History:   Diagnosis Date   • Anxiety and depression    • Chronic bronchitis (CMS/HCC)    • Fibromyalgia    • Heart valve problem    • Hypertension    • Migraine    • PCOS (polycystic ovarian syndrome)    • Pneumonia    • Seizures (CMS/HCC)        Past Surgical History:   • CHOLECYSTECTOMY       Allergies   Allergen Reactions   • Penicillins Rash     Received cefepime and cefdinir in 2018   • Phenobarbital Rash       No current facility-administered medications on file prior to encounter.      Current Outpatient Medications on File Prior to Encounter   Medication Sig Dispense Refill   • azithromycin (ZITHROMAX) 250 MG tablet Take 1 tablet by mouth Daily. Take 2 tablets  the first day, then 1 tablet daily for 4 days. 6 tablet 0   • bisoprolol (ZEBeta) 5 MG tablet Take 1 tablet by mouth Daily. 30 tablet 0   • budesonide-formoterol (SYMBICORT) 160-4.5 MCG/ACT inhaler Inhale 2 puffs 2 (Two) Times a Day. 1 inhaler 0   • ferrous sulfate 324 (65 Fe) MG tablet delayed-release EC tablet Take 1 tablet by mouth 2 (Two) Times a Day With Meals. 60 tablet 0   • flunisolide (NASALIDE) 25 MCG/ACT (0.025%) solution nasal spray Inhale 1 spray Every 12 (Twelve) Hours. 1 bottle 5   • FLUoxetine (PROzac) 40 MG capsule Take 40 mg by mouth Daily.     • gabapentin (NEURONTIN) 800 MG tablet Take 800 mg by mouth 3 (Three) Times a Day.     • hydrOXYzine (VISTARIL) 25 MG capsule Take 25 mg by mouth 3 (Three) Times a Day.  0   • ipratropium-albuterol (DUO-NEB) 0.5-2.5 mg/3 ml nebulizer INHALE 1 VIAL (3 MLS)  BY NEBULIZATION TWO TIMES DAILY  3   • lansoprazole (PREVACID) 30 MG capsule Take 30 mg by mouth Daily.     • levETIRAcetam (KEPPRA) 500 MG tablet Take 1 tablet by mouth Every 12 (Twelve) Hours. 60 tablet 3   • levoFLOXacin (LEVAQUIN) 500 MG tablet TAKE 1 TABLET BY MOUTH EVERY 24 HOURS UNTIL GONE  0   • levothyroxine (SYNTHROID, LEVOTHROID) 50 MCG tablet Take 50 mcg by mouth Daily. 200001  3   • lisinopril (PRINIVIL,ZESTRIL) 2.5 MG tablet Take 2.5 mg by mouth Daily. 200001  2   • lisinopril-hydrochlorothiazide (PRINZIDE,ZESTORETIC) 20-12.5 MG per tablet Take 1 tablet by mouth Daily.     • metroNIDAZOLE (FLAGYL) 500 MG tablet TAKE 1 TABLET BY MOUTH EVERY TWELVE HOURS UNTIL GONE  0   • ondansetron ODT (ZOFRAN-ODT) 4 MG disintegrating tablet Take 1 tablet by mouth Every 6 (Six) Hours As Needed for Nausea or Vomiting. 20 tablet 0   • potassium chloride (K-DUR) 10 MEQ CR tablet TAKE 2 TABLETS BY MOUTH TWO TIMES A DAY WITH FOOD  0   • SUMAtriptan (IMITREX) 50 MG tablet Take one tablet at onset of headache. May repeat dose one time in 2 hours if headache not relieved. 9 tablet 3   • tiZANidine (ZANAFLEX) 4 MG  tablet 1 tablet Every 8 (Eight) Hours.  0         Current Facility-Administered Medications:   •  acetaminophen (TYLENOL) tablet 650 mg, 650 mg, Oral, Q6H PRN, Malathi Vasquez APRN, 650 mg at 06/11/20 0447  •  [START ON 6/12/2020] aztreonam (AZACTAM) 2 g/100 mL 0.9% NS (mbp), 2 g, Intravenous, Q8H, Elsy Augustin MD  •  bismuth subsalicylate (PEPTO BISMOL) 262 MG/15ML suspension 30 mL, 30 mL, Oral, Q6H PRN, Verenice Keller MD, 30 mL at 06/09/20 1721  •  budesonide-formoterol (SYMBICORT) 160-4.5 MCG/ACT inhaler 2 puff, 2 puff, Inhalation, BID - RT, Rancho Lang MD, 2 puff at 06/11/20 0719  •  cyclobenzaprine (FLEXERIL) tablet 5 mg, 5 mg, Oral, TID PRN, Verenice Keller MD, 5 mg at 06/11/20 1023  •  DAPTOmycin (CUBICIN) 600 mg in sodium chloride 0.9 % 50 mL IVPB, 8 mg/kg (Adjusted), Intravenous, Q24H, Rancho Lang MD, Last Rate: 100 mL/hr at 06/11/20 0837, 600 mg at 06/11/20 0837  •  diatrizoate meglumine-sodium (GASTROGRAFIN) 66-10 % solution  - ADS Override Pull, , , ,   •  FLUoxetine (PROzac) capsule 40 mg, 40 mg, Oral, Daily, Vanessa Lopez MD, 40 mg at 06/11/20 0835  •  gabapentin (NEURONTIN) capsule 800 mg, 800 mg, Oral, Q8H, Vanessa Lopez MD, 800 mg at 06/11/20 0447  •  HYDROcodone-acetaminophen (NORCO) 5-325 MG per tablet 1 tablet, 1 tablet, Oral, Q6H PRN, Nathan Ashley MD  •  hydrocortisone 2.5 % cream, , Topical, Q12H, Verenice Keller MD  •  HYDROmorphone (DILAUDID) injection 0.25 mg, 0.25 mg, Intravenous, Q4H PRN, Nathan Ashley MD, 0.25 mg at 06/11/20 1605  •  hydrOXYzine (ATARAX) tablet 25 mg, 25 mg, Oral, Nightly PRN, Jae Cesar PA-C, 25 mg at 06/10/20 1941  •  ipratropium-albuterol (DUO-NEB) nebulizer solution 3 mL, 3 mL, Nebulization, Q6H PRN, Vanessa Lopez MD, 3 mL at 06/10/20 2043  •  lactobacillus acidophilus (RISAQUAD) capsule 1 capsule, 1 capsule, Oral, Daily, Rancho Lang MD, 1 capsule at 06/11/20 0835  •  levETIRAcetam (KEPPRA)  750 mg in sodium chloride 0.9 % 100 mL IVPB, 750 mg, Intravenous, Q12H, Elizabeth Brown MD  •  levothyroxine (SYNTHROID, LEVOTHROID) tablet 50 mcg, 50 mcg, Oral, Daily, Vanessa Lopez MD, 50 mcg at 06/11/20 0447  •  loperamide (IMODIUM) capsule 2 mg, 2 mg, Oral, 4x Daily PRN, Verenice Keller MD, 2 mg at 06/11/20 0836  •  melatonin tablet 5 mg, 5 mg, Oral, Nightly PRN, Jae Cesar PA-C, 5 mg at 06/10/20 1941  •  metroNIDAZOLE (FLAGYL) 500 mg/100mL IVPB, 500 mg, Intravenous, Q12H, Elsy Augustin MD, 500 mg at 06/11/20 1738  •  Morphine (MS CONTIN) 12 hr tablet 15 mg, 15 mg, Oral, Q12H, Verenice Keller MD, 15 mg at 06/11/20 0836  •  morphine injection 1 mg, 1 mg, Intravenous, Q2H PRN, Nathan Ashley MD, 1 mg at 06/11/20 1746  •  nystatin (MYCOSTATIN) powder, , Topical, Q12H, Verenice Keller MD  •  ondansetron (ZOFRAN) injection 4 mg, 4 mg, Intravenous, Q6H PRN, Nathan Ashley MD, 4 mg at 06/11/20 1605  •  pantoprazole (PROTONIX) EC tablet 40 mg, 40 mg, Oral, QAM, Vanessa Lopez MD, 40 mg at 06/11/20 0448  •  potassium chloride (MICRO-K) CR capsule 40 mEq, 40 mEq, Oral, PRN, 40 mEq at 06/11/20 0040 **OR** potassium chloride (KLOR-CON) packet 40 mEq, 40 mEq, Oral, PRN **OR** potassium chloride 10 mEq in 100 mL IVPB, 10 mEq, Intravenous, Q1H PRN, Verenice Keller MD  •  prochlorperazine (COMPAZINE) injection 5 mg, 5 mg, Intravenous, Q6H PRN, 5 mg at 06/07/20 2316 **OR** prochlorperazine (COMPAZINE) tablet 5 mg, 5 mg, Oral, Q6H PRN, 5 mg at 06/11/20 0447 **OR** prochlorperazine (COMPAZINE) suppository 25 mg, 25 mg, Rectal, Q12H PRN, Verenice Keller MD  •  sodium chloride 0.9 % infusion, 125 mL/hr, Intravenous, Continuous, Vanesas Lopez MD, Last Rate: 125 mL/hr at 06/10/20 1949, 125 mL/hr at 06/10/20 1949  •  zolpidem (AMBIEN) tablet 5 mg, 5 mg, Oral, Nightly PRN, Verenice Keller MD, 5 mg at 06/10/20 1941    Family History   Problem Relation Age of Onset   • Breast cancer  Mother    • Bone cancer Mother    • Skin cancer Mother    • Hypertension Mother    • Dementia Father    • Diabetes Father    • Heart disease Father    • Hypertension Father    • Stroke Father    • Pulmonary embolism Father    • Alcohol abuse Sister    • Hypertension Sister    • Mental illness Sister    • Alcohol abuse Brother    • Hypertension Brother    • Mental illness Brother    • Alzheimer's disease Maternal Grandmother    • Parkinsonism Maternal Grandmother    • Heart disease Paternal Grandfather      Social History     Socioeconomic History   • Marital status: Single     Spouse name: Not on file   • Number of children: Not on file   • Years of education: Not on file   • Highest education level: Not on file   Tobacco Use   • Smoking status: Former Smoker     Packs/day: 0.25     Years: 5.00     Pack years: 1.25     Types: Cigarettes     Last attempt to quit:      Years since quittin.4   • Smokeless tobacco: Never Used   Substance and Sexual Activity   • Alcohol use: No   • Drug use: Not Currently     Types: IV     Comment: HEROIN; last used a month ago   • Sexual activity: Defer       Review of Systems:  Review of Systems   Constitutional: Positive for appetite change, fatigue and fever. Negative for chills.   HENT: Negative for drooling, hearing loss and sinus pain.    Eyes: Negative for photophobia and visual disturbance.   Respiratory: Positive for cough and shortness of breath. Negative for apnea.    Cardiovascular: Positive for leg swelling. Negative for chest pain.   Gastrointestinal: Positive for abdominal distention, nausea and vomiting. Negative for abdominal pain and diarrhea.   Endocrine: Negative for polyphagia and polyuria.   Genitourinary: Negative for difficulty urinating, enuresis and hematuria.   Musculoskeletal: Negative for arthralgias, joint swelling and neck stiffness.   Skin: Positive for rash.   Allergic/Immunologic: Negative for immunocompromised state.   Neurological: Negative  "for syncope, numbness and headaches.   Hematological: Negative for adenopathy.   Psychiatric/Behavioral: Negative for agitation, confusion and hallucinations.     Otherwise the 12 point review of systems is negative.    BP (!) 124/33 (BP Location: Right arm, Patient Position: Lying)   Pulse (!) 138   Temp (!) 102.2 °F (39 °C) (Axillary)   Resp 26   Ht 165.1 cm (65\")   Wt 106 kg (233 lb)   LMP 04/01/2020 (Approximate)   SpO2 98%   BMI 38.77 kg/m²   Body mass index is 38.77 kg/m².    General: Mild distress  HEENT: PER, subconjunctival hemorrhage on the right, minimally icteric bilaterally  Cardiac: regular rhythm, tachycardic, no audible rubs  Pulmonary: bilateral breath sounds, non labored, adventitial sounds noted at the bilateral bases  Abdominal: Obese, minimal tenderness in the epigastric area, no generalized peritonitis  Neurologic: awake, alert, no obvious focal deficits  Extremities: warm, no edema  Skin: Petechial rash noted in arms and lower extremities    CBC  Results from last 7 days   Lab Units 06/11/20  0409   WBC 10*3/mm3 10.24   HEMOGLOBIN g/dL 9.3*   HEMATOCRIT % 30.8*   PLATELETS 10*3/mm3 276       CMP  Results from last 7 days   Lab Units 06/11/20  0409   SODIUM mmol/L 135*   POTASSIUM mmol/L 3.9   CHLORIDE mmol/L 102   CO2 mmol/L 20.0*   BUN mg/dL 14   CREATININE mg/dL 0.66   CALCIUM mg/dL 7.8*   BILIRUBIN mg/dL 0.8   ALK PHOS U/L 123*   ALT (SGPT) U/L 7   AST (SGOT) U/L 12   GLUCOSE mg/dL 130*       Radiology  Imaging Results (Last 72 Hours)     Procedure Component Value Units Date/Time    CT Abdomen Pelvis With Contrast [[id number]] Collected:  06/11/20 1338     Updated:  06/11/20 1424    Narrative:       EXAMINATION: CT ABDOMEN AND PELVIS W CONTRAST-06/11/2020:      INDICATION: Projectile vomiting; Z74.09-Other reduced mobility;  Z78.9-Other specified health status.      TECHNIQUE: CT abdomen and pelvis with intravenous contrast.     The radiation dose reduction device was turned on for " each scan per the  ALARA (As Low as Reasonably Achievable) protocol.     COMPARISON: CT dated 06/05/2020.     FINDINGS: Opacifications within the right middle lobe extending  anteriorly fairly confluent as seen on prior comparison of limited  evaluation with bilateral small volume pleural effusions. Liver without  focal lesion, demonstrating perihepatic ascites. The spleen is  unremarkable. The gallbladder is surgically absent. The pancreas is  unremarkable. Adrenals without distinct nodule. Kidneys without  hydronephrosis or hydroureter. No bulky retroperitoneal adenopathy.  Patent nonaneurysmal abdominal aorta. Portal veins and IVC patent. GI  tract evaluation demonstrates severe distention of the gastric lumen  with air-fluid level along with severely dilated small bowel loops with  air-fluid levels extending into the right lower quadrant where there is  caliber change of likely transition point adjacent to the right iliac  vessels. There is fecalization throughout the small bowel additionally  noted, however, no distinct pneumatosis despite mesenteric edema and  surrounding free fluid. No evidence for perforation or abscess. The  large bowel is decompressed and unremarkable. The pelvic viscera  unremarkable without bulky pelvic adenopathy. Degenerative changes of  the spine with body wall edema.       Impression:       1.  Small bowel obstruction with severely dilated small bowel loops and  air-fluid levels extending into the right lower quadrant adjacent to the  right iliac vessels where there is caliber change concerning for an  apparent transition point at this level with evidence for ascites and  mesenteric edema, however, no evidence for perforation or abscess.     2.  Persistent small volume bilateral pleural effusions and  opacifications in the right midlung better seen on recent 06/05/2020  examination recently performed.     D:  06/11/2020  E:  06/11/2020          CT Head Without Contrast [[id number]]  Collected:  06/11/20 1334     Updated:  06/11/20 1339    Narrative:       EXAMINATION: CT HEAD WO CONTRAST-06/11/2020:      INDICATION: Speech changes (or aphasia), new or progressive;  Z74.09-Other reduced mobility; Z78.9-Other specified health status.      TECHNIQUE: CT head without intravenous contrast.     The radiation dose reduction device was turned on for each scan per the  ALARA (As Low as Reasonably Achievable) protocol.     COMPARISON: NONE.     FINDINGS:  The midline structures are symmetric without evidence of  mass, mass effect or midline shift. Ventricles and sulci within normal  limits. No intra-axial hemorrhage or extra-axial fluid collection.  Globes and orbits are unremarkable. The visualized paranasal sinuses and  mastoid air cells are grossly clear and well pneumatized with the  exception of a moderate-to-large left mastoid effusion. Calvarium  intact.       Impression:       1. No acute intracranial abnormality specifically, no midline shift or  hydrocephalus. No intra-axial hemorrhage.  2. Moderate-to-large left mastoid effusion.     D:  06/11/2020  E:  06/11/2020             XR Abdomen KUB [[id number]] Collected:  06/11/20 0955     Updated:  06/11/20 1014    Narrative:       EXAMINATION: XR ABDOMEN KUB- 06/11/2020     INDICATION: vomiting; Z74.09-Other reduced mobility; Z78.9-Other  specified health status      COMPARISON: NONE     FINDINGS: Supine views of the abdomen reveal dilated gas-filled small  bowel measuring up to 6.4 cm in diameter concerning for obstructive  bowel gas pattern as there is paucity of air throughout the large bowel.  No gross intraperitoneal free air.           Impression:       Severely dilated gas-filled small bowel loops throughout the  mid abdomen consistent with obstructive bowel gas pattern.     D:  06/11/2020  E:  06/11/2020                  Assessment:  Ileus versus small bowel obstruction  Sepsis  MRSA bacteremia, tricuspid valve endocarditis  Tricuspid  regurgitation  Epidural abscesses  Paraspinous abscess  Pneumonia superimposed on history of chronic obstructive pulmonary disease  IV drug abuse    Plan:  I reviewed the CT scan of the abdomen and pelvis images personally.  She had essentially resolution of her abdominal symptoms once the nasogastric tube was placed.  This nasogastric tube needs to be an 18 [place] nasogastric tube,  I discussed this with the nurse and was going to replace it.  At this point, I would place this to low wall suction, allow her GI tract to decompress and rest, volume resuscitate her, and treat her medically.  Infectious diseases following for antibiotic coverage.  She may have ice chips and popsicles for satiation.    Pedro Lundberg MD  06/11/20  18:56

## 2020-06-11 NOTE — CONSULTS
Referring Provider: Gabi  Reason for Consultation: breakthrough seizure     Patient Care Team:  Ollie Flores MD as PCP - General (Internal Medicine)    Chief complaint episode of decreased awareness     Subjective .     History of present illness:    Liliana Calderon is a 39-year-old woman with a history of seizure disorder, chronic back pain, IV drug use, and COPD who is currently inpatient with tricuspid valve endocarditis.  She is awaiting transfer to [facility] for cardiac surgery.  Neurology consult today for episode of decreased awareness, concerning for possible absence seizure.    Patient was admitted to [facility] on May 31 for fever, increased back pain, and shortness of breath.  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 Today, patient had witnessed episode of blank stare and unresponsiveness that was very brief. She was able to communicate soon after pain medication provided.     Tells me she does not have any memory of what happened earlier today.  She tells me she has long history of seizure since she was a child and that she has absence and generalized tonic-clonic seizures.  Her last seizure was 2 years ago.  She is on daily Keppra for seizure prophylaxis.  Patient tells me she is in extreme low back pain and does not feel morphine is helping.      Review of Systems  Febrile to 102, chills, chronic back pain, skin changes, severe dry mouth, remaining ROS negative except as noted in HPI    History  Past Medical History:   Diagnosis Date   • Anxiety and depression    • Chronic bronchitis (CMS/HCC)    • Fibromyalgia    • Heart valve problem    • Hypertension    • Migraine    • PCOS (polycystic ovarian syndrome)    • Pneumonia    • Seizures (CMS/HCC)    ,   Past Surgical History:   Procedure Laterality Date   • CHOLECYSTECTOMY      ,   Family History   Problem Relation Age of Onset   • Breast cancer Mother    • Bone cancer Mother    • Skin cancer Mother    • Hypertension Mother    • Dementia Father    • Diabetes Father    • Heart disease Father    • Hypertension Father    • Stroke Father    • Pulmonary embolism Father    • Alcohol abuse Sister    • Hypertension Sister    • Mental illness Sister    • Alcohol abuse Brother    • Hypertension Brother    • Mental illness Brother    • Alzheimer's disease Maternal Grandmother    • Parkinsonism Maternal Grandmother    • Heart disease Paternal Grandfather    ,   Social History     Tobacco Use   • Smoking status: Former Smoker     Packs/day: 0.25     Years: 5.00     Pack years: 1.25     Types: Cigarettes     Last attempt to quit: 2014     Years since quittin.4   • Smokeless tobacco: Never Used   Substance Use Topics   • Alcohol use: No   • Drug use: Not Currently     Types: IV     Comment: HEROIN; last used a month ago    and   Medications Prior to Admission   Medication Sig Dispense Refill Last Dose   • azithromycin (ZITHROMAX) 250 MG tablet Take 1 tablet by mouth Daily. Take 2 tablets the first day, then 1 tablet daily for 4 days. 6 tablet 0 Not Taking   • bisoprolol (ZEBeta) 5 MG tablet Take 1 tablet by mouth Daily. 30 tablet 0 Taking   • budesonide-formoterol (SYMBICORT) 160-4.5 MCG/ACT inhaler Inhale 2 puffs 2 (Two) Times a Day. 1 inhaler 0 Taking   • ferrous sulfate 324 (65 Fe) MG tablet delayed-release EC tablet Take 1 tablet by mouth 2 (Two) Times a Day With Meals. 60 tablet 0 Taking   • flunisolide (NASALIDE) 25 MCG/ACT (0.025%) solution nasal spray Inhale 1 spray Every 12 (Twelve) Hours. 1 bottle 5    • FLUoxetine (PROzac) 40 MG capsule Take 40 mg by mouth Daily.   Taking   • gabapentin (NEURONTIN) 800 MG tablet Take 800 mg by mouth 3 (Three) Times a Day.   Taking   • hydrOXYzine (VISTARIL) 25 MG capsule Take 25 mg by mouth 3 (Three) Times a Day.  0 Taking   • ipratropium-albuterol  "(DUO-NEB) 0.5-2.5 mg/3 ml nebulizer INHALE 1 VIAL (3 MLS)  BY NEBULIZATION TWO TIMES DAILY  3 Taking   • lansoprazole (PREVACID) 30 MG capsule Take 30 mg by mouth Daily.   Taking   • levETIRAcetam (KEPPRA) 500 MG tablet Take 1 tablet by mouth Every 12 (Twelve) Hours. 60 tablet 3 Taking   • levoFLOXacin (LEVAQUIN) 500 MG tablet TAKE 1 TABLET BY MOUTH EVERY 24 HOURS UNTIL GONE  0 Not Taking   • levothyroxine (SYNTHROID, LEVOTHROID) 50 MCG tablet Take 50 mcg by mouth Daily. 200001  3 Taking   • lisinopril (PRINIVIL,ZESTRIL) 2.5 MG tablet Take 2.5 mg by mouth Daily. 200001  2 Not Taking   • lisinopril-hydrochlorothiazide (PRINZIDE,ZESTORETIC) 20-12.5 MG per tablet Take 1 tablet by mouth Daily.   Taking   • metroNIDAZOLE (FLAGYL) 500 MG tablet TAKE 1 TABLET BY MOUTH EVERY TWELVE HOURS UNTIL GONE  0 Not Taking   • ondansetron ODT (ZOFRAN-ODT) 4 MG disintegrating tablet Take 1 tablet by mouth Every 6 (Six) Hours As Needed for Nausea or Vomiting. 20 tablet 0    • potassium chloride (K-DUR) 10 MEQ CR tablet TAKE 2 TABLETS BY MOUTH TWO TIMES A DAY WITH FOOD  0 Taking   • SUMAtriptan (IMITREX) 50 MG tablet Take one tablet at onset of headache. May repeat dose one time in 2 hours if headache not relieved. 9 tablet 3 Taking   • tiZANidine (ZANAFLEX) 4 MG tablet 1 tablet Every 8 (Eight) Hours.  0 Taking       Objective     Vital Signs   Blood pressure (!) 124/33, pulse (!) 138, temperature (!) 102.2 °F (39 °C), temperature source Axillary, resp. rate 26, height 165.1 cm (65\"), weight 106 kg (233 lb), last menstrual period 04/01/2020, SpO2 98 %.    Physical Exam:    General- obese, gray pallor, appears uncomfortable and critically ill  Head/eyes- blood tinged tears, atraumatic  ENT- very dry mucous membrane, normal nose  Respiratory-supplementary oxygen, tachypneic  Cardiac- tachycardic, no pitting edema  Abdomen- distended, tight  Extremities- septic emboli in digits, cool extremities  Skin- gray pallor  Speech- no aphasia or " dysarthria  Cranial nerves- pupils dilated but reactive, EOMI, visual fields intact, no facial weakness, sensation intact to light touch, hearing intact, tongue midline  Mental status-she is oriented to person place and time  Cognitive-patient is distracted by her pain and has difficulty with attention but is able to follow commands  Motor- normal bulk and tone, she is able to lift arms antigravity and is generally weak in her upper extremities and lower extremities.  She complains of severe back pain and can move her legs on the bed but complains of pain with antigravity movement  Reflexes- trace patellar bilaterally, downgoing toes  Cerebellar- no obvious ataxia appreciated, no nystagmus  Gait-deferred for pain  Results Review:  Fever to 102, CBC with normal white count but mild anemia, no significant abnormality on BMP    Assessment/Plan       Sepsis (CMS/HCC)    Essential hypertension    Fibromyalgia syndrome    GERD without esophagitis    DDD (degenerative disc disease), cervical    Anxiety    Depression    Seizure (CMS/HCC)    Migraine    Infective endocarditis    Epidural abscess    MRSA bacteremia      39-year-old woman with 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  Is waiting for transfer to [facility] for cardiac surgery to repair her tricuspid valve.      Her episode today was concerning more for stress reaction due to pain then seizure, however seizure threshold can be lowered in the setting of uncontrolled infection and pain.  20-minute EEG today was normal.  Given her quick return to baseline, I am hesitant to increase her home Keppra from 500 mg twice a day but it might be prudent for small increase.  Would continue to monitor and address pain as best able.     Recommendations  -increase keppra 750mg bid (IV while NPO)  -pain control per  primary team  -call with change in mental status      I discussed the patient's findings and my recommendations with patient, family and primary care team    Elizabeth Brown MD  06/11/20  16:36

## 2020-06-11 NOTE — NURSING NOTE
Pt has low Leoncio of 15; unable to speak to staff RN at this time. Wounds/skin interventions in place. Please contact [facility] nurse as needed for concerns.

## 2020-06-11 NOTE — PROGRESS NOTES
Liliana Calderon  1980  [id number]    Evaluating Physician: Elsy Augustin    Chief Complaint: fever    Reason for Consultation: MRSA bacteremia    History of present illness:   Patient is a 39 y.o.  Yr old female with history of IVDU, depression/anxiety, COPD, and seizure d/o who initially presented to [facility] yesterday with c/o shortness of air and worsening low back pain. She last used IV heroin about 1 month prior.  She was doing relatively well until about 3 days PTA when she noticed worsening low back pain and worsening shortness of air with blood-tinged sputum. She additionally started having fevers up to 103.9F. She did not have contact with anyone known to have COVID-19 infection.     On arrival to [facility] on 5/31, the patient was found to have fevers and a significant leukocytosis. CT chest was concerning for pneumonia and possible septic pulm emboli. Blood cultures resulted with MRSA and she was transferred to [facility] for a higher level of care. Tmax since arrival here has been 102.7F. WBC was elevated to 14.2 but has improved to 10 on antibiotics. She was started on vancomycin and aztreonam by primary team. Repeat blood cultures from 6/1 are again positive for MRSA. TTE and MRI lumbar spine ordered. ID consulted for recommendations in the setting of her MRSA septicemia.    Subjective:    6/3/20: The patient is still having back pain. No new weakness in her lower extremities but does have extreme back pain with movement. Fevers improved. Tmax 100F. Does have some anemia with Hgb 6.4. No nausea or vomiting. Mild diarrhea. No new rash.     6/4/20: still having some back pain and had some fevers overnight. Tmax this morning was 102.7F.  Not feeling very well this morning when I saw her. No worsening of her diarrhea. No nausea. No new rash. Still able to move her extremities well with no new weakness but does have significant pain lifting her legs off the bed.     6/5/20: Patient is  "unfortunately still having fevers up to 102.5°F.  She is still not feeling well with significant back pain.  No new weakness in her lower extremities.  She is having some diarrhea but only about 2 episodes in the last 24 hours.  No nausea.  No new rashes.  She went for REDD today and was noted to have about a 1 cm x 1 cm vegetation on the tricuspid valve per my discussion with Dr. Pinedo.    6/6/2020: Patient states she is still receiving her antibiotics but has complaints of nausea and vomiting as well as diarrhea.  Discussion with nursing staff states that she is having blood-tinged  tears, bloody emesis and worsening petechial rash over her body.  RN staff states they have not noted any blood in her diarrhea at this point.  Afebrile overnight with some tachypnea noted.  Remains without leukocytosis.  6/4 body fluid culture from back returned positive for 2+ MRSA.  6/3 blood cultures currently remain no growth to date. Per RN her rash has gradually progressed over the last couple days    6/7/2020: Patient laying in bed awake Heparin stopped last pm   RN states that he was told by the tech that there was blood in the stool but did not personally check it.   She feels \"horrible\" today.  She remains with nausea, vomiting and significant amounts of diarrhea- 3 stools so far today  Documentation shows she has been afebrile and hemodynamically stable overnight. Leukocytosis is slightly improved from 14-12.  H&H has improved from yesterday's values to 11 and 38.  Platelets of 405.  7/C. difficile toxin was negative.  6/4 body fluid back aspirate positive for MRSA.  Cdiff negative  6/8 alert, afebrile, no new skin lesions, c/o discomfort at the site of the R upper arm lesion that may have a hemorrhagic center  6/10  Alert, no new skin lesions and the largest one on the right upper arm is receding and is less tender although it is starting to ulcerate  Back pain improved    6/11  Febrile to 102 today; ongoing diarrhea and " "she has developed projectile vomiting w/o hemetemesis  All \"allowable\" stool studies unremarkable   Alert but pale no new skin lesions and existing ones are starting to recede  Possible transfer to [facility] for angiovac when she is stabilized  WBC down to 11    Past Medical History:   Diagnosis Date   • Anxiety and depression    • Chronic bronchitis (CMS/HCC)    • Fibromyalgia    • Heart valve problem    • Hypertension    • Migraine    • PCOS (polycystic ovarian syndrome)    • Pneumonia    • Seizures (CMS/HCC)        Past Surgical History:   Procedure Laterality Date   • CHOLECYSTECTOMY         Social History:  IVDU (heroin). Former smoker. No ETOH use.    Family History:  family history includes Alcohol abuse in her brother and sister; Alzheimer's disease in her maternal grandmother; Bone cancer in her mother; Breast cancer in her mother; Dementia in her father; Diabetes in her father; Heart disease in her father and paternal grandfather; Hypertension in her brother, father, mother, and sister; Mental illness in her brother and sister; Parkinsonism in her maternal grandmother; Pulmonary embolism in her father; Skin cancer in her mother; Stroke in her father.    Allergies   Allergen Reactions   • Penicillins Rash     Received cefepime and cefdinir in 2018   • Phenobarbital Rash       Medication:  Current Facility-Administered Medications   Medication Dose Route Frequency Provider Last Rate Last Dose   • acetaminophen (TYLENOL) tablet 650 mg  650 mg Oral Q6H PRN Malathi Vasquez APRN   650 mg at 06/11/20 0447   • bismuth subsalicylate (PEPTO BISMOL) 262 MG/15ML suspension 30 mL  30 mL Oral Q6H PRN Verenice Keller MD   30 mL at 06/09/20 1721   • budesonide-formoterol (SYMBICORT) 160-4.5 MCG/ACT inhaler 2 puff  2 puff Inhalation BID - RT Rancho Lang MD   2 puff at 06/11/20 0719   • cyclobenzaprine (FLEXERIL) tablet 5 mg  5 mg Oral TID PRN Verenice Keller MD   5 mg at 06/11/20 1023   • DAPTOmycin (CUBICIN) 600 mg in " sodium chloride 0.9 % 50 mL IVPB  8 mg/kg (Adjusted) Intravenous Q24H Rancho Lang  mL/hr at 06/11/20 0837 600 mg at 06/11/20 0837   • diatrizoate meglumine-sodium (GASTROGRAFIN) 66-10 % solution  - ADS Override Pull            • FLUoxetine (PROzac) capsule 40 mg  40 mg Oral Daily Vanessa Lopez MD   40 mg at 06/11/20 0835   • gabapentin (NEURONTIN) capsule 800 mg  800 mg Oral Q8H Vanessa Lopez MD   800 mg at 06/11/20 0447   • HYDROcodone-acetaminophen (NORCO) 5-325 MG per tablet 1 tablet  1 tablet Oral Q6H PRN Nathan Ashley MD       • hydrocortisone 2.5 % cream   Topical Q12H Verenice Keller MD       • HYDROmorphone (DILAUDID) injection 0.25 mg  0.25 mg Intravenous Q4H PRN Nathan Ashley MD       • hydrOXYzine (ATARAX) tablet 25 mg  25 mg Oral Nightly PRN Jae Cesar PA-C   25 mg at 06/10/20 1941   • ipratropium-albuterol (DUO-NEB) nebulizer solution 3 mL  3 mL Nebulization Q6H PRN Vanessa Lopez MD   3 mL at 06/10/20 2043   • lactobacillus acidophilus (RISAQUAD) capsule 1 capsule  1 capsule Oral Daily Rancho Lang MD   1 capsule at 06/11/20 0835   • levETIRAcetam (KEPPRA) tablet 500 mg  500 mg Oral Q12H Oskar Black MD   500 mg at 06/11/20 0836   • levothyroxine (SYNTHROID, LEVOTHROID) tablet 50 mcg  50 mcg Oral Daily Vanessa Lopez MD   50 mcg at 06/11/20 0447   • loperamide (IMODIUM) capsule 2 mg  2 mg Oral 4x Daily PRN Verenice Keller MD   2 mg at 06/11/20 0836   • melatonin tablet 5 mg  5 mg Oral Nightly PRN Jae Cesar PA-C   5 mg at 06/10/20 1941   • Morphine (MS CONTIN) 12 hr tablet 15 mg  15 mg Oral Q12H Verenice Keller MD   15 mg at 06/11/20 0836   • morphine injection 1 mg  1 mg Intravenous Q2H PRN Nathan Ashley MD   1 mg at 06/11/20 1345   • nystatin (MYCOSTATIN) powder   Topical Q12H Verenice Keller MD       • ondansetron (ZOFRAN) injection 4 mg  4 mg Intravenous Q6H PRN Nathan Ashley MD   4 mg at 06/11/20 1020   •  "pantoprazole (PROTONIX) EC tablet 40 mg  40 mg Oral QAM Vanessa Lopez MD   40 mg at 06/11/20 0448   • potassium chloride (MICRO-K) CR capsule 40 mEq  40 mEq Oral PRN Verenice Keller MD   40 mEq at 06/11/20 0040    Or   • potassium chloride (KLOR-CON) packet 40 mEq  40 mEq Oral PRN Verenice Keller MD        Or   • potassium chloride 10 mEq in 100 mL IVPB  10 mEq Intravenous Q1H PRN Verenice Keller MD       • prochlorperazine (COMPAZINE) injection 5 mg  5 mg Intravenous Q6H PRN Verenice Keller MD   5 mg at 06/07/20 2316    Or   • prochlorperazine (COMPAZINE) tablet 5 mg  5 mg Oral Q6H PRN Verenice Keller MD   5 mg at 06/11/20 0447    Or   • prochlorperazine (COMPAZINE) suppository 25 mg  25 mg Rectal Q12H PRN Verenice Keller MD       • sodium chloride 0.9 % infusion  125 mL/hr Intravenous Continuous Vanessa Lopez  mL/hr at 06/10/20 1949 125 mL/hr at 06/10/20 1949   • zolpidem (AMBIEN) tablet 5 mg  5 mg Oral Nightly PRN Verenice Keller MD   5 mg at 06/10/20 1941         Infectious History:  MRSA    Antibiotics:  Anti-Infectives (From admission, onward)    Ordered     Dose/Rate Route Frequency Start Stop    06/02/20 0743  DAPTOmycin (CUBICIN) 600 mg in sodium chloride 0.9 % 50 mL IVPB     Rancho Lang MD reviewed the order on 06/05/20 0802.   Ordering Provider:  Rancho Lang MD    8 mg/kg × 76.6 kg (Adjusted)  100 mL/hr over 30 Minutes Intravenous Every 24 Hours 06/02/20 0900 06/23/20 0859    06/01/20 1501  vancomycin 2250 mg/500 mL 0.9% NS IVPB ([facility])     Ordering Provider:  Vanessa Lopez MD    2,250 mg Intravenous Once 06/01/20 1600 06/01/20 1756            Review of Systems    As above    Physical Exam:   Vital Signs   BP (!) 79/60 (BP Location: Right arm, Patient Position: Sitting)   Pulse (!) 140   Temp 100.4 °F (38 °C) (Axillary)   Resp 20   Ht 165.1 cm (65\")   Wt 106 kg (233 lb)   LMP 04/01/2020 (Approximate)   SpO2 98%   BMI 38.77 kg/m²     GENERAL: Awake " alert, Obese. moderate distress  HENT: Normocephalic, atraumatic.  Conjunctiva with hemorrhages bilaterally  NECK: Supple without nuchal rigidity. No mass.  HEART: RRR; No murmur, gallops or rubs noted.   LUNGS: CTAB. Nonlabored.  No wheezing noted  ABDOMEN: Soft, nontender, nondistended. Positive bowel sounds.  No guarding noted  EXT:  No cyanosis, clubbing or edema.   :   Without Petit catheter.  MSK: FROM without joint effusions noted arms/legs.  Some tenderness to palpation over her lumbar spine.  SKIN: has slightly discolored/gray patchy/papular lesions over her bilateral lower extremity- these appeared about 2 weeks ago per the patient.  No other possible peripheral stigmata of infective endocarditis noted over her extremities or elsewhere.  She has also developed a petechial rash over her entire back bilateral upper extremities, buttocks and inguinal and abdominal folds which is improveing  On the R upper arm there remains a large lesion with a hemorrhagic center but minimal induration and no fuctuance  NEURO: Oriented to PPT. No focal deficits on motor/sensory exam at arms/legs. 5 out of 5 strength throughout  PSYCHIATRIC: Normal insight and judgement. Cooperative with PE    Laboratory Data    Results from last 7 days   Lab Units 06/11/20  0409 06/10/20  0530 06/09/20  1028   WBC 10*3/mm3 10.24 17.35* 16.06*   HEMOGLOBIN g/dL 9.3* 10.3* 11.6*   HEMATOCRIT % 30.8* 33.8* 38.9   PLATELETS 10*3/mm3 276 370 445     Results from last 7 days   Lab Units 06/11/20  0409   SODIUM mmol/L 135*   POTASSIUM mmol/L 3.9   CHLORIDE mmol/L 102   CO2 mmol/L 20.0*   BUN mg/dL 14   CREATININE mg/dL 0.66   GLUCOSE mg/dL 130*   CALCIUM mg/dL 7.8*     Results from last 7 days   Lab Units 06/11/20  0409  06/08/20  0513   ALK PHOS U/L 123*   < > 148*   BILIRUBIN mg/dL 0.8   < > 0.6   BILIRUBIN DIRECT mg/dL  --   --  0.4*   ALT (SGPT) U/L 7   < > 8   AST (SGOT) U/L 12   < > 12    < > = values in this interval not displayed.          Results from last 7 days   Lab Units 06/07/20  0456   SED RATE mm/hr 74*     Results from last 7 days   Lab Units 06/07/20  0456   CRP mg/dL 16.14*       Estimated Creatinine Clearance: 139.8 mL/min (by C-G formula based on SCr of 0.66 mg/dL).       Microbiology:    Microbiology Results (last 10 days)     Procedure Component Value - Date/Time    Stool Culture (Reference Lab) - Stool, Per Rectum [[id number]] Collected:  06/08/20 0351    Lab Status:  Preliminary result Specimen:  Stool from Per Rectum Updated:  06/10/20 2008     Salmonella/Shigella Screen Final report     Result 1 Comment     Comment: No Salmonella or Shigella recovered.        E coli, Shiga toxin Assay Negative    Narrative:       Performed at:  [address] [place] [address]  [id number]  : Lam Roman PhD, Phone:  [phone]    Clostridium Difficile Toxin - Stool, Per Rectum [[id number]] Collected:  06/05/20 1748    Lab Status:  Final result Specimen:  Stool from Per Rectum Updated:  06/05/20 1858    Narrative:       The following orders were created for panel order Clostridium Difficile Toxin - Stool, Per Rectum.  Procedure                               Abnormality         Status                     ---------                               -----------         ------                     Clostridium Difficile To...[[id number]]  Normal              Final result                 Please view results for these tests on the individual orders.    Clostridium Difficile Toxin, PCR - Stool, Per Rectum [[id number]]  (Normal) Collected:  06/05/20 1748    Lab Status:  Final result Specimen:  Stool from Per Rectum Updated:  06/05/20 1858     C. Difficile Toxins by PCR Not Detected    Narrative:       Performance characteristics of test not established for patients <2 years of age.  Negative for Toxigenic C. Difficile    Body Fluid Culture - Aspirate, Back [[id number]]  (Abnormal)  (Susceptibility) Collected:  06/04/20 164    Lab  Status:  Final result Specimen:  Aspirate from Back Updated:  06/07/20 0701     Body Fluid Culture Light growth (2+) Staphylococcus aureus, MRSA     Comment:   Methicillin resistant Staphylococcus aureus, Patient may be an isolation risk.        Gram Stain No organisms seen      Many (4+) WBCs per low power field    Susceptibility      Staphylococcus aureus, MRSA     TYRONE     Gentamicin Susceptible     Oxacillin Resistant     Rifampin Susceptible     Vancomycin Susceptible                Susceptibility Comments     Staphylococcus aureus, MRSA    This isolate does not demonstrate inducible clindamycin resistance in vitro.               Anaerobic Culture - Aspirate, Back [[id number]] Collected:  06/04/20 1643    Lab Status:  Final result Specimen:  Aspirate from Back Updated:  06/09/20 0741     Anaerobic Culture No anaerobes isolated at 5 days    Blood Culture - Blood, Arm, Right [[id number]] Collected:  06/03/20 1229    Lab Status:  Final result Specimen:  Blood from Arm, Right Updated:  06/08/20 1401     Blood Culture No growth at 5 days    Blood Culture - Blood, Arm, Left [[id number]] Collected:  06/03/20 1229    Lab Status:  Final result Specimen:  Blood from Arm, Left Updated:  06/08/20 1401     Blood Culture No growth at 5 days    Blood Culture - Blood, Wrist, Left [[id number]]  (Abnormal) Collected:  06/01/20 1601    Lab Status:  Final result Specimen:  Blood from Wrist, Left Updated:  06/04/20 0612     Blood Culture Staphylococcus aureus, MRSA     Comment:   Infectious disease consultation is highly recommended to rule out distant foci of infection.  Methicillin resistant Staphylococcus aureus, Patient may be an isolation risk.        Isolated from Aerobic Bottle     Gram Stain Aerobic Bottle Gram positive cocci in clusters    Narrative:       Refer to previous blood culture collected on 6/1/2020 1558 for MICs.      Blood Culture - Blood, Arm, Left [[id number]]  (Abnormal)  (Susceptibility) Collected:  06/01/20 1550     Lab Status:  Final result Specimen:  Blood from Arm, Left Updated:  06/04/20 0610     Blood Culture Staphylococcus aureus, MRSA     Comment:   Infectious disease consultation is highly recommended to rule out distant foci of infection.  Methicillin resistant Staphylococcus aureus, Patient may be an isolation risk.        Isolated from Aerobic and Anaerobic Bottles     Gram Stain Aerobic Bottle Gram positive cocci in clusters      Anaerobic Bottle Gram positive cocci in clusters    Susceptibility      Staphylococcus aureus, MRSA     TYRONE     Gentamicin Susceptible     Oxacillin Resistant     Rifampin Susceptible     Vancomycin Susceptible                Susceptibility Comments     Staphylococcus aureus, MRSA    This isolate does not demonstrate inducible clindamycin resistance in vitro.               Blood Culture ID, PCR - Blood, Arm, Left [[id number]]  (Abnormal) Collected:  06/01/20 1558    Lab Status:  Final result Specimen:  Blood from Arm, Left Updated:  06/02/20 0721     BCID, PCR Staphylococcus aureus. mecA (methicillin resistance gene) detected. Identification by BCID PCR.     BOTTLE TYPE Aerobic Bottle          Radiology:  Xr Shoulder 2+ View Right    Result Date: 6/8/2020  No acute bony abnormality.  D:  06/08/2020 E:  06/08/2020  This report was finalized on 6/8/2020 4:30 PM by Dr. Jasmine Marie MD.      Ct Head Without Contrast    Result Date: 6/11/2020  1. No acute intracranial abnormality specifically, no midline shift or hydrocephalus. No intra-axial hemorrhage. 2. Moderate-to-large left mastoid effusion.  D:  06/11/2020 E:  06/11/2020        Ct Chest With Contrast    Result Date: 6/5/2020  Abnormal chest CT with moderate dependent bilateral pleural effusions. There are bilateral parenchymal changes in the lungs including well-circumscribed nodules which likely represent septic emboli and a poorly defined nodules and areas of airspace change which could represent septic emboli or pneumonia.  Findings most likely are all infectious. There are small reactive nodes in the mediastinum. The spleen is prominent in size but incompletely imaged. No upper abdominal adenopathy. No acute bone lesions in the chest Signer Name: Anushka Gutierrez MD  Signed: 6/5/2020 8:44 PM  Workstation Name: [id number]  [facility]    Ct Abdomen Pelvis With Contrast    Result Date: 6/11/2020  1.  Small bowel obstruction with severely dilated small bowel loops and air-fluid levels extending into the right lower quadrant adjacent to the right iliac vessels where there is caliber change concerning for an apparent transition point at this level with evidence for ascites and mesenteric edema, however, no evidence for perforation or abscess.  2.  Persistent small volume bilateral pleural effusions and opacifications in the right midlung better seen on recent 06/05/2020 examination recently performed.  D:  06/11/2020 E:  06/11/2020       Ct Guided Biopsy Aspiration Injection    Result Date: 6/4/2020   Percutaneous aspiration of very tiny abscess collection adjacent to vertebral bodies/within the erector spinae muscle, yielding 2 mL of purulent appearing fluid. No drainage catheter was left in place - too small for drainage catheter/contraindicated to leave drainage catheter in collection with possible communication with epidural space  Plan:  Follow-up lab results. Continued care with primary team. ______________________________________________________________________  PROCEDURE SUMMARY: - Aspiration of tiny abscess pockets under fluoroscopic guidance - Additional procedure(s): None  PROCEDURE DETAILS:  Pre-procedure Consent: Informed consent for the procedure including risks, benefits and alternatives was obtained and time-out was performed prior to the procedure. Preparation: The site was prepared and draped using maximal sterile barrier technique including cutaneous antisepsis.  Anesthesia/sedation Level of  anesthesia/sedation: Moderate sedation (conscious sedation) Anesthesia/sedation administered by: Independent trained observer under attending supervision with continuous monitoring of the patient?s level of consciousness and physiologic status Total intra-service sedation time (minutes): 12  Fluid collection aspiration The patient was positioned prone on. Initial imaging was performed. Local anesthesia was administered. The fluid collection was accessed using a 22-gauge spinal needle under CT fluoroscopic guidance. Position within the fluid collection was confirmed, and fluid aspiration was performed. All instruments were then removed. - Initial imaging findings: Fluid collections identified on comparison MRI lumbar spine 6/4/2020 not as well visualized on CT. - Aspiration needle/catheter: 22-gauge spinal needle - Post-aspiration imaging findings: No immediate complication  Contrast Contrast agent: None Contrast volume (mL): 0  Radiation Dose CT dose length product (mGy-cm): 975  Additional Details Additional description of procedure: None Equipment details: None Specimens removed: Aspirated fluid sent for analysis. Estimated blood loss (mL): Less than 10 Standardized report: DrainageAspiration  Attestation I was present and scrubbed for the entire procedure. Imaging reviewed. Agree with final report as written.  This report was finalized on 6/4/2020 5:36 PM by Yimi Huggins.      Xr Abdomen Kub    Result Date: 6/11/2020  Severely dilated gas-filled small bowel loops throughout the mid abdomen consistent with obstructive bowel gas pattern.  D:  06/11/2020 E:  06/11/2020            Impression:     This is a 40 yo woman with a h/o IVDU presenting with severe low back pain and shortness of air and found to have fevers/sepsis with MRSA growing from multiple blood culture sets. Also has CT chest findings that are concerning for septic pulmonary emboli and now noted to have a decent size vegetation on her tricuspid valve  on REDD consistent with infective endocarditis. MRI with 8x4 x3 cm multilobular abscess of the right erector spinae muscles, approximately from L3-S1. Also with two possible interconnected dorsal epidural abscesses at L2-L3 and L3-L4 but only causing mild canal stenosis. Dr. Alex with neurosurgery has evaluated and does not think that she requires any surgical intervention at this time for her epidural abscesses as they are modest. Underwent CT-guided drainage 6/4 and had 2cc of purulent drainage aspirated from her fluid collection in erector spinae muscles. Repeat blood culture sent 6/3 is negative    REDD with a 1 cm x 1 cm vegetation on the tricuspid valve    It appears that her petechial rash has progressed somewhat but her renal function, PT/INR and platelet count are stable which makes DIC less likely      Problems:  1. 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 MRSA septicemia  Bacteremia appears to have cleared; no new cutaneous emboli  3. Epidural abscesses  4. Large loculated Paraspinal muscle abscess- s/p aspiration which is culture positive  5. Generalized petechial rash-stable  6. Vomiting- etiology unclear  ? Esophagitis ? Drug AE- potentially either teflaro or daptomycin although neither is highly likely to be the culprit  R/O pancreatitis, biliary disease, mesenteric emboli etc  7. Diarrhea - ongoing and intermittent Cdiff negative, culture unremarkable; unable to order GI panel  8. Sepsis with fever, leukocytosis with neutrophilia, tachycardia- ongoing  9. Pneumonia/lung nodules- concern for septic pulmonary emboli  10. Acute on chronic low back pain  11. Urine cx with MRSA- represents systemic infection, not primary source  12. Microcytic anemia  13. History of PCN allergy   14. Recent IV drug use  15. Obesity  16. H/o COPD  17. H/o seizure disorder  18. H/o depression/anxiety    PLAN: Thank you for asking us  to see Liliana Calderon, I recommend the followin. Continue to follow cbc with diff, bmp, esr, and crp closely. Weekly cpk level  2. CT abdomen  3. Lactate, lipase; repeat blood cultures  4. Continue daptomycin 8mg/kg IV q24h. Baseline CPK level was 48 and remains normal  5. Hold teflaro if no obvious etiology of the vomiting; she is at risk for aspiration so add azactam and flagyl  6. Will consider repeat MRI L-spine in 2-3 weeks. Neurosurgery following  7. CT surgery following  8. Ng tube     Complex set of medical issues requiring a high-level medical decision making.  The patient does have significant risk for for further morbidity and mortality in the setting of her severe MRSA infection. Has some risk for paralysis if her epidural abscesses worsen.    Prognosis guarded    Discussed with AYALA Augustin MD  2020

## 2020-06-11 NOTE — PLAN OF CARE
Problem: Patient Care Overview  Goal: Plan of Care Review  Outcome: Ongoing (interventions implemented as appropriate)  Flowsheets  Taken 6/11/2020 0307  Progress: improving  Outcome Summary: VSS, patient rested better tonight and pain has seemed to be more under control. Wating for bed at . Loose BMs have slowed down.  Taken 6/10/2020 2000  Plan of Care Reviewed With: patient

## 2020-06-11 NOTE — NURSING NOTE
Pt was given a ice pack for her neck by CN and pt ate the entire bag. She has been non compliant with many nursing request today.

## 2020-06-11 NOTE — PROGRESS NOTES
CTS Progress Note      POD  s/p        LOS: 10 days   Patient Care Team:  Ollie Flores MD as PCP - General (Internal Medicine)    Subjective  Awake, alert, cooperative  Complaints of vague anterior chest pain.  Slept fair last night  CC:     Objective    Vital Signs  Temp:  [98 °F (36.7 °C)-98.9 °F (37.2 °C)] 98 °F (36.7 °C)  Heart Rate:  [100-116] 101  Resp:  [18] 18  BP: (142-146)/(74-93) 142/74    Physical Exam:   General Appearance:    Lungs: Scattered rhonchi, poor cough   Heart: regular rhythm & normal rate, normal S1, S2, no murmur, no gallop, no rub and no click   Skin: Warm, dry   Abdomen: Soft, nontender, bowel sounds present throughout.  Results   Results from last 7 days   Lab Units 06/11/20  0409   WBC 10*3/mm3 10.24   HEMOGLOBIN g/dL 9.3*   HEMATOCRIT % 30.8*   PLATELETS 10*3/mm3 276     Results from last 7 days   Lab Units 06/11/20  0409   SODIUM mmol/L 135*   POTASSIUM mmol/L 3.9   CHLORIDE mmol/L 102   CO2 mmol/L 20.0*   BUN mg/dL 14   CREATININE mg/dL 0.66   GLUCOSE mg/dL 130*   CALCIUM mg/dL 7.8*           Imaging Results (Last 24 Hours)     ** No results found for the last 24 hours. **          Assessment      Sepsis (CMS/HCC)    Essential hypertension    Fibromyalgia syndrome    GERD without esophagitis    DDD (degenerative disc disease), cervical    Anxiety    Depression    Seizure (CMS/HCC)    Migraine    Infective endocarditis    Epidural abscess    MRSA bacteremia  Seeing patient for tricuspid endocarditis, acute  Would best be treated by angio VAC of her native tricuspid valve endocarditis when stable enough to be transferred to  for the above-mentioned procedure.    Plan   We will continue to follow with you until able to be transferred to .    JENELLE Stone  06/11/20  08:02

## 2020-06-11 NOTE — PROGRESS NOTES
Continued Stay Note   [facility]     Patient Name: Liliana Calderon  MRN: [id number]  Today's Date: 6/11/2020    Admit Date: 6/1/2020    Discharge Plan     Row Name 06/11/20 0836       Plan    Plan  Ongoing    Patient/Family in Agreement with Plan  yes    Plan Comments  Visited pt. at bedside. She complains of general pain and weakness. Is awaiting transfer to  when bed is available. Will cont. to follow and update.     Final Discharge Disposition Code  02 - short term hospital for  care        Discharge Codes    No documentation.             Komal Schuster RN

## 2020-06-12 LAB
CAMPYLOBACTER STL CULT: NORMAL
D-LACTATE SERPL-SCNC: 1.6 MMOL/L (ref 0.5–2)
E COLI SXT STL QL IA: NEGATIVE
LDH SERPL-CCNC: 239 U/L (ref 135–214)
Lab: NORMAL
Lab: NORMAL
SALM + SHIG STL CULT: NORMAL

## 2020-06-12 PROCEDURE — 99232 SBSQ HOSP IP/OBS MODERATE 35: CPT | Performed by: HOSPITALIST

## 2020-06-12 PROCEDURE — 94799 UNLISTED PULMONARY SVC/PX: CPT

## 2020-06-12 PROCEDURE — 25010000002 LEVETRIRACETAM PER 10 MG: Performed by: PSYCHIATRY & NEUROLOGY

## 2020-06-12 PROCEDURE — 97110 THERAPEUTIC EXERCISES: CPT

## 2020-06-12 PROCEDURE — 83615 LACTATE (LD) (LDH) ENZYME: CPT | Performed by: INTERNAL MEDICINE

## 2020-06-12 PROCEDURE — 83605 ASSAY OF LACTIC ACID: CPT | Performed by: INTERNAL MEDICINE

## 2020-06-12 PROCEDURE — 25010000002 MORPHINE PER 10 MG: Performed by: HOSPITALIST

## 2020-06-12 PROCEDURE — 25010000002 DAPTOMYCIN PER 1 MG: Performed by: INTERNAL MEDICINE

## 2020-06-12 PROCEDURE — 25010000002 HYDROMORPHONE PER 4 MG: Performed by: HOSPITALIST

## 2020-06-12 PROCEDURE — 99232 SBSQ HOSP IP/OBS MODERATE 35: CPT | Performed by: PSYCHIATRY & NEUROLOGY

## 2020-06-12 PROCEDURE — C1751 CATH, INF, PER/CENT/MIDLINE: HCPCS

## 2020-06-12 PROCEDURE — 02HV33Z INSERTION OF INFUSION DEVICE INTO SUPERIOR VENA CAVA, PERCUTANEOUS APPROACH: ICD-10-PCS | Performed by: HOSPITALIST

## 2020-06-12 PROCEDURE — 25010000002 ONDANSETRON PER 1 MG: Performed by: HOSPITALIST

## 2020-06-12 PROCEDURE — C1894 INTRO/SHEATH, NON-LASER: HCPCS

## 2020-06-12 RX ORDER — SODIUM CHLORIDE 0.9 % (FLUSH) 0.9 %
10 SYRINGE (ML) INJECTION EVERY 12 HOURS SCHEDULED
Status: DISCONTINUED | OUTPATIENT
Start: 2020-06-12 | End: 2020-06-16 | Stop reason: HOSPADM

## 2020-06-12 RX ORDER — SODIUM CHLORIDE 0.9 % (FLUSH) 0.9 %
10 SYRINGE (ML) INJECTION AS NEEDED
Status: DISCONTINUED | OUTPATIENT
Start: 2020-06-12 | End: 2020-06-16 | Stop reason: HOSPADM

## 2020-06-12 RX ADMIN — AZTREONAM 2 G: 2 INJECTION, POWDER, LYOPHILIZED, FOR SOLUTION INTRAMUSCULAR; INTRAVENOUS at 00:00

## 2020-06-12 RX ADMIN — NYSTATIN: 100000 POWDER TOPICAL at 20:01

## 2020-06-12 RX ADMIN — HYDROMORPHONE HYDROCHLORIDE 0.25 MG: 1 INJECTION, SOLUTION INTRAMUSCULAR; INTRAVENOUS; SUBCUTANEOUS at 08:29

## 2020-06-12 RX ADMIN — HYDROXYZINE HYDROCHLORIDE 25 MG: 25 TABLET, FILM COATED ORAL at 20:00

## 2020-06-12 RX ADMIN — GABAPENTIN 800 MG: 400 CAPSULE ORAL at 20:54

## 2020-06-12 RX ADMIN — AZTREONAM 2 G: 2 INJECTION, POWDER, LYOPHILIZED, FOR SOLUTION INTRAMUSCULAR; INTRAVENOUS at 08:18

## 2020-06-12 RX ADMIN — SODIUM CHLORIDE 125 ML/HR: 9 INJECTION, SOLUTION INTRAVENOUS at 02:54

## 2020-06-12 RX ADMIN — BUDESONIDE AND FORMOTEROL FUMARATE DIHYDRATE 2 PUFF: 160; 4.5 AEROSOL RESPIRATORY (INHALATION) at 09:07

## 2020-06-12 RX ADMIN — AZTREONAM 2 G: 2 INJECTION, POWDER, LYOPHILIZED, FOR SOLUTION INTRAMUSCULAR; INTRAVENOUS at 17:08

## 2020-06-12 RX ADMIN — SODIUM CHLORIDE 1000 ML: 9 INJECTION, SOLUTION INTRAVENOUS at 14:14

## 2020-06-12 RX ADMIN — METRONIDAZOLE 500 MG: 500 INJECTION, SOLUTION INTRAVENOUS at 02:55

## 2020-06-12 RX ADMIN — GABAPENTIN 800 MG: 400 CAPSULE ORAL at 14:13

## 2020-06-12 RX ADMIN — Medication 1 CAPSULE: at 08:18

## 2020-06-12 RX ADMIN — GABAPENTIN 800 MG: 400 CAPSULE ORAL at 05:33

## 2020-06-12 RX ADMIN — LEVETIRACETAM 750 MG: 100 INJECTION, SOLUTION INTRAVENOUS at 20:01

## 2020-06-12 RX ADMIN — HYDROMORPHONE HYDROCHLORIDE 0.25 MG: 1 INJECTION, SOLUTION INTRAMUSCULAR; INTRAVENOUS; SUBCUTANEOUS at 17:12

## 2020-06-12 RX ADMIN — CYCLOBENZAPRINE HYDROCHLORIDE 5 MG: 10 TABLET, FILM COATED ORAL at 16:03

## 2020-06-12 RX ADMIN — HYDROCORTISONE: 25 CREAM TOPICAL at 09:03

## 2020-06-12 RX ADMIN — HYDROCODONE BITARTRATE AND ACETAMINOPHEN 1 TABLET: 5; 325 TABLET ORAL at 19:46

## 2020-06-12 RX ADMIN — SODIUM CHLORIDE 125 ML/HR: 9 INJECTION, SOLUTION INTRAVENOUS at 14:15

## 2020-06-12 RX ADMIN — SODIUM CHLORIDE, PRESERVATIVE FREE 10 ML: 5 INJECTION INTRAVENOUS at 20:02

## 2020-06-12 RX ADMIN — PANTOPRAZOLE SODIUM 40 MG: 40 TABLET, DELAYED RELEASE ORAL at 05:33

## 2020-06-12 RX ADMIN — SODIUM CHLORIDE 1000 ML: 9 INJECTION, SOLUTION INTRAVENOUS at 00:01

## 2020-06-12 RX ADMIN — HYDROMORPHONE HYDROCHLORIDE 0.25 MG: 1 INJECTION, SOLUTION INTRAMUSCULAR; INTRAVENOUS; SUBCUTANEOUS at 12:50

## 2020-06-12 RX ADMIN — ACETAMINOPHEN 650 MG: 325 TABLET, FILM COATED ORAL at 05:33

## 2020-06-12 RX ADMIN — HYDROCODONE BITARTRATE AND ACETAMINOPHEN 1 TABLET: 5; 325 TABLET ORAL at 05:33

## 2020-06-12 RX ADMIN — LEVOTHYROXINE SODIUM 50 MCG: 50 TABLET ORAL at 05:33

## 2020-06-12 RX ADMIN — MORPHINE SULFATE 1 MG: 2 INJECTION, SOLUTION INTRAMUSCULAR; INTRAVENOUS at 05:33

## 2020-06-12 RX ADMIN — NYSTATIN: 100000 POWDER TOPICAL at 09:03

## 2020-06-12 RX ADMIN — HYDROMORPHONE HYDROCHLORIDE 0.25 MG: 1 INJECTION, SOLUTION INTRAMUSCULAR; INTRAVENOUS; SUBCUTANEOUS at 00:01

## 2020-06-12 RX ADMIN — HYDROCORTISONE: 25 CREAM TOPICAL at 20:01

## 2020-06-12 RX ADMIN — ONDANSETRON 4 MG: 2 INJECTION INTRAMUSCULAR; INTRAVENOUS at 19:57

## 2020-06-12 RX ADMIN — MORPHINE SULFATE 15 MG: 15 TABLET, FILM COATED, EXTENDED RELEASE ORAL at 08:18

## 2020-06-12 RX ADMIN — MORPHINE SULFATE 15 MG: 15 TABLET, FILM COATED, EXTENDED RELEASE ORAL at 20:00

## 2020-06-12 RX ADMIN — ZOLPIDEM TARTRATE 5 MG: 5 TABLET ORAL at 20:01

## 2020-06-12 RX ADMIN — HYDROMORPHONE HYDROCHLORIDE 0.25 MG: 1 INJECTION, SOLUTION INTRAMUSCULAR; INTRAVENOUS; SUBCUTANEOUS at 20:54

## 2020-06-12 RX ADMIN — HYDROMORPHONE HYDROCHLORIDE 0.25 MG: 1 INJECTION, SOLUTION INTRAMUSCULAR; INTRAVENOUS; SUBCUTANEOUS at 04:06

## 2020-06-12 RX ADMIN — METRONIDAZOLE 500 MG: 500 INJECTION, SOLUTION INTRAVENOUS at 16:02

## 2020-06-12 RX ADMIN — HYDROCODONE BITARTRATE AND ACETAMINOPHEN 1 TABLET: 5; 325 TABLET ORAL at 12:03

## 2020-06-12 RX ADMIN — LEVETIRACETAM 750 MG: 100 INJECTION, SOLUTION INTRAVENOUS at 09:03

## 2020-06-12 RX ADMIN — BUDESONIDE AND FORMOTEROL FUMARATE DIHYDRATE 2 PUFF: 160; 4.5 AEROSOL RESPIRATORY (INHALATION) at 19:53

## 2020-06-12 RX ADMIN — DAPTOMYCIN 600 MG: 500 INJECTION, POWDER, LYOPHILIZED, FOR SOLUTION INTRAVENOUS at 09:44

## 2020-06-12 RX ADMIN — FLUOXETINE HYDROCHLORIDE 40 MG: 20 CAPSULE ORAL at 08:18

## 2020-06-12 NOTE — PROGRESS NOTES
Continued Stay Note  [facility]     Patient Name: Liliana Calderon  MRN: [id number]  Today's Date: 6/12/2020    Admit Date: 6/1/2020    Discharge Plan     Row Name 06/12/20 0918       Plan    Plan  Update    Plan Comments  Spoke with bed command at [facility]. Was told they are working as quickly as possible to find a bed for pt. to be transferred to. Pt. now ith an NG to [facility]. Will cont. to follow and update.     Final Discharge Disposition Code  30 - still a patient        Discharge Codes    No documentation.             Komal Schuster RN

## 2020-06-12 NOTE — PLAN OF CARE
Problem: Patient Care Overview  Goal: Plan of Care Review  Flowsheets (Taken 6/12/2020 1946)  Outcome Summary: Pt tolerated BUE ther-ex supine 15 reps with improved AROM and ther ex tolerance with RUE, cont IPOT per POC

## 2020-06-12 NOTE — PLAN OF CARE
Pt has been awake all evening. She has had all of her prn medications. Patient requesting iv pain meds every two hours. She has called out excessively for ICE and Popsicles. She has eaten 12 popsicles and eaten 14 large cups of ice. I have explained numerous times that she is supposed to let her gut rest but insists since the DR said she could have ice and popsicles she should get them. She did not want to have her sheets changed despite ng content on them. She

## 2020-06-12 NOTE — PROGRESS NOTES
Liliana Calderon  1980  [id number]    Evaluating Physician: Elsy Augustin    Chief Complaint: fever    Reason for Consultation: MRSA bacteremia    History of present illness:   Patient is a 39 y.o.  Yr old female with history of IVDU, depression/anxiety, COPD, and seizure d/o who initially presented to [facility] yesterday with c/o shortness of air and worsening low back pain. She last used IV heroin about 1 month prior.  She was doing relatively well until about 3 days PTA when she noticed worsening low back pain and worsening shortness of air with blood-tinged sputum. She additionally started having fevers up to 103.9F. She did not have contact with anyone known to have COVID-19 infection.     On arrival to [facility] on 5/31, the patient was found to have fevers and a significant leukocytosis. CT chest was concerning for pneumonia and possible septic pulm emboli. Blood cultures resulted with MRSA and she was transferred to [facility] for a higher level of care. Tmax since arrival here has been 102.7F. WBC was elevated to 14.2 but has improved to 10 on antibiotics. She was started on vancomycin and aztreonam by primary team. Repeat blood cultures from 6/1 are again positive for MRSA. TTE and MRI lumbar spine ordered. ID consulted for recommendations in the setting of her MRSA septicemia.    Subjective:    6/3/20: The patient is still having back pain. No new weakness in her lower extremities but does have extreme back pain with movement. Fevers improved. Tmax 100F. Does have some anemia with Hgb 6.4. No nausea or vomiting. Mild diarrhea. No new rash.     6/4/20: still having some back pain and had some fevers overnight. Tmax this morning was 102.7F.  Not feeling very well this morning when I saw her. No worsening of her diarrhea. No nausea. No new rash. Still able to move her extremities well with no new weakness but does have significant pain lifting her legs off the bed.     6/5/20: Patient is  "unfortunately still having fevers up to 102.5°F.  She is still not feeling well with significant back pain.  No new weakness in her lower extremities.  She is having some diarrhea but only about 2 episodes in the last 24 hours.  No nausea.  No new rashes.  She went for REDD today and was noted to have about a 1 cm x 1 cm vegetation on the tricuspid valve per my discussion with Dr. Pinedo.    6/6/2020: Patient states she is still receiving her antibiotics but has complaints of nausea and vomiting as well as diarrhea.  Discussion with nursing staff states that she is having blood-tinged  tears, bloody emesis and worsening petechial rash over her body.  RN staff states they have not noted any blood in her diarrhea at this point.  Afebrile overnight with some tachypnea noted.  Remains without leukocytosis.  6/4 body fluid culture from back returned positive for 2+ MRSA.  6/3 blood cultures currently remain no growth to date. Per RN her rash has gradually progressed over the last couple days    6/7/2020: Patient laying in bed awake Heparin stopped last pm   RN states that he was told by the tech that there was blood in the stool but did not personally check it.   She feels \"horrible\" today.  She remains with nausea, vomiting and significant amounts of diarrhea- 3 stools so far today  Documentation shows she has been afebrile and hemodynamically stable overnight. Leukocytosis is slightly improved from 14-12.  H&H has improved from yesterday's values to 11 and 38.  Platelets of 405.  7/C. difficile toxin was negative.  6/4 body fluid back aspirate positive for MRSA.  Cdiff negative  6/8 alert, afebrile, no new skin lesions, c/o discomfort at the site of the R upper arm lesion that may have a hemorrhagic center  6/10  Alert, no new skin lesions and the largest one on the right upper arm is receding and is less tender although it is starting to ulcerate  Back pain improved    6/11  Febrile to 102 today; ongoing diarrhea and " "she has developed projectile vomiting w/o hemetemesis  All \"allowable\" stool studies unremarkable   Alert but pale no new skin lesions and existing ones are starting to recede  Possible transfer to  for angiovac when she is stabilized  WBC down to 11  6/12 Tm 102.2, vomiting improved after NG placed- > 6L output; alert, looks much less uncomfortable today; no diarrhea and abd pain is minimal; picc to be placed    Past Medical History:   Diagnosis Date   • Anxiety and depression    • Chronic bronchitis (CMS/HCC)    • Fibromyalgia    • Heart valve problem    • Hypertension    • Migraine    • PCOS (polycystic ovarian syndrome)    • Pneumonia    • Seizures (CMS/HCC)        Past Surgical History:   Procedure Laterality Date   • CHOLECYSTECTOMY         Social History:  IVDU (heroin). Former smoker. No ETOH use.    Family History:  family history includes Alcohol abuse in her brother and sister; Alzheimer's disease in her maternal grandmother; Bone cancer in her mother; Breast cancer in her mother; Dementia in her father; Diabetes in her father; Heart disease in her father and paternal grandfather; Hypertension in her brother, father, mother, and sister; Mental illness in her brother and sister; Parkinsonism in her maternal grandmother; Pulmonary embolism in her father; Skin cancer in her mother; Stroke in her father.    Allergies   Allergen Reactions   • Penicillins Rash     Received cefepime and cefdinir in 2018   • Phenobarbital Rash       Medication:  Current Facility-Administered Medications   Medication Dose Route Frequency Provider Last Rate Last Dose   • acetaminophen (TYLENOL) tablet 650 mg  650 mg Oral Q6H PRN Malathi Vasquez APRN   650 mg at 06/12/20 0533   • aztreonam (AZACTAM) 2 g/100 mL 0.9% NS (mbp)  2 g Intravenous Q8H Elsy Augustin MD   2 g at 06/12/20 0818   • bismuth subsalicylate (PEPTO BISMOL) 262 MG/15ML suspension 30 mL  30 mL Oral Q6H PRN Verenice Keller MD   30 mL at 06/09/20 1721   • " budesonide-formoterol (SYMBICORT) 160-4.5 MCG/ACT inhaler 2 puff  2 puff Inhalation BID - RT Rancho Lang MD   2 puff at 06/12/20 0907   • cyclobenzaprine (FLEXERIL) tablet 5 mg  5 mg Oral TID PRN Verenice Keller MD   5 mg at 06/11/20 2016   • DAPTOmycin (CUBICIN) 600 mg in sodium chloride 0.9 % 50 mL IVPB  8 mg/kg (Adjusted) Intravenous Q24H Rancho Lang  mL/hr at 06/12/20 0944 600 mg at 06/12/20 0944   • FLUoxetine (PROzac) capsule 40 mg  40 mg Oral Daily Vanessa Lopez MD   40 mg at 06/12/20 0818   • gabapentin (NEURONTIN) capsule 800 mg  800 mg Oral Q8H Vanessa Lopez MD   800 mg at 06/12/20 0533   • HYDROcodone-acetaminophen (NORCO) 5-325 MG per tablet 1 tablet  1 tablet Oral Q6H PRN Nathan Ashley MD   1 tablet at 06/12/20 1203   • hydrocortisone 2.5 % cream   Topical Q12H Verenice Keller MD       • HYDROmorphone (DILAUDID) injection 0.25 mg  0.25 mg Intravenous Q4H PRN Nathan Ashley MD   0.25 mg at 06/12/20 1250   • hydrOXYzine (ATARAX) tablet 25 mg  25 mg Oral Nightly PRN Jae Cesar PA-C   25 mg at 06/11/20 2016   • ipratropium-albuterol (DUO-NEB) nebulizer solution 3 mL  3 mL Nebulization Q6H PRN Vanessa Lopez MD   3 mL at 06/10/20 2043   • lactobacillus acidophilus (RISAQUAD) capsule 1 capsule  1 capsule Oral Daily Rancho Lang MD   1 capsule at 06/12/20 0818   • levETIRAcetam (KEPPRA) 750 mg in sodium chloride 0.9 % 100 mL IVPB  750 mg Intravenous Q12H Elizabeth Brown MD   750 mg at 06/12/20 0903   • levothyroxine (SYNTHROID, LEVOTHROID) tablet 50 mcg  50 mcg Oral Daily Vanessa Lopez MD   50 mcg at 06/12/20 0533   • loperamide (IMODIUM) capsule 2 mg  2 mg Oral 4x Daily PRN Verenice Keller MD   2 mg at 06/11/20 0836   • melatonin tablet 5 mg  5 mg Oral Nightly PRN Jae Cesar PA-C   5 mg at 06/10/20 1941   • metroNIDAZOLE (FLAGYL) 500 mg/100mL IVPB  500 mg Intravenous Q12H Elsy Augustin MD   500 mg at  06/12/20 0255   • Morphine (MS CONTIN) 12 hr tablet 15 mg  15 mg Oral Q12H Verenice Keller MD   15 mg at 06/12/20 0818   • morphine injection 1 mg  1 mg Intravenous Q2H PRN Nathan Ashley MD   1 mg at 06/12/20 0533   • nystatin (MYCOSTATIN) powder   Topical Q12H Verenice Keller MD       • ondansetron (ZOFRAN) injection 4 mg  4 mg Intravenous Q6H PRN Nathan Ashley MD   4 mg at 06/11/20 1605   • pantoprazole (PROTONIX) EC tablet 40 mg  40 mg Oral QAM Vanessa Lopez MD   40 mg at 06/12/20 0533   • potassium chloride (MICRO-K) CR capsule 40 mEq  40 mEq Oral PRN Verenice Keller MD   40 mEq at 06/11/20 0040    Or   • potassium chloride (KLOR-CON) packet 40 mEq  40 mEq Oral PRN Verenice Keller MD        Or   • potassium chloride 10 mEq in 100 mL IVPB  10 mEq Intravenous Q1H PRN Verenice Keller MD       • prochlorperazine (COMPAZINE) injection 5 mg  5 mg Intravenous Q6H PRN Verenice Keller MD   5 mg at 06/07/20 2316    Or   • prochlorperazine (COMPAZINE) tablet 5 mg  5 mg Oral Q6H PRN Verenice Keller MD   5 mg at 06/11/20 0447    Or   • prochlorperazine (COMPAZINE) suppository 25 mg  25 mg Rectal Q12H PRN Verenice Keller MD       • sodium chloride 0.9 % bolus 1,000 mL  1,000 mL Intravenous Once Pedro Lundberg MD       • sodium chloride 0.9 % flush 10 mL  10 mL Intravenous Q12H Nathan Ashley MD       • sodium chloride 0.9 % flush 10 mL  10 mL Intravenous PRN Nathan Ashley MD       • sodium chloride 0.9 % infusion  125 mL/hr Intravenous Continuous Vanessa Lopez  mL/hr at 06/12/20 0254 125 mL/hr at 06/12/20 0254   • zolpidem (AMBIEN) tablet 5 mg  5 mg Oral Nightly PRN Verenice Keller MD   5 mg at 06/11/20 2016         Infectious History:  MRSA    Antibiotics:  Anti-Infectives (From admission, onward)    Ordered     Dose/Rate Route Frequency Start Stop    06/11/20 1537  aztreonam (AZACTAM) 2 g/100 mL 0.9% NS (mbp)  Review   Ordering Provider:  Elsy Augustin MD    2 g  over 4 Hours  "Intravenous Every 8 Hours 06/12/20 0000 06/19/20 2359    06/11/20 1537  aztreonam (AZACTAM) 2 g/100 mL 0.9% NS (mbp)     Ordering Provider:  Elsy Augustin MD    2 g  over 30 Minutes Intravenous Once 06/11/20 1630 06/11/20 1808    06/11/20 1537  metroNIDAZOLE (FLAGYL) 500 mg/100mL IVPB  Review   Ordering Provider:  Elsy Augustin MD    500 mg  over 60 Minutes Intravenous Every 12 Hours 06/11/20 1600 06/19/20 1559    06/02/20 0743  DAPTOmycin (CUBICIN) 600 mg in sodium chloride 0.9 % 50 mL IVPB     Rancho Lang MD reviewed the order on 06/05/20 0802.   Ordering Provider:  Rancho Lang MD    8 mg/kg × 76.6 kg (Adjusted)  100 mL/hr over 30 Minutes Intravenous Every 24 Hours 06/02/20 0900 06/23/20 0859    06/01/20 1501  vancomycin 2250 mg/500 mL 0.9% NS IVPB ([facility])     Ordering Provider:  Vanessa Lopez MD    2,250 mg Intravenous Once 06/01/20 1600 06/01/20 1756            Review of Systems    As above    Physical Exam:   Vital Signs   /63 (BP Location: Right arm, Patient Position: Lying)   Pulse 105   Temp 98.6 °F (37 °C) (Oral)   Resp 18   Ht 165.1 cm (65\")   Wt 106 kg (233 lb)   LMP 04/01/2020 (Approximate)   SpO2 98%   BMI 38.77 kg/m²     GENERAL: Awake alert, Obese. mild distress  HENT: Normocephalic, atraumatic.  Conjunctiva with hemorrhages bilaterally  NECK: Supple without nuchal rigidity. No mass.  HEART: RRR; No murmur, gallops or rubs noted.   LUNGS: CTAB. Nonlabored.  No wheezing noted  ABDOMEN: Soft, nontender, nondistended. Positive bowel sounds.  No guarding noted  EXT:  No cyanosis, clubbing or edema.   :   Without Petit catheter.  MSK: FROM without joint effusions noted arms/legs.  Some tenderness to palpation over her lumbar spine.  SKIN: has slightly discolored/gray patchy/papular lesions over her bilateral lower extremity- these appeared about 2 weeks ago per the patient.  No other possible peripheral stigmata of infective endocarditis noted over her " extremities or elsewhere.  She has also developed a petechial rash over her entire back bilateral upper extremities, buttocks and inguinal and abdominal folds which is improveing  On the R upper arm there remains a large lesion with a hemorrhagic center but minimal induration and no fuctuance  NEURO: Oriented to PPT. No focal deficits on motor/sensory exam at arms/legs. 5 out of 5 strength throughout  PSYCHIATRIC: Normal insight and judgement. Cooperative with PE    Laboratory Data    Results from last 7 days   Lab Units 06/11/20  0409 06/10/20  0530 06/09/20  1028   WBC 10*3/mm3 10.24 17.35* 16.06*   HEMOGLOBIN g/dL 9.3* 10.3* 11.6*   HEMATOCRIT % 30.8* 33.8* 38.9   PLATELETS 10*3/mm3 276 370 445     Results from last 7 days   Lab Units 06/11/20  0409   SODIUM mmol/L 135*   POTASSIUM mmol/L 3.9   CHLORIDE mmol/L 102   CO2 mmol/L 20.0*   BUN mg/dL 14   CREATININE mg/dL 0.66   GLUCOSE mg/dL 130*   CALCIUM mg/dL 7.8*     Results from last 7 days   Lab Units 06/11/20  0409  06/08/20  0513   ALK PHOS U/L 123*   < > 148*   BILIRUBIN mg/dL 0.8   < > 0.6   BILIRUBIN DIRECT mg/dL  --   --  0.4*   ALT (SGPT) U/L 7   < > 8   AST (SGOT) U/L 12   < > 12    < > = values in this interval not displayed.     Results from last 7 days   Lab Units 06/07/20  0456   SED RATE mm/hr 74*     Results from last 7 days   Lab Units 06/07/20  0456   CRP mg/dL 16.14*       Estimated Creatinine Clearance: 139.8 mL/min (by C-G formula based on SCr of 0.66 mg/dL).       Microbiology:    Microbiology Results (last 10 days)     Procedure Component Value - Date/Time    Stool Culture (Reference Lab) - Stool, Per Rectum [[id number]] Collected:  06/08/20 0351    Lab Status:  Final result Specimen:  Stool from Per Rectum Updated:  06/12/20 0951     Salmonella/Shigella Screen Final report     Result 1 Comment     Comment: No Salmonella or Shigella recovered.        E coli, Shiga toxin Assay Negative    Narrative:       Performed at:  [address] [place] [address]  [address]  [id number]  : Lam Roman PhD, Phone:  [phone]    Clostridium Difficile Toxin - Stool, Per Rectum [[id number]] Collected:  06/05/20 1748    Lab Status:  Final result Specimen:  Stool from Per Rectum Updated:  06/05/20 1858    Narrative:       The following orders were created for panel order Clostridium Difficile Toxin - Stool, Per Rectum.  Procedure                               Abnormality         Status                     ---------                               -----------         ------                     Clostridium Difficile To...[[id number]]  Normal              Final result                 Please view results for these tests on the individual orders.    Clostridium Difficile Toxin, PCR - Stool, Per Rectum [[id number]]  (Normal) Collected:  06/05/20 1748    Lab Status:  Final result Specimen:  Stool from Per Rectum Updated:  06/05/20 1858     C. Difficile Toxins by PCR Not Detected    Narrative:       Performance characteristics of test not established for patients <2 years of age.  Negative for Toxigenic C. Difficile    Body Fluid Culture - Aspirate, Back [[id number]]  (Abnormal)  (Susceptibility) Collected:  06/04/20 1645    Lab Status:  Final result Specimen:  Aspirate from Back Updated:  06/07/20 0701     Body Fluid Culture Light growth (2+) Staphylococcus aureus, MRSA     Comment:   Methicillin resistant Staphylococcus aureus, Patient may be an isolation risk.        Gram Stain No organisms seen      Many (4+) WBCs per low power field    Susceptibility      Staphylococcus aureus, MRSA     TYRONE     Gentamicin Susceptible     Oxacillin Resistant     Rifampin Susceptible     Vancomycin Susceptible                Susceptibility Comments     Staphylococcus aureus, MRSA    This isolate does not demonstrate inducible clindamycin resistance in vitro.               Anaerobic Culture - Aspirate, Back [[id number]] Collected:  06/04/20 1643    Lab Status:  Final  result Specimen:  Aspirate from Back Updated:  06/09/20 0741     Anaerobic Culture No anaerobes isolated at 5 days    Blood Culture - Blood, Arm, Right [[id number]] Collected:  06/03/20 1229    Lab Status:  Final result Specimen:  Blood from Arm, Right Updated:  06/08/20 1401     Blood Culture No growth at 5 days    Blood Culture - Blood, Arm, Left [[id number]] Collected:  06/03/20 1229    Lab Status:  Final result Specimen:  Blood from Arm, Left Updated:  06/08/20 1401     Blood Culture No growth at 5 days          Radiology:  Xr Shoulder 2+ View Right    Result Date: 6/8/2020  No acute bony abnormality.  D:  06/08/2020 E:  06/08/2020  This report was finalized on 6/8/2020 4:30 PM by Dr. Jasmine Marie MD.      Ct Head Without Contrast    Result Date: 6/11/2020  1. No acute intracranial abnormality specifically, no midline shift or hydrocephalus. No intra-axial hemorrhage. 2. Moderate-to-large left mastoid effusion.  D:  06/11/2020 E:  06/11/2020        Ct Chest With Contrast    Result Date: 6/5/2020  Abnormal chest CT with moderate dependent bilateral pleural effusions. There are bilateral parenchymal changes in the lungs including well-circumscribed nodules which likely represent septic emboli and a poorly defined nodules and areas of airspace change which could represent septic emboli or pneumonia. Findings most likely are all infectious. There are small reactive nodes in the mediastinum. The spleen is prominent in size but incompletely imaged. No upper abdominal adenopathy. No acute bone lesions in the chest Signer Name: Anushka Gutierrez MD  Signed: 6/5/2020 8:44 PM  Workstation Name: [id number]  [facility]    Ct Abdomen Pelvis With Contrast    Result Date: 6/11/2020  1.  Small bowel obstruction with severely dilated small bowel loops and air-fluid levels extending into the right lower quadrant adjacent to the right iliac vessels where there is caliber change concerning for an  apparent transition point at this level with evidence for ascites and mesenteric edema, however, no evidence for perforation or abscess.  2.  Persistent small volume bilateral pleural effusions and opacifications in the right midlung better seen on recent 06/05/2020 examination recently performed.  D:  06/11/2020 E:  06/11/2020       Ct Guided Biopsy Aspiration Injection    Result Date: 6/4/2020   Percutaneous aspiration of very tiny abscess collection adjacent to vertebral bodies/within the erector spinae muscle, yielding 2 mL of purulent appearing fluid. No drainage catheter was left in place - too small for drainage catheter/contraindicated to leave drainage catheter in collection with possible communication with epidural space  Plan:  Follow-up lab results. Continued care with primary team. ______________________________________________________________________  PROCEDURE SUMMARY: - Aspiration of tiny abscess pockets under fluoroscopic guidance - Additional procedure(s): None  PROCEDURE DETAILS:  Pre-procedure Consent: Informed consent for the procedure including risks, benefits and alternatives was obtained and time-out was performed prior to the procedure. Preparation: The site was prepared and draped using maximal sterile barrier technique including cutaneous antisepsis.  Anesthesia/sedation Level of anesthesia/sedation: Moderate sedation (conscious sedation) Anesthesia/sedation administered by: Independent trained observer under attending supervision with continuous monitoring of the patient?s level of consciousness and physiologic status Total intra-service sedation time (minutes): 12  Fluid collection aspiration The patient was positioned prone on. Initial imaging was performed. Local anesthesia was administered. The fluid collection was accessed using a 22-gauge spinal needle under CT fluoroscopic guidance. Position within the fluid collection was confirmed, and fluid aspiration was performed. All  instruments were then removed. - Initial imaging findings: Fluid collections identified on comparison MRI lumbar spine 6/4/2020 not as well visualized on CT. - Aspiration needle/catheter: 22-gauge spinal needle - Post-aspiration imaging findings: No immediate complication  Contrast Contrast agent: None Contrast volume (mL): 0  Radiation Dose CT dose length product (mGy-cm): 975  Additional Details Additional description of procedure: None Equipment details: None Specimens removed: Aspirated fluid sent for analysis. Estimated blood loss (mL): Less than 10 Standardized report: DrainageAspiration  Attestation I was present and scrubbed for the entire procedure. Imaging reviewed. Agree with final report as written.  This report was finalized on 6/4/2020 5:36 PM by Yimi Huggins.      Xr Abdomen Kub    Result Date: 6/11/2020  Severely dilated gas-filled small bowel loops throughout the mid abdomen consistent with obstructive bowel gas pattern.  D:  06/11/2020 E:  06/11/2020            Impression:     This is a 40 yo woman with a h/o IVDU presenting with severe low back pain and shortness of air and found to have fevers/sepsis with MRSA growing from multiple blood culture sets. Also has CT chest findings that are concerning for septic pulmonary emboli and now noted to have a decent size vegetation on her tricuspid valve on REDD consistent with infective endocarditis. MRI with 8x4 x3 cm multilobular abscess of the right erector spinae muscles, approximately from L3-S1. Also with two possible interconnected dorsal epidural abscesses at L2-L3 and L3-L4 but only causing mild canal stenosis. Dr. Alex with neurosurgery has evaluated and does not think that she requires any surgical intervention at this time for her epidural abscesses as they are modest. Underwent CT-guided drainage 6/4 and had 2cc of purulent drainage aspirated from her fluid collection in erector spinae muscles. Repeat blood culture sent 6/3 is  negative    REDD with a 1 cm x 1 cm vegetation on the tricuspid valve    It appears that her petechial rash has progressed somewhat but her renal function, PT/INR and platelet count are stable which makes DIC less likely      Problems:  1. 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 MRSA septicemia  Bacteremia appears to have cleared; no new cutaneous emboli  3. Epidural abscesses  4. Large loculated Paraspinal muscle abscess- s/p aspiration which is culture positive  5. Generalized petechial rash-stable  6. Vomiting- etiology unclear   ? Esophagitis ? Drug AE- potentially either teflaro or daptomycin although neither is highly likely to be the culprit  No evidence of pancreatitis or biliary disease, R/O mesenteric emboli   Improved after NG placed and Teflaro stopped  Possible transition zone on abdominal CT  7. Diarrhea - ongoing and intermittent Cdiff negative, culture unremarkable; unable to order GI panel  8. Sepsis with fever, leukocytosis with neutrophilia, tachycardia-recurrence  associated with severe vomiting- unclear if aspiration, new complication of endocarditis, etc  9. Septic pulmonary emboli  10. Acute on chronic low back pain  11. Urine cx with MRSA- represents systemic infection, not primary source  12. Microcytic anemia  13. History of PCN allergy   14. Recent IV drug use  15. Obesity  16. H/o COPD  17. H/o seizure disorder  18. H/o depression/anxiety    PLAN: Thank you for asking us to see Liliana Calderon, I recommend the followin. Continue daptomycin alone for now  2. Hold teflaro for now - could consider rechallenging with this drug after GI symptoms resolved  3. Continue daptomycin 8mg/kg IV q24h. Baseline CPK level was 48 and remains normal  4. Continue azactam and flagyl- if repeat czr in am is clear I will stop these  5. Will consider repeat MRI L-spine in 2-3 weeks. Neurosurgery following  6. CT surgery has  arranged for transfer to  for angiovac when clinically stable  7. Ng tube  8. Follow repeat blood culture sent 6/8  9. Double lumen picc placed in case the ileus/SBO does not resolve and she might need TPN    Dr Rancho Lang will see 6/15     Complex set of medical issues requiring a high-level medical decision making.  The patient does have significant risk for for further morbidity and mortality in the setting of her severe MRSA infection. Has some risk for paralysis if her epidural abscesses worsen.    Prognosis guarded    Discussed with AYALA Augustin MD  6/12/2020

## 2020-06-12 NOTE — THERAPY TREATMENT NOTE
Acute Care - Occupational Therapy Treatment Note  [facility]     Patient Name: Liliana Calderon  : 1980  MRN: [id number]  Today's Date: 2020     Date of Referral to OT: 20       Admit Date: 2020       ICD-10-CM ICD-9-CM   1. Impaired mobility and ADLs Z74.09 V49.89    Z78.9      Patient Active Problem List   Diagnosis   • Bradycardia with 41-50 beats per minute   • Essential hypertension   • Fibromyalgia syndrome   • GERD without esophagitis   • Sepsis (CMS/HCC)   • DDD (degenerative disc disease), cervical   • Anxiety   • Depression   • Seizure (CMS/HCC)   • Migraine   • Infective endocarditis   • Epidural abscess   • MRSA bacteremia     Past Medical History:   Diagnosis Date   • Anxiety and depression    • Chronic bronchitis (CMS/HCC)    • Fibromyalgia    • Heart valve problem    • Hypertension    • Migraine    • PCOS (polycystic ovarian syndrome)    • Pneumonia    • Seizures (CMS/HCC)      Past Surgical History:   Procedure Laterality Date   • CHOLECYSTECTOMY         Therapy Treatment    Rehabilitation Treatment Summary     Row Name 20 1435             Treatment Time/Intention    Discipline  occupational therapist  -KF      Document Type  therapy note (daily note)  -KF      Subjective Information  complains of;weakness;fatigue;pain  -KF      Mode of Treatment  occupational therapy  -KF      Therapy Frequency (OT Eval)  3 times/wk  -KF      Patient Effort  good  -KF      Existing Precautions/Restrictions  fall;spinal;other (see comments) NG  -KF      Patient Response to Treatment  tolerated   -KF      Recorded by [KF] Lucia Bass, OT 20 1507      Row Name 20 1435             Vital Signs    Pre Systolic BP Rehab  117  -KF      Pre Treatment Diastolic BP  63 RN cleared VSS   -KF      O2 Delivery Pre Treatment  room air  -KF      O2 Delivery Post Treatment  room air  -KF      Pre Patient Position  Supine  -KF      Intra Patient Position  Supine  -KF      Post Patient  Position  Supine  -KF      Recorded by [KF] Lucia Bass, OT 06/12/20 1507      Row Name 06/12/20 1435             Cognitive Assessment/Intervention    Additional Documentation  Cognitive Assessment/Intervention (Group)  -KF      Recorded by [KF] Lucia Bass, OT 06/12/20 1507      Row Name 06/12/20 1435             Cognitive Assessment/Intervention- PT/OT    Affect/Mental Status (Cognitive)  WFL  -KF      Behavioral Issues (Cognitive)  difficulty managing stress  -KF      Orientation Status (Cognition)  oriented x 4  -KF      Follows Commands (Cognition)  WFL;follows one step commands;over 90% accuracy  -KF      Cognitive Function (Cognitive)  safety deficit;executive function deficit;WFL  -KF      Executive Function Deficit (Cognition)  mild deficit;judgment;insight/awareness of deficits  -KF      Safety Deficit (Cognitive)  mild deficit;awareness of need for assistance;insight into deficits/self awareness;safety precautions awareness  -KF      Cognitive Interventions (Cognitive)  occupation/activity based interventions;process/task specific training  -KF      Recorded by [KF] Lucia Bass, OT 06/12/20 1507      Row Name 06/12/20 1435             Safety Issues, Functional Mobility    Safety Issues Affecting Function (Mobility)  insight into deficits/self awareness;awareness of need for assistance;safety precaution awareness  -KF      Impairments Affecting Function (Mobility)  strength;pain;balance;endurance/activity tolerance;postural/trunk control;range of motion (ROM)  -KF      Recorded by [KF] Lucia Bass, OT 06/12/20 1507      Row Name 06/12/20 1435             Bed Mobility Assessment/Treatment    Comment (Bed Mobility)  Pt declined 2/2 pain   -KF      Recorded by [KF] Lucia Bass, OT 06/12/20 1507      Row Name 06/12/20 1435             Functional Mobility    Functional Mobility- Comment  Pt declined 2/2 to pain   -KF      Recorded by [KF] Lucia Bass, OT 06/12/20 1507       Row Name 06/12/20 1435             Transfer Assessment/Treatment    Comment (Transfers)  Pt declined   -KF      Recorded by [KF] Lucia Bass, OT 06/12/20 1507      Row Name 06/12/20 1435             ADL Assessment/Intervention    61564 - OT Self Care/Mgmt Minutes  2  -KF      BADL Assessment/Intervention  feeding  -KF      Recorded by [KF] Lucia Bass, OT 06/12/20 1507      Row Name 06/12/20 1435             BADL Safety/Performance    Impairments, BADL Safety/Performance  pain;range of motion;strength;endurance/activity tolerance  -KF      Skilled BADL Treatment/Intervention  BADL process/adaptation training;cognitive/safety deficit modifications  -KF      Progress in BADL Status  improvement noted  -KF      Recorded by [KF] Lucia Bass, OT 06/12/20 1507      Row Name 06/12/20 1435             Motor Skills Assessment/Interventions    Additional Documentation  Balance (Group);Balance Interventions (Group)  -KF      Recorded by [KF] Lucia Bass, OT 06/12/20 1507      Row Name 06/12/20 1435             Therapeutic Exercise    07056 - OT Therapeutic Exercise Minutes  10  -KF      Recorded by [KF] Lucia Bass, OT 06/12/20 1507      Row Name 06/12/20 1435             Upper Extremity Seated Therapeutic Exercise    Performed, Seated Upper Extremity (Therapeutic Exercise)  shoulder flexion/extension;shoulder horizontal abduction/adduction;elbow flexion/extension  -KF      Exercise Type, Seated Upper Extremity (Therapeutic Exercise)  AROM (active range of motion)  -KF      Expected Outcomes, Seated Upper Extremity (Therapeutic Exercise)  improve functional tolerance, self-care activity;improve performance, BADLs;improve functional stability;strengthen normal movement patterns  -KF      Sets/Reps Detail, Seated Upper Extremity (Therapeutic Exercise)  1/15  -KF      Transfers Skills, Training to Functional Activity, Seated Upper Extremity (Therapeutic Exercise)  beginning to transfer skills  to functional activity  -KF      Comment, Seated Upper Extremity (Therapeutic Exercise)  BUE   -KF      Recorded by [KF] Lucia Bass, OT 06/12/20 1507      Row Name 06/12/20 1435             Positioning and Restraints    Pre-Treatment Position  in bed  -KF      Post Treatment Position  bed  -KF      In Bed  notified nsg;supine;fowlers;call light within reach;encouraged to call for assist;exit alarm on;side rails up x2;legs elevated;heels elevated;RUE elevated;LUE elevated  -KF      Recorded by [KF] Lucia Bass, OT 06/12/20 1507      Row Name 06/12/20 1435             Pain Assessment    Additional Documentation  Pain Scale: Numbers Pre/Post-Treatment (Group)  -KF      Recorded by [MICAELA] Lucia Bass, OT 06/12/20 1507      Row Name 06/12/20 1435             Pain Scale: Numbers Pre/Post-Treatment    Pain Scale: Numbers, Pretreatment  6/10  -KF      Pain Scale: Numbers, Post-Treatment  7/10  -KF      Pain Location - Side  Right  -KF      Pain Location - Orientation  generalized  -KF      Pain Location  shoulder  -KF      Pain Intervention(s)  Repositioned;Ambulation/increased activity  -KF      Recorded by [KF] Lucia Bass, OT 06/12/20 1507      Row Name                Wound 06/09/20 Right upper arm Other (comment)    Wound - Properties Group Date first assessed: 06/09/20 [KI] Side: Right [KI] Orientation: upper [KI] Location: arm [KI] Primary Wound Type: Other [KI] Recorded by:  [KI] Omayra Holguin RN 06/09/20 0938    Row Name 06/12/20 1430             Plan of Care Review    Plan of Care Reviewed With  patient  -KF      Progress  improving  -KF      Recorded by [KF] Lucia Bass, OT 06/12/20 1507      Row Name 06/12/20 1435             Outcome Summary/Treatment Plan (OT)    Daily Summary of Progress (OT)  progress toward functional goals as expected  -KF      Anticipated Discharge Disposition (OT)  inpatient rehabilitation facility  -KF      Recorded by [KF] Lucia Bass, OT  06/12/20 1507        User Key  (r) = Recorded By, (t) = Taken By, (c) = Cosigned By    Initials Name Effective Dates Discipline    Omayra King RN 06/16/16 -  Nurse    Lucia Salvador OT 04/03/18 -  OT        Wound 06/09/20 Right upper arm Other (comment) (Active)   Dressing Appearance open to air 6/12/2020 12:00 PM   Base clean;dry;red;purple 6/12/2020 12:00 PM   Periwound redness 6/12/2020 12:00 PM   Edges irregular 6/12/2020 12:00 PM   Drainage Amount none 6/12/2020 12:00 PM       Occupational Therapy Education                 Title: PT OT SLP Therapies (Done)     Topic: Occupational Therapy (Done)     Point: ADL training (Done)     Description:   Instruct learner(s) on proper safety adaptation and remediation techniques during self care or transfers.   Instruct in proper use of assistive devices.              Learning Progress Summary           Patient Acceptance, E,TB,D, VU,DU by  at 6/12/2020 1435    Acceptance, E,TB,D, VU,DU by  at 6/8/2020 1145    Acceptance, E,TB,D, VU,DU,NR by  at 6/5/2020 1105                   Point: Home exercise program (Done)     Description:   Instruct learner(s) on appropriate technique for monitoring, assisting and/or progressing therapeutic exercises/activities.              Learning Progress Summary           Patient Acceptance, E,TB,D, VU,DU by  at 6/12/2020 1435    Acceptance, E,TB,D, VU,DU by  at 6/8/2020 1145    Acceptance, E,TB,D, VU,DU,NR by  at 6/5/2020 1105                   Point: Precautions (Done)     Description:   Instruct learner(s) on prescribed precautions during self-care and functional transfers.              Learning Progress Summary           Patient Acceptance, E,TB,D, VU,DU by  at 6/12/2020 1435    Acceptance, E,TB,D, VU,DU by  at 6/8/2020 1145    Acceptance, E,TB,D, VU,DU,NR by  at 6/5/2020 1105                   Point: Body mechanics (Done)     Description:   Instruct learner(s) on proper positioning and spine alignment  during self-care, functional mobility activities and/or exercises.              Learning Progress Summary           Patient Acceptance, E,TB,D, VU,DU by  at 6/12/2020 1435    Acceptance, E,TB,D, VU,DU by KF at 6/8/2020 1145    Acceptance, E,TB,D, VU,DU,NR by  at 6/5/2020 1105                               User Key     Initials Effective Dates Name Provider Type Discipline     04/03/18 -  Lucia Bass, OT Occupational Therapist OT                OT Recommendation and Plan  Outcome Summary/Treatment Plan (OT)  Daily Summary of Progress (OT): progress toward functional goals as expected  Anticipated Equipment Needs at Discharge (OT): (TBD)  Anticipated Discharge Disposition (OT): inpatient rehabilitation facility  Planned Therapy Interventions (OT Eval): activity tolerance training, adaptive equipment training, BADL retraining, cognitive/visual perception retraining, functional balance retraining, IADL retraining, neuromuscular control/coordination retraining, occupation/activity based interventions, patient/caregiver education/training, ROM/therapeutic exercise, strengthening exercise, transfer/mobility retraining  Therapy Frequency (OT Eval): 3 times/wk  Daily Summary of Progress (OT): progress toward functional goals as expected  Plan of Care Review  Plan of Care Reviewed With: patient  Plan of Care Reviewed With: patient  Outcome Summary: Pt tolerated BUE ther-ex supine with improved AROM and ther ex tolerance with RUE, cont IPOT per POC  Outcome Measures     Row Name 06/12/20 1435             How much help from another is currently needed...    Putting on and taking off regular lower body clothing?  2  -KF      Bathing (including washing, rinsing, and drying)  2  -KF      Toileting (which includes using toilet bed pan or urinal)  2  -KF      Putting on and taking off regular upper body clothing  2  -KF      Taking care of personal grooming (such as brushing teeth)  3  -KF      Eating meals  4  -KF       AM-PAC 6 Clicks Score (OT)  15  -KF         Functional Assessment    Outcome Measure Options  AM-PAC 6 Clicks Daily Activity (OT)  -KF        User Key  (r) = Recorded By, (t) = Taken By, (c) = Cosigned By    Initials Name Provider Type    Lucia Salvador OT Occupational Therapist           Time Calculation:   Time Calculation- OT     Row Name 06/12/20 1435             Time Calculation- OT    OT Start Time  1435  -KF      OT Stop Time  1450  -KF      OT Time Calculation (min)  15 min  -KF      Total Timed Code Minutes- OT  15 minute(s)  -KF      OT Received On  06/12/20  -KF         Timed Charges    35539 - OT Therapeutic Exercise Minutes  10  -KF      69604 - OT Therapeutic Activity Minutes  3  -KF      91960 - OT Self Care/Mgmt Minutes  2  -KF        User Key  (r) = Recorded By, (t) = Taken By, (c) = Cosigned By    Initials Name Provider Type    Lucia Salvador OT Occupational Therapist        Therapy Charges for Today     Code Description Service Date Service Provider Modifiers Qty    [id number]  OT THER PROC EA 15 MIN 6/12/2020 Lucia Bass OT GO 1               Lucia Bass OT  6/12/2020

## 2020-06-12 NOTE — PROGRESS NOTES
"Neurology       Patient Care Team:  Ollie Flores MD as PCP - General (Internal Medicine)    Chief complaint breakthrough sz      Subjective .     History:    Patient is doing much better today/pain is controlled  No further sz  Temp 101  Denies any stroke symptoms       Objective     Vital Signs   Blood pressure 136/86, pulse 106, temperature (!) 101 °F (38.3 °C), temperature source Axillary, resp. rate 18, height 165.1 cm (65\"), weight 106 kg (233 lb), last menstrual period 04/01/2020, SpO2 98 %.    Physical Exam:  General- obese, gray pallor, resting comfortably, ngt to suction  Head/eyes- blood tinged tears, atraumatic  ENT- moist membrane, normal nose  Respiratory-supplementary oxygen, no distress  Cardiac-rr    Extremities- septic emboli in digits, cool extremities  Skin- gray pallor  Speech- no aphasia or dysarthria  Cranial nerves- pupils equal and reactive, EOMI, visual fields intact, no facial weakness, hearing intact, tongue midline  Mental status-she is oriented to person place and time  Cognitive-good attention and concentration  Motor- normal bulk and tone, moving all extremities antigravity      Results Review:  Personally reviewed head ct/nml             Assessment/Plan     Sepsis (CMS/HCC)    Essential hypertension    Fibromyalgia syndrome    GERD without esophagitis    DDD (degenerative disc disease), cervical    Anxiety    Depression    Seizure (CMS/HCC)    Migraine    Infective endocarditis    Epidural abscess    MRSA bacteremia        39-year-old woman with 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  Is waiting for transfer to [facility] for cardiac surgery to repair her tricuspid valve.       No further seizure since increasing keppra from 500mg bid to 750mg bid.      Did discuss with patient, with her recurrent fevers/infection, " uncontrolled pain, seizure threshold can be lowered. Hope to reduce back to nml dose once she is more stable.    Also discussed signs/symptoms of stroke.     Will sign off at this time, please call if needed.     I discussed the patient's findings and my recommendations with patient, family and primary care team         Elizabeth Brown MD  06/12/20  10:04

## 2020-06-12 NOTE — PROGRESS NOTES
"General Surgery Daily Progress Note    Subjective:  Abdominal complaints much better today.  Passing flatus and having bowel movements.    Objective:  /63 (BP Location: Right arm, Patient Position: Lying)   Pulse 105   Temp 98.6 °F (37 °C) (Oral)   Resp 18   Ht 165.1 cm (65\")   Wt 106 kg (233 lb)   LMP 04/01/2020 (Approximate)   SpO2 98%   BMI 38.77 kg/m²       General Appearance: Moderate distress, NG tube in place  Eyes: Anicteric  Neck: Trachea midline   Cardiovascular:  RR, tachy, without murmur nor rub  Lungs:  Bilateral respirations mildly labored  Abdomen:  Soft, non-tender, no masses, no organomegaly  Extremities:  No cyanosis or + edema   Skin: Petechial rash unchanged  Neurologic: awake and conversant       Imaging Results (Last 24 Hours)     Procedure Component Value Units Date/Time    CT Abdomen Pelvis With Contrast [[id number]] Collected:  06/11/20 1338     Updated:  06/11/20 1424    Narrative:       EXAMINATION: CT ABDOMEN AND PELVIS W CONTRAST-06/11/2020:      INDICATION: Projectile vomiting; Z74.09-Other reduced mobility;  Z78.9-Other specified health status.      TECHNIQUE: CT abdomen and pelvis with intravenous contrast.     The radiation dose reduction device was turned on for each scan per the  ALARA (As Low as Reasonably Achievable) protocol.     COMPARISON: CT dated 06/05/2020.     FINDINGS: Opacifications within the right middle lobe extending  anteriorly fairly confluent as seen on prior comparison of limited  evaluation with bilateral small volume pleural effusions. Liver without  focal lesion, demonstrating perihepatic ascites. The spleen is  unremarkable. The gallbladder is surgically absent. The pancreas is  unremarkable. Adrenals without distinct nodule. Kidneys without  hydronephrosis or hydroureter. No bulky retroperitoneal adenopathy.  Patent nonaneurysmal abdominal aorta. Portal veins and IVC patent. GI  tract evaluation demonstrates severe distention of the gastric " lumen  with air-fluid level along with severely dilated small bowel loops with  air-fluid levels extending into the right lower quadrant where there is  caliber change of likely transition point adjacent to the right iliac  vessels. There is fecalization throughout the small bowel additionally  noted, however, no distinct pneumatosis despite mesenteric edema and  surrounding free fluid. No evidence for perforation or abscess. The  large bowel is decompressed and unremarkable. The pelvic viscera  unremarkable without bulky pelvic adenopathy. Degenerative changes of  the spine with body wall edema.       Impression:       1.  Small bowel obstruction with severely dilated small bowel loops and  air-fluid levels extending into the right lower quadrant adjacent to the  right iliac vessels where there is caliber change concerning for an  apparent transition point at this level with evidence for ascites and  mesenteric edema, however, no evidence for perforation or abscess.     2.  Persistent small volume bilateral pleural effusions and  opacifications in the right midlung better seen on recent 06/05/2020  examination recently performed.     D:  06/11/2020  E:  06/11/2020          CT Head Without Contrast [[id number]] Collected:  06/11/20 1334     Updated:  06/11/20 1339    Narrative:       EXAMINATION: CT HEAD WO CONTRAST-06/11/2020:      INDICATION: Speech changes (or aphasia), new or progressive;  Z74.09-Other reduced mobility; Z78.9-Other specified health status.      TECHNIQUE: CT head without intravenous contrast.     The radiation dose reduction device was turned on for each scan per the  ALARA (As Low as Reasonably Achievable) protocol.     COMPARISON: NONE.     FINDINGS:  The midline structures are symmetric without evidence of  mass, mass effect or midline shift. Ventricles and sulci within normal  limits. No intra-axial hemorrhage or extra-axial fluid collection.  Globes and orbits are unremarkable. The  visualized paranasal sinuses and  mastoid air cells are grossly clear and well pneumatized with the  exception of a moderate-to-large left mastoid effusion. Calvarium  intact.       Impression:       1. No acute intracranial abnormality specifically, no midline shift or  hydrocephalus. No intra-axial hemorrhage.  2. Moderate-to-large left mastoid effusion.     D:  06/11/2020  E:  06/11/2020                   CBC:  Results from last 7 days   Lab Units 06/11/20  0409   WBC 10*3/mm3 10.24   HEMOGLOBIN g/dL 9.3*   HEMATOCRIT % 30.8*   PLATELETS 10*3/mm3 276       CMP:  Results from last 7 days   Lab Units 06/11/20  0409   SODIUM mmol/L 135*   POTASSIUM mmol/L 3.9   CHLORIDE mmol/L 102   CO2 mmol/L 20.0*   BUN mg/dL 14   CREATININE mg/dL 0.66   CALCIUM mg/dL 7.8*   BILIRUBIN mg/dL 0.8   ALK PHOS U/L 123*   ALT (SGPT) U/L 7   AST (SGOT) U/L 12   GLUCOSE mg/dL 130*         Assessment:  Ileus - resolving  Sepsis  MRSA bacteremia, tricuspid valve endocarditis  Tricuspid regurgitation  Epidural abscesses  Paraspinous abscess  Pneumonia superimposed on history of chronic obstructive pulmonary disease  IV drug abuse    Plan:   Keep n.p.o. with NG tube in place for now.  I think this is most likely a reactive process as opposed to a mechanical obstruction.  May have ice chips, sips of water, and popsicles as requested.    Pedro Lundberg MD - 6/12/2020, 12:33

## 2020-06-12 NOTE — PROGRESS NOTES
[facility] Medicine Services  PROGRESS NOTE    Patient Name: Liliana Calderon  : 1980  MRN: [id number]    Date of Admission: 2020  Primary Care Physician: Ollie Flores MD    Subjective   Subjective     CC:  Sepsis POA    HPI:  No vomiting today.  Seems much improved with NG tube in place.  She does report she has a BM overnight.  No flatus.    Review of Systems    Unable to assess    Objective   Objective     Vital Signs:   Temp:  [97.9 °F (36.6 °C)-101 °F (38.3 °C)] 98.6 °F (37 °C)  Heart Rate:  [105-127] 105  Resp:  [18-24] 18  BP: (117-136)/(63-96) 117/63        Physical Exam:    Constitutional: No acute distress, awake, alert  HENT: NCAT, + NG tube in place to LWS  Respiratory: Clear to auscultation bilaterally, respiratory effort normal   Cardiovascular: tachycardic, s1 and s2, + murmur  Gastrointestinal: hypoactive bowel sounds, distended, obese abdomen, no guarding  Musculoskeletal: No bilateral ankle edema  Psychiatric: Appropriate affect, cooperative  Neurologic: Oriented x 3, strength symmetric in all extremities, Cranial Nerves grossly intact to confrontation, speech clear  Skin: feet petechiae, Right upper arm bruise    Results Reviewed:  Results from last 7 days   Lab Units 06/11/20  0409 06/10/20  0530 06/09/20  1028  20  0921   WBC 10*3/mm3 10.24 17.35* 16.06*   < > 9.26   HEMOGLOBIN g/dL 9.3* 10.3* 11.6*   < > 9.7*  9.7*   HEMATOCRIT % 30.8* 33.8* 38.9   < > 33.1*  33.1*   PLATELETS 10*3/mm3 276 370 445   < > 282   INR   --   --   --   --  1.23*    < > = values in this interval not displayed.     Results from last 7 days   Lab Units 06/11/20  0409 06/10/20  0530 20  1028 20  0513   SODIUM mmol/L 135* 134* 135* 137   POTASSIUM mmol/L 3.9 3.4* 3.7 3.9   CHLORIDE mmol/L 102 102 104 103   CO2 mmol/L 20.0* 18.0* 19.0* 20.0*   BUN mg/dL 14 14 11 11   CREATININE mg/dL 0.66 0.64 0.55* 0.52*   GLUCOSE mg/dL 130* 151* 148* 167*   CALCIUM mg/dL  7.8* 7.5* 7.6* 7.9*   ALT (SGPT) U/L 7 7  --  8   AST (SGOT) U/L 12 15  --  12   TROPONIN T ng/mL <0.010  --   --   --      Estimated Creatinine Clearance: 139.8 mL/min (by C-G formula based on SCr of 0.66 mg/dL).    Microbiology Results Abnormal     Procedure Component Value - Date/Time    Blood Culture - Blood, Arm, Right [[id number]] Collected:  06/11/20 1545    Lab Status:  Preliminary result Specimen:  Blood from Arm, Right Updated:  06/12/20 1615     Blood Culture No growth at 24 hours    Stool Culture (Reference Lab) - Stool, Per Rectum [[id number]] Collected:  06/08/20 0352    Lab Status:  Edited Result - FINAL Specimen:  Stool from Per Rectum Updated:  06/12/20 1410     Salmonella/Shigella Screen Final report     Result 1 Comment     Comment: No Salmonella or Shigella recovered.        Campylobacter Culture Final report     Result 1 Comment     Comment: No Campylobacter species isolated.        E coli, Shiga toxin Assay Negative    Narrative:       Performed at:  [address] [place] [address]  [id number]  : Lam Roman PhD, Phone:  [phone]    Anaerobic Culture - Aspirate, Back [[id number]] Collected:  06/04/20 1643    Lab Status:  Final result Specimen:  Aspirate from Back Updated:  06/09/20 0741     Anaerobic Culture No anaerobes isolated at 5 days    Blood Culture - Blood, Arm, Right [[id number]] Collected:  06/03/20 1229    Lab Status:  Final result Specimen:  Blood from Arm, Right Updated:  06/08/20 1401     Blood Culture No growth at 5 days    Blood Culture - Blood, Arm, Left [[id number]] Collected:  06/03/20 1229    Lab Status:  Final result Specimen:  Blood from Arm, Left Updated:  06/08/20 1401     Blood Culture No growth at 5 days    Body Fluid Culture - Aspirate, Back [[id number]]  (Abnormal)  (Susceptibility) Collected:  06/04/20 1645    Lab Status:  Final result Specimen:  Aspirate from Back Updated:  06/07/20 0701     Body Fluid Culture Light growth (2+)  Staphylococcus aureus, MRSA     Comment:   Methicillin resistant Staphylococcus aureus, Patient may be an isolation risk.        Gram Stain No organisms seen      Many (4+) WBCs per low power field    Susceptibility      Staphylococcus aureus, MRSA     TYRONE     Gentamicin Susceptible     Oxacillin Resistant     Rifampin Susceptible     Vancomycin Susceptible                Susceptibility Comments     Staphylococcus aureus, MRSA    This isolate does not demonstrate inducible clindamycin resistance in vitro.               Clostridium Difficile Toxin - Stool, Per Rectum [[id number]] Collected:  06/05/20 1748    Lab Status:  Final result Specimen:  Stool from Per Rectum Updated:  06/05/20 1858    Narrative:       The following orders were created for panel order Clostridium Difficile Toxin - Stool, Per Rectum.  Procedure                               Abnormality         Status                     ---------                               -----------         ------                     Clostridium Difficile To...[[id number]]  Normal              Final result                 Please view results for these tests on the individual orders.    Clostridium Difficile Toxin, PCR - Stool, Per Rectum [[id number]]  (Normal) Collected:  06/05/20 1748    Lab Status:  Final result Specimen:  Stool from Per Rectum Updated:  06/05/20 1858     C. Difficile Toxins by PCR Not Detected    Narrative:       Performance characteristics of test not established for patients <2 years of age.  Negative for Toxigenic C. Difficile    Blood Culture - Blood, Wrist, Left [[id number]]  (Abnormal) Collected:  06/01/20 1601    Lab Status:  Final result Specimen:  Blood from Wrist, Left Updated:  06/04/20 0612     Blood Culture Staphylococcus aureus, MRSA     Comment:   Infectious disease consultation is highly recommended to rule out distant foci of infection.  Methicillin resistant Staphylococcus aureus, Patient may be an isolation risk.        Isolated from  Aerobic Bottle     Gram Stain Aerobic Bottle Gram positive cocci in clusters    Narrative:       Refer to previous blood culture collected on 6/1/2020 1558 for MICs.      Blood Culture - Blood, Arm, Left [[id number]]  (Abnormal)  (Susceptibility) Collected:  06/01/20 1558    Lab Status:  Final result Specimen:  Blood from Arm, Left Updated:  06/04/20 0610     Blood Culture Staphylococcus aureus, MRSA     Comment:   Infectious disease consultation is highly recommended to rule out distant foci of infection.  Methicillin resistant Staphylococcus aureus, Patient may be an isolation risk.        Isolated from Aerobic and Anaerobic Bottles     Gram Stain Aerobic Bottle Gram positive cocci in clusters      Anaerobic Bottle Gram positive cocci in clusters    Susceptibility      Staphylococcus aureus, MRSA     TYRONE     Gentamicin Susceptible     Oxacillin Resistant     Rifampin Susceptible     Vancomycin Susceptible                Susceptibility Comments     Staphylococcus aureus, MRSA    This isolate does not demonstrate inducible clindamycin resistance in vitro.               Blood Culture ID, PCR - Blood, Arm, Left [[id number]]  (Abnormal) Collected:  06/01/20 1558    Lab Status:  Final result Specimen:  Blood from Arm, Left Updated:  06/02/20 0721     BCID, PCR Staphylococcus aureus. mecA (methicillin resistance gene) detected. Identification by BCID PCR.     BOTTLE TYPE Aerobic Bottle          Imaging Results (Last 24 Hours)     Procedure Component Value Units Date/Time    CT Head Without Contrast [[id number]] Collected:  06/11/20 1334     Updated:  06/12/20 1726    Narrative:       EXAMINATION: CT HEAD WO CONTRAST-06/11/2020:      INDICATION: Speech changes (or aphasia), new or progressive;  Z74.09-Other reduced mobility; Z78.9-Other specified health status.      TECHNIQUE: CT head without intravenous contrast.     The radiation dose reduction device was turned on for each scan per the  ALARA (As Low as Reasonably  Achievable) protocol.     COMPARISON: NONE.     FINDINGS:  The midline structures are symmetric without evidence of  mass, mass effect or midline shift. Ventricles and sulci within normal  limits. No intra-axial hemorrhage or extra-axial fluid collection.  Globes and orbits are unremarkable. The visualized paranasal sinuses and  mastoid air cells are grossly clear and well pneumatized with the  exception of a moderate-to-large left mastoid effusion. Calvarium  intact.       Impression:       1. No acute intracranial abnormality specifically, no midline shift or  hydrocephalus. No intra-axial hemorrhage.  2. Moderate-to-large left mastoid effusion.     D:  06/11/2020  E:  06/11/2020           This report was finalized on 6/12/2020 5:23 PM by Dr. Sam Pugh.       CT Abdomen Pelvis With Contrast [[id number]] Collected:  06/11/20 1338     Updated:  06/12/20 1726    Narrative:       EXAMINATION: CT ABDOMEN AND PELVIS W CONTRAST-06/11/2020:      INDICATION: Projectile vomiting; Z74.09-Other reduced mobility;  Z78.9-Other specified health status.      TECHNIQUE: CT abdomen and pelvis with intravenous contrast.     The radiation dose reduction device was turned on for each scan per the  ALARA (As Low as Reasonably Achievable) protocol.     COMPARISON: CT dated 06/05/2020.     FINDINGS: Opacifications within the right middle lobe extending  anteriorly fairly confluent as seen on prior comparison of limited  evaluation with bilateral small volume pleural effusions. Liver without  focal lesion, demonstrating perihepatic ascites. The spleen is  unremarkable. The gallbladder is surgically absent. The pancreas is  unremarkable. Adrenals without distinct nodule. Kidneys without  hydronephrosis or hydroureter. No bulky retroperitoneal adenopathy.  Patent nonaneurysmal abdominal aorta. Portal veins and IVC patent. GI  tract evaluation demonstrates severe distention of the gastric lumen  with air-fluid level along with severely  dilated small bowel loops with  air-fluid levels extending into the right lower quadrant where there is  caliber change of likely transition point adjacent to the right iliac  vessels. There is fecalization throughout the small bowel additionally  noted, however, no distinct pneumatosis despite mesenteric edema and  surrounding free fluid. No evidence for perforation or abscess. The  large bowel is decompressed and unremarkable. The pelvic viscera  unremarkable without bulky pelvic adenopathy. Degenerative changes of  the spine with body wall edema.       Impression:       1.  Small bowel obstruction with severely dilated small bowel loops and  air-fluid levels extending into the right lower quadrant adjacent to the  right iliac vessels where there is caliber change concerning for an  apparent transition point at this level with evidence for ascites and  mesenteric edema, however, no evidence for perforation or abscess.     2.  Persistent small volume bilateral pleural effusions and  opacifications in the right midlung better seen on recent 06/05/2020  examination recently performed.     D:  06/11/2020  E:  06/11/2020     This report was finalized on 6/12/2020 5:23 PM by Dr. Sam Pugh.       XR Abdomen KUB [[id number]] Collected:  06/11/20 0955     Updated:  06/12/20 1724    Narrative:       EXAMINATION: XR ABDOMEN KUB- 06/11/2020     INDICATION: vomiting; Z74.09-Other reduced mobility; Z78.9-Other  specified health status      COMPARISON: NONE     FINDINGS: Supine views of the abdomen reveal dilated gas-filled small  bowel measuring up to 6.4 cm in diameter concerning for obstructive  bowel gas pattern as there is paucity of air throughout the large bowel.  No gross intraperitoneal free air.           Impression:       Severely dilated gas-filled small bowel loops throughout the  mid abdomen consistent with obstructive bowel gas pattern.     D:  06/11/2020  E:  06/11/2020     This report was finalized on  6/12/2020 5:21 PM by Dr. Sam Pugh.             Results for orders placed during the hospital encounter of 06/01/20   Adult Transesophageal Echo (REDD) W/ Cont if Necessary Per Protocol    Narrative · Left ventricular systolic function is normal. Estimated EF appears to be   in the range of 56 - 60%  · The tricuspid valve is abnormal. There is a large vegetation measuring 1   x 1.5 cm on the tricuspid valve. Predominantly located on the posterior   leaflet but anterior leaflet appears involved as well.  · Estimated right ventricular systolic pressure from tricuspid   regurgitation is moderately elevated (45-55 mmHg).  · Mild to moderate mitral regurgitation noted with an eccentric jet.   However no vegetations visualized on the mitral valve  · The other cardiac valves appear free of vegetations  · Discussed findings with ordering provider.        I have reviewed the medications:  Scheduled Meds:    aztreonam 2 g Intravenous Q8H   budesonide-formoterol 2 puff Inhalation BID - RT   DAPTOmycin 8 mg/kg (Adjusted) Intravenous Q24H   FLUoxetine 40 mg Oral Daily   gabapentin 800 mg Oral Q8H   hydrocortisone  Topical Q12H   lactobacillus acidophilus 1 capsule Oral Daily   levETIRAcetam 750 mg Intravenous Q12H   levothyroxine 50 mcg Oral Daily   metroNIDAZOLE 500 mg Intravenous Q12H   Morphine 15 mg Oral Q12H   nystatin  Topical Q12H   pantoprazole 40 mg Oral QAM   sodium chloride 10 mL Intravenous Q12H     Continuous Infusions:    sodium chloride 125 mL/hr Last Rate: 125 mL/hr (06/12/20 1415)     PRN Meds:.•  acetaminophen  •  bismuth subsalicylate  •  cyclobenzaprine  •  HYDROcodone-acetaminophen  •  HYDROmorphone  •  hydrOXYzine  •  ipratropium-albuterol  •  loperamide  •  melatonin  •  Morphine  •  ondansetron  •  potassium chloride **OR** potassium chloride **OR** potassium chloride  •  prochlorperazine **OR** prochlorperazine **OR** prochlorperazine  •  sodium chloride  •  zolpidem    Assessment/Plan   Assessment &  Plan     Active Hospital Problems    Diagnosis  POA   • **Sepsis (CMS/HCC) [A41.9]  Yes   • Infective endocarditis [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        Brief Hospital Course to date:  Liliana Calderon is a 39 y.o. female with past medical history of depression/anxiety, fibromyalgia, COPD, hypertension, seizure disorder, and remote IV drug use who was admitted on June 1 for sepsis with MRSA bacteremia, UTI, pneumonia and epidural abscess.  ID has been following.  Today she has had intractable pain issues and ultimately started having projectile vomiting with vomitus that look like stool.  Abdominal x-ray is concerning for bowel obstruction.  Surgery has been consulted and a CT with contrast has been ordered    Projectile vomiting  -CT of the abdomen pelvis with IV contrast and oral contrast  -NG tube to LWS  -General Surgery following    Unresponsiveness  -Intermittent periods of responding to questioning  -EEG  -increased Keppra  -Neurology has signed off    MRSA bacteremia  -She is being treated for UTI pneumonia tricuspid valve endocarditis and epidural abscesses  -REDD with tricuspid valve vegetation approximately 1 cm x 1.2 cm  -CT chest concerning for pneumonia  -Possibly has septic pulmonary emboli  -IV antibiotics per ID  -Plan to transfer to  due to need for angiolymphatic which cannot be done here    Diarrhea  -Probiotic  -Negative for C. Difficile  -Imodium as needed    Acute on chronic nausea and vomiting  -Add antiemetics today  -Worsening GI status  -CT  abdomen pelvis    Right shoulder pain  -Warmth, erythema and tender to the touch  -X-ray of the right shoulder with no acute fracture or dislocation    Rash  -Stable  -Unclear etiology  -Cortisone cream    Acute on chronic low back pain  -Suspected due to epidural abscesses  -Pain medication as needed  -She had a CT-guided drainage of a right para spinal abscess on June 4 was positive for MRSA  -Flexeril has been added 3 times a day    Continue NG tube for now    DVT Prophylaxis:  SCDs    Daily Care Communication  Due to current limited visitation policies, an attempt will be made daily to update patient's identified best point-of-contact(s)   Contact:  Madison Mccallum   Relation:   sister   Type of communication (phone or televideo):   phone   Time of communication:   6:52 pm   Notes (if applicable):  Discussed condition today     Disposition: I expect the patient to be discharged TBD    CODE STATUS:   Code Status and Medical Interventions:   Ordered at: 06/01/20 1441     Code Status:    CPR     Medical Interventions (Level of Support Prior to Arrest):    Full       Electronically signed by Nathan Ashley MD, 06/12/20, 18:48.

## 2020-06-12 NOTE — PROGRESS NOTES
"Adult Nutrition  Assessment/PES    Patient Name:  Liliana Calderon  YOB: 1980  MRN: [id number]  Admit Date:  6/1/2020    Assessment Date:  6/12/2020    Comments:      Reason for Assessment     Row Name 06/12/20 0932          Reason for Assessment    Reason For Assessment  per organizational policy NPO/CLEAR LIQUIDS > 7 DAYS     Diagnosis  -- SEE PREVIOUS NUTRITION NOTES. SEPSIS, TV ENDOCARDITIS, ILEUS VS SBO           Anthropometrics     Row Name 06/12/20 0937          Anthropometrics    Height  165.1 cm (65\")        Ideal Body Weight (IBW)    Ideal Body Weight (IBW) (kg)  57.29         Labs/Tests/Procedures/Meds     Row Name 06/12/20 0935          Labs/Procedures/Meds    Lab Results Reviewed  reviewed        Medications    Pertinent Medications Reviewed  reviewed         Physical Findings     Row Name 06/12/20 0936          Physical Findings    Tubes  other (see comments) NG TO LWS         Estimated/Assessed Needs     Row Name 06/12/20 0937          Calculation Measurements    Weight Used For Calculations  106 kg (233 lb 7.5 oz)     Height  165.1 cm (65\")        Estimated/Assessed Needs    Additional Documentation  KCAL/KG (Group);Protein Requirements (Group)        KCAL/KG    KCAL/KG  14 Kcal/Kg (kcal);20 Kcal/Kg (kcal)     14 Kcal/Kg (kcal)  1482.6     20 Kcal/Kg (kcal)  2118        Protein Requirements    Weight Used For Protein Calculations  56.8 kg (125 lb 3.5 oz)     Est Protein Requirement Amount (gms/kg)  2.0 gm protein     Estimated Protein Requirements (gms/day)  113.6         Nutrition Prescription Ordered     Row Name 06/12/20 0938          Nutrition Prescription PO    Current PO Diet  NPO                 Problem/Interventions:  Problem 1     Row Name 06/12/20 0938          Nutrition Diagnoses Problem 1    Problem 1  Inadequate Intake/Infusion     Etiology (related to)  MNT for Treatment/Condition     Signs/Symptoms (evidenced by)  PO Intake NPO/CL LIQ > 7 DAYS               Intervention " Goal     Row Name 06/12/20 0938          Intervention Goal    General  Nutrition support treatment     TF/PN  Inititiate TF/PN         Nutrition Intervention     Row Name 06/12/20 0938          Nutrition Intervention    RD/Tech Action  Care plan reviewd;Follow Tx progress;Recommend/ordered         Nutrition Prescription     Row Name 06/12/20 0939          Nutrition Prescription PN    Parenteral Prescription  PN begin/change;Parenteral to supply     PN Route  Peripheral     Dextrose Concentration (%)  8 %     Amino Acid Concentration (%)  3.5%     PN Goal Rate (mL/hr)  135 mL/hr SUGGEST ADVANCE FROM 70 ML/HR  ML/HR AFTER 8 HRS AS ELIZABETH     PN Starting Rate (mL/hr)  70 mL/hr     PN Goal Volume (mL)  3240 mL     Lipid Concentration (%)  20% SMOF     Lipid Volume (mL)  200 mL        PN to Supply    NPC/Day  1281 NPC/day     Kcal/Day  1733 Kcal/day     Protein (gm/day)  113 gm/day         Education/Evaluation     Row Name 06/12/20 0943          Monitor/Evaluation    Monitor  Per protocol           Electronically signed by:  Kaelyn Mcdaniel RD  06/12/20 09:43

## 2020-06-13 ENCOUNTER — APPOINTMENT (OUTPATIENT)
Dept: GENERAL RADIOLOGY | Facility: HOSPITAL | Age: 40
End: 2020-06-13

## 2020-06-13 PROCEDURE — 94799 UNLISTED PULMONARY SVC/PX: CPT

## 2020-06-13 PROCEDURE — 71045 X-RAY EXAM CHEST 1 VIEW: CPT

## 2020-06-13 PROCEDURE — 99231 SBSQ HOSP IP/OBS SF/LOW 25: CPT | Performed by: PHYSICIAN ASSISTANT

## 2020-06-13 PROCEDURE — 25010000002 PROCHLORPERAZINE 10 MG/2ML SOLUTION: Performed by: INTERNAL MEDICINE

## 2020-06-13 PROCEDURE — 25010000002 LEVETRIRACETAM PER 10 MG: Performed by: PSYCHIATRY & NEUROLOGY

## 2020-06-13 PROCEDURE — 25010000002 MORPHINE PER 10 MG: Performed by: HOSPITALIST

## 2020-06-13 PROCEDURE — 99231 SBSQ HOSP IP/OBS SF/LOW 25: CPT | Performed by: NURSE PRACTITIONER

## 2020-06-13 PROCEDURE — 25010000002 HYDROMORPHONE PER 4 MG: Performed by: HOSPITALIST

## 2020-06-13 PROCEDURE — 25010000002 ONDANSETRON PER 1 MG: Performed by: HOSPITALIST

## 2020-06-13 PROCEDURE — 25010000002 DAPTOMYCIN PER 1 MG: Performed by: INTERNAL MEDICINE

## 2020-06-13 RX ADMIN — HYDROCODONE BITARTRATE AND ACETAMINOPHEN 1 TABLET: 5; 325 TABLET ORAL at 04:45

## 2020-06-13 RX ADMIN — HYDROXYZINE HYDROCHLORIDE 25 MG: 25 TABLET, FILM COATED ORAL at 19:57

## 2020-06-13 RX ADMIN — NYSTATIN: 100000 POWDER TOPICAL at 08:43

## 2020-06-13 RX ADMIN — AZTREONAM 2 G: 2 INJECTION, POWDER, LYOPHILIZED, FOR SOLUTION INTRAMUSCULAR; INTRAVENOUS at 08:43

## 2020-06-13 RX ADMIN — NYSTATIN: 100000 POWDER TOPICAL at 20:00

## 2020-06-13 RX ADMIN — SODIUM CHLORIDE, PRESERVATIVE FREE 10 ML: 5 INJECTION INTRAVENOUS at 23:59

## 2020-06-13 RX ADMIN — HYDROMORPHONE HYDROCHLORIDE 0.25 MG: 1 INJECTION, SOLUTION INTRAMUSCULAR; INTRAVENOUS; SUBCUTANEOUS at 01:06

## 2020-06-13 RX ADMIN — HYDROCORTISONE: 25 CREAM TOPICAL at 20:00

## 2020-06-13 RX ADMIN — AZTREONAM 2 G: 2 INJECTION, POWDER, LYOPHILIZED, FOR SOLUTION INTRAMUSCULAR; INTRAVENOUS at 00:22

## 2020-06-13 RX ADMIN — LEVOTHYROXINE SODIUM 50 MCG: 50 TABLET ORAL at 05:09

## 2020-06-13 RX ADMIN — DAPTOMYCIN 600 MG: 500 INJECTION, POWDER, LYOPHILIZED, FOR SOLUTION INTRAVENOUS at 08:43

## 2020-06-13 RX ADMIN — METRONIDAZOLE 500 MG: 500 INJECTION, SOLUTION INTRAVENOUS at 03:50

## 2020-06-13 RX ADMIN — PROCHLORPERAZINE EDISYLATE 5 MG: 5 INJECTION INTRAMUSCULAR; INTRAVENOUS at 23:58

## 2020-06-13 RX ADMIN — MORPHINE SULFATE 15 MG: 15 TABLET, FILM COATED, EXTENDED RELEASE ORAL at 08:43

## 2020-06-13 RX ADMIN — PANTOPRAZOLE SODIUM 40 MG: 40 TABLET, DELAYED RELEASE ORAL at 08:43

## 2020-06-13 RX ADMIN — AZTREONAM 2 G: 2 INJECTION, POWDER, LYOPHILIZED, FOR SOLUTION INTRAMUSCULAR; INTRAVENOUS at 23:58

## 2020-06-13 RX ADMIN — ONDANSETRON 4 MG: 2 INJECTION INTRAMUSCULAR; INTRAVENOUS at 01:50

## 2020-06-13 RX ADMIN — HYDROMORPHONE HYDROCHLORIDE 0.25 MG: 1 INJECTION, SOLUTION INTRAMUSCULAR; INTRAVENOUS; SUBCUTANEOUS at 09:30

## 2020-06-13 RX ADMIN — MORPHINE SULFATE 15 MG: 15 TABLET, FILM COATED, EXTENDED RELEASE ORAL at 19:58

## 2020-06-13 RX ADMIN — HYDROCODONE BITARTRATE AND ACETAMINOPHEN 1 TABLET: 5; 325 TABLET ORAL at 19:58

## 2020-06-13 RX ADMIN — ONDANSETRON 4 MG: 2 INJECTION INTRAMUSCULAR; INTRAVENOUS at 13:36

## 2020-06-13 RX ADMIN — HYDROMORPHONE HYDROCHLORIDE 0.25 MG: 1 INJECTION, SOLUTION INTRAMUSCULAR; INTRAVENOUS; SUBCUTANEOUS at 05:09

## 2020-06-13 RX ADMIN — AZTREONAM 2 G: 2 INJECTION, POWDER, LYOPHILIZED, FOR SOLUTION INTRAMUSCULAR; INTRAVENOUS at 16:06

## 2020-06-13 RX ADMIN — METRONIDAZOLE 500 MG: 500 INJECTION, SOLUTION INTRAVENOUS at 16:07

## 2020-06-13 RX ADMIN — HYDROMORPHONE HYDROCHLORIDE 0.25 MG: 1 INJECTION, SOLUTION INTRAMUSCULAR; INTRAVENOUS; SUBCUTANEOUS at 17:35

## 2020-06-13 RX ADMIN — SODIUM CHLORIDE 125 ML/HR: 9 INJECTION, SOLUTION INTRAVENOUS at 19:57

## 2020-06-13 RX ADMIN — CYCLOBENZAPRINE HYDROCHLORIDE 5 MG: 10 TABLET, FILM COATED ORAL at 19:59

## 2020-06-13 RX ADMIN — BUDESONIDE AND FORMOTEROL FUMARATE DIHYDRATE 2 PUFF: 160; 4.5 AEROSOL RESPIRATORY (INHALATION) at 21:46

## 2020-06-13 RX ADMIN — HYDROCORTISONE: 25 CREAM TOPICAL at 08:44

## 2020-06-13 RX ADMIN — HYDROMORPHONE HYDROCHLORIDE 0.25 MG: 1 INJECTION, SOLUTION INTRAMUSCULAR; INTRAVENOUS; SUBCUTANEOUS at 13:30

## 2020-06-13 RX ADMIN — MORPHINE SULFATE 1 MG: 2 INJECTION, SOLUTION INTRAMUSCULAR; INTRAVENOUS at 00:26

## 2020-06-13 RX ADMIN — HYDROMORPHONE HYDROCHLORIDE 0.25 MG: 1 INJECTION, SOLUTION INTRAMUSCULAR; INTRAVENOUS; SUBCUTANEOUS at 21:34

## 2020-06-13 RX ADMIN — BUDESONIDE AND FORMOTEROL FUMARATE DIHYDRATE 2 PUFF: 160; 4.5 AEROSOL RESPIRATORY (INHALATION) at 08:59

## 2020-06-13 RX ADMIN — ZOLPIDEM TARTRATE 5 MG: 5 TABLET ORAL at 19:58

## 2020-06-13 RX ADMIN — GABAPENTIN 800 MG: 400 CAPSULE ORAL at 19:58

## 2020-06-13 RX ADMIN — SODIUM CHLORIDE 125 ML/HR: 9 INJECTION, SOLUTION INTRAVENOUS at 08:51

## 2020-06-13 RX ADMIN — MELATONIN TAB 5 MG 5 MG: 5 TAB at 20:00

## 2020-06-13 RX ADMIN — Medication 1 CAPSULE: at 08:43

## 2020-06-13 RX ADMIN — LEVETIRACETAM 750 MG: 100 INJECTION, SOLUTION INTRAVENOUS at 08:43

## 2020-06-13 RX ADMIN — LEVETIRACETAM 750 MG: 100 INJECTION, SOLUTION INTRAVENOUS at 19:57

## 2020-06-13 RX ADMIN — FLUOXETINE HYDROCHLORIDE 40 MG: 20 CAPSULE ORAL at 08:43

## 2020-06-13 RX ADMIN — SODIUM CHLORIDE, PRESERVATIVE FREE 10 ML: 5 INJECTION INTRAVENOUS at 08:44

## 2020-06-13 NOTE — PLAN OF CARE
Problem: Patient Care Overview  Goal: Plan of Care Review  Outcome: Ongoing (interventions implemented as appropriate)  Flowsheets (Taken 6/13/2020 5582)  Progress: no change  Plan of Care Reviewed With: patient  Outcome Summary: Pt c/o 10/10 pain throughout shift, frequently asks for pain medication. Pt given ginger ale; pt had bout of emesis shortly after. Pt receiving IV abx. NG to LWS in place. Plan for transfer to  when bed becomes available. VSS on room air, will continue to monitor.

## 2020-06-13 NOTE — NURSING NOTE
RN walked into pt's room at 1600; found floor covered with pt's bile. NG tube was on the floor. Pt had multiple rounds of emesis; has repeatedly asked for drinks and popsicles throughout shift from different staff members including housekeeping. Pt has been noncompliant with order for sips of liquids and ice chips. Pt does not understand why she cannot consume as much as she wants. Pt educated on need for NG tube; LWS has collected >1000mL gastric contents this shift. Pt vomiting around NG tube during replacement.

## 2020-06-13 NOTE — PROGRESS NOTES
Continued Stay Note  [facility]     Patient Name: Liliana Calderon  MRN: [id number]  Today's Date: 6/13/2020    Admit Date: 6/1/2020    Discharge Plan     Row Name 06/13/20 1521       Plan    Plan Comments  Still no bed available at [facility].  No anticipated transfer expected to happen this weekend.  CM and/or RN can cont to call to f/u prn ([phone]).  CM will f/u on Monday.          Discharge Codes    No documentation.             Lynda Choudhary, RN

## 2020-06-13 NOTE — PROGRESS NOTES
[facility] Medicine Services  PROGRESS NOTE    Patient Name: Liliana Calderon  : 1980  MRN: [id number]    Date of Admission: 2020  Primary Care Physician: Ollie Flores MD    Subjective   Subjective     CC:  Sepsis POA    HPI:  Still with large amount NG return. No abdominal pain. + flatus and BM this afternoon.   Hungry and wants ginger ale. No nausea      Review of Systems    Gen- No fevers, chills  CV- No chest pain, palpitations  Resp- No cough, dyspnea  GI- + NG, No abd pain        Objective   Objective     Vital Signs:   Temp:  [98.7 °F (37.1 °C)-99.3 °F (37.4 °C)] 98.7 °F (37.1 °C)  Heart Rate:  [101-114] 108  Resp:  [16-18] 16  BP: (138-156)/(83-91) 156/91        Physical Exam:    Constitutional: No acute distress, awake, alert- sitting up in bed  HENT: NCAT, + NG tube in place to LWS  Respiratory: Clear to auscultation bilaterally, respiratory effort normal   Cardiovascular: , s1 and s2, + murmur  Gastrointestinal: + bowel sounds, soft, obese abdomen, no guarding  Musculoskeletal: No bilateral ankle edema  Psychiatric: Appropriate affect, cooperative  Neurologic: Oriented x 3, strength symmetric in all extremities, speech clear  Skin: feet petechiae, Right upper arm bruise, multiple ext marks    Results Reviewed:  Results from last 7 days   Lab Units 20  0409 06/10/20  0530 06/09/20  1028   WBC 10*3/mm3 10.24 17.35* 16.06*   HEMOGLOBIN g/dL 9.3* 10.3* 11.6*   HEMATOCRIT % 30.8* 33.8* 38.9   PLATELETS 10*3/mm3 276 370 445     Results from last 7 days   Lab Units 20  0409 06/10/20  0530 20  1028 20  0513   SODIUM mmol/L 135* 134* 135* 137   POTASSIUM mmol/L 3.9 3.4* 3.7 3.9   CHLORIDE mmol/L 102 102 104 103   CO2 mmol/L 20.0* 18.0* 19.0* 20.0*   BUN mg/dL 14 14 11 11   CREATININE mg/dL 0.66 0.64 0.55* 0.52*   GLUCOSE mg/dL 130* 151* 148* 167*   CALCIUM mg/dL 7.8* 7.5* 7.6* 7.9*   ALT (SGPT) U/L 7 7  --  8   AST (SGOT) U/L 12 15  --   12   TROPONIN T ng/mL <0.010  --   --   --      Estimated Creatinine Clearance: 139.8 mL/min (by C-G formula based on SCr of 0.66 mg/dL).    Microbiology Results Abnormal     Procedure Component Value - Date/Time    Blood Culture - Blood, Arm, Right [[id number]] Collected:  06/11/20 1545    Lab Status:  Preliminary result Specimen:  Blood from Arm, Right Updated:  06/12/20 1615     Blood Culture No growth at 24 hours    Stool Culture (Reference Lab) - Stool, Per Rectum [[id number]] Collected:  06/08/20 0351    Lab Status:  Edited Result - FINAL Specimen:  Stool from Per Rectum Updated:  06/12/20 1410     Salmonella/Shigella Screen Final report     Result 1 Comment     Comment: No Salmonella or Shigella recovered.        Campylobacter Culture Final report     Result 1 Comment     Comment: No Campylobacter species isolated.        E coli, Shiga toxin Assay Negative    Narrative:       Performed at:  [address] [place] [address]  [id number]  : Lam Roman PhD, Phone:  [phone]    Anaerobic Culture - Aspirate, Back [[id number]] Collected:  06/04/20 1643    Lab Status:  Final result Specimen:  Aspirate from Back Updated:  06/09/20 0741     Anaerobic Culture No anaerobes isolated at 5 days    Blood Culture - Blood, Arm, Right [[id number]] Collected:  06/03/20 1229    Lab Status:  Final result Specimen:  Blood from Arm, Right Updated:  06/08/20 1401     Blood Culture No growth at 5 days    Blood Culture - Blood, Arm, Left [[id number]] Collected:  06/03/20 1229    Lab Status:  Final result Specimen:  Blood from Arm, Left Updated:  06/08/20 1401     Blood Culture No growth at 5 days    Body Fluid Culture - Aspirate, Back [[id number]]  (Abnormal)  (Susceptibility) Collected:  06/04/20 1645    Lab Status:  Final result Specimen:  Aspirate from Back Updated:  06/07/20 0701     Body Fluid Culture Light growth (2+) Staphylococcus aureus, MRSA     Comment:   Methicillin resistant Staphylococcus  aureus, Patient may be an isolation risk.        Gram Stain No organisms seen      Many (4+) WBCs per low power field    Susceptibility      Staphylococcus aureus, MRSA     TYRONE     Gentamicin Susceptible     Oxacillin Resistant     Rifampin Susceptible     Vancomycin Susceptible                Susceptibility Comments     Staphylococcus aureus, MRSA    This isolate does not demonstrate inducible clindamycin resistance in vitro.               Clostridium Difficile Toxin - Stool, Per Rectum [[id number]] Collected:  06/05/20 1748    Lab Status:  Final result Specimen:  Stool from Per Rectum Updated:  06/05/20 1858    Narrative:       The following orders were created for panel order Clostridium Difficile Toxin - Stool, Per Rectum.  Procedure                               Abnormality         Status                     ---------                               -----------         ------                     Clostridium Difficile To...[[id number]]  Normal              Final result                 Please view results for these tests on the individual orders.    Clostridium Difficile Toxin, PCR - Stool, Per Rectum [[id number]]  (Normal) Collected:  06/05/20 1748    Lab Status:  Final result Specimen:  Stool from Per Rectum Updated:  06/05/20 1858     C. Difficile Toxins by PCR Not Detected    Narrative:       Performance characteristics of test not established for patients <2 years of age.  Negative for Toxigenic C. Difficile    Blood Culture - Blood, Wrist, Left [[id number]]  (Abnormal) Collected:  06/01/20 1601    Lab Status:  Final result Specimen:  Blood from Wrist, Left Updated:  06/04/20 0612     Blood Culture Staphylococcus aureus, MRSA     Comment:   Infectious disease consultation is highly recommended to rule out distant foci of infection.  Methicillin resistant Staphylococcus aureus, Patient may be an isolation risk.        Isolated from Aerobic Bottle     Gram Stain Aerobic Bottle Gram positive cocci in clusters     Narrative:       Refer to previous blood culture collected on 6/1/2020 1558 for MICs.      Blood Culture - Blood, Arm, Left [[id number]]  (Abnormal)  (Susceptibility) Collected:  06/01/20 1558    Lab Status:  Final result Specimen:  Blood from Arm, Left Updated:  06/04/20 0610     Blood Culture Staphylococcus aureus, MRSA     Comment:   Infectious disease consultation is highly recommended to rule out distant foci of infection.  Methicillin resistant Staphylococcus aureus, Patient may be an isolation risk.        Isolated from Aerobic and Anaerobic Bottles     Gram Stain Aerobic Bottle Gram positive cocci in clusters      Anaerobic Bottle Gram positive cocci in clusters    Susceptibility      Staphylococcus aureus, MRSA     TYRONE     Gentamicin Susceptible     Oxacillin Resistant     Rifampin Susceptible     Vancomycin Susceptible                Susceptibility Comments     Staphylococcus aureus, MRSA    This isolate does not demonstrate inducible clindamycin resistance in vitro.               Blood Culture ID, PCR - Blood, Arm, Left [[id number]]  (Abnormal) Collected:  06/01/20 1558    Lab Status:  Final result Specimen:  Blood from Arm, Left Updated:  06/02/20 0721     BCID, PCR Staphylococcus aureus. mecA (methicillin resistance gene) detected. Identification by BCID PCR.     BOTTLE TYPE Aerobic Bottle          Imaging Results (Last 24 Hours)     Procedure Component Value Units Date/Time    XR Chest 1 View [[id number]] Collected:  06/13/20 1030     Updated:  06/13/20 1031    Narrative:          EXAMINATION: XR CHEST 1 VW-      INDICATION: pneumonia; Z74.09-Other reduced mobility; Z78.9-Other  specified health status      COMPARISON: 06/08/2020     FINDINGS: Portable chest reveals patchy ill-defined opacification left  lung base with small left pleural effusion. The heart is enlarged.  Increased markings seen within the right upper lobe as well as  prominence of the right hilar region. Degenerative changes seen  within  the spine with nasogastric G-tube the stomach.           Impression:       Ill-defined opacification left lung base. Mild increased  markings seen within the right upper lobe and right midlung.          CT Head Without Contrast [[id number]] Collected:  06/11/20 1334     Updated:  06/12/20 1726    Narrative:       EXAMINATION: CT HEAD WO CONTRAST-06/11/2020:      INDICATION: Speech changes (or aphasia), new or progressive;  Z74.09-Other reduced mobility; Z78.9-Other specified health status.      TECHNIQUE: CT head without intravenous contrast.     The radiation dose reduction device was turned on for each scan per the  ALARA (As Low as Reasonably Achievable) protocol.     COMPARISON: NONE.     FINDINGS:  The midline structures are symmetric without evidence of  mass, mass effect or midline shift. Ventricles and sulci within normal  limits. No intra-axial hemorrhage or extra-axial fluid collection.  Globes and orbits are unremarkable. The visualized paranasal sinuses and  mastoid air cells are grossly clear and well pneumatized with the  exception of a moderate-to-large left mastoid effusion. Calvarium  intact.       Impression:       1. No acute intracranial abnormality specifically, no midline shift or  hydrocephalus. No intra-axial hemorrhage.  2. Moderate-to-large left mastoid effusion.     D:  06/11/2020  E:  06/11/2020           This report was finalized on 6/12/2020 5:23 PM by Dr. Sam Pugh.       CT Abdomen Pelvis With Contrast [[id number]] Collected:  06/11/20 1338     Updated:  06/12/20 1726    Narrative:       EXAMINATION: CT ABDOMEN AND PELVIS W CONTRAST-06/11/2020:      INDICATION: Projectile vomiting; Z74.09-Other reduced mobility;  Z78.9-Other specified health status.      TECHNIQUE: CT abdomen and pelvis with intravenous contrast.     The radiation dose reduction device was turned on for each scan per the  ALARA (As Low as Reasonably Achievable) protocol.     COMPARISON: CT dated 06/05/2020.      FINDINGS: Opacifications within the right middle lobe extending  anteriorly fairly confluent as seen on prior comparison of limited  evaluation with bilateral small volume pleural effusions. Liver without  focal lesion, demonstrating perihepatic ascites. The spleen is  unremarkable. The gallbladder is surgically absent. The pancreas is  unremarkable. Adrenals without distinct nodule. Kidneys without  hydronephrosis or hydroureter. No bulky retroperitoneal adenopathy.  Patent nonaneurysmal abdominal aorta. Portal veins and IVC patent. GI  tract evaluation demonstrates severe distention of the gastric lumen  with air-fluid level along with severely dilated small bowel loops with  air-fluid levels extending into the right lower quadrant where there is  caliber change of likely transition point adjacent to the right iliac  vessels. There is fecalization throughout the small bowel additionally  noted, however, no distinct pneumatosis despite mesenteric edema and  surrounding free fluid. No evidence for perforation or abscess. The  large bowel is decompressed and unremarkable. The pelvic viscera  unremarkable without bulky pelvic adenopathy. Degenerative changes of  the spine with body wall edema.       Impression:       1.  Small bowel obstruction with severely dilated small bowel loops and  air-fluid levels extending into the right lower quadrant adjacent to the  right iliac vessels where there is caliber change concerning for an  apparent transition point at this level with evidence for ascites and  mesenteric edema, however, no evidence for perforation or abscess.     2.  Persistent small volume bilateral pleural effusions and  opacifications in the right midlung better seen on recent 06/05/2020  examination recently performed.     D:  06/11/2020  E:  06/11/2020     This report was finalized on 6/12/2020 5:23 PM by Dr. Sam Pugh.       XR Abdomen KUB [[id number]] Collected:  06/11/20 0955     Updated:  06/12/20  1724    Narrative:       EXAMINATION: XR ABDOMEN KUB- 06/11/2020     INDICATION: vomiting; Z74.09-Other reduced mobility; Z78.9-Other  specified health status      COMPARISON: NONE     FINDINGS: Supine views of the abdomen reveal dilated gas-filled small  bowel measuring up to 6.4 cm in diameter concerning for obstructive  bowel gas pattern as there is paucity of air throughout the large bowel.  No gross intraperitoneal free air.           Impression:       Severely dilated gas-filled small bowel loops throughout the  mid abdomen consistent with obstructive bowel gas pattern.     D:  06/11/2020  E:  06/11/2020     This report was finalized on 6/12/2020 5:21 PM by Dr. Sam Pugh.             Results for orders placed during the hospital encounter of 06/01/20   Adult Transesophageal Echo (REDD) W/ Cont if Necessary Per Protocol    Narrative · Left ventricular systolic function is normal. Estimated EF appears to be   in the range of 56 - 60%  · The tricuspid valve is abnormal. There is a large vegetation measuring 1   x 1.5 cm on the tricuspid valve. Predominantly located on the posterior   leaflet but anterior leaflet appears involved as well.  · Estimated right ventricular systolic pressure from tricuspid   regurgitation is moderately elevated (45-55 mmHg).  · Mild to moderate mitral regurgitation noted with an eccentric jet.   However no vegetations visualized on the mitral valve  · The other cardiac valves appear free of vegetations  · Discussed findings with ordering provider.        I have reviewed the medications:  Scheduled Meds:    aztreonam 2 g Intravenous Q8H   budesonide-formoterol 2 puff Inhalation BID - RT   DAPTOmycin 8 mg/kg (Adjusted) Intravenous Q24H   FLUoxetine 40 mg Oral Daily   gabapentin 800 mg Oral Q8H   hydrocortisone  Topical Q12H   lactobacillus acidophilus 1 capsule Oral Daily   levETIRAcetam 750 mg Intravenous Q12H   levothyroxine 50 mcg Oral Daily   metroNIDAZOLE 500 mg Intravenous Q12H    Morphine 15 mg Oral Q12H   nystatin  Topical Q12H   pantoprazole 40 mg Oral QAM   sodium chloride 10 mL Intravenous Q12H     Continuous Infusions:    sodium chloride 125 mL/hr Last Rate: 125 mL/hr (06/13/20 0851)     PRN Meds:.•  acetaminophen  •  bismuth subsalicylate  •  cyclobenzaprine  •  HYDROcodone-acetaminophen  •  HYDROmorphone  •  hydrOXYzine  •  ipratropium-albuterol  •  loperamide  •  melatonin  •  Morphine  •  ondansetron  •  potassium chloride **OR** potassium chloride **OR** potassium chloride  •  prochlorperazine **OR** prochlorperazine **OR** prochlorperazine  •  sodium chloride  •  zolpidem    Assessment/Plan   Assessment & Plan     Active Hospital Problems    Diagnosis  POA   • **Sepsis (CMS/HCC) [A41.9]  Yes   • Infective endocarditis [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        Brief Hospital Course to date:  Liliana Calderon is a 39 y.o. female with past medical history of depression/anxiety, fibromyalgia, COPD, hypertension, seizure disorder, and remote IV drug use who was admitted on June 1 for sepsis with MRSA bacteremia, UTI, pneumonia and epidural abscess.  ID has been following.  6/11 she had intractable pain issues and ultimately started having projectile vomiting with vomitus that look like stool.  Abdominal x-ray is concerning for bowel obstruction.  Surgery following and NG in place:     Projectile vomiting/ SBO  -CT of the abdomen pelvis showed SBO- bowel function returning- + BM  -NG tube to LWS; sips and chips  -General Surgery  following    Seizures  -Intermittent periods of responding to questioning  -EEG- no evidence of seizures  -increased Keppra with no further sz activity after increase  -Neurology has signed off    MRSA bacteremia  -She is being treated for UTI pneumonia tricuspid valve endocarditis and epidural abscesses  -REDD with tricuspid valve vegetation approximately 1 cm x 1.2 cm  -CT chest concerning for pneumonia  -Possibly has septic pulmonary emboli  -IV antibiotics per ID  -Plan to transfer to  due to need for angiovac which cannot be done here    Diarrhea  -resolved  -Probiotic  -Negative for C. Difficile    Right shoulder pain  -supportive care  -X-ray of the right shoulder with no acute fracture or dislocation    Rash  -Stable  -Unclear etiology  -Cortisone cream    Acute on chronic low back pain  -Suspected due to spinal/ epidural abscesses  -Pain medication as needed  -She had a CT-guided drainage of a right para spinal abscess on June 4 was positive for MRSA  -Flexeril has been added 3 times a day        DVT Prophylaxis:  SCDs    Daily Care Communication  Due to current limited visitation policies, an attempt will be made daily to update patient's identified best point-of-contact(s)   Contact:    Relation:      Type of communication (phone or televideo):      Time of communication:      Notes (if applicable):      Disposition: I expect the patient to be transferred to  when bed available    CODE STATUS:   Code Status and Medical Interventions:   Ordered at: 06/01/20 1441     Code Status:    CPR     Medical Interventions (Level of Support Prior to Arrest):    Full       Electronically signed by SHAYNE Brady, 06/13/20, 12:17.

## 2020-06-13 NOTE — PLAN OF CARE
VSS, room air. NG to low wall suction, fairly high output but patient is eating a large amount of ice chips and multiple popsicles. NG output appears to be mostly water, with some bile. RN and PCT educated pt about decreasing PO intake, pt was not receptive and continued to ask for ice/water frequently. IVF continued, IV abx given. PICC dressing changed. Pt frequently seeks out staff for ice, popsicles, and PRN medication. Waiting for transfer to . Will continue to monitor.

## 2020-06-13 NOTE — PROGRESS NOTES
"General Surgery Daily Progress Note    Subjective:  Hungry. Passing flatus and stool. Denies abdominal pain. NGT voluminous return.    Objective:  /91 (BP Location: Right leg, Patient Position: Lying)   Pulse 101   Temp 98.7 °F (37.1 °C) (Oral)   Resp 16   Ht 165.1 cm (65\")   Wt 106 kg (233 lb)   LMP 04/01/2020 (Approximate)   SpO2 98%   BMI 38.77 kg/m²       General Appearance: Moderate distress  Eyes: Anicteric  Neck: Trachea midline   Cardiovascular:  RR, tachy, without murmur nor rub  Lungs:  Bilateral respirations unlabored   Abdomen:  Soft, non-tender, no masses, NGT noted   Extremities:  No cyanosis or edema   Skin:  No obvious rashes   Neurologic: awake and conversant       Imaging Results (Last 24 Hours)     Procedure Component Value Units Date/Time    XR Chest 1 View [[id number]] Collected:  06/13/20 1030     Updated:  06/13/20 1031    Narrative:          EXAMINATION: XR CHEST 1 VW-      INDICATION: pneumonia; Z74.09-Other reduced mobility; Z78.9-Other  specified health status      COMPARISON: 06/08/2020     FINDINGS: Portable chest reveals patchy ill-defined opacification left  lung base with small left pleural effusion. The heart is enlarged.  Increased markings seen within the right upper lobe as well as  prominence of the right hilar region. Degenerative changes seen within  the spine with nasogastric G-tube the stomach.           Impression:       Ill-defined opacification left lung base. Mild increased  markings seen within the right upper lobe and right midlung.          CT Head Without Contrast [[id number]] Collected:  06/11/20 1334     Updated:  06/12/20 1726    Narrative:       EXAMINATION: CT HEAD WO CONTRAST-06/11/2020:      INDICATION: Speech changes (or aphasia), new or progressive;  Z74.09-Other reduced mobility; Z78.9-Other specified health status.      TECHNIQUE: CT head without intravenous contrast.     The radiation dose reduction device was turned on for each scan per " the  ALARA (As Low as Reasonably Achievable) protocol.     COMPARISON: NONE.     FINDINGS:  The midline structures are symmetric without evidence of  mass, mass effect or midline shift. Ventricles and sulci within normal  limits. No intra-axial hemorrhage or extra-axial fluid collection.  Globes and orbits are unremarkable. The visualized paranasal sinuses and  mastoid air cells are grossly clear and well pneumatized with the  exception of a moderate-to-large left mastoid effusion. Calvarium  intact.       Impression:       1. No acute intracranial abnormality specifically, no midline shift or  hydrocephalus. No intra-axial hemorrhage.  2. Moderate-to-large left mastoid effusion.     D:  06/11/2020  E:  06/11/2020           This report was finalized on 6/12/2020 5:23 PM by Dr. Sam Pugh.       CT Abdomen Pelvis With Contrast [[id number]] Collected:  06/11/20 1338     Updated:  06/12/20 1726    Narrative:       EXAMINATION: CT ABDOMEN AND PELVIS W CONTRAST-06/11/2020:      INDICATION: Projectile vomiting; Z74.09-Other reduced mobility;  Z78.9-Other specified health status.      TECHNIQUE: CT abdomen and pelvis with intravenous contrast.     The radiation dose reduction device was turned on for each scan per the  ALARA (As Low as Reasonably Achievable) protocol.     COMPARISON: CT dated 06/05/2020.     FINDINGS: Opacifications within the right middle lobe extending  anteriorly fairly confluent as seen on prior comparison of limited  evaluation with bilateral small volume pleural effusions. Liver without  focal lesion, demonstrating perihepatic ascites. The spleen is  unremarkable. The gallbladder is surgically absent. The pancreas is  unremarkable. Adrenals without distinct nodule. Kidneys without  hydronephrosis or hydroureter. No bulky retroperitoneal adenopathy.  Patent nonaneurysmal abdominal aorta. Portal veins and IVC patent. GI  tract evaluation demonstrates severe distention of the gastric lumen  with  air-fluid level along with severely dilated small bowel loops with  air-fluid levels extending into the right lower quadrant where there is  caliber change of likely transition point adjacent to the right iliac  vessels. There is fecalization throughout the small bowel additionally  noted, however, no distinct pneumatosis despite mesenteric edema and  surrounding free fluid. No evidence for perforation or abscess. The  large bowel is decompressed and unremarkable. The pelvic viscera  unremarkable without bulky pelvic adenopathy. Degenerative changes of  the spine with body wall edema.       Impression:       1.  Small bowel obstruction with severely dilated small bowel loops and  air-fluid levels extending into the right lower quadrant adjacent to the  right iliac vessels where there is caliber change concerning for an  apparent transition point at this level with evidence for ascites and  mesenteric edema, however, no evidence for perforation or abscess.     2.  Persistent small volume bilateral pleural effusions and  opacifications in the right midlung better seen on recent 06/05/2020  examination recently performed.     D:  06/11/2020  E:  06/11/2020     This report was finalized on 6/12/2020 5:23 PM by Dr. Sam Pugh.       XR Abdomen KUB [[id number]] Collected:  06/11/20 0955     Updated:  06/12/20 1724    Narrative:       EXAMINATION: XR ABDOMEN KUB- 06/11/2020     INDICATION: vomiting; Z74.09-Other reduced mobility; Z78.9-Other  specified health status      COMPARISON: NONE     FINDINGS: Supine views of the abdomen reveal dilated gas-filled small  bowel measuring up to 6.4 cm in diameter concerning for obstructive  bowel gas pattern as there is paucity of air throughout the large bowel.  No gross intraperitoneal free air.           Impression:       Severely dilated gas-filled small bowel loops throughout the  mid abdomen consistent with obstructive bowel gas pattern.     D:  06/11/2020  E:  06/11/2020      This report was finalized on 6/12/2020 5:21 PM by Dr. Sam Pugh.             CBC:  Results from last 7 days   Lab Units 06/11/20  0409   WBC 10*3/mm3 10.24   HEMOGLOBIN g/dL 9.3*   HEMATOCRIT % 30.8*   PLATELETS 10*3/mm3 276       CMP:  Results from last 7 days   Lab Units 06/11/20  0409   SODIUM mmol/L 135*   POTASSIUM mmol/L 3.9   CHLORIDE mmol/L 102   CO2 mmol/L 20.0*   BUN mg/dL 14   CREATININE mg/dL 0.66   CALCIUM mg/dL 7.8*   BILIRUBIN mg/dL 0.8   ALK PHOS U/L 123*   ALT (SGPT) U/L 7   AST (SGOT) U/L 12   GLUCOSE mg/dL 130*         Assessment:  Ileus - resolving, passing stool and flatus  Sepsis  MRSA bacteremia, tricuspid valve endocarditis  Tricuspid regurgitation  Epidural abscesses  Paraspinous abscess  Pneumonia superimposed on history of chronic obstructive pulmonary disease  IV drug abuse    Plan:   NPO with NGT. May have ice chips and sips. Flat and up right X ray in the am. GI function returning.  Awaiting [facility] for angio VAC.    Pedro Lundberg MD - 6/13/2020, 10:56

## 2020-06-13 NOTE — PROGRESS NOTES
CTS Progress Note      POD  s/p        LOS: 12 days   Patient Care Team:  Ollie Flores MD as PCP - General (Internal Medicine)    Subjective  Feels better this morning  Still with nausea  CC:     Objective    Vital Signs  Temp:  [98.6 °F (37 °C)-99.3 °F (37.4 °C)] 99.3 °F (37.4 °C)  Heart Rate:  [105-114] 111  Resp:  [18] 18  BP: (117-147)/(63-89) 147/89    Physical Exam:   General Appearance:    Lungs: Scattered rhonchi, fair cough   Heart: regular rhythm & normal rate, normal S1, S2, no murmur, no gallop, no rub and no click   Skin: Warm, dry     Results   Results from last 7 days   Lab Units 06/11/20  0409   WBC 10*3/mm3 10.24   HEMOGLOBIN g/dL 9.3*   HEMATOCRIT % 30.8*   PLATELETS 10*3/mm3 276     Results from last 7 days   Lab Units 06/11/20  0409   SODIUM mmol/L 135*   POTASSIUM mmol/L 3.9   CHLORIDE mmol/L 102   CO2 mmol/L 20.0*   BUN mg/dL 14   CREATININE mg/dL 0.66   GLUCOSE mg/dL 130*   CALCIUM mg/dL 7.8*           Imaging Results (Last 24 Hours)     Procedure Component Value Units Date/Time    XR Chest 1 View [[id number]] Resulted:  06/13/20 0458     Updated:  06/13/20 0543    CT Head Without Contrast [[id number]] Collected:  06/11/20 1334     Updated:  06/12/20 1726    Narrative:       EXAMINATION: CT HEAD WO CONTRAST-06/11/2020:      INDICATION: Speech changes (or aphasia), new or progressive;  Z74.09-Other reduced mobility; Z78.9-Other specified health status.      TECHNIQUE: CT head without intravenous contrast.     The radiation dose reduction device was turned on for each scan per the  ALARA (As Low as Reasonably Achievable) protocol.     COMPARISON: NONE.     FINDINGS:  The midline structures are symmetric without evidence of  mass, mass effect or midline shift. Ventricles and sulci within normal  limits. No intra-axial hemorrhage or extra-axial fluid collection.  Globes and orbits are unremarkable. The visualized paranasal sinuses and  mastoid air cells are grossly clear and well  pneumatized with the  exception of a moderate-to-large left mastoid effusion. Calvarium  intact.       Impression:       1. No acute intracranial abnormality specifically, no midline shift or  hydrocephalus. No intra-axial hemorrhage.  2. Moderate-to-large left mastoid effusion.     D:  06/11/2020  E:  06/11/2020           This report was finalized on 6/12/2020 5:23 PM by Dr. Sam Pugh.       CT Abdomen Pelvis With Contrast [[id number]] Collected:  06/11/20 1338     Updated:  06/12/20 1726    Narrative:       EXAMINATION: CT ABDOMEN AND PELVIS W CONTRAST-06/11/2020:      INDICATION: Projectile vomiting; Z74.09-Other reduced mobility;  Z78.9-Other specified health status.      TECHNIQUE: CT abdomen and pelvis with intravenous contrast.     The radiation dose reduction device was turned on for each scan per the  ALARA (As Low as Reasonably Achievable) protocol.     COMPARISON: CT dated 06/05/2020.     FINDINGS: Opacifications within the right middle lobe extending  anteriorly fairly confluent as seen on prior comparison of limited  evaluation with bilateral small volume pleural effusions. Liver without  focal lesion, demonstrating perihepatic ascites. The spleen is  unremarkable. The gallbladder is surgically absent. The pancreas is  unremarkable. Adrenals without distinct nodule. Kidneys without  hydronephrosis or hydroureter. No bulky retroperitoneal adenopathy.  Patent nonaneurysmal abdominal aorta. Portal veins and IVC patent. GI  tract evaluation demonstrates severe distention of the gastric lumen  with air-fluid level along with severely dilated small bowel loops with  air-fluid levels extending into the right lower quadrant where there is  caliber change of likely transition point adjacent to the right iliac  vessels. There is fecalization throughout the small bowel additionally  noted, however, no distinct pneumatosis despite mesenteric edema and  surrounding free fluid. No evidence for perforation or  abscess. The  large bowel is decompressed and unremarkable. The pelvic viscera  unremarkable without bulky pelvic adenopathy. Degenerative changes of  the spine with body wall edema.       Impression:       1.  Small bowel obstruction with severely dilated small bowel loops and  air-fluid levels extending into the right lower quadrant adjacent to the  right iliac vessels where there is caliber change concerning for an  apparent transition point at this level with evidence for ascites and  mesenteric edema, however, no evidence for perforation or abscess.     2.  Persistent small volume bilateral pleural effusions and  opacifications in the right midlung better seen on recent 06/05/2020  examination recently performed.     D:  06/11/2020  E:  06/11/2020     This report was finalized on 6/12/2020 5:23 PM by Dr. Sam Pugh.       XR Abdomen KUB [[id number]] Collected:  06/11/20 0955     Updated:  06/12/20 1724    Narrative:       EXAMINATION: XR ABDOMEN KUB- 06/11/2020     INDICATION: vomiting; Z74.09-Other reduced mobility; Z78.9-Other  specified health status      COMPARISON: NONE     FINDINGS: Supine views of the abdomen reveal dilated gas-filled small  bowel measuring up to 6.4 cm in diameter concerning for obstructive  bowel gas pattern as there is paucity of air throughout the large bowel.  No gross intraperitoneal free air.           Impression:       Severely dilated gas-filled small bowel loops throughout the  mid abdomen consistent with obstructive bowel gas pattern.     D:  06/11/2020  E:  06/11/2020     This report was finalized on 6/12/2020 5:21 PM by Dr. Sam Pugh.             Assessment      Sepsis (CMS/HCC)    Essential hypertension    Fibromyalgia syndrome    GERD without esophagitis    DDD (degenerative disc disease), cervical    Anxiety    Depression    Seizure (CMS/HCC)    Migraine    Infective endocarditis    Epidural abscess    MRSA bacteremia  8300 cc from the NG tube.  Seeing patient for  MRSA tricuspid valve endocarditis    Plan   Send to [facility] when stable and able to have angio VAC.    JENELLE Stone  06/13/20  08:19     Agree with above plan and treatment regimen.      Greg Blanchard MD  [facility]  06/13/20   12:24

## 2020-06-14 ENCOUNTER — APPOINTMENT (OUTPATIENT)
Dept: GENERAL RADIOLOGY | Facility: HOSPITAL | Age: 40
End: 2020-06-14

## 2020-06-14 LAB — PHOSPHATE SERPL-MCNC: 3.2 MG/DL (ref 2.5–4.5)

## 2020-06-14 PROCEDURE — 94799 UNLISTED PULMONARY SVC/PX: CPT

## 2020-06-14 PROCEDURE — 25010000002 LEVETRIRACETAM PER 10 MG: Performed by: PSYCHIATRY & NEUROLOGY

## 2020-06-14 PROCEDURE — 25010000002 DAPTOMYCIN PER 1 MG: Performed by: INTERNAL MEDICINE

## 2020-06-14 PROCEDURE — 99232 SBSQ HOSP IP/OBS MODERATE 35: CPT | Performed by: NURSE PRACTITIONER

## 2020-06-14 PROCEDURE — 25010000002 ONDANSETRON PER 1 MG: Performed by: HOSPITALIST

## 2020-06-14 PROCEDURE — 74019 RADEX ABDOMEN 2 VIEWS: CPT

## 2020-06-14 PROCEDURE — 97530 THERAPEUTIC ACTIVITIES: CPT

## 2020-06-14 PROCEDURE — 63710000001 PROCHLORPERAZINE MALEATE PER 5 MG: Performed by: INTERNAL MEDICINE

## 2020-06-14 PROCEDURE — 99231 SBSQ HOSP IP/OBS SF/LOW 25: CPT | Performed by: PHYSICIAN ASSISTANT

## 2020-06-14 PROCEDURE — 84100 ASSAY OF PHOSPHORUS: CPT

## 2020-06-14 PROCEDURE — 25010000002 HYDROMORPHONE PER 4 MG: Performed by: HOSPITALIST

## 2020-06-14 RX ADMIN — METRONIDAZOLE 500 MG: 500 INJECTION, SOLUTION INTRAVENOUS at 04:58

## 2020-06-14 RX ADMIN — LEVETIRACETAM 750 MG: 100 INJECTION, SOLUTION INTRAVENOUS at 08:07

## 2020-06-14 RX ADMIN — ACETAMINOPHEN 650 MG: 325 TABLET, FILM COATED ORAL at 18:06

## 2020-06-14 RX ADMIN — HYDROMORPHONE HYDROCHLORIDE 0.25 MG: 1 INJECTION, SOLUTION INTRAMUSCULAR; INTRAVENOUS; SUBCUTANEOUS at 01:47

## 2020-06-14 RX ADMIN — BUDESONIDE AND FORMOTEROL FUMARATE DIHYDRATE 2 PUFF: 160; 4.5 AEROSOL RESPIRATORY (INHALATION) at 08:24

## 2020-06-14 RX ADMIN — MORPHINE SULFATE 15 MG: 15 TABLET, FILM COATED, EXTENDED RELEASE ORAL at 08:06

## 2020-06-14 RX ADMIN — METRONIDAZOLE 500 MG: 500 INJECTION, SOLUTION INTRAVENOUS at 15:15

## 2020-06-14 RX ADMIN — PROCHLORPERAZINE MALEATE 5 MG: 5 TABLET ORAL at 20:59

## 2020-06-14 RX ADMIN — HYDROCORTISONE: 25 CREAM TOPICAL at 08:07

## 2020-06-14 RX ADMIN — FLUOXETINE HYDROCHLORIDE 40 MG: 20 CAPSULE ORAL at 08:06

## 2020-06-14 RX ADMIN — HYDROMORPHONE HYDROCHLORIDE 0.25 MG: 1 INJECTION, SOLUTION INTRAMUSCULAR; INTRAVENOUS; SUBCUTANEOUS at 21:00

## 2020-06-14 RX ADMIN — NYSTATIN: 100000 POWDER TOPICAL at 21:05

## 2020-06-14 RX ADMIN — HYDROCODONE BITARTRATE AND ACETAMINOPHEN 1 TABLET: 5; 325 TABLET ORAL at 21:00

## 2020-06-14 RX ADMIN — SODIUM CHLORIDE 125 ML/HR: 9 INJECTION, SOLUTION INTRAVENOUS at 04:58

## 2020-06-14 RX ADMIN — BUDESONIDE AND FORMOTEROL FUMARATE DIHYDRATE 2 PUFF: 160; 4.5 AEROSOL RESPIRATORY (INHALATION) at 20:37

## 2020-06-14 RX ADMIN — DAPTOMYCIN 600 MG: 500 INJECTION, POWDER, LYOPHILIZED, FOR SOLUTION INTRAVENOUS at 08:07

## 2020-06-14 RX ADMIN — ONDANSETRON 4 MG: 2 INJECTION INTRAMUSCULAR; INTRAVENOUS at 18:10

## 2020-06-14 RX ADMIN — Medication 1 CAPSULE: at 08:07

## 2020-06-14 RX ADMIN — HYDROMORPHONE HYDROCHLORIDE 0.25 MG: 1 INJECTION, SOLUTION INTRAMUSCULAR; INTRAVENOUS; SUBCUTANEOUS at 13:14

## 2020-06-14 RX ADMIN — HYDROMORPHONE HYDROCHLORIDE 0.25 MG: 1 INJECTION, SOLUTION INTRAMUSCULAR; INTRAVENOUS; SUBCUTANEOUS at 04:59

## 2020-06-14 RX ADMIN — HYDROMORPHONE HYDROCHLORIDE 0.25 MG: 1 INJECTION, SOLUTION INTRAMUSCULAR; INTRAVENOUS; SUBCUTANEOUS at 17:14

## 2020-06-14 RX ADMIN — NYSTATIN: 100000 POWDER TOPICAL at 08:07

## 2020-06-14 RX ADMIN — GABAPENTIN 800 MG: 400 CAPSULE ORAL at 20:59

## 2020-06-14 RX ADMIN — LEVETIRACETAM 750 MG: 100 INJECTION, SOLUTION INTRAVENOUS at 21:55

## 2020-06-14 RX ADMIN — GABAPENTIN 800 MG: 400 CAPSULE ORAL at 04:58

## 2020-06-14 RX ADMIN — CYCLOBENZAPRINE HYDROCHLORIDE 5 MG: 10 TABLET, FILM COATED ORAL at 18:01

## 2020-06-14 RX ADMIN — SODIUM CHLORIDE, PRESERVATIVE FREE 10 ML: 5 INJECTION INTRAVENOUS at 08:07

## 2020-06-14 RX ADMIN — PANTOPRAZOLE SODIUM 40 MG: 40 TABLET, DELAYED RELEASE ORAL at 04:59

## 2020-06-14 RX ADMIN — AZTREONAM 2 G: 2 INJECTION, POWDER, LYOPHILIZED, FOR SOLUTION INTRAMUSCULAR; INTRAVENOUS at 08:07

## 2020-06-14 RX ADMIN — HYDROCODONE BITARTRATE AND ACETAMINOPHEN 1 TABLET: 5; 325 TABLET ORAL at 15:11

## 2020-06-14 RX ADMIN — MELATONIN TAB 5 MG 5 MG: 5 TAB at 20:59

## 2020-06-14 RX ADMIN — HYDROCORTISONE: 25 CREAM TOPICAL at 21:06

## 2020-06-14 RX ADMIN — SODIUM CHLORIDE 125 ML/HR: 9 INJECTION, SOLUTION INTRAVENOUS at 13:38

## 2020-06-14 RX ADMIN — MORPHINE SULFATE 15 MG: 15 TABLET, FILM COATED, EXTENDED RELEASE ORAL at 20:59

## 2020-06-14 RX ADMIN — LOPERAMIDE HYDROCHLORIDE 2 MG: 2 CAPSULE ORAL at 21:00

## 2020-06-14 RX ADMIN — ZOLPIDEM TARTRATE 5 MG: 5 TABLET ORAL at 21:00

## 2020-06-14 RX ADMIN — LEVOTHYROXINE SODIUM 50 MCG: 50 TABLET ORAL at 04:59

## 2020-06-14 RX ADMIN — HYDROMORPHONE HYDROCHLORIDE 0.25 MG: 1 INJECTION, SOLUTION INTRAMUSCULAR; INTRAVENOUS; SUBCUTANEOUS at 09:08

## 2020-06-14 RX ADMIN — HYDROXYZINE HYDROCHLORIDE 25 MG: 25 TABLET, FILM COATED ORAL at 21:13

## 2020-06-14 RX ADMIN — AZTREONAM 2 G: 2 INJECTION, POWDER, LYOPHILIZED, FOR SOLUTION INTRAMUSCULAR; INTRAVENOUS at 15:15

## 2020-06-14 NOTE — PROGRESS NOTES
Liliana Calderon  1980  [id number]    Evaluating Physician: Elsy Augustin    Chief Complaint: fever    Reason for Consultation: MRSA bacteremia    History of present illness:   Patient is a 39 y.o.  Yr old female with history of IVDU, depression/anxiety, COPD, and seizure d/o who initially presented to [facility] yesterday with c/o shortness of air and worsening low back pain. She last used IV heroin about 1 month prior.  She was doing relatively well until about 3 days PTA when she noticed worsening low back pain and worsening shortness of air with blood-tinged sputum. She additionally started having fevers up to 103.9F. She did not have contact with anyone known to have COVID-19 infection.     On arrival to [facility] on 5/31, the patient was found to have fevers and a significant leukocytosis. CT chest was concerning for pneumonia and possible septic pulm emboli. Blood cultures resulted with MRSA and she was transferred to [facility] for a higher level of care. Tmax since arrival here has been 102.7F. WBC was elevated to 14.2 but has improved to 10 on antibiotics. She was started on vancomycin and aztreonam by primary team. Repeat blood cultures from 6/1 are again positive for MRSA. TTE and MRI lumbar spine ordered. ID consulted for recommendations in the setting of her MRSA septicemia.    Subjective:    6/3/20: The patient is still having back pain. No new weakness in her lower extremities but does have extreme back pain with movement. Fevers improved. Tmax 100F. Does have some anemia with Hgb 6.4. No nausea or vomiting. Mild diarrhea. No new rash.     6/4/20: still having some back pain and had some fevers overnight. Tmax this morning was 102.7F.  Not feeling very well this morning when I saw her. No worsening of her diarrhea. No nausea. No new rash. Still able to move her extremities well with no new weakness but does have significant pain lifting her legs off the bed.     6/5/20: Patient is  "unfortunately still having fevers up to 102.5°F.  She is still not feeling well with significant back pain.  No new weakness in her lower extremities.  She is having some diarrhea but only about 2 episodes in the last 24 hours.  No nausea.  No new rashes.  She went for REDD today and was noted to have about a 1 cm x 1 cm vegetation on the tricuspid valve per my discussion with Dr. Pinedo.    6/6/2020: Patient states she is still receiving her antibiotics but has complaints of nausea and vomiting as well as diarrhea.  Discussion with nursing staff states that she is having blood-tinged  tears, bloody emesis and worsening petechial rash over her body.  RN staff states they have not noted any blood in her diarrhea at this point.  Afebrile overnight with some tachypnea noted.  Remains without leukocytosis.  6/4 body fluid culture from back returned positive for 2+ MRSA.  6/3 blood cultures currently remain no growth to date. Per RN her rash has gradually progressed over the last couple days    6/7/2020: Patient laying in bed awake Heparin stopped last pm   RN states that he was told by the tech that there was blood in the stool but did not personally check it.   She feels \"horrible\" today.  She remains with nausea, vomiting and significant amounts of diarrhea- 3 stools so far today  Documentation shows she has been afebrile and hemodynamically stable overnight. Leukocytosis is slightly improved from 14-12.  H&H has improved from yesterday's values to 11 and 38.  Platelets of 405.  7/C. difficile toxin was negative.  6/4 body fluid back aspirate positive for MRSA.  Cdiff negative  6/8 alert, afebrile, no new skin lesions, c/o discomfort at the site of the R upper arm lesion that may have a hemorrhagic center  6/10  Alert, no new skin lesions and the largest one on the right upper arm is receding and is less tender although it is starting to ulcerate  Back pain improved    6/11  Febrile to 102 today; ongoing diarrhea and " "she has developed projectile vomiting w/o hemetemesis  All \"allowable\" stool studies unremarkable   Alert but pale no new skin lesions and existing ones are starting to recede  Possible transfer to  for angiovac when she is stabilized  WBC down to 11  6/12 Tm 102.2, vomiting improved after NG placed- > 6L output; alert, looks much less uncomfortable today; no diarrhea and abd pain is minimal; picc to be placed    6/13 Azactam and flagyl added for concern re: aspiration pneumonia; NG tube remain in place with 1L out yesterday; fever resolved; 6/11 blood cultures negative    Past Medical History:   Diagnosis Date   • Anxiety and depression    • Chronic bronchitis (CMS/HCC)    • Fibromyalgia    • Heart valve problem    • Hypertension    • Migraine    • PCOS (polycystic ovarian syndrome)    • Pneumonia    • Seizures (CMS/HCC)        Past Surgical History:   Procedure Laterality Date   • CHOLECYSTECTOMY         Social History:  IVDU (heroin). Former smoker. No ETOH use.    Family History:  family history includes Alcohol abuse in her brother and sister; Alzheimer's disease in her maternal grandmother; Bone cancer in her mother; Breast cancer in her mother; Dementia in her father; Diabetes in her father; Heart disease in her father and paternal grandfather; Hypertension in her brother, father, mother, and sister; Mental illness in her brother and sister; Parkinsonism in her maternal grandmother; Pulmonary embolism in her father; Skin cancer in her mother; Stroke in her father.    Allergies   Allergen Reactions   • Penicillins Rash     Received cefepime and cefdinir in 2018   • Phenobarbital Rash       Medication:  Current Facility-Administered Medications   Medication Dose Route Frequency Provider Last Rate Last Dose   • acetaminophen (TYLENOL) tablet 650 mg  650 mg Oral Q6H PRN Malathi Vasquez APRN   650 mg at 06/12/20 0533   • aztreonam (AZACTAM) 2 g/100 mL 0.9% NS (mbp)  2 g Intravenous Q8H Elsy Augustin MD   " 2 g at 06/14/20 1515   • bismuth subsalicylate (PEPTO BISMOL) 262 MG/15ML suspension 30 mL  30 mL Oral Q6H PRN Verenice Keller MD   30 mL at 06/09/20 1721   • budesonide-formoterol (SYMBICORT) 160-4.5 MCG/ACT inhaler 2 puff  2 puff Inhalation BID - RT Rancho Lang MD   2 puff at 06/14/20 0824   • cyclobenzaprine (FLEXERIL) tablet 5 mg  5 mg Oral TID PRN Verenice Keller MD   5 mg at 06/13/20 1959   • DAPTOmycin (CUBICIN) 600 mg in sodium chloride 0.9 % 50 mL IVPB  8 mg/kg (Adjusted) Intravenous Q24H Rancho Lang  mL/hr at 06/14/20 0807 600 mg at 06/14/20 0807   • FLUoxetine (PROzac) capsule 40 mg  40 mg Oral Daily Vanessa Lopez MD   40 mg at 06/14/20 0806   • gabapentin (NEURONTIN) capsule 800 mg  800 mg Oral Q8H Vanessa Lopez MD   800 mg at 06/14/20 0458   • HYDROcodone-acetaminophen (NORCO) 5-325 MG per tablet 1 tablet  1 tablet Oral Q6H PRN Nathan Ashley MD   1 tablet at 06/14/20 1511   • hydrocortisone 2.5 % cream   Topical Q12H Verenice Keller MD       • HYDROmorphone (DILAUDID) injection 0.25 mg  0.25 mg Intravenous Q4H PRN Nathan Ashley MD   0.25 mg at 06/14/20 1314   • hydrOXYzine (ATARAX) tablet 25 mg  25 mg Oral Nightly PRN Jae Cesar PA-C   25 mg at 06/13/20 1957   • ipratropium-albuterol (DUO-NEB) nebulizer solution 3 mL  3 mL Nebulization Q6H PRN Vanessa Lopez MD   3 mL at 06/10/20 2043   • lactobacillus acidophilus (RISAQUAD) capsule 1 capsule  1 capsule Oral Daily Rancho Lang MD   1 capsule at 06/14/20 0807   • levETIRAcetam (KEPPRA) 750 mg in sodium chloride 0.9 % 100 mL IVPB  750 mg Intravenous Q12H Elizabeth Brown MD   750 mg at 06/14/20 0807   • levothyroxine (SYNTHROID, LEVOTHROID) tablet 50 mcg  50 mcg Oral Daily Vanessa Lopez MD   50 mcg at 06/14/20 0459   • loperamide (IMODIUM) capsule 2 mg  2 mg Oral 4x Daily PRN Verenice Keller MD   2 mg at 06/11/20 0836   • melatonin tablet 5 mg  5 mg Oral Nightly PRN  Jae Cesar PA-C   5 mg at 06/13/20 2000   • metroNIDAZOLE (FLAGYL) 500 mg/100mL IVPB  500 mg Intravenous Q12H Elsy Augustin MD   500 mg at 06/14/20 1515   • Morphine (MS CONTIN) 12 hr tablet 15 mg  15 mg Oral Q12H Verenice Keller MD   15 mg at 06/14/20 0806   • morphine injection 1 mg  1 mg Intravenous Q2H PRN Nathan Ashley MD   1 mg at 06/13/20 0026   • nystatin (MYCOSTATIN) powder   Topical Q12H Verenice Keller MD       • ondansetron (ZOFRAN) injection 4 mg  4 mg Intravenous Q6H PRN Nathan Ashley MD   4 mg at 06/13/20 1336   • pantoprazole (PROTONIX) EC tablet 40 mg  40 mg Oral QAM Vanessa Lopez MD   40 mg at 06/14/20 0459   • potassium chloride (MICRO-K) CR capsule 40 mEq  40 mEq Oral PRN Verenice Keller MD   40 mEq at 06/11/20 0040    Or   • potassium chloride (KLOR-CON) packet 40 mEq  40 mEq Oral PRN Verenice Keller MD        Or   • potassium chloride 10 mEq in 100 mL IVPB  10 mEq Intravenous Q1H PRN Verenice Keller MD       • prochlorperazine (COMPAZINE) injection 5 mg  5 mg Intravenous Q6H PRN Verenice Keller MD   5 mg at 06/13/20 2358    Or   • prochlorperazine (COMPAZINE) tablet 5 mg  5 mg Oral Q6H PRN Verenice Keller MD   5 mg at 06/11/20 0447    Or   • prochlorperazine (COMPAZINE) suppository 25 mg  25 mg Rectal Q12H PRN Verenice Keller MD       • sodium chloride 0.9 % flush 10 mL  10 mL Intravenous Q12H Nathan Ashley MD   10 mL at 06/14/20 0807   • sodium chloride 0.9 % flush 10 mL  10 mL Intravenous PRN Nathan Ashley MD       • sodium chloride 0.9 % infusion  125 mL/hr Intravenous Continuous Vanessa Lopez  mL/hr at 06/14/20 1338 125 mL/hr at 06/14/20 1338   • zolpidem (AMBIEN) tablet 5 mg  5 mg Oral Nightly PRN Verenice Keller MD   5 mg at 06/13/20 1958         Infectious History:  MRSA    Antibiotics:  Anti-Infectives (From admission, onward)    Ordered     Dose/Rate Route Frequency Start Stop    06/11/20 1537  aztreonam (AZACTAM) 2 g/100 mL 0.9% NS  "(mbp)  Review   Ordering Provider:  Elsy Augustin MD    2 g  over 4 Hours Intravenous Every 8 Hours 06/12/20 0000 06/19/20 2359    06/11/20 1537  aztreonam (AZACTAM) 2 g/100 mL 0.9% NS (mbp)     Ordering Provider:  Elsy Augustin MD    2 g  over 30 Minutes Intravenous Once 06/11/20 1630 06/11/20 1808    06/11/20 1537  metroNIDAZOLE (FLAGYL) 500 mg/100mL IVPB  Review   Ordering Provider:  Elsy Augustin MD    500 mg  over 60 Minutes Intravenous Every 12 Hours 06/11/20 1600 06/19/20 1559    06/02/20 0743  DAPTOmycin (CUBICIN) 600 mg in sodium chloride 0.9 % 50 mL IVPB     Rancho Lang MD reviewed the order on 06/05/20 0802.   Ordering Provider:  Rancho Lang MD    8 mg/kg × 76.6 kg (Adjusted)  100 mL/hr over 30 Minutes Intravenous Every 24 Hours 06/02/20 0900 06/23/20 0859    06/01/20 1501  vancomycin 2250 mg/500 mL 0.9% NS IVPB ([facility])     Ordering Provider:  Vanessa Lopez MD    2,250 mg Intravenous Once 06/01/20 1600 06/01/20 1756            Review of Systems    As above    Physical Exam:   Vital Signs   /88 (BP Location: Right arm, Patient Position: Lying)   Pulse 103   Temp 99.2 °F (37.3 °C) (Axillary)   Resp 18   Ht 165.1 cm (65\")   Wt 106 kg (233 lb)   LMP 04/01/2020 (Approximate)   SpO2 98%   BMI 38.77 kg/m²     GENERAL: Awake alert, Obese. mild distress  HENT: Normocephalic, atraumatic.  Conjunctiva with hemorrhages bilaterally  NECK: Supple without nuchal rigidity. No mass.  HEART: RRR; No murmur, gallops or rubs noted.   LUNGS: CTAB. Nonlabored.  No wheezing noted  ABDOMEN: Soft, nontender, nondistended. Positive bowel sounds.  No guarding noted  EXT:  No cyanosis, clubbing or edema.   :   Without Petit catheter.  MSK: FROM without joint effusions noted arms/legs.  Some tenderness to palpation over her lumbar spine.  SKIN: has slightly discolored/gray patchy/papular lesions over her bilateral lower extremity- these appeared about 2 weeks ago per the " patient.  No other possible peripheral stigmata of infective endocarditis noted over her extremities or elsewhere.  She has also developed a petechial rash over her entire back bilateral upper extremities, buttocks and inguinal and abdominal folds which is improveing  On the R upper arm there remains a large lesion with a hemorrhagic center but minimal induration and no fuctuance  NEURO: Oriented to PPT. No focal deficits on motor/sensory exam at arms/legs. 5 out of 5 strength throughout  PSYCHIATRIC: Normal insight and judgement. Cooperative with PE    Laboratory Data    Results from last 7 days   Lab Units 06/11/20  0409 06/10/20  0530 06/09/20  1028   WBC 10*3/mm3 10.24 17.35* 16.06*   HEMOGLOBIN g/dL 9.3* 10.3* 11.6*   HEMATOCRIT % 30.8* 33.8* 38.9   PLATELETS 10*3/mm3 276 370 445     Results from last 7 days   Lab Units 06/11/20  0409   SODIUM mmol/L 135*   POTASSIUM mmol/L 3.9   CHLORIDE mmol/L 102   CO2 mmol/L 20.0*   BUN mg/dL 14   CREATININE mg/dL 0.66   GLUCOSE mg/dL 130*   CALCIUM mg/dL 7.8*     Results from last 7 days   Lab Units 06/11/20  0409  06/08/20  0513   ALK PHOS U/L 123*   < > 148*   BILIRUBIN mg/dL 0.8   < > 0.6   BILIRUBIN DIRECT mg/dL  --   --  0.4*   ALT (SGPT) U/L 7   < > 8   AST (SGOT) U/L 12   < > 12    < > = values in this interval not displayed.               Estimated Creatinine Clearance: 139.8 mL/min (by C-G formula based on SCr of 0.66 mg/dL).       Microbiology:    Microbiology Results (last 10 days)     Procedure Component Value - Date/Time    Blood Culture - Blood, Arm, Right [[id number]] Collected:  06/11/20 1545    Lab Status:  Preliminary result Specimen:  Blood from Arm, Right Updated:  06/13/20 1616     Blood Culture No growth at 2 days    Stool Culture (Reference Lab) - Stool, Per Rectum [[id number]] Collected:  06/08/20 0351    Lab Status:  Edited Result - FINAL Specimen:  Stool from Per Rectum Updated:  06/12/20 1410     Salmonella/Shigella Screen Final report     Result 1  Comment     Comment: No Salmonella or Shigella recovered.        Campylobacter Culture Final report     Result 1 Comment     Comment: No Campylobacter species isolated.        E coli, Shiga toxin Assay Negative    Narrative:       Performed at:  [address] [place] [address]  [id number]  : Lam Roman PhD, Phone:  [phone]    Clostridium Difficile Toxin - Stool, Per Rectum [[id number]] Collected:  06/05/20 1748    Lab Status:  Final result Specimen:  Stool from Per Rectum Updated:  06/05/20 1858    Narrative:       The following orders were created for panel order Clostridium Difficile Toxin - Stool, Per Rectum.  Procedure                               Abnormality         Status                     ---------                               -----------         ------                     Clostridium Difficile To...[[id number]]  Normal              Final result                 Please view results for these tests on the individual orders.    Clostridium Difficile Toxin, PCR - Stool, Per Rectum [[id number]]  (Normal) Collected:  06/05/20 1748    Lab Status:  Final result Specimen:  Stool from Per Rectum Updated:  06/05/20 1858     C. Difficile Toxins by PCR Not Detected    Narrative:       Performance characteristics of test not established for patients <2 years of age.  Negative for Toxigenic C. Difficile    Body Fluid Culture - Aspirate, Back [[id number]]  (Abnormal)  (Susceptibility) Collected:  06/04/20 1645    Lab Status:  Final result Specimen:  Aspirate from Back Updated:  06/07/20 0701     Body Fluid Culture Light growth (2+) Staphylococcus aureus, MRSA     Comment:   Methicillin resistant Staphylococcus aureus, Patient may be an isolation risk.        Gram Stain No organisms seen      Many (4+) WBCs per low power field    Susceptibility      Staphylococcus aureus, MRSA     TYRONE     Gentamicin Susceptible     Oxacillin Resistant     Rifampin Susceptible     Vancomycin Susceptible                   Susceptibility Comments     Staphylococcus aureus, MRSA    This isolate does not demonstrate inducible clindamycin resistance in vitro.               Anaerobic Culture - Aspirate, Back [[id number]] Collected:  06/04/20 1643    Lab Status:  Final result Specimen:  Aspirate from Back Updated:  06/09/20 0741     Anaerobic Culture No anaerobes isolated at 5 days          Radiology:  Xr Shoulder 2+ View Right    Result Date: 6/8/2020  No acute bony abnormality.  D:  06/08/2020 E:  06/08/2020  This report was finalized on 6/8/2020 4:30 PM by Dr. Jasmine Marie MD.      Xr Abdomen Flat & Upright    Result Date: 6/14/2020  Nasogastric tube in the stomach with mild gastric distention. Dilated loops of small bowel again seen diffusely throughout the abdomen unchanged with findings suggesting ileus or small bowel obstruction.  DICTATED:   06/14/2020 EDITED/ls :   06/14/2020      Ct Head Without Contrast    Result Date: 6/12/2020  1. No acute intracranial abnormality specifically, no midline shift or hydrocephalus. No intra-axial hemorrhage. 2. Moderate-to-large left mastoid effusion.  D:  06/11/2020 E:  06/11/2020    This report was finalized on 6/12/2020 5:23 PM by Dr. Sam Pugh.      Ct Abdomen Pelvis With Contrast    Result Date: 6/12/2020  1.  Small bowel obstruction with severely dilated small bowel loops and air-fluid levels extending into the right lower quadrant adjacent to the right iliac vessels where there is caliber change concerning for an apparent transition point at this level with evidence for ascites and mesenteric edema, however, no evidence for perforation or abscess.  2.  Persistent small volume bilateral pleural effusions and opacifications in the right midlung better seen on recent 06/05/2020 examination recently performed.  D:  06/11/2020 E:  06/11/2020  This report was finalized on 6/12/2020 5:23 PM by Dr. Sam Pugh.      Xr Chest 1 View    Result Date: 6/13/2020  Ill-defined  opacification left lung base. Mild increased markings seen within the right upper lobe and right midlung.  DICTATED:   06/13/2020 EDITED/ls :   06/13/2020  This report was finalized on 6/13/2020 3:35 PM by Dr. Jasmine Marie MD.      Xr Abdomen Kub    Result Date: 6/12/2020  Severely dilated gas-filled small bowel loops throughout the mid abdomen consistent with obstructive bowel gas pattern.  D:  06/11/2020 E:  06/11/2020  This report was finalized on 6/12/2020 5:21 PM by Dr. Sam Pugh.           Impression:     This is a 38 yo woman with a h/o IVDU presenting with severe low back pain and shortness of air and found to have fevers/sepsis with MRSA growing from multiple blood culture sets. Also has CT chest findings that are concerning for septic pulmonary emboli and now noted to have a decent size vegetation on her tricuspid valve on REDD consistent with infective endocarditis. MRI with 8x4 x3 cm multilobular abscess of the right erector spinae muscles, approximately from L3-S1. Also with two possible interconnected dorsal epidural abscesses at L2-L3 and L3-L4 but only causing mild canal stenosis. Dr. Alex with neurosurgery has evaluated and does not think that she requires any surgical intervention at this time for her epidural abscesses as they are modest. Underwent CT-guided drainage 6/4 and had 2cc of purulent drainage aspirated from her fluid collection in erector spinae muscles. Repeat blood culture sent 6/3 is negative    REDD with a 1 cm x 1 cm vegetation on the tricuspid valve    It appears that her petechial rash has progressed somewhat but her renal function, PT/INR and platelet count are stable which makes DIC less likely      Problems:  1. 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 MRSA septicemia  Bacteremia appears to have cleared; no new cutaneous emboli  3. Epidural abscesses  4. Large loculated Paraspinal muscle  abscess- s/p aspiration which is culture positive  5. New LLL infiltrate, likely aspiration pneumonia  6. Ileus etiology unclear   ? Esophagitis ? Drug AE- potentially either teflaro or daptomycin although neither is highly likely to be the culprit  No evidence of pancreatitis or biliary disease and she is s/p cholecystectomy, R/O mesenteric emboli   Improved after NG placed and Teflaro stopped  Possible transition zone on abdominal CT concerning for SBO  7. Diarrhea - ongoing and intermittent Cdiff negative, culture unremarkable; unable to order GI panel  8. Sepsis with fever, leukocytosis with neutrophilia, tachycardia-recurrence  associated with severe vomiting- unclear if aspiration, new complication of endocarditis, etc  9. Septic pulmonary emboli  10. Acute on chronic low back pain  11. Urine cx with MRSA- represents systemic infection, not primary source  12. Microcytic anemia  13. History of PCN allergy   14. Recent IV drug use  15. Obesity  16. H/o COPD  17. H/o seizure disorder  18. H/o depression/anxiety    PLAN: Thank you for asking us to see Liliana Calderon, I recommend the followin. Continue daptomycin alone for now  2. Hold teflaro for now - could consider rechallenging with this drug after GI symptoms resolved- will defer to Dr Lang  3. Continue daptomycin 8mg/kg IV q24h. Baseline CPK level was 48 and remains normal  4. Continue azactam and flagyl-  5. Will consider repeat MRI L-spine in 2-3 weeks. Neurosurgery following  6. CT surgery has arranged for transfer to [facility] for angiovac when clinically stable  7. Ng tube  8. Follow repeat blood culture sent   9. Double lumen picc placed in case the ileus/SBO does not resolve and she might need TPN    Dr Rancho Lang will see 6/15     Complex set of medical issues requiring a high-level medical decision making.  The patient does have significant risk for for further morbidity and mortality in the setting of her severe MRSA infection.  Has some risk for paralysis if her epidural abscesses worsen.    Prognosis guarded    Discussed with AYALA Augustin MD  6/14/2020

## 2020-06-14 NOTE — PROGRESS NOTES
[facility] Medicine Services  PROGRESS NOTE    Patient Name: Liliana Calderon  : 1980  MRN: [id number]    Date of Admission: 2020  Primary Care Physician: Ollie Flores MD    Subjective   Subjective     CC:  Sepsis POA    HPI:  Patient NG out yesterday and replaced. Multiple episodes of vomiting. Large output in NG continues. + BMs  Denies abdominal pain. Thirsty and hungry  Apparently persuaded multiple staff member to bring popscicles and drinks yesterday      Review of Systems    Gen- No fevers, chills  CV- No chest pain, palpitations  Resp- No cough, dyspnea  GI- + NG, + vomiting, No abd pain        Objective   Objective     Vital Signs:   Temp:  [98.5 °F (36.9 °C)-99.2 °F (37.3 °C)] 99.2 °F (37.3 °C)  Heart Rate:  [] 104  Resp:  [16-18] 18  BP: (136-147)/(76-92) 137/88        Physical Exam:    Constitutional: No acute distress, awake, alert- sitting up in bed  HENT: NCAT, + NG tube in place to LWS- bilious output  Respiratory: Clear to auscultation bilaterally, respiratory effort normal   Cardiovascular: tachy but regular, s1 and s2, + murmur  Gastrointestinal: + bowel sounds, soft, obese abdomen, no guarding  Musculoskeletal: No bilateral ankle edema  Psychiatric: Appropriate affect, cooperative  Neurologic: Oriented x 3, strength symmetric in all extremities, speech clear  Skin: feet petechiae, Right upper arm bruise, multiple ext marks    Results Reviewed:  Results from last 7 days   Lab Units 20  0409 06/10/20  0530 20  1028   WBC 10*3/mm3 10.24 17.35* 16.06*   HEMOGLOBIN g/dL 9.3* 10.3* 11.6*   HEMATOCRIT % 30.8* 33.8* 38.9   PLATELETS 10*3/mm3 276 370 445     Results from last 7 days   Lab Units 20  0409 06/10/20  0530 20  1028 20  0513   SODIUM mmol/L 135* 134* 135* 137   POTASSIUM mmol/L 3.9 3.4* 3.7 3.9   CHLORIDE mmol/L 102 102 104 103   CO2 mmol/L 20.0* 18.0* 19.0* 20.0*   BUN mg/dL 14 14 11 11   CREATININE mg/dL 0.66  0.64 0.55* 0.52*   GLUCOSE mg/dL 130* 151* 148* 167*   CALCIUM mg/dL 7.8* 7.5* 7.6* 7.9*   ALT (SGPT) U/L 7 7  --  8   AST (SGOT) U/L 12 15  --  12   TROPONIN T ng/mL <0.010  --   --   --      Estimated Creatinine Clearance: 139.8 mL/min (by C-G formula based on SCr of 0.66 mg/dL).    Microbiology Results Abnormal     Procedure Component Value - Date/Time    Blood Culture - Blood, Arm, Right [[id number]] Collected:  06/11/20 1545    Lab Status:  Preliminary result Specimen:  Blood from Arm, Right Updated:  06/13/20 1616     Blood Culture No growth at 2 days    Stool Culture (Reference Lab) - Stool, Per Rectum [[id number]] Collected:  06/08/20 4260    Lab Status:  Edited Result - FINAL Specimen:  Stool from Per Rectum Updated:  06/12/20 1410     Salmonella/Shigella Screen Final report     Result 1 Comment     Comment: No Salmonella or Shigella recovered.        Campylobacter Culture Final report     Result 1 Comment     Comment: No Campylobacter species isolated.        E coli, Shiga toxin Assay Negative    Narrative:       Performed at:  [address] [place] [address]  [id number]  : Lam Roman PhD, Phone:  [phone]    Anaerobic Culture - Aspirate, Back [[id number]] Collected:  06/04/20 1643    Lab Status:  Final result Specimen:  Aspirate from Back Updated:  06/09/20 0741     Anaerobic Culture No anaerobes isolated at 5 days    Blood Culture - Blood, Arm, Right [[id number]] Collected:  06/03/20 1229    Lab Status:  Final result Specimen:  Blood from Arm, Right Updated:  06/08/20 1401     Blood Culture No growth at 5 days    Blood Culture - Blood, Arm, Left [[id number]] Collected:  06/03/20 1229    Lab Status:  Final result Specimen:  Blood from Arm, Left Updated:  06/08/20 1401     Blood Culture No growth at 5 days    Body Fluid Culture - Aspirate, Back [[id number]]  (Abnormal)  (Susceptibility) Collected:  06/04/20 1645    Lab Status:  Final result Specimen:  Aspirate from Back  Updated:  06/07/20 0701     Body Fluid Culture Light growth (2+) Staphylococcus aureus, MRSA     Comment:   Methicillin resistant Staphylococcus aureus, Patient may be an isolation risk.        Gram Stain No organisms seen      Many (4+) WBCs per low power field    Susceptibility      Staphylococcus aureus, MRSA     TYRONE     Gentamicin Susceptible     Oxacillin Resistant     Rifampin Susceptible     Vancomycin Susceptible                Susceptibility Comments     Staphylococcus aureus, MRSA    This isolate does not demonstrate inducible clindamycin resistance in vitro.               Clostridium Difficile Toxin - Stool, Per Rectum [[id number]] Collected:  06/05/20 1748    Lab Status:  Final result Specimen:  Stool from Per Rectum Updated:  06/05/20 1858    Narrative:       The following orders were created for panel order Clostridium Difficile Toxin - Stool, Per Rectum.  Procedure                               Abnormality         Status                     ---------                               -----------         ------                     Clostridium Difficile To...[[id number]]  Normal              Final result                 Please view results for these tests on the individual orders.    Clostridium Difficile Toxin, PCR - Stool, Per Rectum [[id number]]  (Normal) Collected:  06/05/20 1748    Lab Status:  Final result Specimen:  Stool from Per Rectum Updated:  06/05/20 1858     C. Difficile Toxins by PCR Not Detected    Narrative:       Performance characteristics of test not established for patients <2 years of age.  Negative for Toxigenic C. Difficile    Blood Culture - Blood, Wrist, Left [[id number]]  (Abnormal) Collected:  06/01/20 1601    Lab Status:  Final result Specimen:  Blood from Wrist, Left Updated:  06/04/20 0612     Blood Culture Staphylococcus aureus, MRSA     Comment:   Infectious disease consultation is highly recommended to rule out distant foci of infection.  Methicillin resistant Staphylococcus  aureus, Patient may be an isolation risk.        Isolated from Aerobic Bottle     Gram Stain Aerobic Bottle Gram positive cocci in clusters    Narrative:       Refer to previous blood culture collected on 6/1/2020 1558 for MICs.      Blood Culture - Blood, Arm, Left [[id number]]  (Abnormal)  (Susceptibility) Collected:  06/01/20 1558    Lab Status:  Final result Specimen:  Blood from Arm, Left Updated:  06/04/20 0610     Blood Culture Staphylococcus aureus, MRSA     Comment:   Infectious disease consultation is highly recommended to rule out distant foci of infection.  Methicillin resistant Staphylococcus aureus, Patient may be an isolation risk.        Isolated from Aerobic and Anaerobic Bottles     Gram Stain Aerobic Bottle Gram positive cocci in clusters      Anaerobic Bottle Gram positive cocci in clusters    Susceptibility      Staphylococcus aureus, MRSA     TYRONE     Gentamicin Susceptible     Oxacillin Resistant     Rifampin Susceptible     Vancomycin Susceptible                Susceptibility Comments     Staphylococcus aureus, MRSA    This isolate does not demonstrate inducible clindamycin resistance in vitro.               Blood Culture ID, PCR - Blood, Arm, Left [[id number]]  (Abnormal) Collected:  06/01/20 1558    Lab Status:  Final result Specimen:  Blood from Arm, Left Updated:  06/02/20 0721     BCID, PCR Staphylococcus aureus. mecA (methicillin resistance gene) detected. Identification by BCID PCR.     BOTTLE TYPE Aerobic Bottle          Imaging Results (Last 24 Hours)     Procedure Component Value Units Date/Time    XR Chest 1 View [[id number]] Collected:  06/13/20 1030     Updated:  06/13/20 1538    Narrative:          EXAMINATION: XR CHEST 1 VW - 06/13/2020      INDICATION: Pneumonia. Z74.09-Other reduced mobility; Z78.9-Other  specified health status.      COMPARISON: 06/08/2020     FINDINGS: Portable chest reveals patchy ill-defined opacification left  lung base with small left pleural effusion. The  heart is enlarged.  Increased markings seen within the right upper lobe as well as  prominence of the right hilar region. Degenerative changes seen within  the spine with nasogastric tube tip in the stomach.           Impression:       Ill-defined opacification left lung base. Mild increased  markings seen within the right upper lobe and right midlung.     DICTATED:   06/13/2020  EDITED/ls :   06/13/2020      This report was finalized on 6/13/2020 3:35 PM by Dr. Jasmine Marie MD.             Results for orders placed during the hospital encounter of 06/01/20   Adult Transesophageal Echo (REDD) W/ Cont if Necessary Per Protocol    Narrative · Left ventricular systolic function is normal. Estimated EF appears to be   in the range of 56 - 60%  · The tricuspid valve is abnormal. There is a large vegetation measuring 1   x 1.5 cm on the tricuspid valve. Predominantly located on the posterior   leaflet but anterior leaflet appears involved as well.  · Estimated right ventricular systolic pressure from tricuspid   regurgitation is moderately elevated (45-55 mmHg).  · Mild to moderate mitral regurgitation noted with an eccentric jet.   However no vegetations visualized on the mitral valve  · The other cardiac valves appear free of vegetations  · Discussed findings with ordering provider.        I have reviewed the medications:  Scheduled Meds:    aztreonam 2 g Intravenous Q8H   budesonide-formoterol 2 puff Inhalation BID - RT   DAPTOmycin 8 mg/kg (Adjusted) Intravenous Q24H   FLUoxetine 40 mg Oral Daily   gabapentin 800 mg Oral Q8H   hydrocortisone  Topical Q12H   lactobacillus acidophilus 1 capsule Oral Daily   levETIRAcetam 750 mg Intravenous Q12H   levothyroxine 50 mcg Oral Daily   metroNIDAZOLE 500 mg Intravenous Q12H   Morphine 15 mg Oral Q12H   nystatin  Topical Q12H   pantoprazole 40 mg Oral QAM   sodium chloride 10 mL Intravenous Q12H     Continuous Infusions:    sodium chloride 125 mL/hr Last Rate: 125 mL/hr  (06/14/20 8237)     PRN Meds:.•  acetaminophen  •  bismuth subsalicylate  •  cyclobenzaprine  •  HYDROcodone-acetaminophen  •  HYDROmorphone  •  hydrOXYzine  •  ipratropium-albuterol  •  loperamide  •  melatonin  •  Morphine  •  ondansetron  •  potassium chloride **OR** potassium chloride **OR** potassium chloride  •  prochlorperazine **OR** prochlorperazine **OR** prochlorperazine  •  sodium chloride  •  zolpidem    Assessment/Plan   Assessment & Plan     Active Hospital Problems    Diagnosis  POA   • **Sepsis (CMS/HCC) [A41.9]  Yes   • Infective endocarditis [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        Brief Hospital Course to date:  Liliana Calderon is a 39 y.o. female with past medical history of depression/anxiety, fibromyalgia, COPD, hypertension, seizure disorder, and remote IV drug use who was admitted on June 1 for sepsis with MRSA bacteremia, UTI, pneumonia and epidural abscess.  ID has been following.  6/11 she had intractable pain issues and ultimately started having projectile vomiting with vomitus that look like stool.  Abdominal x-ray is concerning for bowel obstruction.  Surgery following and NG in place:     Projectile vomiting/ SBO  -CT of the abdomen pelvis showed SBO-  + BM, slow return. Continues to have high volume output and vomiting with NG.   -NG tube to LWS; ice chips only. Long discussion with patient that she will wait for surgery to advance diet and not to request food or drinks from employees  -General Surgery following    Seizures  -Intermittent  periods of responding to questioning  -EEG- no evidence of seizures  -increased Keppra with no further sz activity after increase  -Neurology has signed off    MRSA bacteremia  -She is being treated for UTI pneumonia tricuspid valve endocarditis and epidural abscesses  -REDD with tricuspid valve vegetation approximately 1 cm x 1.2 cm  -CT chest concerning for pneumonia  -Possibly has septic pulmonary emboli  -IV antibiotics per ID  -Plan to transfer to  due to need for angiovac which cannot be done here    Diarrhea  -resolved  -Probiotic  -Negative for C. Difficile    Right shoulder pain  -supportive care  -X-ray of the right shoulder with no acute fracture or dislocation    Rash  -Stable  -Unclear etiology  -Cortisone cream    Acute on chronic low back pain  -Suspected due to spinal/ epidural abscesses  -Pain medication as needed  -She had a CT-guided drainage of a right para spinal abscess on June 4 was positive for MRSA  -Flexeril has been added 3 times a day        DVT Prophylaxis:  SCDs      Disposition: I expect the patient to be transferred to  when bed available    CODE STATUS:   Code Status and Medical Interventions:   Ordered at: 06/01/20 1441     Code Status:    CPR     Medical Interventions (Level of Support Prior to Arrest):    Full       Electronically signed by SHAYNE Brady, 06/14/20, 11:16.

## 2020-06-14 NOTE — PLAN OF CARE
Problem: Patient Care Overview  Goal: Plan of Care Review  Flowsheets (Taken 6/14/2020 1514)  Progress: no change  Plan of Care Reviewed With: patient  Outcome Summary: Pt has less c/o pain during session. With HOB elevated pt able to performe sup>sit with supervision, and sit>sup with bed flat with supervision. Pt Stands with MIN Ax2, Takes steps toward HOB with CGAx2.

## 2020-06-14 NOTE — PROGRESS NOTES
"                  Clinical Nutrition     Reason for Visit:   NPO/Clear liquid >9 days    Patient Name: Liliana Calderon  YOB: 1980  MRN: [id number]  Date of Encounter: 06/14/20 09:54  Admission date: 6/1/2020    Comments: Patient seen for NPO/clear liquid > 9 days today. Continues with NGT and high output. Per nursing, patient noncompliant with \"sips and chips\" consuming multiple popsicles and drinks. Vomiting yesterday. Anticipate continued NPO/clear liquid due to GI s/sxs and inability to access GI for EN, RD recommends CPN via central line already in place. Patient at risk for refeeding syndrome with minimal intake over the past 9 days. RD recommends:    D16%, AA 6.5% to start at 25 mL/hr for first 24 hours. 200 mL SMOF lipids daily.  After 24 hours pending electrolytes and labs, advance by 25 mL Q8Hrs to goal rate of 75 mL/hr.  GIR = 0.6 mg/kg/min first 24 hours    Phosphorus lab ordered today prior to possible initiation of nutrition support. Await daily labs.    RD to continue to monitor patient NPO status and possible initiation of CPN.    Nutrition Assessment   Assessment     Admission diagnosis  Low back pain    Additional diagnosis/conditions/procedures this admission  Sepsis  Nausea, vomiting - improving  MRSA bacteremia, UTI, pneumonia  R sided pulmonary nodules  Microcytic anemia  Diarrhea, resolved  Petechial rash  R shoulder pain  Projectile vomiting  Ileus vs SBO    (6/4) s/p CT aspiration bx paraspinal abscess  (6/5) s/p REDD - 1cm x 1 cm tricuspid valve vegetation  (6/11) RRT - unresponsiveness  (6/11) NGT LWS  (6/12) NPO/clear liquid >7 days - RD recs for PPN: D8%, AA3.5% @ 135 mL/hr. 200 mL SMOF lipids/daily  (6/12) PICC placed    Additional PMH/procedures:  HTN  Fibromyalgia  GERD  DDD  Anxiety/depression  Seizures  PCOS  IVDU - last heroin use x1 month  COPD  Chronic bronchitis    CCY    Reported/Observed/Food/Nutrition Related History:      Patient NPO/clear liquid today >9 days. " "Per surgery, pt allowed to have sips and chips. Noted per RN 6/12, \"eaten 12 popsicles and eaten 14 large cups of ice.\" Noted patient with emesis and NGT removal last night. Per RN, \"Pt had multiple rounds of emesis; has repeatedly asked for drinks and popsicles; Pt has been noncompliant with order for sips of liquids and ice chips.\"    Per Infectious Disease note 6/12 \"Double lumen picc placed in case the ileus/SBO does not resolve and she might need TPN\"    Per I/O documentation:  1200 mL NGT output      Anthropometrics     Height: 165.1 cm (65\")  Last filed wt: Weight: 106 kg (233 lb) (06/02/20 1440)  Weight Method: Bed scale    BMI: BMI (Calculated): 38.8  Obese Class II: 35-39.9kg/m2    Ideal Body Weight (IBW) (kg): 57.29  Admission wt: 233 lb  Method obtained: bed scale weight per charting 6/1    Labs reviewed     Results from last 7 days   Lab Units 06/11/20  0409 06/10/20  0530 06/09/20  1028 06/08/20  0513   GLUCOSE mg/dL 130* 151* 148* 167*   BUN mg/dL 14 14 11 11   CREATININE mg/dL 0.66 0.64 0.55* 0.52*   SODIUM mmol/L 135* 134* 135* 137   CHLORIDE mmol/L 102 102 104 103   POTASSIUM mmol/L 3.9 3.4* 3.7 3.9   ALT (SGPT) U/L 7 7  --  8     Results from last 7 days   Lab Units 06/11/20  0409 06/10/20  0530 06/08/20  0513   ALBUMIN g/dL 2.20* 2.00* 2.10*       Results from last 7 days   Lab Units 06/11/20  1125   GLUCOSE mg/dL 106     Lab Results   Lab Value Date/Time    HGBA1C 6.0 06/01/2018 1406    HGBA1C 5.8 11/11/2016 0555       Medications reviewed   Pertinent: IV abx, prozac, gabapentin, risaquad, keppra, flagyl, morphine, protonix  GTT: NaCl @ 125 mL/hr      Intake/Output 24 hrs (7:00AM - 6:59 AM)     Intake & Output (last day)       06/13 0701 - 06/14 0700 06/14 0701 - 06/15 0700    P.O. 1600     I.V. (mL/kg) 1000 (9.4)     Other 60     IV Piggyback 300     Total Intake(mL/kg) 2960 (27.9)     Urine (mL/kg/hr) 0 (0)     Emesis/NG output 1200     Stool 0     Total Output 1200     Net +1760           " "Urine Unmeasured Occurrence 4 x     Stool Unmeasured Occurrence 4 x 1 x          Current Nutrition Prescription     PO: NPO Diet sips/chips  Intake: per charting pt noncompliant with \"sips and chips\" consuming large amounts of popsicles/ drinks      Nutrition Diagnosis     6/14  Problem Altered GI function   Etiology SBO vs ileus   Signs/Symptoms NPO/ high NGT output     6/9 updated 6/12, 6/14  Problem Inadequate oral intake   Etiology Clinical status   Signs/Symptoms NPO/clear liquid x9/d   Status: ongoing    Nutrition Intervention     1.  Follow treatment progress, Care plan reviewed  2.  Await begin PO   3. Patient NPO/clear liquid >9 days. Anticipate continued NPO/clear liquid due to GI s/sxs and inability to access GI for EN, RD recommends CPN via central line already in place. Patient at risk for refeeding syndrome, slow advancement of nutrition support     288 g dextrose, 117 g protein    D16%, AA 6.5% to start at 25 mL/hr for first 24 hours. 200 mL SMOF lipids daily. Pending electrolyte and labs, advance by 25 mL Q8Hrs to goal rate of 75 mL/hr. GIR = 0.6 mg/kg/min first 24 hours    3. Phos lab ordered prior to possible initiation of nutrition support. Await CMP  4. RD to continue to monitor patient NPO/clear liquid status    Goal:   General: Nutrition support treatment  PO: Tolerate PO   PN: Initiate CPN      Monitoring/Evaluation:   Per protocol, I&O, PO intake, Pertinent labs, PN delivery/tolerance, Weight, GI status, Symptoms    Will Continue to follow per protocol      Telma Kauffman RDN, LD  Time Spent: 90 minutes  "

## 2020-06-14 NOTE — THERAPY PROGRESS REPORT/RE-CERT
Patient Name: Liliana Calderon  : 1980    MRN: [id number]                              Today's Date: 2020       Admit Date: 2020    Visit Dx:     ICD-10-CM ICD-9-CM   1. Impaired mobility and ADLs Z74.09 V49.89    Z78.9      Patient Active Problem List   Diagnosis   • Bradycardia with 41-50 beats per minute   • Essential hypertension   • Fibromyalgia syndrome   • GERD without esophagitis   • Sepsis (CMS/HCC)   • DDD (degenerative disc disease), cervical   • Anxiety   • Depression   • Seizure (CMS/HCC)   • Migraine   • Infective endocarditis   • Epidural abscess   • MRSA bacteremia     Past Medical History:   Diagnosis Date   • Anxiety and depression    • Chronic bronchitis (CMS/HCC)    • Fibromyalgia    • Heart valve problem    • Hypertension    • Migraine    • PCOS (polycystic ovarian syndrome)    • Pneumonia    • Seizures (CMS/HCC)      Past Surgical History:   Procedure Laterality Date   • CHOLECYSTECTOMY       General Information     Row Name 20 1509          PT Evaluation Time/Intention    Document Type  therapy note (daily note)  -EJ     Mode of Treatment  physical therapy  -EJ     Row Name 20 1509          General Information    Patient Profile Reviewed?  yes  -EJ     Existing Precautions/Restrictions  fall;spinal;other (see comments) Pt reports pain R shoulder, spinal precautions for pt comfort as she has multiple epidural abscesses  -EJ     Row Name 20 1509          Cognitive Assessment/Intervention- PT/OT    Orientation Status (Cognition)  oriented x 4  -EJ     Row Name 20 1509          Safety Issues, Functional Mobility    Safety Issues Affecting Function (Mobility)  safety precautions follow-through/compliance;insight into deficits/self awareness;safety precaution awareness  -EJ     Impairments Affecting Function (Mobility)  strength;pain;balance;endurance/activity tolerance;postural/trunk control;range of motion (ROM)  -EJ       User Key  (r) = Recorded By, (t) =  Taken By, (c) = Cosigned By    Initials Name Provider Type    Elsy Gatica PT Physical Therapist        Mobility     Row Name 06/14/20 1509          Bed Mobility Assessment/Treatment    Bed Mobility Assessment/Treatment  supine-sit;sit-supine  -EJ     Rolling Right Cochran (Bed Mobility)  supervision  -EJ     Scooting/Bridging Cochran (Bed Mobility)  supervision  -EJ     Assistive Device (Bed Mobility)  bed rails;head of bed elevated  -EJ     Row Name 06/14/20 1509          Sit-Stand Transfer    Sit-Stand Cochran (Transfers)  minimum assist (75% patient effort);2 person assist  -EJ     Assistive Device (Sit-Stand Transfers)  walker, front-wheeled  -EJ     Row Name 06/14/20 1509          Gait/Stairs Assessment/Training    Cochran Level (Gait)  2 person assist;contact guard  -EJ     Assistive Device (Gait)  walker, front-wheeled  -EJ     Distance in Feet (Gait)  2  -EJ     Pattern (Gait)  step-to  -EJ     Comment (Gait/Stairs)  Pt takes side step stoward HOB only. BP elevated while in sitting. Pt denies ability to perform further ambulation or marching in place. Pt sits back down. SBA to return to supine.  -EJ       User Key  (r) = Recorded By, (t) = Taken By, (c) = Cosigned By    Initials Name Provider Type    Elsy Gatica PT Physical Therapist        Obj/Interventions     Row Name 06/14/20 1511          Static Sitting Balance    Level of Cochran (Unsupported Sitting, Static Balance)  supervision  -EJ     Sitting Position (Unsupported Sitting, Static Balance)  sitting on edge of bed  -EJ     Time Able to Maintain Position (Unsupported Sitting, Static Balance)  more than 5 minutes  -EJ     Comment (Unsupported Sitting, Static Balance)  Pt reports dizziness BP taken twice, RN johnson'd progression to standing.   -EJ     Row Name 06/14/20 1511          Static Standing Balance    Level of Cochran (Supported Standing, Static Balance)  contact guard assist  -EJ     Time Able  to Maintain Position (Supported Standing, Static Balance)  less than 15 seconds  -     Assistive Device Utilized (Supported Standing, Static Balance)  walker, rolling  -       User Key  (r) = Recorded By, (t) = Taken By, (c) = Cosigned By    Initials Name Provider Type    Elsy Gatica, PT Physical Therapist        Goals/Plan     Row Name 06/14/20 1518          Bed Mobility Goal 1 (PT)    Progress/Outcomes (Bed Mobility Goal 1, PT)  goal partially met  -[facility] Name 06/14/20 1518          Transfer Goal 1 (PT)    Progress/Outcome (Transfer Goal 1, PT)  good progress toward goal  -[facility] Name 06/14/20 1518          Gait Training Goal 1 (PT)    Progress/Outcome (Gait Training Goal 1, PT)  progress slower than expected  -       User Key  (r) = Recorded By, (t) = Taken By, (c) = Cosigned By    Initials Name Provider Type     Elsy Hinojosa PT Physical Therapist        Clinical Impression     [facility] Name 06/14/20 1514          Pain Assessment    Additional Documentation  Pain Scale: FACES Pre/Post-Treatment (Group)  -[facility] Name 06/14/20 1514          Pain Scale: Numbers Pre/Post-Treatment    Pain Location - Side  Right  -     Pain Location - Orientation  generalized  -     Pain Location  shoulder  -     Pain Intervention(s)  Repositioned;Ambulation/increased activity  -[facility] Name 06/14/20 1514          Pain Scale: FACES Pre/Post-Treatment    Pain: FACES Scale, Pretreatment  2-->hurts little bit  -     Pain: FACES Scale, Post-Treatment  2-->hurts little bit  -[facility] Name 06/14/20 1514          Plan of Care Review    Plan of Care Reviewed With  patient  -     Progress  no change  -     Outcome Summary  Pt has less c/o pain during session. With HOB elevated pt able to performe sup>sit with supervision, and sit>sup with bed flat with supervision. Pt Stands with MIN Ax2, Takes steps toward HOB with CGAx2.  PT to attempt increasing frequency to daily.   -     Row Name  06/14/20 1514          Vital Signs    Pre Systolic BP Rehab  158  -EJ     Pre Treatment Diastolic BP  111  -EJ     Intra Systolic BP Rehab  166  -EJ     Intra Treatment Diastolic BP  105  -EJ     Post Systolic BP Rehab  147  -EJ     Post Treatment Diastolic BP  93  -EJ     Pretreatment Heart Rate (beats/min)  107  -EJ     Intratreatment Heart Rate (beats/min)  108  -EJ     Posttreatment Heart Rate (beats/min)  106  -EJ     Pre Patient Position  Sitting  -EJ     Intra Patient Position  Sitting  -EJ     Post Patient Position  Supine  -EJ     Row Name 06/14/20 1514          Positioning and Restraints    Pre-Treatment Position  in bed  -EJ     Post Treatment Position  bed  -EJ     In Bed  supine;call light within reach;encouraged to call for assist;notified nsg;exit alarm on;side rails up x2  -EJ       User Key  (r) = Recorded By, (t) = Taken By, (c) = Cosigned By    Initials Name Provider Type    Elsy Gatica PT Physical Therapist        Outcome Measures     Row Name 06/14/20 1519          How much help from another person do you currently need...    Turning from your back to your side while in flat bed without using bedrails?  3  -EJ     Moving from lying on back to sitting on the side of a flat bed without bedrails?  3  -EJ     Moving to and from a bed to a chair (including a wheelchair)?  3  -EJ     Standing up from a chair using your arms (e.g., wheelchair, bedside chair)?  2  -EJ     Climbing 3-5 steps with a railing?  1  -EJ     To walk in hospital room?  3  -EJ     AM-PAC 6 Clicks Score (PT)  15  -EJ     Row Name 06/14/20 1519          Functional Assessment    Outcome Measure Options  AM-PAC 6 Clicks Basic Mobility (PT)  -EJ       User Key  (r) = Recorded By, (t) = Taken By, (c) = Cosigned By    Initials Name Provider Type    Elsy Gatica PT Physical Therapist        Physical Therapy Education                 Title: PT OT SLP Therapies (Done)     Topic: Physical Therapy (Done)     Point:  Mobility training (Done)     Description:   Instruct learner(s) on safety and technique for assisting patient out of bed, chair or wheelchair.  Instruct in the proper use of assistive devices, such as walker, crutches, cane or brace.              Patient Friendly Description:   It's important to get you on your feet again, but we need to do so in a way that is safe for you. Falling has serious consequences, and your personal safety is the most important thing of all.        When it's time to get out of bed, one of us or a family member will sit next to you on the bed to give you support.     If your doctor or nurse tells you to use a walker, crutches, a cane, or a brace, be sure you use it every time you get out of bed, even if you think you don't need it.    Learning Progress Summary           Patient Acceptance, E, VU,NR by MAURICIO at 6/14/2020 1519    Acceptance, E, VU,NR by MAURICIO at 6/10/2020 1551    Acceptance, E, NR by MAURICIO at 6/8/2020 1431    Comment:  Limited education based on pt's tolerance of PT during session.    Acceptance, E,TB, VU,NR by MAURICIO at 6/5/2020 1342    Comment:  Pt advised in log roll to avoid unnecessary pain.                   Point: Home exercise program (Done)     Description:   Instruct learner(s) on appropriate technique for monitoring, assisting and/or progressing patient with therapeutic exercises and activities.              Learning Progress Summary           Patient Acceptance, E, VU,NR by MAURICIO at 6/14/2020 1519    Acceptance, E, VU,NR by MAURICIO at 6/10/2020 1551    Acceptance, E, NR by EJ at 6/8/2020 1431    Comment:  Limited education based on pt's tolerance of PT during session.                   Point: Body mechanics (Done)     Description:   Instruct learner(s) on proper positioning and spine alignment for patient and/or caregiver during mobility tasks and/or exercises.              Learning Progress Summary           Patient Acceptance, E, VU,NR by MAURICIO at 6/14/2020 1519    Acceptance, E, VU,NR  by MAURICIO at 6/10/2020 1551    Acceptance, E, NR by MAURICIO at 6/8/2020 1431    Comment:  Limited education based on pt's tolerance of PT during session.    Acceptance, E,TB, VU,NR by MAURICIO at 6/5/2020 1342    Comment:  Pt advised in log roll to avoid unnecessary pain.                   Point: Precautions (Done)     Description:   Instruct learner(s) on prescribed precautions during mobility and gait tasks              Learning Progress Summary           Patient Acceptance, E, VU,NR by MAURICIO at 6/14/2020 1519    Acceptance, E, VU,NR by MAURICIO at 6/10/2020 1551    Acceptance, E, NR by EJ at 6/8/2020 1431    Comment:  Limited education based on pt's tolerance of PT during session.    Acceptance, E,TB, VU,NR by MAURICIO at 6/5/2020 1342    Comment:  Pt advised in log roll to avoid unnecessary pain.                               User Key     Initials Effective Dates Name Provider Type Discipline     11/20/18 -  Elsy Hinojosa, PT Physical Therapist PT              PT Recommendation and Plan  Planned Therapy Interventions (PT Eval): balance training, bed mobility training, gait training, home exercise program, joint mobilization, lumbar stabilization, manual therapy techniques, motor coordination training, neuromuscular re-education, patient/family education, postural re-education, ROM (range of motion), stair training, strengthening, stretching, orthotic fitting/training, transfer training  Outcome Summary/Treatment Plan (PT)  Anticipated Discharge Disposition (PT): inpatient rehabilitation facility  Plan of Care Reviewed With: patient  Progress: no change  Outcome Summary: Pt has less c/o pain during session. With HOB elevated pt able to performe sup>sit with supervision, and sit>sup with bed flat with supervision. Pt Stands with MIN Ax2, Takes steps toward HOB with CGAx2.  PT to attempt increasing frequency to daily.      Time Calculation:   PT Charges     Row Name 06/14/20 1519             Time Calculation    Start Time  1430  -       PT Received On  06/14/20  -EJ      PT Goal Re-Cert Due Date  06/24/20  -EJ         Time Calculation- PT    Total Timed Code Minutes- PT  35 minute(s)  -EJ         Timed Charges    73199 - PT Therapeutic Activity Minutes  35  -EJ        User Key  (r) = Recorded By, (t) = Taken By, (c) = Cosigned By    Initials Name Provider Type     Elsy Hinojosa, PT Physical Therapist        Therapy Charges for Today     Code Description Service Date Service Provider Modifiers Qty    [id number] HC PT THERAPEUTIC ACT EA 15 MIN 6/14/2020 Elsy Hinojosa PT GP 2          PT G-Codes  Outcome Measure Options: AM-PAC 6 Clicks Basic Mobility (PT)  AM-PAC 6 Clicks Score (PT): 15  AM-PAC 6 Clicks Score (OT): 15    Elsy Mario PT  6/14/2020

## 2020-06-14 NOTE — PROGRESS NOTES
"General Surgery Daily Progress Note    Subjective:  Denies nausea, vomiting, or worsening abdominal pain.  \"Hungry\".    Objective:  /88 (BP Location: Right arm, Patient Position: Lying)   Pulse 104   Temp 99.2 °F (37.3 °C) (Axillary)   Resp 18   Ht 165.1 cm (65\")   Wt 106 kg (233 lb)   LMP 04/01/2020 (Approximate)   SpO2 98%   BMI 38.77 kg/m²       General Appearance: Mild distress  Eyes: Anicteric  Neck: Trachea midline   Cardiovascular:  RRR without murmur nor rub  Lungs:  Bilateral respirations unlabored   Abdomen:  Soft, non-tender, no masses, no organomegaly  Extremities:  No cyanosis or edema   Skin:  No obvious rashes   Neurologic: awake and conversant       Imaging Results (Last 24 Hours)     Procedure Component Value Units Date/Time    XR Chest 1 View [[id number]] Collected:  06/13/20 1030     Updated:  06/13/20 1538    Narrative:          EXAMINATION: XR CHEST 1 VW - 06/13/2020      INDICATION: Pneumonia. Z74.09-Other reduced mobility; Z78.9-Other  specified health status.      COMPARISON: 06/08/2020     FINDINGS: Portable chest reveals patchy ill-defined opacification left  lung base with small left pleural effusion. The heart is enlarged.  Increased markings seen within the right upper lobe as well as  prominence of the right hilar region. Degenerative changes seen within  the spine with nasogastric tube tip in the stomach.           Impression:       Ill-defined opacification left lung base. Mild increased  markings seen within the right upper lobe and right midlung.     DICTATED:   06/13/2020  EDITED/ls :   06/13/2020      This report was finalized on 6/13/2020 3:35 PM by Dr. Jasmine Marie MD.             CBC:  Results from last 7 days   Lab Units 06/11/20  0409   WBC 10*3/mm3 10.24   HEMOGLOBIN g/dL 9.3*   HEMATOCRIT % 30.8*   PLATELETS 10*3/mm3 276       CMP:  Results from last 7 days   Lab Units 06/11/20  0409   SODIUM mmol/L 135*   POTASSIUM mmol/L 3.9   CHLORIDE mmol/L 102   CO2 " mmol/L 20.0*   BUN mg/dL 14   CREATININE mg/dL 0.66   CALCIUM mg/dL 7.8*   BILIRUBIN mg/dL 0.8   ALK PHOS U/L 123*   ALT (SGPT) U/L 7   AST (SGOT) U/L 12   GLUCOSE mg/dL 130*         Assessment:  Ileus - resolving, passing stool and flatus  Sepsis  MRSA bacteremia, tricuspid valve endocarditis  Tricuspid regurgitation  Epidural abscesses  Paraspinous abscess  Pneumonia superimposed on history of chronic obstructive pulmonary disease  IV drug abuse    Plan:   Flat and upright films pending.  Discussing the patient with the nurse she pulled her NG tube out yesterday and had significant nausea and vomiting.  I will obtain a small bowel follow-through tomorrow morning.  Awaiting [facility] for angio VAC.    Pedro Lundberg MD - 6/14/2020, 11:29

## 2020-06-14 NOTE — PROGRESS NOTES
CTS Progress Note         LOS: 13 days   Patient Care Team:  Ollie Flores MD as PCP - General (Internal Medicine)    CC: Diarrhea    Subjective  Patient states that she feels well this morning denies any complaints of nausea however did have some diarrhea this morning.  Patient denies any chest pain      Objective    Vital Signs  Temp:  [98.5 °F (36.9 °C)-99.2 °F (37.3 °C)] 99.2 °F (37.3 °C)  Heart Rate:  [] 106  Resp:  [16-18] 18  BP: (136-147)/(76-92) 137/88    Physical Exam:   General Appearance: Well-developed, well-nourished no acute distress   Lungs: Clear to auscultation bilaterally no wheezes, rales or rhonchi   Heart: Regular rate and rhythm no murmurs, rubs or gallops   Skin: No clubbing cyanosis no lesions or rash appreciated     Results     Results from last 7 days   Lab Units 06/11/20  0409   WBC 10*3/mm3 10.24   HEMOGLOBIN g/dL 9.3*   HEMATOCRIT % 30.8*   PLATELETS 10*3/mm3 276     Results from last 7 days   Lab Units 06/11/20  0409   SODIUM mmol/L 135*   POTASSIUM mmol/L 3.9   CHLORIDE mmol/L 102   CO2 mmol/L 20.0*   BUN mg/dL 14   CREATININE mg/dL 0.66   GLUCOSE mg/dL 130*   CALCIUM mg/dL 7.8*           Imaging Results (Last 24 Hours)     Procedure Component Value Units Date/Time    XR Chest 1 View [[id number]] Collected:  06/13/20 1030     Updated:  06/13/20 1538    Narrative:          EXAMINATION: XR CHEST 1 VW - 06/13/2020      INDICATION: Pneumonia. Z74.09-Other reduced mobility; Z78.9-Other  specified health status.      COMPARISON: 06/08/2020     FINDINGS: Portable chest reveals patchy ill-defined opacification left  lung base with small left pleural effusion. The heart is enlarged.  Increased markings seen within the right upper lobe as well as  prominence of the right hilar region. Degenerative changes seen within  the spine with nasogastric tube tip in the stomach.           Impression:       Ill-defined opacification left lung base. Mild increased  markings seen within the  right upper lobe and right midlung.     DICTATED:   06/13/2020  EDITED/ls :   06/13/2020      This report was finalized on 6/13/2020 3:35 PM by Dr. Jasmine Marie MD.             Assessment      Sepsis (CMS/HCC)    Essential hypertension    Fibromyalgia syndrome    GERD without esophagitis    DDD (degenerative disc disease), cervical    Anxiety    Depression    Seizure (CMS/HCC)    Migraine    Infective endocarditis    Epidural abscess    MRSA bacteremia  1200 cc from the NG tube.  Seeing patient for MRSA tricuspid valve endocarditis    Plan   Patient to be transferred to the [facility] the first of this week once a bed is available for catheter-based extraction the tricuspid valve vegetation    JENELLE Diana  06/14/20  08:15

## 2020-06-14 NOTE — PLAN OF CARE
Pt calls out freq for ice chips, pain meds, explained NPO status,and pain meds with illeus, NSR on telemetry, room air, await bed at , NGT in place large output , c/o nausea, freq incontinent, refuses to try to make it to the [facility], or sit in the chair.

## 2020-06-15 ENCOUNTER — APPOINTMENT (OUTPATIENT)
Dept: GENERAL RADIOLOGY | Facility: HOSPITAL | Age: 40
End: 2020-06-15

## 2020-06-15 VITALS
OXYGEN SATURATION: 98 % | BODY MASS INDEX: 38.82 KG/M2 | DIASTOLIC BLOOD PRESSURE: 93 MMHG | HEIGHT: 65 IN | SYSTOLIC BLOOD PRESSURE: 158 MMHG | TEMPERATURE: 99.1 F | WEIGHT: 233 LBS | RESPIRATION RATE: 18 BRPM | HEART RATE: 110 BPM

## 2020-06-15 LAB
ALBUMIN SERPL-MCNC: 1.8 G/DL (ref 3.5–5.2)
ALBUMIN/GLOB SERPL: 0.6 G/DL
ALP SERPL-CCNC: 122 U/L (ref 39–117)
ALT SERPL W P-5'-P-CCNC: 6 U/L (ref 1–33)
ANION GAP SERPL CALCULATED.3IONS-SCNC: 10 MMOL/L (ref 5–15)
AST SERPL-CCNC: 12 U/L (ref 1–32)
BILIRUB SERPL-MCNC: 0.4 MG/DL (ref 0.2–1.2)
BUN BLD-MCNC: 9 MG/DL (ref 6–20)
BUN/CREAT SERPL: 20 (ref 7–25)
CALCIUM SPEC-SCNC: 7.3 MG/DL (ref 8.6–10.5)
CHLORIDE SERPL-SCNC: 111 MMOL/L (ref 98–107)
CK SERPL-CCNC: 50 U/L (ref 20–180)
CO2 SERPL-SCNC: 15 MMOL/L (ref 22–29)
CREAT BLD-MCNC: 0.45 MG/DL (ref 0.57–1)
DEPRECATED RDW RBC AUTO: 50.5 FL (ref 37–54)
ERYTHROCYTE [DISTWIDTH] IN BLOOD BY AUTOMATED COUNT: 18.6 % (ref 12.3–15.4)
GFR SERPL CREATININE-BSD FRML MDRD: >150 ML/MIN/1.73
GLOBULIN UR ELPH-MCNC: 3.1 GM/DL
GLUCOSE BLD-MCNC: 87 MG/DL (ref 65–99)
HCT VFR BLD AUTO: 30.8 % (ref 34–46.6)
HGB BLD-MCNC: 9.2 G/DL (ref 12–15.9)
MCH RBC QN AUTO: 22.7 PG (ref 26.6–33)
MCHC RBC AUTO-ENTMCNC: 29.9 G/DL (ref 31.5–35.7)
MCV RBC AUTO: 76 FL (ref 79–97)
PLATELET # BLD AUTO: 274 10*3/MM3 (ref 140–450)
PMV BLD AUTO: 8.9 FL (ref 6–12)
POTASSIUM BLD-SCNC: 3 MMOL/L (ref 3.5–5.2)
PROT SERPL-MCNC: 4.9 G/DL (ref 6–8.5)
RBC # BLD AUTO: 4.05 10*6/MM3 (ref 3.77–5.28)
SODIUM BLD-SCNC: 136 MMOL/L (ref 136–145)
WBC NRBC COR # BLD: 9.51 10*3/MM3 (ref 3.4–10.8)

## 2020-06-15 PROCEDURE — 25010000002 VANCOMYCIN 10 G RECONSTITUTED SOLUTION

## 2020-06-15 PROCEDURE — 25010000002 MORPHINE PER 10 MG: Performed by: NURSE PRACTITIONER

## 2020-06-15 PROCEDURE — 25010000002 MORPHINE PER 10 MG: Performed by: HOSPITALIST

## 2020-06-15 PROCEDURE — 99239 HOSP IP/OBS DSCHRG MGMT >30: CPT | Performed by: NURSE PRACTITIONER

## 2020-06-15 PROCEDURE — 85027 COMPLETE CBC AUTOMATED: CPT | Performed by: NURSE PRACTITIONER

## 2020-06-15 PROCEDURE — 80053 COMPREHEN METABOLIC PANEL: CPT | Performed by: NURSE PRACTITIONER

## 2020-06-15 PROCEDURE — 25010000002 HYDROMORPHONE PER 4 MG: Performed by: HOSPITALIST

## 2020-06-15 PROCEDURE — 0 DIATRIZOATE MEGLUMINE & SODIUM PER 1 ML: Performed by: HOSPITALIST

## 2020-06-15 PROCEDURE — 25010000003 POTASSIUM CHLORIDE 10 MEQ/100ML SOLUTION: Performed by: INTERNAL MEDICINE

## 2020-06-15 PROCEDURE — 97530 THERAPEUTIC ACTIVITIES: CPT

## 2020-06-15 PROCEDURE — 97110 THERAPEUTIC EXERCISES: CPT

## 2020-06-15 PROCEDURE — 25010000002 LEVETRIRACETAM PER 10 MG: Performed by: PSYCHIATRY & NEUROLOGY

## 2020-06-15 PROCEDURE — 94799 UNLISTED PULMONARY SVC/PX: CPT

## 2020-06-15 PROCEDURE — 25810000003 SODIUM CHLORIDE 0.9 % WITH KCL 20 MEQ 20-0.9 MEQ/L-% SOLUTION: Performed by: NURSE PRACTITIONER

## 2020-06-15 PROCEDURE — 82550 ASSAY OF CK (CPK): CPT | Performed by: INTERNAL MEDICINE

## 2020-06-15 PROCEDURE — 74250 X-RAY XM SM INT 1CNTRST STD: CPT

## 2020-06-15 RX ORDER — MORPHINE SULFATE 2 MG/ML
2 INJECTION, SOLUTION INTRAMUSCULAR; INTRAVENOUS EVERY 6 HOURS SCHEDULED
Qty: 39 ML | Refills: 0
Start: 2020-06-15 | End: 2020-06-25

## 2020-06-15 RX ORDER — PANTOPRAZOLE SODIUM 40 MG/10ML
40 INJECTION, POWDER, LYOPHILIZED, FOR SOLUTION INTRAVENOUS
Status: DISCONTINUED | OUTPATIENT
Start: 2020-06-15 | End: 2020-06-16 | Stop reason: HOSPADM

## 2020-06-15 RX ORDER — MORPHINE SULFATE 2 MG/ML
2 INJECTION, SOLUTION INTRAMUSCULAR; INTRAVENOUS EVERY 6 HOURS SCHEDULED
Status: DISCONTINUED | OUTPATIENT
Start: 2020-06-15 | End: 2020-06-16 | Stop reason: HOSPADM

## 2020-06-15 RX ORDER — POTASSIUM CHLORIDE 7.45 MG/ML
10 INJECTION INTRAVENOUS
Start: 2020-06-15

## 2020-06-15 RX ORDER — LEVOTHYROXINE SODIUM ANHYDROUS 100 UG/5ML
25 INJECTION, POWDER, LYOPHILIZED, FOR SOLUTION INTRAVENOUS
Qty: 30 EACH
Start: 2020-06-16

## 2020-06-15 RX ORDER — VANCOMYCIN HYDROCHLORIDE 1 G/200ML
10 INJECTION, SOLUTION INTRAVENOUS EVERY 8 HOURS
Qty: 8400 ML | Refills: 0 | Status: SHIPPED | OUTPATIENT
Start: 2020-06-15 | End: 2020-06-29

## 2020-06-15 RX ORDER — PANTOPRAZOLE SODIUM 40 MG/10ML
40 INJECTION, POWDER, LYOPHILIZED, FOR SOLUTION INTRAVENOUS
Start: 2020-06-15

## 2020-06-15 RX ORDER — ONDANSETRON 2 MG/ML
4 INJECTION INTRAMUSCULAR; INTRAVENOUS EVERY 6 HOURS PRN
Start: 2020-06-15

## 2020-06-15 RX ORDER — PROCHLORPERAZINE EDISYLATE 5 MG/ML
5 INJECTION INTRAMUSCULAR; INTRAVENOUS EVERY 6 HOURS PRN
Start: 2020-06-15

## 2020-06-15 RX ORDER — SODIUM CHLORIDE AND POTASSIUM CHLORIDE 150; 900 MG/100ML; MG/100ML
100 INJECTION, SOLUTION INTRAVENOUS CONTINUOUS
Start: 2020-06-15

## 2020-06-15 RX ORDER — CYCLOBENZAPRINE HCL 5 MG
5 TABLET ORAL 3 TIMES DAILY PRN
Start: 2020-06-15

## 2020-06-15 RX ORDER — L.ACID,PARA/B.BIFIDUM/S.THERM 8B CELL
1 CAPSULE ORAL DAILY
Start: 2020-06-16

## 2020-06-15 RX ORDER — LEVOTHYROXINE SODIUM ANHYDROUS 100 UG/5ML
25 INJECTION, POWDER, LYOPHILIZED, FOR SOLUTION INTRAVENOUS
Status: DISCONTINUED | OUTPATIENT
Start: 2020-06-16 | End: 2020-06-16 | Stop reason: HOSPADM

## 2020-06-15 RX ORDER — VANCOMYCIN HYDROCHLORIDE 1 G/200ML
10 INJECTION, SOLUTION INTRAVENOUS EVERY 8 HOURS
Status: DISCONTINUED | OUTPATIENT
Start: 2020-06-15 | End: 2020-06-16 | Stop reason: HOSPADM

## 2020-06-15 RX ORDER — SODIUM CHLORIDE AND POTASSIUM CHLORIDE 150; 900 MG/100ML; MG/100ML
100 INJECTION, SOLUTION INTRAVENOUS CONTINUOUS
Status: ACTIVE | OUTPATIENT
Start: 2020-06-15 | End: 2020-06-15

## 2020-06-15 RX ORDER — HYDROMORPHONE HYDROCHLORIDE 1 MG/ML
0.25 INJECTION, SOLUTION INTRAMUSCULAR; INTRAVENOUS; SUBCUTANEOUS EVERY 4 HOURS PRN
Start: 2020-06-15 | End: 2020-06-21

## 2020-06-15 RX ORDER — NYSTATIN 100000 [USP'U]/G
POWDER TOPICAL EVERY 12 HOURS SCHEDULED
Start: 2020-06-15

## 2020-06-15 RX ADMIN — AZTREONAM 2 G: 2 INJECTION, POWDER, LYOPHILIZED, FOR SOLUTION INTRAMUSCULAR; INTRAVENOUS at 00:57

## 2020-06-15 RX ADMIN — HYDROMORPHONE HYDROCHLORIDE 0.25 MG: 1 INJECTION, SOLUTION INTRAMUSCULAR; INTRAVENOUS; SUBCUTANEOUS at 12:53

## 2020-06-15 RX ADMIN — HYDROCODONE BITARTRATE AND ACETAMINOPHEN 1 TABLET: 5; 325 TABLET ORAL at 03:06

## 2020-06-15 RX ADMIN — METRONIDAZOLE 500 MG: 500 INJECTION, SOLUTION INTRAVENOUS at 16:20

## 2020-06-15 RX ADMIN — AZTREONAM 2 G: 2 INJECTION, POWDER, LYOPHILIZED, FOR SOLUTION INTRAMUSCULAR; INTRAVENOUS at 18:38

## 2020-06-15 RX ADMIN — METRONIDAZOLE 500 MG: 500 INJECTION, SOLUTION INTRAVENOUS at 05:06

## 2020-06-15 RX ADMIN — POTASSIUM CHLORIDE 10 MEQ: 7.46 INJECTION, SOLUTION INTRAVENOUS at 10:42

## 2020-06-15 RX ADMIN — HYDROCORTISONE: 25 CREAM TOPICAL at 09:17

## 2020-06-15 RX ADMIN — AZTREONAM 2 G: 2 INJECTION, POWDER, LYOPHILIZED, FOR SOLUTION INTRAMUSCULAR; INTRAVENOUS at 09:15

## 2020-06-15 RX ADMIN — HYDROMORPHONE HYDROCHLORIDE 0.25 MG: 1 INJECTION, SOLUTION INTRAMUSCULAR; INTRAVENOUS; SUBCUTANEOUS at 09:14

## 2020-06-15 RX ADMIN — SODIUM CHLORIDE 125 ML/HR: 9 INJECTION, SOLUTION INTRAVENOUS at 05:45

## 2020-06-15 RX ADMIN — MELATONIN TAB 5 MG 5 MG: 5 TAB at 22:06

## 2020-06-15 RX ADMIN — FLUOXETINE HYDROCHLORIDE 40 MG: 20 CAPSULE ORAL at 09:14

## 2020-06-15 RX ADMIN — POTASSIUM CHLORIDE 10 MEQ: 7.46 INJECTION, SOLUTION INTRAVENOUS at 14:02

## 2020-06-15 RX ADMIN — GABAPENTIN 800 MG: 400 CAPSULE ORAL at 05:06

## 2020-06-15 RX ADMIN — NYSTATIN: 100000 POWDER TOPICAL at 09:17

## 2020-06-15 RX ADMIN — HYDROMORPHONE HYDROCHLORIDE 0.25 MG: 1 INJECTION, SOLUTION INTRAMUSCULAR; INTRAVENOUS; SUBCUTANEOUS at 18:38

## 2020-06-15 RX ADMIN — HYDROMORPHONE HYDROCHLORIDE 0.25 MG: 1 INJECTION, SOLUTION INTRAMUSCULAR; INTRAVENOUS; SUBCUTANEOUS at 05:06

## 2020-06-15 RX ADMIN — POTASSIUM CHLORIDE 10 MEQ: 7.46 INJECTION, SOLUTION INTRAVENOUS at 19:52

## 2020-06-15 RX ADMIN — BUDESONIDE AND FORMOTEROL FUMARATE DIHYDRATE 2 PUFF: 160; 4.5 AEROSOL RESPIRATORY (INHALATION) at 21:10

## 2020-06-15 RX ADMIN — GABAPENTIN 800 MG: 400 CAPSULE ORAL at 22:06

## 2020-06-15 RX ADMIN — MORPHINE SULFATE 2 MG: 2 INJECTION, SOLUTION INTRAMUSCULAR; INTRAVENOUS at 19:52

## 2020-06-15 RX ADMIN — PANTOPRAZOLE SODIUM 40 MG: 40 INJECTION, POWDER, FOR SOLUTION INTRAVENOUS at 09:15

## 2020-06-15 RX ADMIN — Medication 1 CAPSULE: at 09:14

## 2020-06-15 RX ADMIN — DIATRIZOATE MEGLUMINE AND DIATRIZOATE SODIUM 240 ML: 660; 100 LIQUID ORAL; RECTAL at 12:06

## 2020-06-15 RX ADMIN — HYDROMORPHONE HYDROCHLORIDE 0.25 MG: 1 INJECTION, SOLUTION INTRAMUSCULAR; INTRAVENOUS; SUBCUTANEOUS at 00:57

## 2020-06-15 RX ADMIN — HYDROXYZINE HYDROCHLORIDE 25 MG: 25 TABLET, FILM COATED ORAL at 22:06

## 2020-06-15 RX ADMIN — MORPHINE SULFATE 2 MG: 2 INJECTION, SOLUTION INTRAMUSCULAR; INTRAVENOUS at 22:07

## 2020-06-15 RX ADMIN — POTASSIUM CHLORIDE AND SODIUM CHLORIDE 100 ML/HR: 900; 150 INJECTION, SOLUTION INTRAVENOUS at 09:17

## 2020-06-15 RX ADMIN — LEVOTHYROXINE SODIUM 50 MCG: 50 TABLET ORAL at 05:06

## 2020-06-15 RX ADMIN — VANCOMYCIN HYDROCHLORIDE 2500 MG: 10 INJECTION, POWDER, LYOPHILIZED, FOR SOLUTION INTRAVENOUS at 14:02

## 2020-06-15 RX ADMIN — LEVETIRACETAM 750 MG: 100 INJECTION, SOLUTION INTRAVENOUS at 09:18

## 2020-06-15 RX ADMIN — ZOLPIDEM TARTRATE 5 MG: 5 TABLET ORAL at 22:06

## 2020-06-15 RX ADMIN — HYDROCODONE BITARTRATE AND ACETAMINOPHEN 1 TABLET: 5; 325 TABLET ORAL at 22:06

## 2020-06-15 RX ADMIN — CYCLOBENZAPRINE HYDROCHLORIDE 5 MG: 10 TABLET, FILM COATED ORAL at 03:06

## 2020-06-15 RX ADMIN — MORPHINE SULFATE 1 MG: 2 INJECTION, SOLUTION INTRAMUSCULAR; INTRAVENOUS at 16:23

## 2020-06-15 RX ADMIN — MORPHINE SULFATE 2 MG: 2 INJECTION, SOLUTION INTRAMUSCULAR; INTRAVENOUS at 12:53

## 2020-06-15 NOTE — PLAN OF CARE
Problem: Patient Care Overview  Goal: Plan of Care Review  Outcome: Ongoing (interventions implemented as appropriate)  Flowsheets (Taken 6/15/2020 4868)  Progress: no change  Plan of Care Reviewed With: patient  Outcome Summary: Patient deferred OOB activity due to fatigue. Patient performed rolling R/L with min.a for pericare and linen change from multiple bowel movements. Patient gave good effort with supine ther ex. Cont PT POC.

## 2020-06-15 NOTE — DISCHARGE SUMMARY
[facility] Medicine Services  DISCHARGE SUMMARY    Patient Name: Liliana Calderon  : 1980  MRN: [id number]    Date of Admission: 2020 12:16 PM  Date of Discharge:  6/15/2020  Primary Care Physician: Ollie Flores MD    Consults     Date and Time Order Name Status Description    2020 1139 Inpatient Neurology Consult General Completed     2020 1016 Inpatient General Surgery Consult Completed     2020 1658 Inpatient Cardiothoracic Surgery Consult Completed     6/3/2020 1212 Inpatient Neurosurgery Consult Completed     2020 1441 Inpatient Infectious Diseases Consult Completed           Hospital Course     Presenting Problem:   Sepsis (CMS/HCC) [A41.9]    Active Hospital Problems    Diagnosis  POA   • **Sepsis (CMS/HCC) [A41.9]  Yes   • Infective endocarditis [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          Hospital Course:  Liliana Calderon is a 39 y.o. female with past medical history of depression/anxiety, fibromyalgia, COPD, hypertension, seizure disorder, and remote IV drug use who was admitted on  for sepsis with MRSA bacteremia, UTI, pneumonia/ ? Pulmonary emboli, ? aspiration and epidural abscess.  ID has been following for antibiotic management. + MRSA and started on vancomycin with PK dosing per ID. Anticipate a prolonged course of IV antibiotics for severe MRSA infection. She will continue azactam and flagyl IV for a 7 day course with stop date 20 planned. Neurosurgery has followed and may consider  repeat MRI L spine in 1-2 weeks for reevaluation.    CT surgery following for infective endocarditis with 1cm x 1.2 cm vegetation on tricuspid valve. Plan is for transfer to  today for angiovac procedure. She continues to maintain mild tachycardia in low 100s.     6/11 she had intractable pain issues and ultimately started having projectile vomiting with vomitus that look like stool.  Abdominal x-ray  concerning for bowel obstruction and confirmed by CT. Surgery following and NG in place, but continues to have high volume output in NG with vomiting around NG. She also has been found noncompliant with NPO status and obtaining drinks/ popscicles from family/ ancillary staff. She underwent SBFT- see results below. + BMs. Continue NG and conservative treatment with slowly advancing diet. Most medications have been transitioned to IV due to this.     Today's progress note:    Projectile vomiting/ SBO  -CT of the abdomen pelvis showed SBO-  + BM, slow return. Continues to have high volume output and vomiting with NG.   -KUB yesterday show unresolved obstruction  -NG tube to LWS; NPO- discussed with patient and staff.   -General Surgery following. Today SBFT  -changing more meds to IV as able     Seizures  -Intermittent periods of responding to questioning  -EEG- no evidence of seizures  -increased Keppra with no further sz activity after increase  -Neurology has signed off     MRSA bacteremia  -She is being treated for UTI pneumonia tricuspid valve endocarditis and epidural abscesses  -REDD with tricuspid valve vegetation approximately 1 cm x 1.2 cm  -CT chest concerning for pneumonia- ? Aspiration. appears to be resolving   -Possibly has septic pulmonary emboli  -IV antibiotics per ID  -Plan to transfer to  due to need for angiovac which cannot be done here     Diarrhea  -Probiotic  -Negative for C. Difficile     Right shoulder pain  -supportive care  -X-ray of the right shoulder with no acute fracture or  dislocation     Rash  -Stable  -Unclear etiology  -Cortisone cream     Acute on chronic low back pain  -Suspected due to spinal/ epidural abscesses  -Pain medication as needed  -She had a CT-guided drainage of a right para spinal abscess on June 4 was positive for MRSA     Hypokalemia  - replace  - change IVFs to NS + 20K       Discharge Follow Up Recommendations for outpatient labs/diagnostics:  Plan transfer to [facility] today for AngioVac  Needs follow up for prolong/ slowly resolving ileus  [facility] to follow for bacteremia/ epidural abscesses    Day of Discharge     HPI:    Patient with high NG output continuing. Has been non compliant with NPO status. Discussed with patient again who is eating lemon ice brought in by sister when I arrived at bedside.  KUB discussed with patient- demonstrated no change in ileus vs SBO  Patient denies pain, nausea, some diarrhea    SBFT this afternoon demonstrates:Ileus.  Nausea and vomiting without the NG tube in place.  Small bowel follow-through demonstrated contrast through to the colon in 3-1/2 hours.  Bowel movements noted.  recommend NG/ Supportive care for now       Review of Systems  Gen- No fevers, chills  CV- No chest pain, palpitations  Resp- No cough, dyspnea  GI- + NG, + vomiting, No abd pain    Vital Signs:   Temp:  [98.5 °F (36.9 °C)-98.9 °F (37.2 °C)] 98.5 °F (36.9 °C)  Heart Rate:  [] 104  Resp:  [18] 18  BP: (128-138)/(79-84) 128/79     Physical Exam:  Constitutional: No acute distress, awake, alert- sitting up in bed eating lemon ice  HENT: NCAT, + NG tube in place to LWS- bilious output  Respiratory: Clear to auscultation bilaterally, respiratory effort normal   Cardiovascular: tachy but regular, s1 and s2, + murmur  Gastrointestinal: + bowel sounds, soft, obese abdomen, no guarding  Musculoskeletal: No bilateral ankle edema  Psychiatric: Appropriate affect, cooperative  Neurologic: Oriented x 3, strength symmetric in all extremities, speech clear  Skin: feet  petechiae, Right upper arm bruise, multiple ext marks    Pertinent  and/or Most Recent Results     Results from last 7 days   Lab Units 06/15/20  0443 06/11/20  0409 06/10/20  0530 06/09/20  1028   WBC 10*3/mm3 9.51 10.24 17.35* 16.06*   HEMOGLOBIN g/dL 9.2* 9.3* 10.3* 11.6*   HEMATOCRIT % 30.8* 30.8* 33.8* 38.9   PLATELETS 10*3/mm3 274 276 370 445   SODIUM mmol/L 136 135* 134* 135*   POTASSIUM mmol/L 3.0* 3.9 3.4* 3.7   CHLORIDE mmol/L 111* 102 102 104   CO2 mmol/L 15.0* 20.0* 18.0* 19.0*   BUN mg/dL 9 14 14 11   CREATININE mg/dL 0.45* 0.66 0.64 0.55*   GLUCOSE mg/dL 87 130* 151* 148*   CALCIUM mg/dL 7.3* 7.8* 7.5* 7.6*     Results from last 7 days   Lab Units 06/15/20  0443 06/11/20  0409 06/10/20  0530   BILIRUBIN mg/dL 0.4 0.8 0.7   ALK PHOS U/L 122* 123* 135*   ALT (SGPT) U/L 6 7 7   AST (SGOT) U/L 12 12 15           Invalid input(s): TG, LDLCALC, LDLREALC  Results from last 7 days   Lab Units 06/12/20  0355 06/11/20  0409   TROPONIN T ng/mL  --  <0.010   LACTATE mmol/L 1.6  --        Brief Urine Lab Results  (Last result in the past 365 days)      Color   Clarity   Blood   Leuk Est   Nitrite   Protein   CREAT   Urine HCG        06/01/20 1652               Negative           Microbiology Results Abnormal     Procedure Component Value - Date/Time    Blood Culture - Blood, Arm, Right [[id number]] Collected:  06/11/20 1545    Lab Status:  Preliminary result Specimen:  Blood from Arm, Right Updated:  06/14/20 1615     Blood Culture No growth at 3 days    Stool Culture (Reference Lab) - Stool, Per Rectum [[id number]] Collected:  06/08/20 0351    Lab Status:  Edited Result - FINAL Specimen:  Stool from Per Rectum Updated:  06/12/20 1410     Salmonella/Shigella Screen Final report     Result 1 Comment     Comment: No Salmonella or Shigella recovered.        Campylobacter Culture Final report     Result 1 Comment     Comment: No Campylobacter species isolated.        E coli, Shiga toxin Assay Negative    Narrative:        Performed at:  [address] [place] [address]  [id number]  : Lam Roman PhD, Phone:  [phone]    Anaerobic Culture - Aspirate, Back [[id number]] Collected:  06/04/20 1643    Lab Status:  Final result Specimen:  Aspirate from Back Updated:  06/09/20 0741     Anaerobic Culture No anaerobes isolated at 5 days    Blood Culture - Blood, Arm, Right [[id number]] Collected:  06/03/20 1229    Lab Status:  Final result Specimen:  Blood from Arm, Right Updated:  06/08/20 1401     Blood Culture No growth at 5 days    Blood Culture - Blood, Arm, Left [[id number]] Collected:  06/03/20 1229    Lab Status:  Final result Specimen:  Blood from Arm, Left Updated:  06/08/20 1401     Blood Culture No growth at 5 days    Body Fluid Culture - Aspirate, Back [[id number]]  (Abnormal)  (Susceptibility) Collected:  06/04/20 1645    Lab Status:  Final result Specimen:  Aspirate from Back Updated:  06/07/20 0701     Body Fluid Culture Light growth (2+) Staphylococcus aureus, MRSA     Comment:   Methicillin resistant Staphylococcus aureus, Patient may be an isolation risk.        Gram Stain No organisms seen      Many (4+) WBCs per low power field    Susceptibility      Staphylococcus aureus, MRSA     TYRONE     Gentamicin Susceptible     Oxacillin Resistant     Rifampin Susceptible     Vancomycin Susceptible                Susceptibility Comments     Staphylococcus aureus, MRSA    This isolate does not demonstrate inducible clindamycin resistance in vitro.               Clostridium Difficile Toxin - Stool, Per Rectum [[id number]] Collected:  06/05/20 1748    Lab Status:  Final result Specimen:  Stool from Per Rectum Updated:  06/05/20 3284    Narrative:       The following orders were created for panel order Clostridium Difficile Toxin - Stool, Per Rectum.  Procedure                               Abnormality         Status                     ---------                               -----------         ------                      Clostridium Difficile To...[[id number]]  Normal              Final result                 Please view results for these tests on the individual orders.    Clostridium Difficile Toxin, PCR - Stool, Per Rectum [[id number]]  (Normal) Collected:  06/05/20 1748    Lab Status:  Final result Specimen:  Stool from Per Rectum Updated:  06/05/20 1858     C. Difficile Toxins by PCR Not Detected    Narrative:       Performance characteristics of test not established for patients <2 years of age.  Negative for Toxigenic C. Difficile    Blood Culture - Blood, Wrist, Left [[id number]]  (Abnormal) Collected:  06/01/20 1601    Lab Status:  Final result Specimen:  Blood from Wrist, Left Updated:  06/04/20 0612     Blood Culture Staphylococcus aureus, MRSA     Comment:   Infectious disease consultation is highly recommended to rule out distant foci of infection.  Methicillin resistant Staphylococcus aureus, Patient may be an isolation risk.        Isolated from Aerobic Bottle     Gram Stain Aerobic Bottle Gram positive cocci in clusters    Narrative:       Refer to previous blood culture collected on 6/1/2020 1558 for MICs.      Blood Culture - Blood, Arm, Left [[id number]]  (Abnormal)  (Susceptibility) Collected:  06/01/20 1558    Lab Status:  Final result Specimen:  Blood from Arm, Left Updated:  06/04/20 0610     Blood Culture Staphylococcus aureus, MRSA     Comment:   Infectious disease consultation is highly recommended to rule out distant foci of infection.  Methicillin resistant Staphylococcus aureus, Patient may be an isolation risk.        Isolated from Aerobic and Anaerobic Bottles     Gram Stain Aerobic Bottle Gram positive cocci in clusters      Anaerobic Bottle Gram positive cocci in clusters    Susceptibility      Staphylococcus aureus, MRSA     TYRONE     Gentamicin Susceptible     Oxacillin Resistant     Rifampin Susceptible     Vancomycin Susceptible                Susceptibility Comments     Staphylococcus  aureus, MRSA    This isolate does not demonstrate inducible clindamycin resistance in vitro.               Blood Culture ID, PCR - Blood, Arm, Left [[id number]]  (Abnormal) Collected:  06/01/20 1558    Lab Status:  Final result Specimen:  Blood from Arm, Left Updated:  06/02/20 0721     BCID, PCR Staphylococcus aureus. mecA (methicillin resistance gene) detected. Identification by BCID PCR.     BOTTLE TYPE Aerobic Bottle          Imaging Results (All)     Procedure Component Value Units Date/Time    XR Abdomen Flat & Upright [[id number]] Collected:  06/14/20 1313     Updated:  06/15/20 1540    Narrative:          EXAMINATION: XR ABDOMEN FLAT AND UPRIGHT - 06/14/2020     INDICATION: Ileus.  Z74.09-Other reduced mobility; Z78.9-Other specified  health status.      COMPARISON: 06/11/2020     FINDINGS: Flat and upright views of the abdomen reveal nasogastric tube  in the stomach with mild gastric distention. Dilated loops of small  bowel seen diffusely throughout the abdomen, unchanged in the interval  suggesting either ileus or obstruction. Degenerative changes seen within  the spine. Surgical clips in the right upper quadrant. No evidence of  obvious free intraperitoneal air.           Impression:       Nasogastric tube in the stomach with mild gastric  distention. Dilated loops of small bowel again seen diffusely throughout  the abdomen unchanged with findings suggesting ileus or small bowel  obstruction.     DICTATED:   06/14/2020  EDITED/ls :   06/14/2020      This report was finalized on 6/15/2020 3:37 PM by Dr. Jasmine Marie MD.       FL Small Bowel Follow Through Single-Contrast [[id number]] Collected:  06/15/20 1531     Updated:  06/15/20 1531    Narrative:       EXAMINATION: FL SMALL BOWEL FOLLOW THROUGH SINGLE-CONTRAST-     INDICATION: ileus vs. SBO; Z74.09-Other reduced mobility; Z78.9-Other  specified health status     TECHNIQUE: 6 associated images were saved.  imaging reveals  multiple loops of  mildly dilated small bowel. Air is seen within the  colon on  imaging. A single contrast study using water-soluble  contrast was performed. Images of the small bowel were obtained at 15  minutes, 45 minutes, 1 hour and 45 minutes, 2 hours 45 minutes, and 3  hours and 30 minutes.     COMPARISON: NONE     FINDINGS: There is mild gross dilatation of the proximal, mid, and  distal small bowel persistent on all images. There was a dilated small  bowel measure approximately 6 cm at its widest diameter. Contrast is  seen within the colon at 3 hours. Mild gross dilatation of the proximal,  mid, and distal small bowel with contrast seen within the colon at 3 1/2  hours may represent a mild ileus, or possibly a partial distal small  bowel obstruction. There was no evidence of complete small bowel  obstruction.          Impression:       Mild gross dilatation of the proximal, mid, and distal small  bowel with contrast seen within the colon at 3 1/2 hours may represent a  mild ileus, or a partial distal small bowel obstruction. There was no  evidence of complete small bowel obstruction.           XR Chest 1 View [[id number]] Collected:  06/13/20 1030     Updated:  06/13/20 1538    Narrative:          EXAMINATION: XR CHEST 1 VW - 06/13/2020      INDICATION: Pneumonia. Z74.09-Other reduced mobility; Z78.9-Other  specified health status.      COMPARISON: 06/08/2020     FINDINGS: Portable chest reveals patchy ill-defined opacification left  lung base with small left pleural effusion. The heart is enlarged.  Increased markings seen within the right upper lobe as well as  prominence of the right hilar region. Degenerative changes seen within  the spine with nasogastric tube tip in the stomach.           Impression:       Ill-defined opacification left lung base. Mild increased  markings seen within the right upper lobe and right midlung.     DICTATED:   06/13/2020  EDITED/ls :   06/13/2020      This report was finalized on  6/13/2020 3:35 PM by Dr. Jasmine Marie MD.       CT Head Without Contrast [[id number]] Collected:  06/11/20 1334     Updated:  06/12/20 1726    Narrative:       EXAMINATION: CT HEAD WO CONTRAST-06/11/2020:      INDICATION: Speech changes (or aphasia), new or progressive;  Z74.09-Other reduced mobility; Z78.9-Other specified health status.      TECHNIQUE: CT head without intravenous contrast.     The radiation dose reduction device was turned on for each scan per the  ALARA (As Low as Reasonably Achievable) protocol.     COMPARISON: NONE.     FINDINGS:  The midline structures are symmetric without evidence of  mass, mass effect or midline shift. Ventricles and sulci within normal  limits. No intra-axial hemorrhage or extra-axial fluid collection.  Globes and orbits are unremarkable. The visualized paranasal sinuses and  mastoid air cells are grossly clear and well pneumatized with the  exception of a moderate-to-large left mastoid effusion. Calvarium  intact.       Impression:       1. No acute intracranial abnormality specifically, no midline shift or  hydrocephalus. No intra-axial hemorrhage.  2. Moderate-to-large left mastoid effusion.     D:  06/11/2020  E:  06/11/2020           This report was finalized on 6/12/2020 5:23 PM by Dr. Sam Pugh.       CT Abdomen Pelvis With Contrast [[id number]] Collected:  06/11/20 1338     Updated:  06/12/20 1726    Narrative:       EXAMINATION: CT ABDOMEN AND PELVIS W CONTRAST-06/11/2020:      INDICATION: Projectile vomiting; Z74.09-Other reduced mobility;  Z78.9-Other specified health status.      TECHNIQUE: CT abdomen and pelvis with intravenous contrast.     The radiation dose reduction device was turned on for each scan per the  ALARA (As Low as Reasonably Achievable) protocol.     COMPARISON: CT dated 06/05/2020.     FINDINGS: Opacifications within the right middle lobe extending  anteriorly fairly confluent as seen on prior comparison of limited  evaluation with  bilateral small volume pleural effusions. Liver without  focal lesion, demonstrating perihepatic ascites. The spleen is  unremarkable. The gallbladder is surgically absent. The pancreas is  unremarkable. Adrenals without distinct nodule. Kidneys without  hydronephrosis or hydroureter. No bulky retroperitoneal adenopathy.  Patent nonaneurysmal abdominal aorta. Portal veins and IVC patent. GI  tract evaluation demonstrates severe distention of the gastric lumen  with air-fluid level along with severely dilated small bowel loops with  air-fluid levels extending into the right lower quadrant where there is  caliber change of likely transition point adjacent to the right iliac  vessels. There is fecalization throughout the small bowel additionally  noted, however, no distinct pneumatosis despite mesenteric edema and  surrounding free fluid. No evidence for perforation or abscess. The  large bowel is decompressed and unremarkable. The pelvic viscera  unremarkable without bulky pelvic adenopathy. Degenerative changes of  the spine with body wall edema.       Impression:       1.  Small bowel obstruction with severely dilated small bowel loops and  air-fluid levels extending into the right lower quadrant adjacent to the  right iliac vessels where there is caliber change concerning for an  apparent transition point at this level with evidence for ascites and  mesenteric edema, however, no evidence for perforation or abscess.     2.  Persistent small volume bilateral pleural effusions and  opacifications in the right midlung better seen on recent 06/05/2020  examination recently performed.     D:  06/11/2020  E:  06/11/2020     This report was finalized on 6/12/2020 5:23 PM by Dr. Sam Pugh.       XR Abdomen KUB [[id number]] Collected:  06/11/20 0955     Updated:  06/12/20 1724    Narrative:       EXAMINATION: XR ABDOMEN KUB- 06/11/2020     INDICATION: vomiting; Z74.09-Other reduced mobility; Z78.9-Other  specified health  status      COMPARISON: NONE     FINDINGS: Supine views of the abdomen reveal dilated gas-filled small  bowel measuring up to 6.4 cm in diameter concerning for obstructive  bowel gas pattern as there is paucity of air throughout the large bowel.  No gross intraperitoneal free air.           Impression:       Severely dilated gas-filled small bowel loops throughout the  mid abdomen consistent with obstructive bowel gas pattern.     D:  06/11/2020  E:  06/11/2020     This report was finalized on 6/12/2020 5:21 PM by Dr. Sam Pugh.       XR Shoulder 2+ View Right [[id number]] Collected:  06/08/20 0955     Updated:  06/08/20 1633    Narrative:          EXAMINATION: XR SHOULDER 2+ VW, RIGHT-06/08/2020:      INDICATION: Right shoulder pain; Z74.09-Other reduced mobility.      COMPARISON: NONE.     FINDINGS: Two views of the right shoulder reveal no evidence of fracture  or dislocation. The cortex is intact. Joint spaces are preserved. No  joint effusion. No acute bony abnormality.           Impression:       No acute bony abnormality.     D:  06/08/2020  E:  06/08/2020     This report was finalized on 6/8/2020 4:30 PM by Dr. Jasmine Marie MD.       CT Chest With Contrast [[id number]] Collected:  06/05/20 2044     Updated:  06/05/20 2046    Narrative:       CT Chest W    INDICATION:   Shortness of the air, history of COPD, hypertension. Hospital admission 6/1/2020 for sepsis with MRSA bacteremia, epidural abscesses and infective endocarditis of the tricuspid valve    TECHNIQUE:   CT of the thorax with IV contrast. Coronal and sagittal reconstructions were obtained.  Radiation dose reduction techniques included automated exposure control or exposure modulation based on body size. Count of known CT and cardiac nuc med studies  performed in previous 12 months: 1.     COMPARISON:   None available.    FINDINGS:  Images through the thoracic inlet demonstrate no thyroid mass or supraclavicular adenopathy images through the  chest demonstrate small mediastinal and hilar nodes. There is no pericardial fluid. There are small to moderate dependent bilateral pleural  effusions.    Lung window images demonstrate peripheral small nodules in both upper lobes. There are larger areas of rounded consolidation in the mid lung regions measuring up to 3 cm on the right, 1.9 cm in the lingula. There is dense consolidation which appears  somewhat ovoid and masslike in the medial aspect of the right middle lobe measuring up to 7.3 cm. These are likely infectious changes. Pneumonia and septic emboli are included in the differential.  The liver enhances normally without focal lesion. The spleen is prominent in size incompletely imaged. The adrenal glands are normal.    Bone window images demonstrate no vertebral or rib lesions.      Impression:       Abnormal chest CT with moderate dependent bilateral pleural effusions. There are bilateral parenchymal changes in the lungs including well-circumscribed nodules which likely represent septic emboli and a poorly defined nodules and areas of airspace  change which could represent septic emboli or pneumonia. Findings most likely are all infectious. There are small reactive nodes in the mediastinum. The spleen is prominent in size but incompletely imaged. No upper abdominal adenopathy. No acute bone  lesions in the chest    Signer Name: Anushka Gutierrez MD   Signed: 6/5/2020 8:44 PM   Workstation Name: [id number]    [facility]    MRI Lumbar Spine With & Without Contrast [[id number]] Collected:  06/03/20 0829     Updated:  06/04/20 2248    Narrative:       EXAMINATION: MRI LUMBAR SPINE W WO CONTRAST-      INDICATION: LBP, sepsis, rule  out abscess.      TECHNIQUE: Pre and postcontrast sagittal and axial T1, sagittal and  axial T2 and sagittal STIR images of the lumbar spine.     COMPARISON: None.     FINDINGS: Patient history indicates sepsis and lower back pain, history  of IVDA,  evaluate for abscess.     Sagittal T2-weighted image #8 series 3 appears to show dorsal epidural  collections at L2-L3 and L3-L4, which showed patchy postcontrast  enhancement presumably abscesses, possibly interconnected as seen on  sagittal postcontrast image 8 series 11. As seen on sagittal imaging  series, these measure 20 x 10 mm at L2-L3 and 22 x 9 mm at L3-L4.     Sagittal images show no obvious enhancing collection or focal mass  effect on the canal elsewhere.     There is normal vertebral marrow signal, with no evidence to suggest  osteomyelitis. There are disc protrusions at L4-L5 and L5-S1, but no  signal changes to suggest discitis. At the far right lateral margin of  sagittal images, there is a roughly 8 x 4 X 3 cm multilobular collection  within the posterior right paraspinous musculature, indicating abscess  here, generally from the L3-L4 axial level down to L5-S1 axial level.     Axial images, by disc space show no significant canal or foraminal  stenosis at L1-L2.     At L2-L3, canal appears lower limits of normal due to dorsal epidural  collection, but is not markedly stenotic. Foramina appear well  maintained.     At L3-L4, dorsal epidural collection is again seen, with only mild canal  narrowing. Foramina appear normal.     At L4-L5, there is a right paracentral disc protrusion with mild mass  effect on the canal, and probably moderate right-sided foraminal  stenosis. Left neural foramen appears normal.     At L5-S1, there is a left paracentral disc protrusion with mild canal  stenosis. Foramina appear well maintained.     Axial images show no evidence of psoas muscle abscess or inflammation,  or other evidence of retroperitoneal inflammation, but do show the  multilocular abscess in the right posterior paraspinous vasculature.       Impression:       1. Approximately 8 x 4 x 3 cm multilocular abscess of the right erector  spinae muscles, approximately from L3 to S1.  2. Two possibly  interconnected dorsal epidural abscesses at L2-L3 and  L3-L4, but causing only mild canal stenosis.  3. No evidence to suggest osteomyelitis or discitis.  4. Moderate-sized L4-L5 and L5-S1 disc protrusions as discussed above.     D:  06/03/2020  E:  06/03/2020     This report was finalized on 6/4/2020 10:45 PM by Dr. William Boateng MD.       CT Guided Biopsy Aspiration Injection [[id number]] Collected:  06/04/20 1730     Updated:  06/04/20 1740    Narrative:       PROCEDURE: Fluid collection aspiration     Procedural Personnel  Attending physician(s): HOLLY Huggins M.D.  Fellow physician(s): None  Resident physician(s): None  Advanced practice provider(s): None     Pre-procedure diagnosis: Sepsis with MRSA bacteremia, MRSA UTI and  pneumonia, also with multiple right-sided pulmonary nodules with  cavitation suspicious for septic emboli. IVDA.  -MRI L-spine concerning for multiple epidural  abscesses,?neurosurgery?has evaluated and no intervention is planned  Post-procedure diagnosis: Same  Indication: Diagnostic  Additional clinical history: None     Complications: No immediate complications.       Impression:          Percutaneous aspiration of very tiny abscess collection adjacent to  vertebral bodies/within the erector spinae muscle, yielding 2 mL of  purulent appearing fluid. No drainage catheter was left in place - too  small for drainage catheter/contraindicated to leave drainage catheter  in collection with possible communication with epidural space     Plan:      Follow-up lab results.  Continued care with primary team.  ______________________________________________________________________     PROCEDURE SUMMARY:  - Aspiration of tiny abscess pockets under fluoroscopic guidance  - Additional procedure(s): None     PROCEDURE DETAILS:     Pre-procedure  Consent: Informed consent for the procedure including risks, benefits  and alternatives was obtained and time-out was performed prior to the  procedure.  Preparation:  The site was prepared and draped using maximal sterile  barrier technique including cutaneous antisepsis.     Anesthesia/sedation  Level of anesthesia/sedation: Moderate sedation (conscious sedation)  Anesthesia/sedation administered by: Independent trained observer under  attending supervision with continuous monitoring of the patient?s level  of consciousness and physiologic status  Total intra-service sedation time (minutes): 12     Fluid collection aspiration  The patient was positioned prone on. Initial imaging was performed.  Local anesthesia was administered. The fluid collection was accessed  using a 22-gauge spinal needle under CT fluoroscopic guidance. Position  within the fluid collection was confirmed, and fluid aspiration was  performed. All instruments were then removed.  - Initial imaging findings: Fluid collections identified on comparison  MRI lumbar spine 6/4/2020 not as well visualized on CT.  - Aspiration needle/catheter: 22-gauge spinal needle  - Post-aspiration imaging findings: No immediate complication     Contrast  Contrast agent: None  Contrast volume (mL): 0     Radiation Dose  CT dose length product (mGy-cm): 975      Additional Details  Additional description of procedure: None  Equipment details: None  Specimens removed: Aspirated fluid sent for analysis.  Estimated blood loss (mL): Less than 10  Standardized report: DrainageAspiration     Attestation  I was present and scrubbed for the entire procedure. Imaging reviewed.  Agree with final report as written.     This report was finalized on 6/4/2020 5:36 PM by Yimi Huggins.                       Results for orders placed during the hospital encounter of 06/01/20   Adult Transesophageal Echo (REDD) W/ Cont if Necessary Per Protocol    Narrative · Left ventricular systolic function is normal. Estimated EF appears to be   in the range of 56 - 60%  · The tricuspid valve is abnormal. There is a large vegetation measuring 1   x 1.5 cm on the  tricuspid valve. Predominantly located on the posterior   leaflet but anterior leaflet appears involved as well.  · Estimated right ventricular systolic pressure from tricuspid   regurgitation is moderately elevated (45-55 mmHg).  · Mild to moderate mitral regurgitation noted with an eccentric jet.   However no vegetations visualized on the mitral valve  · The other cardiac valves appear free of vegetations  · Discussed findings with ordering provider.          Plan for Follow-up of Pending Labs/Results:    Order Current Status    Blood Culture - Blood, Arm, Right Preliminary result        Discharge Details        Discharge Medications      New Medications      Instructions Start Date   aztreonam 2 gm/100ml solution  Commonly known as:  AZACTAM   2 g, Intravenous, Every 8 Hours      cyclobenzaprine 5 MG tablet  Commonly known as:  FLEXERIL   5 mg, Oral, 3 Times Daily PRN      hydrocortisone 2.5 % cream   Topical, Every 12 Hours Scheduled      HYDROmorphone 1 MG/ML injection  Commonly known as:  DILAUDID   0.25 mg, Intravenous, Every 4 Hours PRN      lactobacillus acidophilus capsule capsule   1 capsule, Oral, Daily   Start Date:  June 16, 2020     levETIRAcetam 750 mg in sodium chloride 0.9 % 100 mL IVPB   750 mg, Intravenous, Every 12 Hours Scheduled      levothyroxine sodium 100 MCG reconstituted solution injection  Replaces:  levothyroxine 50 MCG tablet   25 mcg, Intravenous, Daily at 1100   Start Date:  June 16, 2020     metroNIDAZOLE 5-0.79 MG/ML-% IVPB  Commonly known as:  FLAGYL   500 mg, Intravenous, Every 12 Hours      morphine 2 MG/ML injection   2 mg, Intravenous, Every 6 Hours Scheduled      nystatin 817550 UNIT/GM powder  Commonly known as:  MYCOSTATIN   Topical, Every 12 Hours Scheduled      ondansetron 4 MG/2ML injection  Commonly known as:  ZOFRAN   4 mg, Intravenous, Every 6 Hours PRN      pantoprazole 40 MG injection  Commonly known as:  PROTONIX   40 mg, Intravenous, Every Morning Before  Breakfast      potassium chloride 10 MEQ/100ML  Replaces:  potassium chloride 10 MEQ CR tablet   10 mEq, Intravenous, Every 1 Hour PRN      prochlorperazine 10 MG/2ML injection  Commonly known as:  COMPAZINE   5 mg, Intravenous, Every 6 Hours PRN      sodium chloride 0.9 % with KCl 20 mEq 20-0.9 MEQ/L-% infusion   100 mL/hr (100 mL/hr), Intravenous, Continuous      vancomycin 1-5 GM/200ML-% IVPB  Commonly known as:  VANCOCIN   10 mg/kg (1,000 mg), Intravenous, Every 8 Hours         Continue These Medications      Instructions Start Date   budesonide-formoterol 160-4.5 MCG/ACT inhaler  Commonly known as:  SYMBICORT   2 puffs, Inhalation, 2 Times Daily - RT      FLUoxetine 40 MG capsule  Commonly known as:  PROzac   40 mg, Oral, Daily      gabapentin 800 MG tablet  Commonly known as:  NEURONTIN   800 mg, Oral, 3 Times Daily      ipratropium-albuterol 0.5-2.5 mg/3 ml nebulizer  Commonly known as:  DUO-NEB   INHALE 1 VIAL (3 MLS)  BY NEBULIZATION TWO TIMES DAILY         Stop These Medications    azithromycin 250 MG tablet  Commonly known as:  ZITHROMAX     bisoprolol 5 MG tablet  Commonly known as:  ZEBeta     ferrous sulfate 324 (65 Fe) MG tablet delayed-release EC tablet     flunisolide 25 MCG/ACT (0.025%) solution nasal spray  Commonly known as:  NASALIDE     hydrOXYzine pamoate 25 MG capsule  Commonly known as:  VISTARIL     lansoprazole 30 MG capsule  Commonly known as:  PREVACID     levETIRAcetam 500 MG tablet  Commonly known as:  KEPPRA     levoFLOXacin 500 MG tablet  Commonly known as:  LEVAQUIN     levothyroxine 50 MCG tablet  Commonly known as:  SYNTHROID, LEVOTHROID  Replaced by:  levothyroxine sodium 100 MCG reconstituted solution injection     lisinopril 2.5 MG tablet  Commonly known as:  PRINIVIL,ZESTRIL     lisinopril-hydrochlorothiazide 20-12.5 MG per tablet  Commonly known as:  PRINZIDE,ZESTORETIC     metroNIDAZOLE 500 MG tablet  Commonly known as:  FLAGYL     ondansetron ODT 4 MG disintegrating  tablet  Commonly known as:  ZOFRAN-ODT     potassium chloride 10 MEQ CR tablet  Commonly known as:  K-DUR  Replaced by:  potassium chloride 10 MEQ/100ML     SUMAtriptan 50 MG tablet  Commonly known as:  IMITREX     tiZANidine 4 MG tablet  Commonly known as:  ZANAFLEX            Allergies   Allergen Reactions   • Penicillins Rash     Received cefepime and cefdinir in 2018   • Phenobarbital Rash         Discharge Disposition:  Short Term Hospital (DC - External)    Diet:  Hospital:  Diet Order   Procedures   • NPO Diet       Activity:  Activity Instructions     Activity as Tolerated            Restrictions or Other Recommendations:         CODE STATUS:    Code Status and Medical Interventions:   Ordered at: 06/01/20 1441     Code Status:    CPR     Medical Interventions (Level of Support Prior to Arrest):    Full       No future appointments.        Time Spent on Discharge:  50 minutes    Electronically signed by SHAYNE Brady, 06/15/20, 3:51 PM.       Collaboration with other providers

## 2020-06-15 NOTE — PROGRESS NOTES
Case Management Discharge Note      Final Note: Plan is for pt. to be transferred to [facility]. Bed Placement called fro  and they will have a bed later this evening and will call nurse when the bed is ready. [facility] has been placed on will call and [facility] is on the chart. Nurse will need to call [facility] to let them know that pt. is ready for transport.          Destination - Selection Complete      Service Provider Request Status Selected Services Address Phone Number Fax Number    [address] [address] [phone] --      Durable Medical Equipment      No service has been selected for the patient.      Dialysis/Infusion      No service has been selected for the patient.      Home Medical Care      No service has been selected for the patient.      Therapy      No service has been selected for the patient.      Community Resources      No service has been selected for the patient.             Final Discharge Disposition Code: 02 - short term [facility] for [facility]

## 2020-06-15 NOTE — PLAN OF CARE
VSS, NGT in place, large output from ice chips consumed freq thru night, picc in place, ivf/iv abx continue overnight, ST on telemetry, dilaudid given as often as order allows, pt loves her dilaudid, but unable to get out of bed to use BSC or bathroom, prefers to be incontinent and allow staff to clean her up. unable to sit in chair. [facility] called for update on pts condition/vital signs.

## 2020-06-15 NOTE — PROGRESS NOTES
Pharmacy Consult-Vancomycin Dosing  Liliana Calderon is a  39 y.o. female receiving vancomycin therapy.     Indication: endocarditis  Consulting Provider: Dr. Lang  ID Consult: Yes    Goal Trough: 15-20mcg/mL    Current Antimicrobial Therapy  Anti-Infectives (From admission, onward)      Ordered     Dose/Rate Route Frequency Start Stop    06/15/20 1251  ! Vanc trough on 6/16 at 1330, HOLD 1400 dose     Ordering Provider:  Felicita Beavers RPH     Does not apply Once 06/16/20 1300      06/15/20 1251  vancomycin (VANCOCIN) in iso-osmotic dextrose IVPB 1 g (premix) 200 mL     Ordering Provider:  Felicita Beavers RPH    10 mg/kg × 106 kg Intravenous Every 8 Hours 06/15/20 2200 06/29/20 2159    06/15/20 1251  vancomycin 2500 mg/500 mL 0.9% NS IVPB ([facility])     Ordering Provider:  Felicita Beavers RPH    2,500 mg  over 180 Minutes Intravenous Once 06/15/20 1345      06/11/20 1537  aztreonam (AZACTAM) 2 g/100 mL 0.9% NS (mbp)     Rancho Lang MD reviewed the order on 06/15/20 1246.   Ordering Provider:  Elsy Augustin MD    2 g  over 4 Hours Intravenous Every 8 Hours 06/12/20 0000 06/19/20 2359    06/11/20 1537  aztreonam (AZACTAM) 2 g/100 mL 0.9% NS (mbp)     Ordering Provider:  Elsy Augustin MD    2 g  over 30 Minutes Intravenous Once 06/11/20 1630 06/11/20 1808    06/11/20 1537  metroNIDAZOLE (FLAGYL) 500 mg/100mL IVPB     Rancho Lang MD reviewed the order on 06/15/20 1246.   Ordering Provider:  Elsy Augustin MD    500 mg  over 60 Minutes Intravenous Every 12 Hours 06/11/20 1600 06/19/20 1559    06/01/20 1501  vancomycin 2250 mg/500 mL 0.9% NS IVPB ([facility])     Ordering Provider:  Vanessa Lopez MD    2,250 mg Intravenous Once 06/01/20 1600 06/01/20 1756            Allergies  Allergies as of 06/01/2020 - Reviewed 06/01/2020   Allergen Reaction Noted    Penicillins Rash 05/31/2018    Phenobarbital Rash 05/31/2018       Labs    Results from last 7 days   Lab Units 06/15/20  0440  "06/11/20  0409 06/10/20  0530   BUN mg/dL 9 14 14   CREATININE mg/dL 0.45* 0.66 0.64       Results from last 7 days   Lab Units 06/15/20  0443 06/11/20  0409 06/10/20  0530   WBC 10*3/mm3 9.51 10.24 17.35*       Evaluation of Dosing    Ht - 165.1 cm (65\")  Wt - 106 kg (233 lb)    Estimated Creatinine Clearance: 205 mL/min (A) (by C-G formula based on SCr of 0.45 mg/dL (L)).    Intake & Output (last 3 days)         06/12 0701 - 06/13 0700 06/13 0701 - 06/14 0700 06/14 0701 - 06/15 0700 06/15 0701 - 06/16 0700    P.O. 3100 1600 920     I.V. (mL/kg)  1000 (9.4) 1779 (16.8)     Other  60      IV Piggyback 200 300 200 100    Total Intake(mL/kg) 3300 (31.1) 2960 (27.9) 2899 (27.3) 100 (0.9)    Urine (mL/kg/hr) 300 (0.1) 0 (0) 0 (0)     Emesis/NG output 8325 1200 2400     Stool 0 0 0     Total Output 8625 1200 2400     Net -5325 +1760 +499 +100            Urine Unmeasured Occurrence 4 x 4 x 3 x 1 x    Stool Unmeasured Occurrence 4 x 4 x 7 x 1 x            Microbiology and Radiology  Microbiology Results (last 10 days)       Procedure Component Value - Date/Time    Blood Culture - Blood, Arm, Right [[id number]] Collected:  06/11/20 1545    Lab Status:  Preliminary result Specimen:  Blood from Arm, Right Updated:  06/14/20 1615     Blood Culture No growth at 3 days    Stool Culture (Reference Lab) - Stool, Per Rectum [[id number]] Collected:  06/08/20 0351    Lab Status:  Edited Result - FINAL Specimen:  Stool from Per Rectum Updated:  06/12/20 1410     Salmonella/Shigella Screen Final report     Result 1 Comment     Comment: No Salmonella or Shigella recovered.        Campylobacter Culture Final report     Result 1 Comment     Comment: No Campylobacter species isolated.        E coli, Shiga toxin Assay Negative    Narrative:       Performed at:  [address] - [address]  [id number]  : Lam Roman PhD, Phone:  [phone]    Clostridium Difficile Toxin - Stool, Per Rectum [[id number]] " Collected:  06/05/20 1748    Lab Status:  Final result Specimen:  Stool from Per Rectum Updated:  06/05/20 1858    Narrative:       The following orders were created for panel order Clostridium Difficile Toxin - Stool, Per Rectum.  Procedure                               Abnormality         Status                     ---------                               -----------         ------                     Clostridium Difficile To...[[id number]]  Normal              Final result                 Please view results for these tests on the individual orders.    Clostridium Difficile Toxin, PCR - Stool, Per Rectum [[id number]]  (Normal) Collected:  06/05/20 1748    Lab Status:  Final result Specimen:  Stool from Per Rectum Updated:  06/05/20 1858     C. Difficile Toxins by PCR Not Detected    Narrative:       Performance characteristics of test not established for patients <2 years of age.  Negative for Toxigenic C. Difficile            Evaluation of Level                           Assessment/Plan:    Pharmacy consulted to dose vancomycin for endocarditis, goal trough 15-20mcg/mL.  Will load the patient with Vancomycin 2500mg (~23mg/kg) x 1 now, will then start vancomycin 1000mg (~10mg/kg) IV q8h.  A vancomycin trough will be assessed on 6/16 at 1330.  Will continue to follow and adjust dose as needed.    Thank you for this consult,    Felicita Beavers, PharmD, BCPS  6/15/2020  12:54

## 2020-06-15 NOTE — PROGRESS NOTES
CTS Progress Note         LOS: 14 days   Patient Care Team:  Ollie Flores MD as PCP - General (Internal Medicine)    Chief Complaint:      Subjective  NG tube placed with 2.4 L output yesterday.  Afebrile.  Tachycardic this morning.  On room air. Small bowel follow through today per Dr. Lundberg.      Objective    Vital Signs  Temp:  [98.9 °F (37.2 °C)-99.2 °F (37.3 °C)] 98.9 °F (37.2 °C)  Heart Rate:  [101-108] 102  Resp:  [18] 18  BP: (135-147)/(80-93) 138/80    Physical Exam:   General Appearance: Well-developed, well-nourished no acute distress   Lungs:    Heart:    Skin:      Results     Results from last 7 days   Lab Units 06/15/20  0443   WBC 10*3/mm3 9.51   HEMOGLOBIN g/dL 9.2*   HEMATOCRIT % 30.8*   PLATELETS 10*3/mm3 274     Results from last 7 days   Lab Units 06/15/20  0443   SODIUM mmol/L 136   POTASSIUM mmol/L 3.0*   CHLORIDE mmol/L 111*   CO2 mmol/L 15.0*   BUN mg/dL 9   CREATININE mg/dL 0.45*   GLUCOSE mg/dL 87   CALCIUM mg/dL 7.3*           Imaging Results (Last 24 Hours)     Procedure Component Value Units Date/Time    XR Abdomen Flat & Upright [[id number]] Collected:  06/14/20 1313     Updated:  06/14/20 1521    Narrative:          EXAMINATION: XR ABDOMEN FLAT AND UPRIGHT - 06/14/2020     INDICATION: Ileus.  Z74.09-Other reduced mobility; Z78.9-Other specified  health status.      COMPARISON: 06/11/2020     FINDINGS: Flat and upright views of the abdomen reveal nasogastric tube  in the stomach with mild gastric distention. Dilated loops of small  bowel seen diffusely throughout the abdomen, unchanged in the interval  suggesting either ileus or obstruction. Degenerative changes seen within  the spine. Surgical clips in the right upper quadrant. No evidence of  obvious free intraperitoneal air.           Impression:       Nasogastric tube in the stomach with mild gastric  distention. Dilated loops of small bowel again seen diffusely throughout  the abdomen unchanged with findings suggesting  ileus or small bowel  obstruction.     DICTATED:   06/14/2020  EDITED/ls :   06/14/2020              Assessment      Sepsis (CMS/HCC)    Essential hypertension    Fibromyalgia syndrome    GERD without esophagitis    DDD (degenerative disc disease), cervical    Anxiety    Depression    Seizure (CMS/HCC)    Migraine    Infective endocarditis    Epidural abscess    MRSA bacteremia  2400 cc from the NG tube.  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  Nothing to add to current treatment regimen.      Greg Blanchard MD  CTSurgery  06/15/20   08:49

## 2020-06-15 NOTE — PROGRESS NOTES
Continued Stay Note  [facility]     Patient Name: Liliana Calderon  MRN: [id number]  Today's Date: 6/15/2020    Admit Date: 6/1/2020    Discharge Plan     Row Name 06/15/20 1109       Plan    Plan  Ongoing    Plan Comments  Still waiting for bed at . Scheduled for small bowel follow through today. Still with NG to [facility] for small bowel obstruction. Cont. to follow.     Final Discharge Disposition Code  30 - still a patient        Discharge Codes    No documentation.             Komal Schuster RN

## 2020-06-15 NOTE — PROGRESS NOTES
"General Surgery Daily Progress Note    Subjective:  No new complaints    Objective:  /79 (BP Location: Right arm, Patient Position: Lying)   Pulse 104   Temp 98.5 °F (36.9 °C) (Oral)   Resp 18   Ht 165.1 cm (65\")   Wt 106 kg (233 lb)   LMP 04/01/2020 (Approximate)   SpO2 98%   BMI 38.77 kg/m²       General Appearance: Moderate distress  Eyes: Anicteric  Neck: Trachea midline   Cardiovascular:  RRR without murmur nor rub  Lungs:  Bilateral respirations unlabored   Abdomen:  Soft, non-tender, no masses, no organomegaly   Extremities:  No cyanosis or edema   Skin:  No obvious rashes   Neurologic: awake and conversant      Imaging Results (Last 24 Hours)     Procedure Component Value Units Date/Time    XR Abdomen Flat & Upright [[id number]] Collected:  06/14/20 1313     Updated:  06/15/20 1540    Narrative:          EXAMINATION: XR ABDOMEN FLAT AND UPRIGHT - 06/14/2020     INDICATION: Ileus.  Z74.09-Other reduced mobility; Z78.9-Other specified  health status.      COMPARISON: 06/11/2020     FINDINGS: Flat and upright views of the abdomen reveal nasogastric tube  in the stomach with mild gastric distention. Dilated loops of small  bowel seen diffusely throughout the abdomen, unchanged in the interval  suggesting either ileus or obstruction. Degenerative changes seen within  the spine. Surgical clips in the right upper quadrant. No evidence of  obvious free intraperitoneal air.           Impression:       Nasogastric tube in the stomach with mild gastric  distention. Dilated loops of small bowel again seen diffusely throughout  the abdomen unchanged with findings suggesting ileus or small bowel  obstruction.     DICTATED:   06/14/2020  EDITED/ls :   06/14/2020      This report was finalized on 6/15/2020 3:37 PM by Dr. Jasmine Marie MD.       FL Small Bowel Follow Through Single-Contrast [[id number]] Collected:  06/15/20 1531     Updated:  06/15/20 1531    Narrative:       EXAMINATION: FL SMALL BOWEL " FOLLOW THROUGH SINGLE-CONTRAST-     INDICATION: ileus vs. SBO; Z74.09-Other reduced mobility; Z78.9-Other  specified health status     TECHNIQUE: 6 associated images were saved.  imaging reveals  multiple loops of mildly dilated small bowel. Air is seen within the  colon on  imaging. A single contrast study using water-soluble  contrast was performed. Images of the small bowel were obtained at 15  minutes, 45 minutes, 1 hour and 45 minutes, 2 hours 45 minutes, and 3  hours and 30 minutes.     COMPARISON: NONE     FINDINGS: There is mild gross dilatation of the proximal, mid, and  distal small bowel persistent on all images. There was a dilated small  bowel measure approximately 6 cm at its widest diameter. Contrast is  seen within the colon at 3 hours. Mild gross dilatation of the proximal,  mid, and distal small bowel with contrast seen within the colon at 3 1/2  hours may represent a mild ileus, or possibly a partial distal small  bowel obstruction. There was no evidence of complete small bowel  obstruction.          Impression:       Mild gross dilatation of the proximal, mid, and distal small  bowel with contrast seen within the colon at 3 1/2 hours may represent a  mild ileus, or a partial distal small bowel obstruction. There was no  evidence of complete small bowel obstruction.                 CBC:  Results from last 7 days   Lab Units 06/15/20  0443   WBC 10*3/mm3 9.51   HEMOGLOBIN g/dL 9.2*   HEMATOCRIT % 30.8*   PLATELETS 10*3/mm3 274       CMP:  Results from last 7 days   Lab Units 06/15/20  0443   SODIUM mmol/L 136   POTASSIUM mmol/L 3.0*   CHLORIDE mmol/L 111*   CO2 mmol/L 15.0*   BUN mg/dL 9   CREATININE mg/dL 0.45*   CALCIUM mg/dL 7.3*   BILIRUBIN mg/dL 0.4   ALK PHOS U/L 122*   ALT (SGPT) U/L 6   AST (SGOT) U/L 12   GLUCOSE mg/dL 87         Assessment:  Ileus    Plan:   Ileus.  Nausea and vomiting without the NG tube in place.  Small bowel follow-through demonstrated contrast through to the  colon in 3-1/2 hours.  Bowel movements noted.  Supportive care for now.    Pedro Lundberg MD - 6/15/2020, 15:43

## 2020-06-15 NOTE — PROGRESS NOTES
[facility] Medicine Services  PROGRESS NOTE    Patient Name: Liliana Calderon  : 1980  MRN: [id number]    Date of Admission: 2020  Primary Care Physician: Ollie Flores MD    Subjective   Subjective     CC:  Sepsis POA    HPI:  Patient with high NG output continuing. Has been non compliant with NPO status. Discussed with patient again who is eating lemon ice brought in by sister when I arrived at bedside.  KUB discussed with patient.  RN to bedside and reconfirmed NPO status  Patient denies pain, nausea, some diarrhea      Review of Systems    Gen- No fevers, chills  CV- No chest pain, palpitations  Resp- No cough, dyspnea  GI- + NG, + vomiting, No abd pain        Objective   Objective     Vital Signs:   Temp:  [98.5 °F (36.9 °C)-98.9 °F (37.2 °C)] 98.5 °F (36.9 °C)  Heart Rate:  [] 104  Resp:  [18] 18  BP: (128-147)/(79-93) 128/79        Physical Exam:    Constitutional: No acute distress, awake, alert- sitting up in bed eating lemon ice  HENT: NCAT, + NG tube in place to LWS- bilious output  Respiratory: Clear to auscultation bilaterally, respiratory effort normal   Cardiovascular: tachy but regular, s1 and s2, + murmur  Gastrointestinal: + bowel sounds, soft, obese abdomen, no guarding  Musculoskeletal: No bilateral ankle edema  Psychiatric: Appropriate affect, cooperative  Neurologic: Oriented x 3, strength symmetric in all extremities, speech clear  Skin: feet petechiae, Right upper arm bruise, multiple ext marks  No change in exam from     Results Reviewed:  Results from last 7 days   Lab Units 06/15/20  0443 06/11/20  0409 06/10/20  0530   WBC 10*3/mm3 9.51 10.24 17.35*   HEMOGLOBIN g/dL 9.2* 9.3* 10.3*   HEMATOCRIT % 30.8* 30.8* 33.8*   PLATELETS 10*3/mm3 274 276 370     Results from last 7 days   Lab Units 06/15/20  0443 20  0409 06/10/20  0530   SODIUM mmol/L 136 135* 134*   POTASSIUM mmol/L 3.0* 3.9 3.4*   CHLORIDE mmol/L 111* 102 102   CO2  mmol/L 15.0* 20.0* 18.0*   BUN mg/dL 9 14 14   CREATININE mg/dL 0.45* 0.66 0.64   GLUCOSE mg/dL 87 130* 151*   CALCIUM mg/dL 7.3* 7.8* 7.5*   ALT (SGPT) U/L 6 7 7   AST (SGOT) U/L 12 12 15   TROPONIN T ng/mL  --  <0.010  --      Estimated Creatinine Clearance: 205 mL/min (A) (by C-G formula based on SCr of 0.45 mg/dL (L)).    Microbiology Results Abnormal     Procedure Component Value - Date/Time    Blood Culture - Blood, Arm, Right [[id number]] Collected:  06/11/20 1545    Lab Status:  Preliminary result Specimen:  Blood from Arm, Right Updated:  06/14/20 1615     Blood Culture No growth at 3 days    Stool Culture (Reference Lab) - Stool, Per Rectum [[id number]] Collected:  06/08/20 0352    Lab Status:  Edited Result - FINAL Specimen:  Stool from Per Rectum Updated:  06/12/20 1410     Salmonella/Shigella Screen Final report     Result 1 Comment     Comment: No Salmonella or Shigella recovered.        Campylobacter Culture Final report     Result 1 Comment     Comment: No Campylobacter species isolated.        E coli, Shiga toxin Assay Negative    Narrative:       Performed at:  [address] [place] [address]  [id number]  : Lam Roman PhD, Phone:  [phone]    Anaerobic Culture - Aspirate, Back [[id number]] Collected:  06/04/20 1643    Lab Status:  Final result Specimen:  Aspirate from Back Updated:  06/09/20 0741     Anaerobic Culture No anaerobes isolated at 5 days    Blood Culture - Blood, Arm, Right [[id number]] Collected:  06/03/20 1229    Lab Status:  Final result Specimen:  Blood from Arm, Right Updated:  06/08/20 1401     Blood Culture No growth at 5 days    Blood Culture - Blood, Arm, Left [[id number]] Collected:  06/03/20 1229    Lab Status:  Final result Specimen:  Blood from Arm, Left Updated:  06/08/20 1401     Blood Culture No growth at 5 days    Body Fluid Culture - Aspirate, Back [[id number]]  (Abnormal)  (Susceptibility) Collected:  06/04/20 1645    Lab Status:  Final  result Specimen:  Aspirate from Back Updated:  06/07/20 0701     Body Fluid Culture Light growth (2+) Staphylococcus aureus, MRSA     Comment:   Methicillin resistant Staphylococcus aureus, Patient may be an isolation risk.        Gram Stain No organisms seen      Many (4+) WBCs per low power field    Susceptibility      Staphylococcus aureus, MRSA     TYRONE     Gentamicin Susceptible     Oxacillin Resistant     Rifampin Susceptible     Vancomycin Susceptible                Susceptibility Comments     Staphylococcus aureus, MRSA    This isolate does not demonstrate inducible clindamycin resistance in vitro.               Clostridium Difficile Toxin - Stool, Per Rectum [[id number]] Collected:  06/05/20 1748    Lab Status:  Final result Specimen:  Stool from Per Rectum Updated:  06/05/20 1858    Narrative:       The following orders were created for panel order Clostridium Difficile Toxin - Stool, Per Rectum.  Procedure                               Abnormality         Status                     ---------                               -----------         ------                     Clostridium Difficile To...[[id number]]  Normal              Final result                 Please view results for these tests on the individual orders.    Clostridium Difficile Toxin, PCR - Stool, Per Rectum [[id number]]  (Normal) Collected:  06/05/20 1748    Lab Status:  Final result Specimen:  Stool from Per Rectum Updated:  06/05/20 1858     C. Difficile Toxins by PCR Not Detected    Narrative:       Performance characteristics of test not established for patients <2 years of age.  Negative for Toxigenic C. Difficile    Blood Culture - Blood, Wrist, Left [[id number]]  (Abnormal) Collected:  06/01/20 1601    Lab Status:  Final result Specimen:  Blood from Wrist, Left Updated:  06/04/20 0612     Blood Culture Staphylococcus aureus, MRSA     Comment:   Infectious disease consultation is highly recommended to rule out distant foci of  infection.  Methicillin resistant Staphylococcus aureus, Patient may be an isolation risk.        Isolated from Aerobic Bottle     Gram Stain Aerobic Bottle Gram positive cocci in clusters    Narrative:       Refer to previous blood culture collected on 6/1/2020 1558 for MICs.      Blood Culture - Blood, Arm, Left [[id number]]  (Abnormal)  (Susceptibility) Collected:  06/01/20 1558    Lab Status:  Final result Specimen:  Blood from Arm, Left Updated:  06/04/20 0610     Blood Culture Staphylococcus aureus, MRSA     Comment:   Infectious disease consultation is highly recommended to rule out distant foci of infection.  Methicillin resistant Staphylococcus aureus, Patient may be an isolation risk.        Isolated from Aerobic and Anaerobic Bottles     Gram Stain Aerobic Bottle Gram positive cocci in clusters      Anaerobic Bottle Gram positive cocci in clusters    Susceptibility      Staphylococcus aureus, MRSA     TYRONE     Gentamicin Susceptible     Oxacillin Resistant     Rifampin Susceptible     Vancomycin Susceptible                Susceptibility Comments     Staphylococcus aureus, MRSA    This isolate does not demonstrate inducible clindamycin resistance in vitro.               Blood Culture ID, PCR - Blood, Arm, Left [[id number]]  (Abnormal) Collected:  06/01/20 1558    Lab Status:  Final result Specimen:  Blood from Arm, Left Updated:  06/02/20 0721     BCID, PCR Staphylococcus aureus. mecA (methicillin resistance gene) detected. Identification by BCID PCR.     BOTTLE TYPE Aerobic Bottle          Imaging Results (Last 24 Hours)     Procedure Component Value Units Date/Time    XR Abdomen Flat & Upright [[id number]] Collected:  06/14/20 1313     Updated:  06/14/20 1521    Narrative:          EXAMINATION: XR ABDOMEN FLAT AND UPRIGHT - 06/14/2020     INDICATION: Ileus.  Z74.09-Other reduced mobility; Z78.9-Other specified  health status.      COMPARISON: 06/11/2020     FINDINGS: Flat and upright views of the abdomen  reveal nasogastric tube  in the stomach with mild gastric distention. Dilated loops of small  bowel seen diffusely throughout the abdomen, unchanged in the interval  suggesting either ileus or obstruction. Degenerative changes seen within  the spine. Surgical clips in the right upper quadrant. No evidence of  obvious free intraperitoneal air.           Impression:       Nasogastric tube in the stomach with mild gastric  distention. Dilated loops of small bowel again seen diffusely throughout  the abdomen unchanged with findings suggesting ileus or small bowel  obstruction.     DICTATED:   06/14/2020  EDITED/ls :   06/14/2020              Results for orders placed during the hospital encounter of 06/01/20   Adult Transesophageal Echo (REDD) W/ Cont if Necessary Per Protocol    Narrative · Left ventricular systolic function is normal. Estimated EF appears to be   in the range of 56 - 60%  · The tricuspid valve is abnormal. There is a large vegetation measuring 1   x 1.5 cm on the tricuspid valve. Predominantly located on the posterior   leaflet but anterior leaflet appears involved as well.  · Estimated right ventricular systolic pressure from tricuspid   regurgitation is moderately elevated (45-55 mmHg).  · Mild to moderate mitral regurgitation noted with an eccentric jet.   However no vegetations visualized on the mitral valve  · The other cardiac valves appear free of vegetations  · Discussed findings with ordering provider.        I have reviewed the medications:  Scheduled Meds:    aztreonam 2 g Intravenous Q8H   budesonide-formoterol 2 puff Inhalation BID - RT   DAPTOmycin 8 mg/kg (Adjusted) Intravenous Q24H   FLUoxetine 40 mg Oral Daily   gabapentin 800 mg Oral Q8H   hydrocortisone  Topical Q12H   lactobacillus acidophilus 1 capsule Oral Daily   levETIRAcetam 750 mg Intravenous Q12H   [START ON 6/16/2020] levothyroxine sodium 25 mcg Intravenous Daily   metroNIDAZOLE 500 mg Intravenous Q12H   Morphine 15 mg Oral  Q12H   nystatin  Topical Q12H   pantoprazole 40 mg Intravenous Q AM   sodium chloride 10 mL Intravenous Q12H     Continuous Infusions:    sodium chloride 0.9 % with KCl 20 mEq 100 mL/hr Last Rate: 100 mL/hr (06/15/20 0917)     PRN Meds:.•  acetaminophen  •  bismuth subsalicylate  •  cyclobenzaprine  •  HYDROcodone-acetaminophen  •  HYDROmorphone  •  hydrOXYzine  •  ipratropium-albuterol  •  melatonin  •  Morphine  •  ondansetron  •  potassium chloride **OR** potassium chloride **OR** potassium chloride  •  prochlorperazine **OR** prochlorperazine **OR** prochlorperazine  •  sodium chloride  •  zolpidem    Assessment/Plan   Assessment & Plan     Active Hospital Problems    Diagnosis  POA   • **Sepsis (CMS/HCC) [A41.9]  Yes   • Infective endocarditis [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        Brief Hospital Course to date:  Liliana Calderon is a 39 y.o. female with past medical history of depression/anxiety, fibromyalgia, COPD, hypertension, seizure disorder, and remote IV drug use who was admitted on June 1 for sepsis with MRSA bacteremia, UTI, pneumonia and epidural abscess.  ID has been following.  6/11 she had intractable pain issues and ultimately started having projectile vomiting with vomitus that look like stool.  Abdominal x-ray is concerning for bowel obstruction.  Surgery following and NG in place:     Projectile vomiting/ SBO  -CT of the abdomen pelvis showed SBO-  + BM, slow return. Continues to have high volume output and vomiting with NG.   -KUB yesterday show  unresolved obstruction  -NG tube to LWS; NPO- discussed with patient and staff.   -General Surgery following. Possible SBFT  -changing more meds to IV as able    Seizures  -Intermittent periods of responding to questioning  -EEG- no evidence of seizures  -increased Keppra with no further sz activity after increase  -Neurology has signed off    MRSA bacteremia  -She is being treated for UTI pneumonia tricuspid valve endocarditis and epidural abscesses  -REDD with tricuspid valve vegetation approximately 1 cm x 1.2 cm  -CT chest concerning for pneumonia- ? Aspiration. appears to be resolving   -Possibly has septic pulmonary emboli  -IV antibiotics per ID  -Plan to transfer to  due to need for angiovac which cannot be done here    Diarrhea  -Probiotic  -Negative for C. Difficile    Right shoulder pain  -supportive care  -X-ray of the right shoulder with no acute fracture or dislocation    Rash  -Stable  -Unclear etiology  -Cortisone cream    Acute on chronic low back pain  -Suspected due to spinal/ epidural abscesses  -Pain medication as needed  -She had a CT-guided drainage of a right para spinal abscess on June 4 was positive for MRSA    Hypokalemia  - replace  - change IVFs to NS + 20K      DVT Prophylaxis:  SCDs      Disposition: I expect the patient to be transferred to  when bed available    CODE STATUS:   Code Status and Medical Interventions:   Ordered at: 06/01/20 1441     Code Status:    CPR     Medical Interventions (Level of Support Prior to Arrest):    Full       Electronically signed by SHAYNE Brady, 06/15/20, 11:00.

## 2020-06-15 NOTE — PROGRESS NOTES
Liliana Calderon  1980  [id number]    Evaluating Physician: Elsy Augustin    Chief Complaint: fever    Reason for Consultation: MRSA bacteremia    History of present illness:   Patient is a 39 y.o.  Yr old female with history of IVDU, depression/anxiety, COPD, and seizure d/o who initially presented to [facility] yesterday with c/o shortness of air and worsening low back pain. She last used IV heroin about 1 month prior.  She was doing relatively well until about 3 days PTA when she noticed worsening low back pain and worsening shortness of air with blood-tinged sputum. She additionally started having fevers up to 103.9F. She did not have contact with anyone known to have COVID-19 infection.     On arrival to [facility] on 5/31, the patient was found to have fevers and a significant leukocytosis. CT chest was concerning for pneumonia and possible septic pulm emboli. Blood cultures resulted with MRSA and she was transferred to [facility] for a higher level of care. Tmax since arrival here has been 102.7F. WBC was elevated to 14.2 but has improved to 10 on antibiotics. She was started on vancomycin and aztreonam by primary team. Repeat blood cultures from 6/1 are again positive for MRSA. TTE and MRI lumbar spine ordered. ID consulted for recommendations in the setting of her MRSA septicemia.    Subjective:    6/3/20: The patient is still having back pain. No new weakness in her lower extremities but does have extreme back pain with movement. Fevers improved. Tmax 100F. Does have some anemia with Hgb 6.4. No nausea or vomiting. Mild diarrhea. No new rash.     6/4/20: still having some back pain and had some fevers overnight. Tmax this morning was 102.7F.  Not feeling very well this morning when I saw her. No worsening of her diarrhea. No nausea. No new rash. Still able to move her extremities well with no new weakness but does have significant pain lifting her legs off the bed.     6/5/20: Patient is  "unfortunately still having fevers up to 102.5°F.  She is still not feeling well with significant back pain.  No new weakness in her lower extremities.  She is having some diarrhea but only about 2 episodes in the last 24 hours.  No nausea.  No new rashes.  She went for REDD today and was noted to have about a 1 cm x 1 cm vegetation on the tricuspid valve per my discussion with Dr. Pinedo.    6/6/2020: Patient states she is still receiving her antibiotics but has complaints of nausea and vomiting as well as diarrhea.  Discussion with nursing staff states that she is having blood-tinged  tears, bloody emesis and worsening petechial rash over her body.  RN staff states they have not noted any blood in her diarrhea at this point.  Afebrile overnight with some tachypnea noted.  Remains without leukocytosis.  6/4 body fluid culture from back returned positive for 2+ MRSA.  6/3 blood cultures currently remain no growth to date. Per RN her rash has gradually progressed over the last couple days    6/7/2020: Patient laying in bed awake Heparin stopped last pm   RN states that he was told by the tech that there was blood in the stool but did not personally check it.   She feels \"horrible\" today.  She remains with nausea, vomiting and significant amounts of diarrhea- 3 stools so far today  Documentation shows she has been afebrile and hemodynamically stable overnight. Leukocytosis is slightly improved from 14-12.  H&H has improved from yesterday's values to 11 and 38.  Platelets of 405.  7/C. difficile toxin was negative.  6/4 body fluid back aspirate positive for MRSA.  Cdiff negative  6/8 alert, afebrile, no new skin lesions, c/o discomfort at the site of the R upper arm lesion that may have a hemorrhagic center  6/10  Alert, no new skin lesions and the largest one on the right upper arm is receding and is less tender although it is starting to ulcerate  Back pain improved    6/11  Febrile to 102 today; ongoing diarrhea and " "she has developed projectile vomiting w/o hemetemesis  All \"allowable\" stool studies unremarkable   Alert but pale no new skin lesions and existing ones are starting to recede  Possible transfer to  for angiovac when she is stabilized  WBC down to 11  6/12 Tm 102.2, vomiting improved after NG placed- > 6L output; alert, looks much less uncomfortable today; no diarrhea and abd pain is minimal; picc to be placed    6/13 Azactam and flagyl added for concern re: aspiration pneumonia; NG tube remain in place with 1L out yesterday; fever resolved; 6/11 blood cultures negative    6/15/20: feeling somewhat better. Back pain slightly better. Denies any shortness of breath. Still having a cough but not much sputum production. Nausea improved. NG tube still in place. Dr. Lundberg planning for small bowel follow through today. No new rashes. Fevers have improved with last fever to 102.2F on 6/11/20.       Past Medical History:   Diagnosis Date   • Anxiety and depression    • Chronic bronchitis (CMS/HCC)    • Fibromyalgia    • Heart valve problem    • Hypertension    • Migraine    • PCOS (polycystic ovarian syndrome)    • Pneumonia    • Seizures (CMS/HCC)        Past Surgical History:   Procedure Laterality Date   • CHOLECYSTECTOMY         Social History:  IVDU (heroin). Former smoker. No ETOH use.    Family History:  family history includes Alcohol abuse in her brother and sister; Alzheimer's disease in her maternal grandmother; Bone cancer in her mother; Breast cancer in her mother; Dementia in her father; Diabetes in her father; Heart disease in her father and paternal grandfather; Hypertension in her brother, father, mother, and sister; Mental illness in her brother and sister; Parkinsonism in her maternal grandmother; Pulmonary embolism in her father; Skin cancer in her mother; Stroke in her father.    Allergies   Allergen Reactions   • Penicillins Rash     Received cefepime and cefdinir in 2018   • Phenobarbital Rash "       Medication:  Current Facility-Administered Medications   Medication Dose Route Frequency Provider Last Rate Last Dose   • acetaminophen (TYLENOL) tablet 650 mg  650 mg Oral Q6H PRN Malathi Vasquez APRN   650 mg at 06/14/20 1806   • aztreonam (AZACTAM) 2 g/100 mL 0.9% NS (mbp)  2 g Intravenous Q8H Elsy Augustin MD   2 g at 06/15/20 0915   • bismuth subsalicylate (PEPTO BISMOL) 262 MG/15ML suspension 30 mL  30 mL Oral Q6H PRN Verenice Keller MD   30 mL at 06/09/20 1721   • budesonide-formoterol (SYMBICORT) 160-4.5 MCG/ACT inhaler 2 puff  2 puff Inhalation BID - RT Rancho Lang MD   2 puff at 06/14/20 2037   • cyclobenzaprine (FLEXERIL) tablet 5 mg  5 mg Oral TID PRN Verenice Keller MD   5 mg at 06/15/20 0306   • DAPTOmycin (CUBICIN) 600 mg in sodium chloride 0.9 % 50 mL IVPB  8 mg/kg (Adjusted) Intravenous Q24H Rancho Lang  mL/hr at 06/14/20 0807 600 mg at 06/14/20 0807   • FLUoxetine (PROzac) capsule 40 mg  40 mg Oral Daily Vanessa Lopez MD   40 mg at 06/15/20 0914   • gabapentin (NEURONTIN) capsule 800 mg  800 mg Oral Q8H Vanessa Lpoez MD   800 mg at 06/15/20 0506   • HYDROcodone-acetaminophen (NORCO) 5-325 MG per tablet 1 tablet  1 tablet Oral Q6H PRN Nathan Ashley MD   1 tablet at 06/15/20 0306   • hydrocortisone 2.5 % cream   Topical Q12H Verenice Keller MD       • HYDROmorphone (DILAUDID) injection 0.25 mg  0.25 mg Intravenous Q4H PRN Nathan Ashley MD   0.25 mg at 06/15/20 0914   • hydrOXYzine (ATARAX) tablet 25 mg  25 mg Oral Nightly PRN Jae Cesar PA-C   25 mg at 06/14/20 2113   • ipratropium-albuterol (DUO-NEB) nebulizer solution 3 mL  3 mL Nebulization Q6H PRN Vanessa Lopez MD   3 mL at 06/10/20 2043   • lactobacillus acidophilus (RISAQUAD) capsule 1 capsule  1 capsule Oral Daily Rancho Lang MD   1 capsule at 06/15/20 0914   • levETIRAcetam (KEPPRA) 750 mg in sodium chloride 0.9 % 100 mL IVPB  750 mg Intravenous Q12H  Elizabeth Brown MD   750 mg at 06/15/20 0918   • [START ON 6/16/2020] levothyroxine sodium injection 25 mcg  25 mcg Intravenous Daily Betzy Mario APRN       • melatonin tablet 5 mg  5 mg Oral Nightly PRN Jae Cesar PA-C   5 mg at 06/14/20 2059   • metroNIDAZOLE (FLAGYL) 500 mg/100mL IVPB  500 mg Intravenous Q12H Elsy Augustin MD   500 mg at 06/15/20 0506   • morphine injection 1 mg  1 mg Intravenous Q2H PRN Nathan Ashley MD   1 mg at 06/13/20 0026   • morphine injection 2 mg  2 mg Intravenous Q6H Betzy Mario APRN       • nystatin (MYCOSTATIN) powder   Topical Q12H Verenice Keller MD       • ondansetron (ZOFRAN) injection 4 mg  4 mg Intravenous Q6H PRN Nathan Ashlye MD   4 mg at 06/14/20 1810   • pantoprazole (PROTONIX) injection 40 mg  40 mg Intravenous Q AM Betzy Mario APRN   40 mg at 06/15/20 0915   • potassium chloride (MICRO-K) CR capsule 40 mEq  40 mEq Oral PRN Verenice Keller MD   40 mEq at 06/11/20 0040    Or   • potassium chloride (KLOR-CON) packet 40 mEq  40 mEq Oral PRN Verenice Keller MD        Or   • potassium chloride 10 mEq in 100 mL IVPB  10 mEq Intravenous Q1H PRN Verenice Keller  mL/hr at 06/15/20 1042 10 mEq at 06/15/20 1042   • prochlorperazine (COMPAZINE) injection 5 mg  5 mg Intravenous Q6H PRN Verenice Keller MD   5 mg at 06/13/20 2358    Or   • prochlorperazine (COMPAZINE) tablet 5 mg  5 mg Oral Q6H PRN Verenice Keller MD   5 mg at 06/14/20 2059    Or   • prochlorperazine (COMPAZINE) suppository 25 mg  25 mg Rectal Q12H PRN Verenice Keller MD       • sodium chloride 0.9 % flush 10 mL  10 mL Intravenous Q12H Nathan Ashley MD   10 mL at 06/14/20 0807   • sodium chloride 0.9 % flush 10 mL  10 mL Intravenous PRN Nathan Ashley MD       • sodium chloride 0.9 % with KCl 20 mEq/L infusion  100 mL/hr Intravenous Continuous Betzy Mario APRN 100 mL/hr at 06/15/20 0917 100 mL/hr at 06/15/20 0917   • zolpidem (AMBIEN) tablet 5 mg  5 mg  "Oral Nightly PRN Verenice Keller MD   5 mg at 06/14/20 2100         Infectious History:  MRSA    Antibiotics:  Anti-Infectives (From admission, onward)    Ordered     Dose/Rate Route Frequency Start Stop    06/11/20 1537  aztreonam (AZACTAM) 2 g/100 mL 0.9% NS (mbp)  Review   Ordering Provider:  Elsy Augustin MD    2 g  over 4 Hours Intravenous Every 8 Hours 06/12/20 0000 06/19/20 2359    06/11/20 1537  aztreonam (AZACTAM) 2 g/100 mL 0.9% NS (mbp)     Ordering Provider:  Elsy Augustin MD    2 g  over 30 Minutes Intravenous Once 06/11/20 1630 06/11/20 1808    06/11/20 1537  metroNIDAZOLE (FLAGYL) 500 mg/100mL IVPB  Review   Ordering Provider:  Elsy Augustin MD    500 mg  over 60 Minutes Intravenous Every 12 Hours 06/11/20 1600 06/19/20 1559    06/02/20 0743  DAPTOmycin (CUBICIN) 600 mg in sodium chloride 0.9 % 50 mL IVPB     Rancho Lang MD reviewed the order on 06/05/20 0802.   Ordering Provider:  Rancho Lang MD    8 mg/kg × 76.6 kg (Adjusted)  100 mL/hr over 30 Minutes Intravenous Every 24 Hours 06/02/20 0900 06/23/20 0859    06/01/20 1501  vancomycin 2250 mg/500 mL 0.9% NS IVPB ([facility])     Ordering Provider:  Vanessa Lopez MD    2,250 mg Intravenous Once 06/01/20 1600 06/01/20 1756            Review of Systems    As above    Physical Exam:   Vital Signs   /79 (BP Location: Right arm, Patient Position: Lying)   Pulse 105   Temp 98.5 °F (36.9 °C) (Oral)   Resp 18   Ht 165.1 cm (65\")   Wt 106 kg (233 lb)   LMP 04/01/2020 (Approximate)   SpO2 98%   BMI 38.77 kg/m²     GENERAL: Awake alert, Obese. NAD  HENT: Normocephalic, atraumatic.  No external oral lesions  Eyes: anicteric. Pupils equal and round  NECK: Supple without nuchal rigidity. No mass.  HEART: RRR; No murmur, gallops or rubs noted.   LUNGS: CTAB. Nonlabored.  No wheezing noted  ABDOMEN: Soft, nontender, nondistended. Positive bowel sounds.  No guarding noted  EXT:  No cyanosis, clubbing or edema. "   :   Without Petit catheter.  MSK: FROM without joint effusions noted arms/legs.  Some tenderness to palpation over her lumbar spine. 5/5 strength throughout  SKIN: has slightly discolored/gray patchy/papular lesions over her bilateral lower extremity- these appeared about 2 weeks PTA per the patient.  No other possible peripheral stigmata of infective endocarditis noted over her extremities or elsewhere.  She has also developed a petechial rash over her entire back bilateral upper extremities, buttocks and inguinal and abdominal folds which is improveing  On the R upper arm there remains a large lesion with a hemorrhagic center but minimal induration and no fuctuance  NEURO: Oriented to PPT. No focal deficits on motor/sensory exam at arms/legs. 5 out of 5 strength throughout  PSYCHIATRIC: Normal insight and judgement. Cooperative with PE    Laboratory Data    Results from last 7 days   Lab Units 06/15/20  0443 06/11/20  0409 06/10/20  0530   WBC 10*3/mm3 9.51 10.24 17.35*   HEMOGLOBIN g/dL 9.2* 9.3* 10.3*   HEMATOCRIT % 30.8* 30.8* 33.8*   PLATELETS 10*3/mm3 274 276 370     Results from last 7 days   Lab Units 06/15/20  0443   SODIUM mmol/L 136   POTASSIUM mmol/L 3.0*   CHLORIDE mmol/L 111*   CO2 mmol/L 15.0*   BUN mg/dL 9   CREATININE mg/dL 0.45*   GLUCOSE mg/dL 87   CALCIUM mg/dL 7.3*     Results from last 7 days   Lab Units 06/15/20  0443   ALK PHOS U/L 122*   BILIRUBIN mg/dL 0.4   ALT (SGPT) U/L 6   AST (SGOT) U/L 12               Estimated Creatinine Clearance: 205 mL/min (A) (by C-G formula based on SCr of 0.45 mg/dL (L)).       Microbiology:    Microbiology Results (last 10 days)     Procedure Component Value - Date/Time    Blood Culture - Blood, Arm, Right [[id number]] Collected:  06/11/20 1547    Lab Status:  Preliminary result Specimen:  Blood from Arm, Right Updated:  06/14/20 6767     Blood Culture No growth at 3 days    Stool Culture (Reference Lab) - Stool, Per Rectum [[id number]] Collected:   06/08/20 0351    Lab Status:  Edited Result - FINAL Specimen:  Stool from Per Rectum Updated:  06/12/20 1410     Salmonella/Shigella Screen Final report     Result 1 Comment     Comment: No Salmonella or Shigella recovered.        Campylobacter Culture Final report     Result 1 Comment     Comment: No Campylobacter species isolated.        E coli, Shiga toxin Assay Negative    Narrative:       Performed at:  [address] [place] [address]  [id number]  : Lam Roman PhD, Phone:  [phone]    Clostridium Difficile Toxin - Stool, Per Rectum [[id number]] Collected:  06/05/20 1748    Lab Status:  Final result Specimen:  Stool from Per Rectum Updated:  06/05/20 1858    Narrative:       The following orders were created for panel order Clostridium Difficile Toxin - Stool, Per Rectum.  Procedure                               Abnormality         Status                     ---------                               -----------         ------                     Clostridium Difficile To...[[id number]]  Normal              Final result                 Please view results for these tests on the individual orders.    Clostridium Difficile Toxin, PCR - Stool, Per Rectum [[id number]]  (Normal) Collected:  06/05/20 1748    Lab Status:  Final result Specimen:  Stool from Per Rectum Updated:  06/05/20 1858     C. Difficile Toxins by PCR Not Detected    Narrative:       Performance characteristics of test not established for patients <2 years of age.  Negative for Toxigenic C. Difficile          Radiology:  Xr Shoulder 2+ View Right    Result Date: 6/8/2020  No acute bony abnormality.  D:  06/08/2020 E:  06/08/2020  This report was finalized on 6/8/2020 4:30 PM by Dr. Jasmine Marie MD.      Xr Abdomen Flat & Upright    Result Date: 6/14/2020  Nasogastric tube in the stomach with mild gastric distention. Dilated loops of small bowel again seen diffusely throughout the abdomen unchanged with findings  suggesting ileus or small bowel obstruction.  DICTATED:   06/14/2020 EDITED/ls :   06/14/2020      Ct Head Without Contrast    Result Date: 6/12/2020  1. No acute intracranial abnormality specifically, no midline shift or hydrocephalus. No intra-axial hemorrhage. 2. Moderate-to-large left mastoid effusion.  D:  06/11/2020 E:  06/11/2020    This report was finalized on 6/12/2020 5:23 PM by Dr. Sam Pugh.      Ct Abdomen Pelvis With Contrast    Result Date: 6/12/2020  1.  Small bowel obstruction with severely dilated small bowel loops and air-fluid levels extending into the right lower quadrant adjacent to the right iliac vessels where there is caliber change concerning for an apparent transition point at this level with evidence for ascites and mesenteric edema, however, no evidence for perforation or abscess.  2.  Persistent small volume bilateral pleural effusions and opacifications in the right midlung better seen on recent 06/05/2020 examination recently performed.  D:  06/11/2020 E:  06/11/2020  This report was finalized on 6/12/2020 5:23 PM by Dr. Sam Pugh.      Xr Chest 1 View    Result Date: 6/13/2020  Ill-defined opacification left lung base. Mild increased markings seen within the right upper lobe and right midlung.  DICTATED:   06/13/2020 EDITED/ls :   06/13/2020  This report was finalized on 6/13/2020 3:35 PM by Dr. Jasmine Marie MD.      Xr Abdomen Kub    Result Date: 6/12/2020  Severely dilated gas-filled small bowel loops throughout the mid abdomen consistent with obstructive bowel gas pattern.  D:  06/11/2020 E:  06/11/2020  This report was finalized on 6/12/2020 5:21 PM by Dr. Sam Pugh.           Impression:     This is a 38 yo woman with a h/o IVDU presenting with severe low back pain and shortness of air and found to have fevers/sepsis with MRSA growing from multiple blood culture sets. Also has CT chest findings that are concerning for septic pulmonary emboli and now noted to have  a decent size vegetation on her tricuspid valve on REDD consistent with infective endocarditis. MRI with 8x4 x3 cm multilobular abscess of the right erector spinae muscles, approximately from L3-S1. Also with two possible interconnected dorsal epidural abscesses at L2-L3 and L3-L4 but only causing mild canal stenosis. Dr. Alex with neurosurgery has evaluated and does not think that she requires any surgical intervention at this time for her epidural abscesses as they are modest. Underwent CT-guided drainage 6/4 and had 2cc of purulent drainage aspirated from her fluid collection in erector spinae muscles. Repeat blood culture sent 6/3 is negative    REDD with a 1 cm x 1 cm vegetation on the tricuspid valve    It appears that her petechial rash has progressed somewhat but her renal function, PT/INR and platelet count are stable which makes DIC less likely    SBO hopefully improving. Aspiration pneumonia seems to be improving and fevers and leukocytosis improved.       Problems:  1. 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 MRSA septicemia  Bacteremia appears to have cleared; no new cutaneous emboli  3. Epidural abscesses  4. Large loculated Paraspinal muscle abscess- s/p aspiration which is culture positive  5. New LLL infiltrate, likely aspiration pneumonia  6. Ileus etiology unclear -Possible transition zone on abdominal CT concerning for SBO  7. Diarrhea - ongoing and intermittent Cdiff negative, culture unremarkable; unable to order GI panel  8. Sepsis with fever, leukocytosis with neutrophilia, tachycardia-recurrence 6/11 associated with severe vomiting- unclear if aspiration, new complication of endocarditis, etc  9. Septic pulmonary emboli  10. Acute on chronic low back pain  11. Urine cx with MRSA- represents systemic infection, not primary source  12. Microcytic anemia  13. History of PCN allergy   14. Recent IV drug  use  15. Obesity  16. H/o COPD  17. H/o seizure disorder  18. H/o depression/anxiety    PLAN: Thank you for asking us to see Liliana Calderon, I recommend the followin. Stop daptomcyin  2. Start Vancomycin with PK dosing consultation. Will need a prolonged course of IV antibiotics for her severe MRSA infection  3. Continue azactam and flagyl for a 7 day course (stop date 20)  4. Will consider repeat MRI L-spine in 1-2 weeks. Neurosurgery following  5. CT surgery has arranged for transfer to  for angiovac when clinically stable  6. Ng tube. Small bowel follow through planned for today. Dr. Lundberg following  7. Follow repeat blood culture sent - NGTD  8. Double lumen picc placed in case the ileus/SBO does not resolve and she might need TPN    Complex set of medical issues requiring a high-level medical decision making.  The patient does have significant risk for for further morbidity and mortality in the setting of her severe MRSA infection. Has some risk for paralysis if her epidural abscesses worsen.    Prognosis guarded    Discussed with RN today            Rancho Lang MD  6/15/2020

## 2020-06-15 NOTE — THERAPY TREATMENT NOTE
Patient Name: Liliana Calderon  : 1980    MRN: [id number]                              Today's Date: 6/15/2020       Admit Date: 2020    Visit Dx:     ICD-10-CM ICD-9-CM   1. Impaired mobility and ADLs Z74.09 V49.89    Z78.9      Patient Active Problem List   Diagnosis   • Bradycardia with 41-50 beats per minute   • Essential hypertension   • Fibromyalgia syndrome   • GERD without esophagitis   • Sepsis (CMS/HCC)   • DDD (degenerative disc disease), cervical   • Anxiety   • Depression   • Seizure (CMS/HCC)   • Migraine   • Infective endocarditis   • Epidural abscess   • MRSA bacteremia     Past Medical History:   Diagnosis Date   • Anxiety and depression    • Chronic bronchitis (CMS/HCC)    • Fibromyalgia    • Heart valve problem    • Hypertension    • Migraine    • PCOS (polycystic ovarian syndrome)    • Pneumonia    • Seizures (CMS/HCC)      Past Surgical History:   Procedure Laterality Date   • CHOLECYSTECTOMY       General Information     Row Name 06/15/20 1533          PT Evaluation Time/Intention    Document Type  therapy note (daily note)  -MP     Mode of Treatment  physical therapy  -MP     Row Name 06/15/20 1533          General Information    Existing Precautions/Restrictions  fall;spinal;other (see comments) Spinal precautions for comfort due to multiple epidural abscesses  -MP     Row Name 06/15/20 1533          Cognitive Assessment/Intervention- PT/OT    Orientation Status (Cognition)  oriented x 4  -MP     Row Name 06/15/20 1533          Safety Issues, Functional Mobility    Impairments Affecting Function (Mobility)  strength;pain;balance;endurance/activity tolerance;postural/trunk control;range of motion (ROM)  -MP       User Key  (r) = Recorded By, (t) = Taken By, (c) = Cosigned By    Initials Name Provider Type    MP Stephen Rodrigues, PT Physical Therapist        Mobility     Row Name 06/15/20 1534          Bed Mobility Assessment/Treatment    Bed Mobility Assessment/Treatment  rolling  left;rolling right  -MP     Rolling Left [place] (Bed Mobility)  minimum assist (75% patient effort);1 person assist;verbal cues  -MP     Rolling Right [place] (Bed Mobility)  minimum assist (75% patient effort);1 person assist;verbal cues  -MP     Comment (Bed Mobility)  Patient performed rolling R/L x8 in session to be cleaned from multiple bowel movments with linen change.   -MP     Row Name 06/15/20 1534          Transfer Assessment/Treatment    Comment (Transfers)  Patient declined due to fatigue from imaging procedures today.  -MP       User Key  (r) = Recorded By, (t) = Taken By, (c) = Cosigned By    Initials Name Provider Type    Stephen Joseph PT Physical Therapist        Obj/Interventions     Row Name 06/15/20 1535          Therapeutic Exercise    Lower Extremity (Therapeutic Exercise)  gluteal sets;quad sets, bilateral  -MP     Lower Extremity Range of Motion (Therapeutic Exercise)  ankle dorsiflexion/plantar flexion, bilateral  -MP     Exercise Type (Therapeutic Exercise)  AROM (active range of motion)  -MP     Position (Therapeutic Exercise)  supine  -MP     Sets/Reps (Therapeutic Exercise)  1x15  -MP     Row Name 06/15/20 1535          Static Sitting Balance    Level of [place] (Unsupported Sitting, Static Balance)  unable to perform activity  -MP     Row Name 06/15/20 1535          Dynamic Sitting Balance    Level of [place], Reaches Outside Midline (Sitting, Dynamic Balance)  unable to perform activity  -MP     Row Name 06/15/20 1535          Static Standing Balance    Level of [place] (Supported Standing, Static Balance)  unable to perform activity  -MP     Row Name 06/15/20 1535          Dynamic Standing Balance    Level of [place], Reaches Outside Midline (Standing, Dynamic Balance)  unable to perform activity  -MP       User Key  (r) = Recorded By, (t) = Taken By, (c) = Cosigned By    Initials Name Provider Type    Stephen Joseph PT Physical Therapist         Goals/Plan    No documentation.       Clinical Impression     Row Name 06/15/20 1535          Pain Assessment    Additional Documentation  Pain Scale: Numbers Pre/Post-Treatment (Group)  -MP     Row Name 06/15/20 1530          Pain Scale: Numbers Pre/Post-Treatment    Pain Scale: Numbers, Pretreatment  9/10  -MP     Pain Scale: Numbers, Post-Treatment  9/10  -MP     Pain Location - Side  Bilateral  -MP     Pain Location - Orientation  lower  -MP     Pain Location  back  -MP     Pre/Post Treatment Pain Comment  HUC present in session to assist in pericare/linen change; aware.  -MP     Pain Intervention(s)  Repositioned  -MP     Row Name 06/15/20 1530          Vital Signs    Pre Systolic BP Rehab  -- VSS; RN cleared for PTx  -MP     O2 Delivery Pre Treatment  room air  -MP     O2 Delivery Intra Treatment  room air  -MP     O2 Delivery Post Treatment  room air  -MP     Pre Patient Position  Supine  -MP     Intra Patient Position  Supine  -MP     Post Patient Position  Supine  -MP     Row Name 06/15/20 1530          Positioning and Restraints    Pre-Treatment Position  in bed  -MP     Post Treatment Position  bed  -MP     In Bed  supine;call light within reach;encouraged to call for assist;exit alarm on;side rails up x3  -MP       User Key  (r) = Recorded By, (t) = Taken By, (c) = Cosigned By    Initials Name Provider Type    Stephen Joseph PT Physical Therapist        Outcome Measures     Row Name 06/15/20 6175          How much help from another person do you currently need...    Turning from your back to your side while in flat bed without using bedrails?  3  -MP     Moving from lying on back to sitting on the side of a flat bed without bedrails?  3  -MP     Moving to and from a bed to a chair (including a wheelchair)?  3  -MP     Standing up from a chair using your arms (e.g., wheelchair, bedside chair)?  2  -MP     Climbing 3-5 steps with a railing?  1  -MP     To walk in hospital room?  2  -MP     AM-PAC 6  Clicks Score (PT)  14  -MP     Row Name 06/15/20 1537          Functional Assessment    Outcome Measure Options  AM-PAC 6 Clicks Basic Mobility (PT)  -MP       User Key  (r) = Recorded By, (t) = Taken By, (c) = Cosigned By    Initials Name Provider Type    Stephen Joseph PT Physical Therapist        Physical Therapy Education                 Title: PT OT SLP Therapies (Done)     Topic: Physical Therapy (Done)     Point: Mobility training (Done)     Description:   Instruct learner(s) on safety and technique for assisting patient out of bed, chair or wheelchair.  Instruct in the proper use of assistive devices, such as walker, crutches, cane or brace.              Patient Friendly Description:   It's important to get you on your feet again, but we need to do so in a way that is safe for you. Falling has serious consequences, and your personal safety is the most important thing of all.        When it's time to get out of bed, one of us or a family member will sit next to you on the bed to give you support.     If your doctor or nurse tells you to use a walker, crutches, a cane, or a brace, be sure you use it every time you get out of bed, even if you think you don't need it.    Learning Progress Summary           Patient Acceptance, E,D, VU,NR by BACILIO at 6/15/2020 1537    Acceptance, E, VU,NR by MAURICIO at 6/14/2020 1519    Acceptance, E, VU,NR by MAURICIO at 6/10/2020 1551    Acceptance, E, NR by MAURICIO at 6/8/2020 1431    Comment:  Limited education based on pt's tolerance of PT during session.    Acceptance, E,TB, VU,NR by MAURICIO at 6/5/2020 1342    Comment:  Pt advised in log roll to avoid unnecessary pain.                   Point: Home exercise program (Done)     Description:   Instruct learner(s) on appropriate technique for monitoring, assisting and/or progressing patient with therapeutic exercises and activities.              Learning Progress Summary           Patient Acceptance, E,D, VU,NR by BACILIO at 6/15/2020 1537     Acceptance, E, VU,NR by  at 6/14/2020 1519    Acceptance, E, VU,NR by  at 6/10/2020 1551    Acceptance, E, NR by  at 6/8/2020 1431    Comment:  Limited education based on pt's tolerance of PT during session.                   Point: Body mechanics (Done)     Description:   Instruct learner(s) on proper positioning and spine alignment for patient and/or caregiver during mobility tasks and/or exercises.              Learning Progress Summary           Patient Acceptance, E,D, VU,NR by  at 6/15/2020 1537    Acceptance, E, VU,NR by  at 6/14/2020 1519    Acceptance, E, VU,NR by  at 6/10/2020 1551    Acceptance, E, NR by  at 6/8/2020 1431    Comment:  Limited education based on pt's tolerance of PT during session.    Acceptance, E,TB, VU,NR by  at 6/5/2020 1342    Comment:  Pt advised in log roll to avoid unnecessary pain.                   Point: Precautions (Done)     Description:   Instruct learner(s) on prescribed precautions during mobility and gait tasks              Learning Progress Summary           Patient Acceptance, E,D, VU,NR by  at 6/15/2020 1537    Acceptance, E, VU,NR by  at 6/14/2020 1519    Acceptance, E, VU,NR by  at 6/10/2020 1551    Acceptance, E, NR by  at 6/8/2020 1431    Comment:  Limited education based on pt's tolerance of PT during session.    Acceptance, E,TB, VU,NR by  at 6/5/2020 1342    Comment:  Pt advised in log roll to avoid unnecessary pain.                               User Key     Initials Effective Dates Name Provider Type Discipline     11/20/18 -  Elsy Hinojosa, PT Physical Therapist PT     11/06/19 -  Stephen Rodrigues PT Physical Therapist PT              PT Recommendation and Plan     Plan of Care Reviewed With: patient  Progress: no change  Outcome Summary: Patient deferred OOB activity due to fatigue. Patient performed rolling R/L with min.a for pericare and linen change from multiple bowel movements. Patient gave good effort with supine ther  ex. Cont PT POC.     Time Calculation:   PT Charges     Row Name 06/15/20 1500             Time Calculation    Start Time  1500  -MP      PT Received On  06/15/20  -MP         Time Calculation- PT    Total Timed Code Minutes- PT  26 minute(s)  -MP         Timed Charges    88287 - PT Therapeutic Exercise Minutes  10  -MP      01433 - PT Therapeutic Activity Minutes  16  -MP        User Key  (r) = Recorded By, (t) = Taken By, (c) = Cosigned By    Initials Name Provider Type    Stephen Joseph PT Physical Therapist        Therapy Charges for Today     Code Description Service Date Service Provider Modifiers Qty    10981869148 HC PT THER PROC EA 15 MIN 6/15/2020 Stephen Rodrigues, PT GP 1    13585768425 HC PT THERAPEUTIC ACT EA 15 MIN 6/15/2020 Stephen Rodrigues, PT GP 1          PT G-Codes  Outcome Measure Options: AM-PAC 6 Clicks Basic Mobility (PT)  AM-PAC 6 Clicks Score (PT): 14  AM-PAC 6 Clicks Score (OT): 15    Stephen Rodrigues PT  6/15/2020

## 2020-06-16 LAB — BACTERIA SPEC AEROBE CULT: NORMAL

## 2020-07-01 ENCOUNTER — TRANSCRIBE ORDERS (OUTPATIENT)
Dept: ADMINISTRATIVE | Facility: HOSPITAL | Age: 40
End: 2020-07-01

## 2020-07-01 DIAGNOSIS — E83.51 HYPOCALCEMIA: Primary | ICD-10-CM

## 2020-10-19 ENCOUNTER — TRANSCRIBE ORDERS (OUTPATIENT)
Dept: ADMINISTRATIVE | Facility: HOSPITAL | Age: 40
End: 2020-10-19

## 2020-10-19 DIAGNOSIS — R17 JAUNDICE: Primary | ICD-10-CM

## 2020-10-19 DIAGNOSIS — R11.2 NAUSEA AND VOMITING, INTRACTABILITY OF VOMITING NOT SPECIFIED, UNSPECIFIED VOMITING TYPE: ICD-10-CM

## 2020-10-20 ENCOUNTER — HOSPITAL ENCOUNTER (OUTPATIENT)
Dept: ULTRASOUND IMAGING | Facility: HOSPITAL | Age: 40
Discharge: HOME | End: 2020-10-20
Admitting: NURSE PRACTITIONER

## 2020-10-20 DIAGNOSIS — R11.2 NAUSEA AND VOMITING, INTRACTABILITY OF VOMITING NOT SPECIFIED, UNSPECIFIED VOMITING TYPE: ICD-10-CM

## 2020-10-20 DIAGNOSIS — R17 JAUNDICE: ICD-10-CM

## 2020-10-20 PROCEDURE — 76705 ECHO EXAM OF ABDOMEN: CPT

## 2020-12-22 ENCOUNTER — APPOINTMENT (OUTPATIENT)
Dept: GENERAL RADIOLOGY | Facility: HOSPITAL | Age: 40
End: 2020-12-22

## 2020-12-22 ENCOUNTER — HOSPITAL ENCOUNTER (EMERGENCY)
Facility: HOSPITAL | Age: 40
Discharge: HOME | End: 2020-12-22
Attending: EMERGENCY MEDICINE | Admitting: STUDENT IN AN ORGANIZED HEALTH CARE EDUCATION/TRAINING PROGRAM

## 2020-12-22 ENCOUNTER — APPOINTMENT (OUTPATIENT)
Dept: CT IMAGING | Facility: HOSPITAL | Age: 40
End: 2020-12-22

## 2020-12-22 VITALS
TEMPERATURE: 98.9 F | OXYGEN SATURATION: 100 % | BODY MASS INDEX: 35.85 KG/M2 | RESPIRATION RATE: 18 BRPM | HEIGHT: 65 IN | HEART RATE: 84 BPM | DIASTOLIC BLOOD PRESSURE: 97 MMHG | WEIGHT: 215.2 LBS | SYSTOLIC BLOOD PRESSURE: 145 MMHG

## 2020-12-22 DIAGNOSIS — J18.9 PNEUMONIA OF BOTH LUNGS DUE TO INFECTIOUS ORGANISM, UNSPECIFIED PART OF LUNG: Primary | ICD-10-CM

## 2020-12-22 LAB
ALBUMIN SERPL-MCNC: 4.5 G/DL (ref 3.5–5.2)
ALBUMIN/GLOB SERPL: 1.2 G/DL
ALP SERPL-CCNC: 248 U/L (ref 39–117)
ALT SERPL W P-5'-P-CCNC: 80 U/L (ref 1–33)
AMPHET+METHAMPHET UR QL: NEGATIVE
AMPHETAMINES UR QL: POSITIVE
ANION GAP SERPL CALCULATED.3IONS-SCNC: 14.6 MMOL/L (ref 5–15)
AST SERPL-CCNC: 50 U/L (ref 1–32)
B-HCG UR QL: NEGATIVE
BARBITURATES UR QL SCN: NEGATIVE
BASOPHILS # BLD AUTO: 0.03 10*3/MM3 (ref 0–0.2)
BASOPHILS NFR BLD AUTO: 0.3 % (ref 0–1.5)
BENZODIAZ UR QL SCN: NEGATIVE
BILIRUB SERPL-MCNC: 0.7 MG/DL (ref 0–1.2)
BUN SERPL-MCNC: 8 MG/DL (ref 6–20)
BUN/CREAT SERPL: 12.3 (ref 7–25)
BUPRENORPHINE SERPL-MCNC: NEGATIVE NG/ML
CALCIUM SPEC-SCNC: 9.9 MG/DL (ref 8.6–10.5)
CANNABINOIDS SERPL QL: NEGATIVE
CHLORIDE SERPL-SCNC: 99 MMOL/L (ref 98–107)
CO2 SERPL-SCNC: 23.4 MMOL/L (ref 22–29)
COCAINE UR QL: NEGATIVE
CREAT SERPL-MCNC: 0.65 MG/DL (ref 0.57–1)
D-LACTATE SERPL-SCNC: 2.7 MMOL/L (ref 0.5–2)
DEPRECATED RDW RBC AUTO: 46.1 FL (ref 37–54)
EOSINOPHIL # BLD AUTO: 0.08 10*3/MM3 (ref 0–0.4)
EOSINOPHIL NFR BLD AUTO: 0.9 % (ref 0.3–6.2)
ERYTHROCYTE [DISTWIDTH] IN BLOOD BY AUTOMATED COUNT: 14.6 % (ref 12.3–15.4)
GFR SERPL CREATININE-BSD FRML MDRD: 101 ML/MIN/1.73
GLOBULIN UR ELPH-MCNC: 3.9 GM/DL
GLUCOSE SERPL-MCNC: 143 MG/DL (ref 65–99)
HCT VFR BLD AUTO: 43.4 % (ref 34–46.6)
HGB BLD-MCNC: 13.4 G/DL (ref 12–15.9)
HOLD SPECIMEN: NORMAL
IMM GRANULOCYTES # BLD AUTO: 0.06 10*3/MM3 (ref 0–0.05)
IMM GRANULOCYTES NFR BLD AUTO: 0.6 % (ref 0–0.5)
LACTATE HOLD SPECIMEN: NORMAL
LYMPHOCYTES # BLD AUTO: 1.41 10*3/MM3 (ref 0.7–3.1)
LYMPHOCYTES NFR BLD AUTO: 15.2 % (ref 19.6–45.3)
MCH RBC QN AUTO: 26.6 PG (ref 26.6–33)
MCHC RBC AUTO-ENTMCNC: 30.9 G/DL (ref 31.5–35.7)
MCV RBC AUTO: 86.1 FL (ref 79–97)
METHADONE UR QL SCN: NEGATIVE
MONOCYTES # BLD AUTO: 0.25 10*3/MM3 (ref 0.1–0.9)
MONOCYTES NFR BLD AUTO: 2.7 % (ref 5–12)
NEUTROPHILS NFR BLD AUTO: 7.45 10*3/MM3 (ref 1.7–7)
NEUTROPHILS NFR BLD AUTO: 80.3 % (ref 42.7–76)
NRBC BLD AUTO-RTO: 0 /100 WBC (ref 0–0.2)
NT-PROBNP SERPL-MCNC: 189.7 PG/ML (ref 0–450)
OPIATES UR QL: POSITIVE
OXYCODONE UR QL SCN: NEGATIVE
PCP UR QL SCN: NEGATIVE
PLATELET # BLD AUTO: 226 10*3/MM3 (ref 140–450)
PMV BLD AUTO: 9.5 FL (ref 6–12)
POTASSIUM SERPL-SCNC: 4.2 MMOL/L (ref 3.5–5.2)
PROCALCITONIN SERPL-MCNC: 0.07 NG/ML (ref 0–0.25)
PROPOXYPH UR QL: NEGATIVE
PROT SERPL-MCNC: 8.4 G/DL (ref 6–8.5)
RBC # BLD AUTO: 5.04 10*6/MM3 (ref 3.77–5.28)
SARS-COV-2 RNA PNL SPEC NAA+PROBE: NOT DETECTED
SODIUM SERPL-SCNC: 137 MMOL/L (ref 136–145)
TRICYCLICS UR QL SCN: NEGATIVE
TROPONIN T SERPL-MCNC: <0.01 NG/ML (ref 0–0.03)
WBC # BLD AUTO: 9.28 10*3/MM3 (ref 3.4–10.8)
WHOLE BLOOD HOLD SPECIMEN: NORMAL
WHOLE BLOOD HOLD SPECIMEN: NORMAL

## 2020-12-22 PROCEDURE — 83605 ASSAY OF LACTIC ACID: CPT | Performed by: EMERGENCY MEDICINE

## 2020-12-22 PROCEDURE — 96374 THER/PROPH/DIAG INJ IV PUSH: CPT

## 2020-12-22 PROCEDURE — 83880 ASSAY OF NATRIURETIC PEPTIDE: CPT | Performed by: EMERGENCY MEDICINE

## 2020-12-22 PROCEDURE — 99284 EMERGENCY DEPT VISIT MOD MDM: CPT

## 2020-12-22 PROCEDURE — 87635 SARS-COV-2 COVID-19 AMP PRB: CPT | Performed by: STUDENT IN AN ORGANIZED HEALTH CARE EDUCATION/TRAINING PROGRAM

## 2020-12-22 PROCEDURE — 71275 CT ANGIOGRAPHY CHEST: CPT

## 2020-12-22 PROCEDURE — 94640 AIRWAY INHALATION TREATMENT: CPT

## 2020-12-22 PROCEDURE — 84145 PROCALCITONIN (PCT): CPT | Performed by: EMERGENCY MEDICINE

## 2020-12-22 PROCEDURE — 93005 ELECTROCARDIOGRAM TRACING: CPT | Performed by: EMERGENCY MEDICINE

## 2020-12-22 PROCEDURE — 85025 COMPLETE CBC W/AUTO DIFF WBC: CPT | Performed by: EMERGENCY MEDICINE

## 2020-12-22 PROCEDURE — 25010000002 METHYLPREDNISOLONE PER 125 MG: Performed by: EMERGENCY MEDICINE

## 2020-12-22 PROCEDURE — 84484 ASSAY OF TROPONIN QUANT: CPT | Performed by: EMERGENCY MEDICINE

## 2020-12-22 PROCEDURE — 25010000002 IOPAMIDOL 61 % SOLUTION: Performed by: EMERGENCY MEDICINE

## 2020-12-22 PROCEDURE — 80306 DRUG TEST PRSMV INSTRMNT: CPT | Performed by: EMERGENCY MEDICINE

## 2020-12-22 PROCEDURE — 80053 COMPREHEN METABOLIC PANEL: CPT | Performed by: EMERGENCY MEDICINE

## 2020-12-22 PROCEDURE — 25010000002 IOPAMIDOL 61 % SOLUTION

## 2020-12-22 PROCEDURE — 71046 X-RAY EXAM CHEST 2 VIEWS: CPT

## 2020-12-22 PROCEDURE — 81025 URINE PREGNANCY TEST: CPT | Performed by: EMERGENCY MEDICINE

## 2020-12-22 RX ORDER — IPRATROPIUM BROMIDE AND ALBUTEROL SULFATE 2.5; .5 MG/3ML; MG/3ML
3 SOLUTION RESPIRATORY (INHALATION) ONCE
Status: COMPLETED | OUTPATIENT
Start: 2020-12-22 | End: 2020-12-22

## 2020-12-22 RX ORDER — METHYLPREDNISOLONE SODIUM SUCCINATE 125 MG/2ML
125 INJECTION, POWDER, LYOPHILIZED, FOR SOLUTION INTRAMUSCULAR; INTRAVENOUS ONCE
Status: COMPLETED | OUTPATIENT
Start: 2020-12-22 | End: 2020-12-22

## 2020-12-22 RX ORDER — LEVOFLOXACIN 750 MG/1
750 TABLET ORAL ONCE
Status: COMPLETED | OUTPATIENT
Start: 2020-12-22 | End: 2020-12-22

## 2020-12-22 RX ORDER — SODIUM CHLORIDE 0.9 % (FLUSH) 0.9 %
10 SYRINGE (ML) INJECTION AS NEEDED
Status: DISCONTINUED | OUTPATIENT
Start: 2020-12-22 | End: 2020-12-22 | Stop reason: HOSPADM

## 2020-12-22 RX ORDER — LEVOFLOXACIN 500 MG/1
500 TABLET, FILM COATED ORAL DAILY
Qty: 7 TABLET | Refills: 0 | Status: SHIPPED | OUTPATIENT
Start: 2020-12-22

## 2020-12-22 RX ADMIN — SODIUM CHLORIDE 1000 ML: 9 INJECTION, SOLUTION INTRAVENOUS at 17:40

## 2020-12-22 RX ADMIN — SODIUM CHLORIDE 1000 ML: 9 INJECTION, SOLUTION INTRAVENOUS at 19:44

## 2020-12-22 RX ADMIN — LEVOFLOXACIN 750 MG: 750 TABLET, FILM COATED ORAL at 20:49

## 2020-12-22 RX ADMIN — IOPAMIDOL 100 ML: 612 INJECTION, SOLUTION INTRAVENOUS at 18:58

## 2020-12-22 RX ADMIN — IOPAMIDOL: 612 INJECTION, SOLUTION INTRAVENOUS at 19:30

## 2020-12-22 RX ADMIN — METHYLPREDNISOLONE SODIUM SUCCINATE 125 MG: 125 INJECTION, POWDER, FOR SOLUTION INTRAMUSCULAR; INTRAVENOUS at 17:40

## 2020-12-22 RX ADMIN — IPRATROPIUM BROMIDE AND ALBUTEROL SULFATE 3 ML: .5; 3 SOLUTION RESPIRATORY (INHALATION) at 17:12

## 2020-12-22 NOTE — ED PROVIDER NOTES
Subjective   40-year-old female presenting with hemoptysis.  She states that for the last 4 days she has had some cough that is occasionally productive of blood-streaked sputum.  Feels mildly short of breath.  Thinks she may have had a fever at onset of symptoms.  She went to [facility], they swabbed her for Covid and it was negative.  They sent her here for further evaluation.  She denies any nausea, vomiting, abdominal pain, chest pain.  She does note that back in the summer she had endocarditis and septic pulmonary emboli.  She was on Eliquis for a time but is now off.  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          Review of Systems   Constitutional: Negative.    HENT: Negative.    Eyes: Negative.    Respiratory: Positive for cough and shortness of breath.         Hemoptysis   Cardiovascular: Negative.    Gastrointestinal: Negative.    Genitourinary: Negative.    Musculoskeletal: Negative.    Skin: Negative.    Neurological: Negative.    Psychiatric/Behavioral: Negative.        Past Medical History:   Diagnosis Date   • Anxiety and depression    • Blood clot in vein    • Chronic bronchitis (CMS/HCC)    • Endocarditis    • Fibromyalgia    • Heart valve problem    • Hypertension    • Migraine    • PCOS (polycystic ovarian syndrome)    • Pneumonia    • Pulmonary embolism (CMS/HCC)    • Seizures (CMS/HCC)        Allergies   Allergen Reactions   • Penicillins Rash     Received cefepime and cefdinir in 2018   • Phenobarbital Rash       Past Surgical History:   Procedure Laterality Date   • CHOLECYSTECTOMY         Family History   Problem Relation Age of Onset   • Breast cancer Mother    • Bone cancer Mother    • Skin cancer Mother    • Hypertension Mother    • Dementia Father    • Diabetes Father    • Heart disease Father    • Hypertension Father    • Stroke Father    • Pulmonary embolism Father    • Alcohol abuse Sister    • Hypertension  Sister    • Mental illness Sister    • Alcohol abuse Brother    • Hypertension Brother    • Mental illness Brother    • Alzheimer's disease Maternal Grandmother    • Parkinsonism Maternal Grandmother    • Heart disease Paternal Grandfather        Social History     Socioeconomic History   • Marital status: Single     Spouse name: Not on file   • Number of children: Not on file   • Years of education: Not on file   • Highest education level: Not on file   Tobacco Use   • Smoking status: Former Smoker     Packs/day: 0.25     Years: 5.00     Pack years: 1.25     Types: Cigarettes     Quit date:      Years since quittin.9   • Smokeless tobacco: Never Used   Substance and Sexual Activity   • Alcohol use: No   • Drug use: Not Currently     Types: IV     Comment: HEROIN; last used a month ago   • Sexual activity: Defer           Objective   Physical Exam  Constitutional:       General: She is not in acute distress.     Appearance: Normal appearance. She is not ill-appearing, toxic-appearing or diaphoretic.   HENT:      Head: Normocephalic and atraumatic.      Right Ear: External ear normal.      Left Ear: External ear normal.      Nose: Nose normal.      Mouth/Throat:      Mouth: Mucous membranes are moist.      Pharynx: Oropharynx is clear.   Eyes:      Extraocular Movements: Extraocular movements intact.      Conjunctiva/sclera: Conjunctivae normal.      Pupils: Pupils are equal, round, and reactive to light.   Neck:      Musculoskeletal: Normal range of motion.   Cardiovascular:      Rate and Rhythm: Normal rate and regular rhythm.      Pulses: Normal pulses.      Heart sounds: Normal heart sounds.   Pulmonary:      Effort: Pulmonary effort is normal. No respiratory distress.      Breath sounds: Normal breath sounds. No stridor. No wheezing, rhonchi or rales.   Abdominal:      General: Bowel sounds are normal. There is no distension.      Tenderness: There is no abdominal tenderness.   Musculoskeletal: Normal  range of motion.         General: No swelling, tenderness or deformity.   Skin:     General: Skin is warm and dry.      Capillary Refill: Capillary refill takes less than 2 seconds.      Findings: No rash.   Neurological:      General: No focal deficit present.      Mental Status: She is alert and oriented to person, place, and time.   Psychiatric:         Mood and Affect: Mood normal.         Behavior: Behavior normal.         Procedures           ED Course  ED Course as of Dec 22 2056   Tue Dec 22, 2020   1944 CT Chest Pulmonary Embolism  Order: [id number]  Status:  Final result   Visible to patient:  No (not released) Next appt:  None  Details      Reading Physician Reading Date Result Priority  Elisha Lerma MD  [phone] 12/22/2020     Narrative & Impression    FINAL REPORT     TECHNIQUE:  Multiple contiguous transaxial slices through the chest were  obtained after the intravenous administration of contrast with  3-D coronal oblique reformatted images.     CLINICAL HISTORY:  cough, sob, hemoptysis, h/o PE     FINDINGS:  There is normal opacification of the pulmonary artery and its  branches without pulmonary embolism. The aorta opacifies  normally without dissection or aneurysm. Heart size is normal.  There are multiple bilateral peripheral nodular groundglass and  airspace consolidation sin a pattern suspicious of pneumonia,  possibly Covid 19 pneumonia.  Recommend appropriate follow-up at  this is nonspecific.     IMPRESSION:  Negative for pulmonary embolism  Bilateral nodular infiltrates  worrisome for Covid 19 pneumonia.  Recommend appropriate  follow-up            [DT]   2055 Discussed with patient.  Will treat with Levaquin.  Will need follow-up in 1 week if not improving    [DT]      ED Course User Index  [DT] Rancho Mota MD                                           MDM  Number of Diagnoses or Management Options  Diagnosis management comments: 40-year-old female with cough, shortness of  breath, hemoptysis.  Well-developed, well-nourished lady in no distress with exam as above.  Her lungs are clear.  Her vital signs are normal.  Will obtain labs, EKG, CT PE protocol.  We are going to check a urine drug screen.  Will give symptomatic treatment.  Disposition pending.    DDx: PE, viral illness, bronchitis, pneumonia, CHF    EKG interpreted by me: Sinus rhythm, normal rate, no acute ST changes, some nonspecific T wave changes, borderline QT, this is an abnormal EKG      Final diagnoses:   Pneumonia of both lungs due to infectious organism, unspecified part of lung            Rancho Mota MD  12/22/20 1302

## 2021-02-02 ENCOUNTER — HOSPITAL ENCOUNTER (EMERGENCY)
Facility: HOSPITAL | Age: 41
Discharge: HOME | End: 2021-02-02
Attending: EMERGENCY MEDICINE | Admitting: EMERGENCY MEDICINE

## 2021-02-02 ENCOUNTER — APPOINTMENT (OUTPATIENT)
Dept: GENERAL RADIOLOGY | Facility: HOSPITAL | Age: 41
End: 2021-02-02

## 2021-02-02 VITALS
HEIGHT: 65 IN | DIASTOLIC BLOOD PRESSURE: 109 MMHG | TEMPERATURE: 97.8 F | SYSTOLIC BLOOD PRESSURE: 120 MMHG | WEIGHT: 210 LBS | HEART RATE: 62 BPM | OXYGEN SATURATION: 100 % | BODY MASS INDEX: 34.99 KG/M2 | RESPIRATION RATE: 18 BRPM

## 2021-02-02 DIAGNOSIS — R07.9 CHEST PAIN, UNSPECIFIED TYPE: Primary | ICD-10-CM

## 2021-02-02 LAB
ALBUMIN SERPL-MCNC: 4.3 G/DL (ref 3.5–5.2)
ALBUMIN/GLOB SERPL: 1.5 G/DL
ALP SERPL-CCNC: 146 U/L (ref 39–117)
ALT SERPL W P-5'-P-CCNC: 9 U/L (ref 1–33)
ANION GAP SERPL CALCULATED.3IONS-SCNC: 12.9 MMOL/L (ref 5–15)
AST SERPL-CCNC: 14 U/L (ref 1–32)
BASOPHILS # BLD AUTO: 0.02 10*3/MM3 (ref 0–0.2)
BASOPHILS NFR BLD AUTO: 0.3 % (ref 0–1.5)
BILIRUB SERPL-MCNC: 1.3 MG/DL (ref 0–1.2)
BUN SERPL-MCNC: 9 MG/DL (ref 6–20)
BUN/CREAT SERPL: 10.8 (ref 7–25)
CALCIUM SPEC-SCNC: 9.3 MG/DL (ref 8.6–10.5)
CHLORIDE SERPL-SCNC: 101 MMOL/L (ref 98–107)
CO2 SERPL-SCNC: 24.1 MMOL/L (ref 22–29)
CREAT SERPL-MCNC: 0.83 MG/DL (ref 0.57–1)
DEPRECATED RDW RBC AUTO: 40.9 FL (ref 37–54)
EOSINOPHIL # BLD AUTO: 0.05 10*3/MM3 (ref 0–0.4)
EOSINOPHIL NFR BLD AUTO: 0.7 % (ref 0.3–6.2)
ERYTHROCYTE [DISTWIDTH] IN BLOOD BY AUTOMATED COUNT: 13.9 % (ref 12.3–15.4)
GFR SERPL CREATININE-BSD FRML MDRD: 77 ML/MIN/1.73
GLOBULIN UR ELPH-MCNC: 2.8 GM/DL
GLUCOSE SERPL-MCNC: 110 MG/DL (ref 65–99)
HCG SERPL QL: NEGATIVE
HCT VFR BLD AUTO: 39.2 % (ref 34–46.6)
HGB BLD-MCNC: 12.5 G/DL (ref 12–15.9)
HOLD SPECIMEN: NORMAL
IMM GRANULOCYTES # BLD AUTO: 0.03 10*3/MM3 (ref 0–0.05)
IMM GRANULOCYTES NFR BLD AUTO: 0.4 % (ref 0–0.5)
LYMPHOCYTES # BLD AUTO: 1.81 10*3/MM3 (ref 0.7–3.1)
LYMPHOCYTES NFR BLD AUTO: 25.9 % (ref 19.6–45.3)
MCH RBC QN AUTO: 25.8 PG (ref 26.6–33)
MCHC RBC AUTO-ENTMCNC: 31.9 G/DL (ref 31.5–35.7)
MCV RBC AUTO: 81 FL (ref 79–97)
MONOCYTES # BLD AUTO: 0.36 10*3/MM3 (ref 0.1–0.9)
MONOCYTES NFR BLD AUTO: 5.1 % (ref 5–12)
NEUTROPHILS NFR BLD AUTO: 4.73 10*3/MM3 (ref 1.7–7)
NEUTROPHILS NFR BLD AUTO: 67.6 % (ref 42.7–76)
NRBC BLD AUTO-RTO: 0 /100 WBC (ref 0–0.2)
NT-PROBNP SERPL-MCNC: 396.9 PG/ML (ref 0–450)
PLATELET # BLD AUTO: 257 10*3/MM3 (ref 140–450)
PMV BLD AUTO: 9.8 FL (ref 6–12)
POTASSIUM SERPL-SCNC: 3.9 MMOL/L (ref 3.5–5.2)
PROT SERPL-MCNC: 7.1 G/DL (ref 6–8.5)
RBC # BLD AUTO: 4.84 10*6/MM3 (ref 3.77–5.28)
SODIUM SERPL-SCNC: 138 MMOL/L (ref 136–145)
TROPONIN T SERPL-MCNC: <0.01 NG/ML (ref 0–0.03)
TROPONIN T SERPL-MCNC: <0.01 NG/ML (ref 0–0.03)
WBC # BLD AUTO: 7 10*3/MM3 (ref 3.4–10.8)
WHOLE BLOOD HOLD SPECIMEN: NORMAL
WHOLE BLOOD HOLD SPECIMEN: NORMAL

## 2021-02-02 PROCEDURE — 96374 THER/PROPH/DIAG INJ IV PUSH: CPT

## 2021-02-02 PROCEDURE — 84703 CHORIONIC GONADOTROPIN ASSAY: CPT | Performed by: EMERGENCY MEDICINE

## 2021-02-02 PROCEDURE — 25010000002 MORPHINE PER 10 MG: Performed by: EMERGENCY MEDICINE

## 2021-02-02 PROCEDURE — 84484 ASSAY OF TROPONIN QUANT: CPT | Performed by: EMERGENCY MEDICINE

## 2021-02-02 PROCEDURE — 99284 EMERGENCY DEPT VISIT MOD MDM: CPT

## 2021-02-02 PROCEDURE — 80053 COMPREHEN METABOLIC PANEL: CPT | Performed by: EMERGENCY MEDICINE

## 2021-02-02 PROCEDURE — 83880 ASSAY OF NATRIURETIC PEPTIDE: CPT | Performed by: EMERGENCY MEDICINE

## 2021-02-02 PROCEDURE — 71045 X-RAY EXAM CHEST 1 VIEW: CPT

## 2021-02-02 PROCEDURE — 93005 ELECTROCARDIOGRAM TRACING: CPT | Performed by: EMERGENCY MEDICINE

## 2021-02-02 PROCEDURE — 85025 COMPLETE CBC W/AUTO DIFF WBC: CPT | Performed by: EMERGENCY MEDICINE

## 2021-02-02 RX ORDER — SODIUM CHLORIDE 0.9 % (FLUSH) 0.9 %
10 SYRINGE (ML) INJECTION AS NEEDED
Status: DISCONTINUED | OUTPATIENT
Start: 2021-02-02 | End: 2021-02-02 | Stop reason: HOSPADM

## 2021-02-02 RX ORDER — NAPROXEN 500 MG/1
500 TABLET ORAL 2 TIMES DAILY PRN
Qty: 14 TABLET | Refills: 0 | Status: SHIPPED | OUTPATIENT
Start: 2021-02-02

## 2021-02-02 RX ORDER — MORPHINE SULFATE 4 MG/ML
4 INJECTION, SOLUTION INTRAMUSCULAR; INTRAVENOUS ONCE
Status: COMPLETED | OUTPATIENT
Start: 2021-02-02 | End: 2021-02-02

## 2021-02-02 RX ORDER — ASPIRIN 325 MG
325 TABLET ORAL ONCE
Status: COMPLETED | OUTPATIENT
Start: 2021-02-02 | End: 2021-02-02

## 2021-02-02 RX ORDER — NITROGLYCERIN 0.4 MG/1
0.4 TABLET SUBLINGUAL
Status: DISCONTINUED | OUTPATIENT
Start: 2021-02-02 | End: 2021-02-02 | Stop reason: HOSPADM

## 2021-02-02 RX ADMIN — ASPIRIN 325 MG ORAL TABLET 325 MG: 325 PILL ORAL at 12:01

## 2021-02-02 RX ADMIN — NITROGLYCERIN 0.4 MG: 0.4 TABLET SUBLINGUAL at 12:03

## 2021-02-02 RX ADMIN — MORPHINE SULFATE 4 MG: 4 INJECTION, SOLUTION INTRAMUSCULAR; INTRAVENOUS at 14:54

## 2021-02-02 NOTE — ED PROVIDER NOTES
TRIAGE CHIEF COMPLAINT:     Nursing and triage notes reviewed    Chief Complaint   Patient presents with   • Chest Pain      HPI: Liliana Calderon is a 40 y.o. female who presents to the emergency department complaining of chest pain.  Patient states she has had some tightness in her chest since around 3 this morning.  She describes a constant nonradiating discomfort.  No pleuritic pain or pain with breathing.  Some slight shortness of breath.  She states that her heart rate have been going down into the 30s and 40s on occasion last night.  She states that has never happened before.  She denies any lightheaded or dizziness when her heart rate would drop.  She denies any cough.  Had been diagnosed with pneumonia few months previously but states she is doing much better after treatment of that.  Denies any nausea, vomiting.    REVIEW OF SYSTEMS: All other systems reviewed and are negative     PAST MEDICAL HISTORY:   Past Medical History:   Diagnosis Date   • Anxiety and depression    • Blood clot in vein    • Chronic bronchitis (CMS/HCC)    • Endocarditis    • Fibromyalgia    • Heart valve problem    • Hypertension    • Migraine    • PCOS (polycystic ovarian syndrome)    • Pneumonia    • Pulmonary embolism (CMS/HCC)    • Seizures (CMS/HCC)         FAMILY HISTORY:   Family History   Problem Relation Age of Onset   • Breast cancer Mother    • Bone cancer Mother    • Skin cancer Mother    • Hypertension Mother    • Dementia Father    • Diabetes Father    • Heart disease Father    • Hypertension Father    • Stroke Father    • Pulmonary embolism Father    • Alcohol abuse Sister    • Hypertension Sister    • Mental illness Sister    • Alcohol abuse Brother    • Hypertension Brother    • Mental illness Brother    • Alzheimer's disease Maternal Grandmother    • Parkinsonism Maternal Grandmother    • Heart disease Paternal Grandfather         SOCIAL HISTORY:   Social History     Socioeconomic History   • Marital status: Single      Spouse name: Not on file   • Number of children: Not on file   • Years of education: Not on file   • Highest education level: Not on file   Tobacco Use   • Smoking status: Former Smoker     Packs/day: 0.25     Years: 5.00     Pack years: 1.25     Types: Cigarettes     Quit date:      Years since quittin.0   • Smokeless tobacco: Never Used   Substance and Sexual Activity   • Alcohol use: No   • Drug use: Not Currently     Types: IV     Comment: HEROIN; last used a month ago   • Sexual activity: Defer        SURGICAL HISTORY:   Past Surgical History:   Procedure Laterality Date   • CHOLECYSTECTOMY          CURRENT MEDICATIONS:      Medication List      ASK your doctor about these medications    budesonide-formoterol 160-4.5 MCG/ACT inhaler  Commonly known as: SYMBICORT  Inhale 2 puffs 2 (Two) Times a Day.     cyclobenzaprine 5 MG tablet  Commonly known as: FLEXERIL  Take 1 tablet by mouth 3 (Three) Times a Day As Needed for Muscle Spasms.     FLUoxetine 40 MG capsule  Commonly known as: PROzac     gabapentin 800 MG tablet  Commonly known as: NEURONTIN     hydrocortisone 2.5 % cream  Apply  topically to the appropriate area as directed Every 12 (Twelve) Hours.     ipratropium-albuterol 0.5-2.5 mg/3 ml nebulizer  Commonly known as: DUO-NEB     lactobacillus acidophilus capsule capsule  Take 1 capsule by mouth Daily.     levETIRAcetam 750 mg in sodium chloride 0.9 % 100 mL IVPB  Infuse 750 mg into a venous catheter Every 12 (Twelve) Hours.     levoFLOXacin 500 MG tablet  Commonly known as: LEVAQUIN  Take 1 tablet by mouth Daily.     levothyroxine sodium 100 MCG reconstituted solution injection  Infuse 1.25 mL into a venous catheter Daily.     nystatin 309451 UNIT/GM powder  Commonly known as: MYCOSTATIN  Apply  topically to the appropriate area as directed Every 12 (Twelve) Hours.     ondansetron 4 MG/2ML injection  Commonly known as: ZOFRAN  Infuse 2 mL into a venous catheter Every 6 (Six) Hours As Needed  for Nausea or Vomiting.     pantoprazole 40 MG injection  Commonly known as: PROTONIX  Infuse 10 mL into a venous catheter Every Morning Before Breakfast. Indications: sbo     potassium chloride 10 MEQ/100ML  Infuse 100 mL into a venous catheter Every 1 (One) Hour As Needed (Potassium Replacement - See Admin Instructions).     prochlorperazine 10 MG/2ML injection  Commonly known as: COMPAZINE  Infuse 1 mL into a venous catheter Every 6 (Six) Hours As Needed for Nausea or Vomiting.     sodium chloride 0.9 % with KCl 20 mEq 20-0.9 MEQ/L-% infusion  Infuse 100 mL/hr into a venous catheter Continuous.             ALLERGIES: Penicillins and Phenobarbital     PHYSICAL EXAM:   VITAL SIGNS:   Vitals:    02/02/21 1116   BP: 134/92   Pulse: 65   Resp: 18   Temp: 97.8 °F (36.6 °C)   SpO2: 99%      CONSTITUTIONAL: Awake, oriented, appears non-toxic   HENT: Atraumatic, normocephalic, oral mucosa pink and moist, airway patent. Nares patent without drainage. External ears normal.   EYES: Conjunctiva clear  NECK: Trachea midline   CARDIOVASCULAR: Normal heart rate, Normal rhythm, No murmurs, rubs, gallops   PULMONARY/CHEST: Clear to auscultation, no rhonchi, wheezes, or rales. Symmetrical breath sounds.  ABDOMINAL: Non-distended, soft, non-tender - no rebound or guarding.  NEUROLOGIC: Non-focal, moving all four extremities, no gross sensory or motor deficits.   EXTREMITIES: No clubbing, cyanosis, or edema   SKIN: Warm, Dry, No erythema, No rash     ED COURSE / MEDICAL DECISION MAKING:   Liliana Calderon is a 40 y.o. female who presents to the emergency department for evaluation of chest discomfort.  Patient is nondistressed on arrival in emergency department.  Vital signs are stable on arrival.  Physical exam is largely unremarkable on arrival.  Patient's sinus bradycardia with appropriate blood pressure and otherwise asymptomatic aside from her chest discomfort.    EKG interpreted by me reveals sinus bradycardia with a rate of 48  bpm.  There is a long QT interval.  No acute ischemic findings.  This is an abnormal appearing EKG.  EKG is unchanged when compared to previous.    Chest x-ray per radiology interpretation does not reveal any acute abnormalities.    Laboratory testing is largely unremarkable including 2 sets of cardiac enzymes several hours apart.  Given she has had continuous pain for well over 6 hours with normal cardiac enzymes and nonischemic appearing EKG I feel that this is less likely related to cardiac issue.  She did feel improved while in the emergency department.  I think patient can safely be discharged home with referral to cardiology for further evaluation.    DECISION TO DISCHARGE/ADMIT: see ED care timeline     FINAL IMPRESSION:   1 --chest pain  2 --   3 --     Electronically signed by: Glo Sim MD, 2/2/2021 11:59 Glo Hardin MD  02/02/21 1865

## 2021-06-21 NOTE — CONSULTS
Liliana Calderon  1980  [id number]    Date of Consult: 6/2/2020    Requesting Provider: Johnson Lux MD  Evaluating Physician: Rancho Lang MD    Chief Complaint: fever    Reason for Consultation: MRSA bacteremia    History of present illness:   Patient is a 39 y.o.  Yr old female with history of IVDU, depression/anxiety, COPD, and seizure d/o who initially presented to [facility] yesterday with c/o shortness of air and worsening low back pain. She last used IV heroin about 1 month prior.  She was doing relatively well until about 3 days PTA when she noticed worsening low back pain and worsening shortness of air with blood-tinged sputum. She additionally started having fevers up to 103.9F. She did not have contact with anyone known to have COVID-19 infection.     On arrival to [facility] on 5/31, the patient was found to have fevers and a significant leukocytosis. CT chest was concerning for pneumonia and possible septic pulm emboli. Blood cultures resulted with MRSA and she was transferred to [facility] for a higher level of care. Tmax since arrival here has been 102.7F. WBC was elevated to 14.2 but has improved to 10 on antibiotics. She was started on vancomycin and aztreonam by primary team. Repeat blood cultures from 6/1 are again positive for MRSA. TTE and MRI lumbar spine ordered. ID consulted for recommendations in the setting of her MRSA septicemia.    Past Medical History:   Diagnosis Date   • Anxiety and depression    • Chronic bronchitis (CMS/HCC)    • Fibromyalgia    • Heart valve problem    • Hypertension    • Migraine    • PCOS (polycystic ovarian syndrome)    • Pneumonia    • Seizures (CMS/HCC)        Past Surgical History:   Procedure Laterality Date   • CHOLECYSTECTOMY         Social History:  IVDU (heroin). Former smoker. No ETOH use.    Family History:  family history includes Alcohol abuse in her brother and sister; Alzheimer's disease in her maternal grandmother; Bone cancer in  her mother; Breast cancer in her mother; Dementia in her father; Diabetes in her father; Heart disease in her father and paternal grandfather; Hypertension in her brother, father, mother, and sister; Mental illness in her brother and sister; Parkinsonism in her maternal grandmother; Pulmonary embolism in her father; Skin cancer in her mother; Stroke in her father.    Allergies   Allergen Reactions   • Penicillins Rash   • Phenobarbital Rash       Medication:  Current Facility-Administered Medications   Medication Dose Route Frequency Provider Last Rate Last Dose   • acetaminophen (TYLENOL) tablet 650 mg  650 mg Oral Q6H PRN Malathi Vasquez APRN   650 mg at 06/01/20 2239   • aztreonam (AZACTAM) 2 g/100 mL 0.9% NS (mbp)  2 g Intravenous Once Vanessa Lopez MD       • budesonide-formoterol (SYMBICORT) 160-4.5 MCG/ACT inhaler 2 puff  2 puff Inhalation BID - RT Vanessa Lopez MD   2 puff at 06/01/20 2019   • DAPTOmycin (CUBICIN) 600 mg in sodium chloride 0.9 % 50 mL IVPB  8 mg/kg (Adjusted) Intravenous Q24H Rancho Lang MD       • FLUoxetine (PROzac) capsule 40 mg  40 mg Oral Daily Vanessa Lopez MD   40 mg at 06/01/20 1606   • gabapentin (NEURONTIN) capsule 800 mg  800 mg Oral Q8H Vanessa Lopez MD   800 mg at 06/02/20 0549   • heparin (porcine) 5000 UNIT/ML injection 5,000 Units  5,000 Units Subcutaneous Q8H Vanessa Lopez MD   5,000 Units at 06/02/20 0549   • HYDROcodone-acetaminophen (NORCO) 5-325 MG per tablet 1 tablet  1 tablet Oral Q6H PRN Vanessa Lopez MD   1 tablet at 06/02/20 0421   • ipratropium-albuterol (DUO-NEB) nebulizer solution 3 mL  3 mL Nebulization Q6H PRN Vanessa Lopez MD       • levothyroxine (SYNTHROID, LEVOTHROID) tablet 50 mcg  50 mcg Oral Daily Vanessa Lopez MD       • ondansetron (ZOFRAN) injection 4 mg  4 mg Intravenous Q6H PRN Vanessa Lopez MD   4 mg at 06/01/20 9344   • pantoprazole (PROTONIX) EC  tablet 40 mg  40 mg Oral QAM Vanessa Lopez MD   40 mg at 06/02/20 0550   • sodium chloride 0.9 % infusion  125 mL/hr Intravenous Continuous Vanessa Lopez  mL/hr at 06/02/20 0305 125 mL/hr at 06/02/20 0305   • tiZANidine (ZANAFLEX) tablet 4 mg  4 mg Oral Q8H PRN Vanessa Lopez MD   4 mg at 06/02/20 0001         Infectious History:  MRSA    Antibiotics:  Anti-Infectives (From admission, onward)    Ordered     Dose/Rate Route Frequency Start Stop    06/02/20 0743  DAPTOmycin (CUBICIN) 600 mg in sodium chloride 0.9 % 50 mL IVPB     Ordering Provider:  Rancho Lang MD    8 mg/kg × 76.6 kg (Adjusted)  100 mL/hr over 30 Minutes Intravenous Every 24 Hours 06/02/20 0900 06/09/20 0859    06/01/20 1446  aztreonam (AZACTAM) 2 g/100 mL 0.9% NS (mbp)     Ordering Provider:  Vanessa Lopez MD    2 g  over 30 Minutes Intravenous Once 06/01/20 1600      06/01/20 1501  vancomycin 2250 mg/500 mL 0.9% NS IVPB ([facility])     Ordering Provider:  Vanessa Lopez MD    2,250 mg Intravenous Once 06/01/20 1600 06/01/20 1756            Review of Systems    Constitutional-- No Fever, chills or sweats.  Appetite good, and no malaise. No fatigue.  Heent-- No new vision, hearing or throat complaints.  No epistaxis or oral sores.  Denies odynophagia or dysphagia.  No flashers, floaters or eye pain. No odynophagia or dysphagia. No headache, photophobia or neck stiffness.  CV-- No chest pain, palpitation or syncope  Resp-- No SOB/cough/Hemoptysis  GI- No nausea, vomiting, or diarrhea.  No hematochezia, melena, or hematemesis. Denies jaundice or chronic liver disease.  -- No dysuria, hematuria, or flank pain.  Denies hesitancy, urgency or flank pain.  Lymph- no swollen lymph nodes in neck/axilla or groin.   Heme- No active bruising or bleeding; no Hx of DVT or PE.  MS-- no swelling or pain in the bones or joints of arms/legs.  No new back pain.  Neuro-- No acute focal weakness or numbness in the  "arms or legs.  No seizures.  Skin-- No rashes, lesions, ulcers. No skin breakdown    12 systems were reviewed and were negative otherwise for acute complaints, except as above and per HPI    Physical Exam:   Vital Signs   /57 (BP Location: Left arm, Patient Position: Lying)   Pulse 80   Temp 99.8 °F (37.7 °C) (Oral)   Resp 18   Ht 165.1 cm (65\")   Wt 106 kg (233 lb)   LMP 04/01/2020 (Approximate)   SpO2 100%   BMI 38.77 kg/m²     GENERAL: Awake and alert, in no acute distress. obese  HEENT: Normocephalic, atraumatic.  PERRL. No conjunctival injection. No icterus. Oropharynx clear without evidence of thrush or exudate. No evidence of peridontal disease.    NECK: Supple without nuchal rigidity. No mass.  HEART: RRR; No murmur, rubs, gallops.   LUNGS: Clear to auscultation bilaterally without wheezing, rales, rhonchi. Normal respiratory effort. Nonlabored.   ABDOMEN: Soft, nontender, nondistended. Positive bowel sounds. No rebound or guarding. NO mass or HSM.  EXT:  No cyanosis, clubbing or edema. No cord.  :   Without Petit catheter.  MSK: FROM without joint effusions noted arms/legs.  Some tenderness to palpation over her lumbar spine.  SKIN: has slightly discolored/gray patchy/papular lesions over her bilateral lower extremity- these appeared about 2 weeks ago per the patient.  No other possible peripheral stigmata of infective endocarditis noted over her extremities or elsewhere. No signs of skin breakdown or cellulitis.  NEURO: Oriented to PPT. No focal deficits on motor/sensory exam at arms/legs. 5 out of 5 strength throughout  PSYCHIATRIC: Normal insight and judgement. Cooperative with PE    Laboratory Data    Results from last 7 days   Lab Units 06/02/20  0352 05/31/20  1751   WBC 10*3/mm3 10.08 14.20*   HEMOGLOBIN g/dL 6.8* 8.8*   HEMATOCRIT % 22.9* 28.5*   PLATELETS 10*3/mm3 130* 198     Results from last 7 days   Lab Units 06/02/20  0352   SODIUM mmol/L 135*   POTASSIUM mmol/L 3.8   CHLORIDE " mmol/L 99   CO2 mmol/L 23.0   BUN mg/dL 12   CREATININE mg/dL 0.63   GLUCOSE mg/dL 143*   CALCIUM mg/dL 7.8*     Results from last 7 days   Lab Units 06/02/20  0352   ALK PHOS U/L 211*   BILIRUBIN mg/dL 0.7   ALT (SGPT) U/L 11   AST (SGOT) U/L 14     Results from last 7 days   Lab Units 05/31/20  1752   SED RATE mm/hr 80*     Results from last 7 days   Lab Units 05/31/20  1751   CRP mg/dL 32.12*       Estimated Creatinine Clearance: 146.4 mL/min (by C-G formula based on SCr of 0.63 mg/dL).       Microbiology:    Blood Culture   Date Value Ref Range Status   06/01/2020 Abnormal Stain (C)  Preliminary   06/01/2020 Abnormal Stain (C)  Preliminary   05/31/2020 Abnormal Stain (C)  Preliminary   05/31/2020 Staphylococcus aureus, MRSA (C)  Preliminary     Comment:       Infectious disease consultation is highly recommended to rule out distant foci of infection.  Methicillin resistant Staphylococcus aureus, Patient may be an isolation risk.       Urine Culture   Date Value Ref Range Status   05/31/2020 50,000 CFU/mL Staphylococcus aureus, MRSA (A)  Preliminary     Comment:       Methicillin resistant Staph aureus, patient may be an isolation risk.  Staphylococcus aureus is not typically regarded as a uropathogen, except in cases with genitourinary instrumentation present.  It's presence suggests an alternate source (e.g. bloodstream, abscess, SSTI, etc.).  Please evaluate accordingly.       Radiology:  Ct Chest With Contrast    Result Date: 5/31/2020  Multiple right lung nodules.  Since 1 of these nodules is cavitary this is worrisome for septic emboli.  Left lung opacities worrisome for pneumonia. Authenticated by Sukhdeep Burnette III, MD on 05/31/2020 08:39:58 PM    Ct Abdomen Pelvis With Contrast    Result Date: 5/31/2020  Splenomegaly.  Normal appendix.  Mild anasarca. Authenticated by Sukhdeep Burnette III, MD on 05/31/2020 08:39:59 PM     I independently read the patient's CT Chest from 5/31/20 and I agree with the  above report.    Impression:     This is a 38 yo woman with a h/o IVDU presenting with severe low back pain and shortness of air and found to have fevers/sepsis with MRSA growing from multiple blood culture sets. Also has CT chest findings that are concerning for septic pulmonary emboli and her situation is very concerning for infective endocarditis of the TV. We will additionally need to rule out discitis/osteo/abscess of the lumbar spine give her low back pain.    Problems:  MRSA septicemia  Sepsis with fever, leukocytosis with neutrophilia, tachycardia  Pneumonia/lung nodules- concern for septic pulmonary emboli  Acute on chronic low back pain  Urine cx with MRSA- represents systemic infection, not primary source  Microcytic anemia  History of PCN allergy   Recent IV drug use  Obesity  H/o COPD  H/o seizure disorder  H/o depression/anxiety    PLAN: Thank you for asking us to see Liliana Calderon, I recommend the following:    -continue to follow cbc with diff, bmp, esr, and crp closely. Weekly cpk level  -will obtain surveillance blood cultures tomorrow  -follow up TTE results, will likely need REDD soon  -Agree with MRI lumbar spine  -stop vancomycin and aztreonam  -check baseline CK level  -Start Daptomycin 8mg/kg IV q24h  -Start Teflaro 600 mg IV BID (has tolerated cephalosporins in the recent past)    Long discussion with the patient today regarding her IV drug use and how this likely contributed to her bloodstream infection and probable endocarditis.  She states that she quit using IV drugs about a month ago and has sought out help on an outpatient basis.    I discussed with Dr. Black today    Complex set of medical issues requiring a high-level medical decision making.  The patient does have significant risk for for further morbidity and mortality in the setting of her MRSA to see me and likely endocarditis.    Rancho Lang MD  6/2/2020                 Consent: The patient's consent was obtained including but not limited to risks of crusting, scabbing, blistering. Render Post-Care Instructions In Note?: no Duration Of Freeze Thaw-Cycle (Seconds): 0 Detail Level: Detailed Post-Care Instructions: I reviewed with the patient in detail post-care instructions. Patient is to wear sunprotection, and avoid picking at any of the treated lesions. Pt may apply Vaseline to crusted or scabbing areas.

## 2024-04-19 NOTE — PROGRESS NOTES
[facility] Medicine Services  PROGRESS NOTE    Patient Name: Liliana Calderon  : 1980  MRN: [id number]    Date of Admission: 2020  Primary Care Physician: Ollie Flores MD    Subjective   Subjective     CC:  Follow-up for MRSA bacteremia, tricuspid valve endocarditis, multiple epidural abscesses    HPI:  Overnight developed a rash on her upper left arm, it is not itchy, but it is painful.  Denies any fever or chills.  Continues to have significant abdominal cramping and diarrhea, nausea improved somewhat with Compazine.    Review of Systems  CV- No chest pain, palpitations  Resp- No cough, dyspnea  GI- No N/V/D, abd pain     Objective   Objective     Vital Signs:   Temp:  [97.9 °F (36.6 °C)-98.1 °F (36.7 °C)] 98.1 °F (36.7 °C)  Heart Rate:  [76-86] 83  Resp:  [18-20] 18  BP: (150-169)/() 150/87        Physical Exam:  Constitutional: No acute distress, awake, alert  HENT: NCAT, mucous membranes moist  Respiratory: Clear to auscultation bilaterally, respiratory effort normal   Cardiovascular: RRR, no murmurs, rubs, or gallops, palpable pedal pulses bilaterally  Gastrointestinal: Positive bowel sounds, soft, nontender, nondistended, obese  Psychiatric: Appropriate affect, cooperative  Neurologic: Oriented x 3, Cranial Nerves grossly intact to confrontation, speech clear  Skin: Has petechiae in bilateral feet and forearms, stable from yesterday, on right upper arm on deltoid there is a large 2 to 4 cm purple patch almost looks like a bruise, with surrounding erythema extending up to shoulder, feels indurated and is tender to palpation    Results Reviewed:  Results from last 7 days   Lab Units 20  1028 20  0513 20  0456  20  0921   WBC 10*3/mm3 16.06* 15.98* 12.89*  --  9.26   HEMOGLOBIN g/dL 11.6* 12.1 11.5*   < > 9.7*  9.7*   HEMATOCRIT % 38.9 40.8 38.2   < > 33.1*  33.1*   PLATELETS 10*3/mm3 445 463* 405  --  282   INR   --   --   --   --   1.23*    < > = values in this interval not displayed.     Results from last 7 days   Lab Units 06/09/20  1028 06/08/20  0513 06/07/20  0456 06/06/20  0921   SODIUM mmol/L 135* 137 134* 134*  134*   POTASSIUM mmol/L 3.7 3.9 3.9 4.2  4.2   CHLORIDE mmol/L 104 103 100 100  100   CO2 mmol/L 19.0* 20.0* 22.0 21.0*  21.0*   BUN mg/dL 11 11 9 7  7   CREATININE mg/dL 0.55* 0.52* 0.49* 0.51*  0.51*   GLUCOSE mg/dL 148* 167* 146* 145*  145*   CALCIUM mg/dL 7.6* 7.9* 8.0* 8.1*  8.1*   ALT (SGPT) U/L  --  8 11 15   AST (SGOT) U/L  --  12 13 16     Estimated Creatinine Clearance: 167.7 mL/min (A) (by C-G formula based on SCr of 0.55 mg/dL (L)).    Microbiology Results Abnormal     Procedure Component Value - Date/Time    Anaerobic Culture - Aspirate, Back [[id number]] Collected:  06/04/20 1643    Lab Status:  Final result Specimen:  Aspirate from Back Updated:  06/09/20 0741     Anaerobic Culture No anaerobes isolated at 5 days    Blood Culture - Blood, Arm, Right [[id number]] Collected:  06/03/20 1229    Lab Status:  Final result Specimen:  Blood from Arm, Right Updated:  06/08/20 1401     Blood Culture No growth at 5 days    Blood Culture - Blood, Arm, Left [[id number]] Collected:  06/03/20 1229    Lab Status:  Final result Specimen:  Blood from Arm, Left Updated:  06/08/20 1401     Blood Culture No growth at 5 days    Body Fluid Culture - Aspirate, Back [[id number]]  (Abnormal)  (Susceptibility) Collected:  06/04/20 1645    Lab Status:  Final result Specimen:  Aspirate from Back Updated:  06/07/20 0701     Body Fluid Culture Light growth (2+) Staphylococcus aureus, MRSA     Comment:   Methicillin resistant Staphylococcus aureus, Patient may be an isolation risk.        Gram Stain No organisms seen      Many (4+) WBCs per low power field    Susceptibility      Staphylococcus aureus, MRSA     TYRONE     Gentamicin Susceptible     Oxacillin Resistant     Rifampin Susceptible     Vancomycin Susceptible                   Susceptibility Comments     Staphylococcus aureus, MRSA    This isolate does not demonstrate inducible clindamycin resistance in vitro.               Clostridium Difficile Toxin - Stool, Per Rectum [[id number]] Collected:  06/05/20 1748    Lab Status:  Final result Specimen:  Stool from Per Rectum Updated:  06/05/20 1858    Narrative:       The following orders were created for panel order Clostridium Difficile Toxin - Stool, Per Rectum.  Procedure                               Abnormality         Status                     ---------                               -----------         ------                     Clostridium Difficile To...[[id number]]  Normal              Final result                 Please view results for these tests on the individual orders.    Clostridium Difficile Toxin, PCR - Stool, Per Rectum [[id number]]  (Normal) Collected:  06/05/20 1748    Lab Status:  Final result Specimen:  Stool from Per Rectum Updated:  06/05/20 1858     C. Difficile Toxins by PCR Not Detected    Narrative:       Performance characteristics of test not established for patients <2 years of age.  Negative for Toxigenic C. Difficile    Blood Culture - Blood, Wrist, Left [[id number]]  (Abnormal) Collected:  06/01/20 1601    Lab Status:  Final result Specimen:  Blood from Wrist, Left Updated:  06/04/20 0612     Blood Culture Staphylococcus aureus, MRSA     Comment:   Infectious disease consultation is highly recommended to rule out distant foci of infection.  Methicillin resistant Staphylococcus aureus, Patient may be an isolation risk.        Isolated from Aerobic Bottle     Gram Stain Aerobic Bottle Gram positive cocci in clusters    Narrative:       Refer to previous blood culture collected on 6/1/2020 1558 for MICs.      Blood Culture - Blood, Arm, Left [[id number]]  (Abnormal)  (Susceptibility) Collected:  06/01/20 1558    Lab Status:  Final result Specimen:  Blood from Arm, Left Updated:  06/04/20 0610     Blood Culture  Staphylococcus aureus, MRSA     Comment:   Infectious disease consultation is highly recommended to rule out distant foci of infection.  Methicillin resistant Staphylococcus aureus, Patient may be an isolation risk.        Isolated from Aerobic and Anaerobic Bottles     Gram Stain Aerobic Bottle Gram positive cocci in clusters      Anaerobic Bottle Gram positive cocci in clusters    Susceptibility      Staphylococcus aureus, MRSA     TYRONE     Gentamicin Susceptible     Oxacillin Resistant     Rifampin Susceptible     Vancomycin Susceptible                Susceptibility Comments     Staphylococcus aureus, MRSA    This isolate does not demonstrate inducible clindamycin resistance in vitro.               Blood Culture ID, PCR - Blood, Arm, Left [[id number]]  (Abnormal) Collected:  06/01/20 1558    Lab Status:  Final result Specimen:  Blood from Arm, Left Updated:  06/02/20 0721     BCID, PCR Staphylococcus aureus. mecA (methicillin resistance gene) detected. Identification by BCID PCR.     BOTTLE TYPE Aerobic Bottle          Imaging Results (Last 24 Hours)     Procedure Component Value Units Date/Time    XR Shoulder 2+ View Right [[id number]] Collected:  06/08/20 0955     Updated:  06/08/20 1633    Narrative:          EXAMINATION: XR SHOULDER 2+ VW, RIGHT-06/08/2020:      INDICATION: Right shoulder pain; Z74.09-Other reduced mobility.      COMPARISON: NONE.     FINDINGS: Two views of the right shoulder reveal no evidence of fracture  or dislocation. The cortex is intact. Joint spaces are preserved. No  joint effusion. No acute bony abnormality.           Impression:       No acute bony abnormality.     D:  06/08/2020  E:  06/08/2020     This report was finalized on 6/8/2020 4:30 PM by Dr. Jasmine Marie MD.             Results for orders placed during the hospital encounter of 06/01/20   Adult Transesophageal Echo (REDD) W/ Cont if Necessary Per Protocol    Narrative · Left ventricular systolic function is normal.  Estimated EF appears to be   in the range of 56 - 60%  · The tricuspid valve is abnormal. There is a large vegetation measuring 1   x 1.5 cm on the tricuspid valve. Predominantly located on the posterior   leaflet but anterior leaflet appears involved as well.  · Estimated right ventricular systolic pressure from tricuspid   regurgitation is moderately elevated (45-55 mmHg).  · Mild to moderate mitral regurgitation noted with an eccentric jet.   However no vegetations visualized on the mitral valve  · The other cardiac valves appear free of vegetations  · Discussed findings with ordering provider.          I have reviewed the medications:  Scheduled Meds:  budesonide-formoterol 2 puff Inhalation BID - RT   ceftaroline 600 mg Intravenous BID   DAPTOmycin 8 mg/kg (Adjusted) Intravenous Q24H   FLUoxetine 40 mg Oral Daily   gabapentin 800 mg Oral Q8H   hydrocortisone  Topical Q12H   lactobacillus acidophilus 1 capsule Oral Daily   levETIRAcetam 500 mg Oral Q12H   levothyroxine 50 mcg Oral Daily   Morphine 15 mg Oral Q12H   nystatin  Topical Q12H   pantoprazole 40 mg Oral QAM     Continuous Infusions:  sodium chloride 125 mL/hr Last Rate: 125 mL/hr (06/09/20 0917)     PRN Meds:.•  acetaminophen  •  HYDROcodone-acetaminophen  •  hydrOXYzine  •  ipratropium-albuterol  •  loperamide  •  melatonin  •  prochlorperazine **OR** prochlorperazine **OR** prochlorperazine  •  tiZANidine  •  zolpidem    Assessment/Plan   Assessment & Plan     Active Hospital Problems    Diagnosis  POA   • **Sepsis (CMS/HCC) [A41.9]  Yes   • Infective endocarditis [I33.0]  Unknown   • Epidural abscess [G06.2]  Unknown   • MRSA bacteremia [R78.81, B95.62]  Unknown   • DDD (degenerative disc disease), cervical [M50.30]  Unknown   • Anxiety [F41.9]  Unknown   • Depression [F32.9]  Unknown   • Seizure (CMS/HCC) [R56.9]  Unknown   • Migraine [G43.909]  Unknown   • Essential hypertension [I10]  Yes   • Fibromyalgia syndrome [M79.7]  Yes   • GERD without  70yo female with hx of DM II, HTN, A fib s/p recent ablation 1 month ago, recent diagnosis of Splenic CA w/ plan for splenectomy on May 10th, presented to the ED w/ complaints of not feeling well as well as frequent falls this week w/ most recent episode today. Pt states she feels her legs are weak and unable to stand independently. States she fell twice this week. Denies numbness/tingling in her LE. Denies cp, palpitations, sob, abd pain, N/V, fever, chills.    In the ED, pt noted to be in A FIB w/ RVR unable to control w/ IV Cardizem. Started on Cardizem gtt w/ home Amiodarone to bring rate down to 100-120s.  esophagitis [K21.9]  Yes      Resolved Hospital Problems   No resolved problems to display.        Brief Hospital Course to date:  Liliana Calderon is a 39 y.o. female with a past medical history of depression/anxiety, fibromyalgia, COPD, hypertension, seizure disorder, and remote IV drug use who was admitted on June 1 for sepsis with MRSA bacteremia, UTI, pneumonia, and epidural abscesses.  Following currently on Dapto and Teflaro.      Right shoulder pain  -Appears erythematous, warm, and tender to the touch and now there is a new rash there, she is already on antibiotics, continue these for now  -X-ray of right shoulder doubt acute fracture or dislocation     Rash, stable  -Unclear etiology, blood counts continue to stay normal  -Continue hydrocortisone cream     MRSA bacteremia, UTI, pneumonia, tricuspid valve endocarditis, and epidural abscesses  -Had REDD with a tricuspid valve vegetation approximately 1 cm x 1.2 cm in size  -Repeat CT chest with dependent bilateral pleural effusions, with bilateral parenchymal changes including well-circumscribed nodules representing septic emboli and poorly defined nodules  -We will continue antibiotics per ID recommendations  -CT surgery consult-will eventually need removal of tricuspid valve vegetation but is too high risk at this point     Acute on chronic low back pain  -Secondary to epidural abscesses-continue Dilaudid as needed for pain  -Had CT-guided drainage of her right paraspinal abscess on June 4 that was positive for MRSA-continue antibiotics-will need repeat imaging at some point     Diarrhea  -C. difficile not detected, continue probiotic; worse, will try Imodium     Nausea and vomiting  -   Improved with Compazine, will continue this for now     DVT Prophylaxis: Subcu heparin     Disposition: I expect the patient to be discharged pending clinical improvement in nausea, vomiting, and eventual treatment for tricuspid valve vegetation.    CODE STATUS:   Code  Status and Medical Interventions:   Ordered at: 06/01/20 1441     Code Status:    CPR     Medical Interventions (Level of Support Prior to Arrest):    Full         Electronically signed by Verenice Keller MD, 06/09/20, 12:28.